# Patient Record
Sex: MALE | Race: BLACK OR AFRICAN AMERICAN | NOT HISPANIC OR LATINO | Employment: UNEMPLOYED | URBAN - METROPOLITAN AREA
[De-identification: names, ages, dates, MRNs, and addresses within clinical notes are randomized per-mention and may not be internally consistent; named-entity substitution may affect disease eponyms.]

---
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
## 2018-01-05 ENCOUNTER — COMMUNICATION - HEALTHEAST (OUTPATIENT)
Dept: FAMILY MEDICINE | Facility: CLINIC | Age: 18
End: 2018-01-05

## 2018-01-05 DIAGNOSIS — F06.30 MOOD DISORDER DUE TO A GENERAL MEDICAL CONDITION: ICD-10-CM

## 2018-01-28 ENCOUNTER — COMMUNICATION - HEALTHEAST (OUTPATIENT)
Dept: FAMILY MEDICINE | Facility: CLINIC | Age: 18
End: 2018-01-28

## 2018-01-28 DIAGNOSIS — F06.30 MOOD DISORDER DUE TO A GENERAL MEDICAL CONDITION: ICD-10-CM

## 2018-02-05 ENCOUNTER — COMMUNICATION - HEALTHEAST (OUTPATIENT)
Dept: FAMILY MEDICINE | Facility: CLINIC | Age: 18
End: 2018-02-05

## 2018-02-05 DIAGNOSIS — F06.30 MOOD DISORDER DUE TO A GENERAL MEDICAL CONDITION: ICD-10-CM

## 2018-02-14 ENCOUNTER — OFFICE VISIT - HEALTHEAST (OUTPATIENT)
Dept: FAMILY MEDICINE | Facility: CLINIC | Age: 18
End: 2018-02-14

## 2018-02-14 DIAGNOSIS — F06.30 MOOD DISORDER DUE TO A GENERAL MEDICAL CONDITION: ICD-10-CM

## 2018-02-14 DIAGNOSIS — F63.81 INTERMITTENT EXPLOSIVE DISORDER: ICD-10-CM

## 2018-02-14 DIAGNOSIS — E66.09 OBESITY DUE TO EXCESS CALORIES: ICD-10-CM

## 2018-02-14 ASSESSMENT — MIFFLIN-ST. JEOR: SCORE: 2353.44

## 2018-03-07 ENCOUNTER — COMMUNICATION - HEALTHEAST (OUTPATIENT)
Dept: FAMILY MEDICINE | Facility: CLINIC | Age: 18
End: 2018-03-07

## 2018-03-07 DIAGNOSIS — F06.30 MOOD DISORDER DUE TO A GENERAL MEDICAL CONDITION: ICD-10-CM

## 2018-04-07 ENCOUNTER — COMMUNICATION - HEALTHEAST (OUTPATIENT)
Dept: FAMILY MEDICINE | Facility: CLINIC | Age: 18
End: 2018-04-07

## 2018-04-07 DIAGNOSIS — F06.30 MOOD DISORDER DUE TO A GENERAL MEDICAL CONDITION: ICD-10-CM

## 2018-04-07 DIAGNOSIS — F63.81 INTERMITTENT EXPLOSIVE DISORDER: ICD-10-CM

## 2018-05-07 ENCOUNTER — COMMUNICATION - HEALTHEAST (OUTPATIENT)
Dept: FAMILY MEDICINE | Facility: CLINIC | Age: 18
End: 2018-05-07

## 2018-05-07 DIAGNOSIS — F06.30 MOOD DISORDER DUE TO A GENERAL MEDICAL CONDITION: ICD-10-CM

## 2018-05-07 DIAGNOSIS — F63.81 INTERMITTENT EXPLOSIVE DISORDER: ICD-10-CM

## 2018-05-16 ENCOUNTER — OFFICE VISIT - HEALTHEAST (OUTPATIENT)
Dept: BEHAVIORAL HEALTH | Facility: CLINIC | Age: 18
End: 2018-05-16

## 2018-05-16 DIAGNOSIS — F63.81 INTERMITTENT EXPLOSIVE DISORDER: ICD-10-CM

## 2018-05-16 DIAGNOSIS — F41.1 GENERALIZED ANXIETY DISORDER: ICD-10-CM

## 2018-06-02 ENCOUNTER — COMMUNICATION - HEALTHEAST (OUTPATIENT)
Dept: FAMILY MEDICINE | Facility: CLINIC | Age: 18
End: 2018-06-02

## 2018-06-02 DIAGNOSIS — F63.81 INTERMITTENT EXPLOSIVE DISORDER: ICD-10-CM

## 2018-06-02 DIAGNOSIS — F06.30 MOOD DISORDER DUE TO A GENERAL MEDICAL CONDITION: ICD-10-CM

## 2018-06-06 ENCOUNTER — COMMUNICATION - HEALTHEAST (OUTPATIENT)
Dept: BEHAVIORAL HEALTH | Facility: CLINIC | Age: 18
End: 2018-06-06

## 2018-06-06 ENCOUNTER — COMMUNICATION - HEALTHEAST (OUTPATIENT)
Dept: FAMILY MEDICINE | Facility: CLINIC | Age: 18
End: 2018-06-06

## 2018-06-06 DIAGNOSIS — D69.3 CHRONIC ITP (IDIOPATHIC THROMBOCYTOPENIA) (H): ICD-10-CM

## 2018-06-06 DIAGNOSIS — F06.30 MOOD DISORDER DUE TO A GENERAL MEDICAL CONDITION: ICD-10-CM

## 2018-06-06 DIAGNOSIS — F63.81 INTERMITTENT EXPLOSIVE DISORDER: ICD-10-CM

## 2018-06-07 ENCOUNTER — COMMUNICATION - HEALTHEAST (OUTPATIENT)
Dept: FAMILY MEDICINE | Facility: CLINIC | Age: 18
End: 2018-06-07

## 2018-06-07 DIAGNOSIS — F06.30 MOOD DISORDER DUE TO A GENERAL MEDICAL CONDITION: ICD-10-CM

## 2018-07-08 ENCOUNTER — COMMUNICATION - HEALTHEAST (OUTPATIENT)
Dept: FAMILY MEDICINE | Facility: CLINIC | Age: 18
End: 2018-07-08

## 2018-07-08 DIAGNOSIS — F63.81 INTERMITTENT EXPLOSIVE DISORDER: ICD-10-CM

## 2018-07-08 DIAGNOSIS — F06.30 MOOD DISORDER DUE TO A GENERAL MEDICAL CONDITION: ICD-10-CM

## 2018-07-09 ENCOUNTER — COMMUNICATION - HEALTHEAST (OUTPATIENT)
Dept: FAMILY MEDICINE | Facility: CLINIC | Age: 18
End: 2018-07-09

## 2018-07-09 DIAGNOSIS — F06.30 MOOD DISORDER DUE TO A GENERAL MEDICAL CONDITION: ICD-10-CM

## 2018-07-09 DIAGNOSIS — F63.81 INTERMITTENT EXPLOSIVE DISORDER: ICD-10-CM

## 2018-07-23 ENCOUNTER — OFFICE VISIT - HEALTHEAST (OUTPATIENT)
Dept: BEHAVIORAL HEALTH | Facility: CLINIC | Age: 18
End: 2018-07-23

## 2018-07-23 DIAGNOSIS — F06.30 MOOD DISORDER DUE TO A GENERAL MEDICAL CONDITION: ICD-10-CM

## 2018-07-30 ENCOUNTER — COMMUNICATION - HEALTHEAST (OUTPATIENT)
Dept: FAMILY MEDICINE | Facility: CLINIC | Age: 18
End: 2018-07-30

## 2018-07-30 DIAGNOSIS — F06.30 MOOD DISORDER DUE TO A GENERAL MEDICAL CONDITION: ICD-10-CM

## 2018-08-21 ENCOUNTER — COMMUNICATION - HEALTHEAST (OUTPATIENT)
Dept: FAMILY MEDICINE | Facility: CLINIC | Age: 18
End: 2018-08-21

## 2018-08-21 DIAGNOSIS — F63.81 INTERMITTENT EXPLOSIVE DISORDER: ICD-10-CM

## 2018-08-21 DIAGNOSIS — F06.30 MOOD DISORDER DUE TO A GENERAL MEDICAL CONDITION: ICD-10-CM

## 2018-09-14 ENCOUNTER — COMMUNICATION - HEALTHEAST (OUTPATIENT)
Dept: FAMILY MEDICINE | Facility: CLINIC | Age: 18
End: 2018-09-14

## 2018-09-14 DIAGNOSIS — F06.30 MOOD DISORDER DUE TO A GENERAL MEDICAL CONDITION: ICD-10-CM

## 2018-09-14 DIAGNOSIS — F63.81 INTERMITTENT EXPLOSIVE DISORDER: ICD-10-CM

## 2018-10-08 ENCOUNTER — COMMUNICATION - HEALTHEAST (OUTPATIENT)
Dept: FAMILY MEDICINE | Facility: CLINIC | Age: 18
End: 2018-10-08

## 2018-10-08 DIAGNOSIS — F63.81 INTERMITTENT EXPLOSIVE DISORDER: ICD-10-CM

## 2018-10-08 DIAGNOSIS — F06.30 MOOD DISORDER DUE TO A GENERAL MEDICAL CONDITION: ICD-10-CM

## 2018-11-04 ENCOUNTER — COMMUNICATION - HEALTHEAST (OUTPATIENT)
Dept: FAMILY MEDICINE | Facility: CLINIC | Age: 18
End: 2018-11-04

## 2018-11-04 DIAGNOSIS — F06.30 MOOD DISORDER DUE TO A GENERAL MEDICAL CONDITION: ICD-10-CM

## 2018-11-04 DIAGNOSIS — F63.81 INTERMITTENT EXPLOSIVE DISORDER: ICD-10-CM

## 2018-12-08 ENCOUNTER — COMMUNICATION - HEALTHEAST (OUTPATIENT)
Dept: FAMILY MEDICINE | Facility: CLINIC | Age: 18
End: 2018-12-08

## 2018-12-08 DIAGNOSIS — F06.30 MOOD DISORDER DUE TO A GENERAL MEDICAL CONDITION: ICD-10-CM

## 2018-12-08 DIAGNOSIS — F63.81 INTERMITTENT EXPLOSIVE DISORDER: ICD-10-CM

## 2019-01-31 ENCOUNTER — COMMUNICATION - HEALTHEAST (OUTPATIENT)
Dept: FAMILY MEDICINE | Facility: CLINIC | Age: 19
End: 2019-01-31

## 2019-01-31 DIAGNOSIS — F06.30 MOOD DISORDER DUE TO A GENERAL MEDICAL CONDITION: ICD-10-CM

## 2019-01-31 DIAGNOSIS — F63.81 INTERMITTENT EXPLOSIVE DISORDER: ICD-10-CM

## 2019-03-01 ENCOUNTER — COMMUNICATION - HEALTHEAST (OUTPATIENT)
Dept: FAMILY MEDICINE | Facility: CLINIC | Age: 19
End: 2019-03-01

## 2019-03-20 ENCOUNTER — OFFICE VISIT - HEALTHEAST (OUTPATIENT)
Dept: FAMILY MEDICINE | Facility: CLINIC | Age: 19
End: 2019-03-20

## 2019-03-20 DIAGNOSIS — E66.813 CLASS 3 SEVERE OBESITY DUE TO EXCESS CALORIES WITHOUT SERIOUS COMORBIDITY IN ADULT, UNSPECIFIED BMI (H): ICD-10-CM

## 2019-03-20 DIAGNOSIS — R46.89 AGGRESSIVE BEHAVIOR: ICD-10-CM

## 2019-03-20 DIAGNOSIS — F70 MENTAL RETARDATION, MILD (I.Q. 50-70): ICD-10-CM

## 2019-03-20 DIAGNOSIS — D69.3 CHRONIC ITP (IDIOPATHIC THROMBOCYTOPENIA) (H): ICD-10-CM

## 2019-03-20 DIAGNOSIS — Z79.899 ASSESSMENT OF EFFECTS OF PSYCHOTROPIC DRUG IN PATIENT AT RISK FOR METABOLIC SYNDROME: ICD-10-CM

## 2019-03-20 DIAGNOSIS — E66.01 CLASS 3 SEVERE OBESITY DUE TO EXCESS CALORIES WITHOUT SERIOUS COMORBIDITY IN ADULT, UNSPECIFIED BMI (H): ICD-10-CM

## 2019-03-20 DIAGNOSIS — F63.81 INTERMITTENT EXPLOSIVE DISORDER: ICD-10-CM

## 2019-03-20 DIAGNOSIS — Z01.89 ASSESSMENT OF EFFECTS OF PSYCHOTROPIC DRUG IN PATIENT AT RISK FOR METABOLIC SYNDROME: ICD-10-CM

## 2019-03-20 DIAGNOSIS — Z91.89 ASSESSMENT OF EFFECTS OF PSYCHOTROPIC DRUG IN PATIENT AT RISK FOR METABOLIC SYNDROME: ICD-10-CM

## 2019-03-20 DIAGNOSIS — Z90.81 POST-SPLENECTOMY: ICD-10-CM

## 2019-04-08 ENCOUNTER — COMMUNICATION - HEALTHEAST (OUTPATIENT)
Dept: FAMILY MEDICINE | Facility: CLINIC | Age: 19
End: 2019-04-08

## 2019-04-08 DIAGNOSIS — R73.03 PRE-DIABETES: ICD-10-CM

## 2019-04-08 DIAGNOSIS — D69.3 CHRONIC ITP (IDIOPATHIC THROMBOCYTOPENIA) (H): ICD-10-CM

## 2019-04-08 DIAGNOSIS — F06.30 MOOD DISORDER DUE TO A GENERAL MEDICAL CONDITION: ICD-10-CM

## 2019-04-08 DIAGNOSIS — F63.81 INTERMITTENT EXPLOSIVE DISORDER: ICD-10-CM

## 2019-04-12 ENCOUNTER — COMMUNICATION - HEALTHEAST (OUTPATIENT)
Dept: FAMILY MEDICINE | Facility: CLINIC | Age: 19
End: 2019-04-12

## 2019-04-12 DIAGNOSIS — E61.1 LOW IRON: ICD-10-CM

## 2019-04-15 ENCOUNTER — OFFICE VISIT - HEALTHEAST (OUTPATIENT)
Dept: BEHAVIORAL HEALTH | Facility: CLINIC | Age: 19
End: 2019-04-15

## 2019-04-15 ENCOUNTER — AMBULATORY - HEALTHEAST (OUTPATIENT)
Dept: FAMILY MEDICINE | Facility: CLINIC | Age: 19
End: 2019-04-15

## 2019-04-15 DIAGNOSIS — F63.81 INTERMITTENT EXPLOSIVE DISORDER: ICD-10-CM

## 2019-04-15 DIAGNOSIS — F40.01 PANIC DISORDER WITH AGORAPHOBIA: ICD-10-CM

## 2019-04-15 DIAGNOSIS — Z79.899 HIGH RISK MEDICATION USE: ICD-10-CM

## 2019-04-15 DIAGNOSIS — E61.1 IRON DEFICIENCY: ICD-10-CM

## 2019-04-15 DIAGNOSIS — F06.30 MOOD DISORDER DUE TO A GENERAL MEDICAL CONDITION: ICD-10-CM

## 2019-04-15 LAB
AMPHETAMINES UR QL SCN: ABNORMAL
BARBITURATES UR QL: ABNORMAL
BENZODIAZ UR QL: ABNORMAL
CANNABINOIDS UR QL SCN: ABNORMAL
COCAINE UR QL: ABNORMAL
CREAT UR-MCNC: 202.9 MG/DL
METHADONE UR QL SCN: ABNORMAL
OPIATES UR QL SCN: ABNORMAL
OXYCODONE UR QL: ABNORMAL
PCP UR QL SCN: ABNORMAL

## 2019-04-15 ASSESSMENT — MIFFLIN-ST. JEOR: SCORE: 2337.56

## 2019-04-16 ENCOUNTER — COMMUNICATION - HEALTHEAST (OUTPATIENT)
Dept: BEHAVIORAL HEALTH | Facility: CLINIC | Age: 19
End: 2019-04-16

## 2019-04-18 LAB — DRUGS UR SCN NOM: NORMAL

## 2019-04-19 ENCOUNTER — COMMUNICATION - HEALTHEAST (OUTPATIENT)
Dept: BEHAVIORAL HEALTH | Facility: CLINIC | Age: 19
End: 2019-04-19

## 2019-04-22 ENCOUNTER — OFFICE VISIT - HEALTHEAST (OUTPATIENT)
Dept: FAMILY MEDICINE | Facility: CLINIC | Age: 19
End: 2019-04-22

## 2019-04-22 DIAGNOSIS — F63.81 INTERMITTENT EXPLOSIVE DISORDER: ICD-10-CM

## 2019-04-22 DIAGNOSIS — D69.3 CHRONIC ITP (IDIOPATHIC THROMBOCYTOPENIA) (H): ICD-10-CM

## 2019-04-22 DIAGNOSIS — Z23 IMMUNIZATION DUE: ICD-10-CM

## 2019-04-22 DIAGNOSIS — Z90.81 POST-SPLENECTOMY: ICD-10-CM

## 2019-04-24 ENCOUNTER — COMMUNICATION - HEALTHEAST (OUTPATIENT)
Dept: BEHAVIORAL HEALTH | Facility: CLINIC | Age: 19
End: 2019-04-24

## 2019-04-25 ENCOUNTER — COMMUNICATION - HEALTHEAST (OUTPATIENT)
Dept: BEHAVIORAL HEALTH | Facility: CLINIC | Age: 19
End: 2019-04-25

## 2019-04-25 ENCOUNTER — OFFICE VISIT - HEALTHEAST (OUTPATIENT)
Dept: BEHAVIORAL HEALTH | Facility: CLINIC | Age: 19
End: 2019-04-25

## 2019-04-25 DIAGNOSIS — F63.81 INTERMITTENT EXPLOSIVE DISORDER: ICD-10-CM

## 2019-04-25 DIAGNOSIS — F40.01 PANIC DISORDER WITH AGORAPHOBIA: ICD-10-CM

## 2019-04-25 DIAGNOSIS — F06.30 MOOD DISORDER DUE TO A GENERAL MEDICAL CONDITION: ICD-10-CM

## 2019-04-25 ASSESSMENT — MIFFLIN-ST. JEOR: SCORE: 2292.21

## 2019-05-01 ENCOUNTER — OFFICE VISIT - HEALTHEAST (OUTPATIENT)
Dept: BEHAVIORAL HEALTH | Facility: CLINIC | Age: 19
End: 2019-05-01

## 2019-05-01 ENCOUNTER — AMBULATORY - HEALTHEAST (OUTPATIENT)
Dept: BEHAVIORAL HEALTH | Facility: CLINIC | Age: 19
End: 2019-05-01

## 2019-05-01 DIAGNOSIS — F63.81 INTERMITTENT EXPLOSIVE DISORDER: ICD-10-CM

## 2019-05-01 DIAGNOSIS — F06.30 MOOD DISORDER DUE TO A GENERAL MEDICAL CONDITION: ICD-10-CM

## 2019-05-01 ASSESSMENT — MIFFLIN-ST. JEOR: SCORE: 2296.74

## 2019-05-07 ENCOUNTER — AMBULATORY - HEALTHEAST (OUTPATIENT)
Dept: NURSING | Facility: CLINIC | Age: 19
End: 2019-05-07

## 2019-05-07 ENCOUNTER — COMMUNICATION - HEALTHEAST (OUTPATIENT)
Dept: FAMILY MEDICINE | Facility: CLINIC | Age: 19
End: 2019-05-07

## 2019-05-07 DIAGNOSIS — D69.3 CHRONIC ITP (IDIOPATHIC THROMBOCYTOPENIA) (H): ICD-10-CM

## 2019-05-07 DIAGNOSIS — Z23 IMMUNIZATION DUE: ICD-10-CM

## 2019-05-07 DIAGNOSIS — Z90.81 POST-SPLENECTOMY: ICD-10-CM

## 2019-05-08 ENCOUNTER — COMMUNICATION - HEALTHEAST (OUTPATIENT)
Dept: BEHAVIORAL HEALTH | Facility: CLINIC | Age: 19
End: 2019-05-08

## 2019-05-08 ENCOUNTER — AMBULATORY - HEALTHEAST (OUTPATIENT)
Dept: BEHAVIORAL HEALTH | Facility: CLINIC | Age: 19
End: 2019-05-08

## 2019-05-08 DIAGNOSIS — Z79.899 HIGH RISK MEDICATION USE: ICD-10-CM

## 2019-05-08 DIAGNOSIS — F63.81 INTERMITTENT EXPLOSIVE DISORDER: ICD-10-CM

## 2019-05-08 LAB
AMPHETAMINES UR QL SCN: NORMAL
BARBITURATES UR QL: NORMAL
BENZODIAZ UR QL: NORMAL
CANNABINOIDS UR QL SCN: NORMAL
COCAINE UR QL: NORMAL
CREAT UR-MCNC: 295.3 MG/DL
METHADONE UR QL SCN: NORMAL
OPIATES UR QL SCN: NORMAL
OXYCODONE UR QL: NORMAL
PCP UR QL SCN: NORMAL

## 2019-05-08 ASSESSMENT — MIFFLIN-ST. JEOR: SCORE: 2264.99

## 2019-05-09 ENCOUNTER — OFFICE VISIT - HEALTHEAST (OUTPATIENT)
Dept: BEHAVIORAL HEALTH | Facility: CLINIC | Age: 19
End: 2019-05-09

## 2019-05-09 ENCOUNTER — COMMUNICATION - HEALTHEAST (OUTPATIENT)
Dept: BEHAVIORAL HEALTH | Facility: CLINIC | Age: 19
End: 2019-05-09

## 2019-05-09 DIAGNOSIS — F63.81 INTERMITTENT EXPLOSIVE DISORDER: ICD-10-CM

## 2019-05-22 ENCOUNTER — RECORDS - HEALTHEAST (OUTPATIENT)
Dept: ADMINISTRATIVE | Facility: OTHER | Age: 19
End: 2019-05-22

## 2019-05-30 ENCOUNTER — COMMUNICATION - HEALTHEAST (OUTPATIENT)
Dept: BEHAVIORAL HEALTH | Facility: CLINIC | Age: 19
End: 2019-05-30

## 2019-05-30 DIAGNOSIS — F40.01 PANIC DISORDER WITH AGORAPHOBIA: ICD-10-CM

## 2019-06-03 ENCOUNTER — COMMUNICATION - HEALTHEAST (OUTPATIENT)
Dept: BEHAVIORAL HEALTH | Facility: CLINIC | Age: 19
End: 2019-06-03

## 2019-06-03 DIAGNOSIS — F40.01 PANIC DISORDER WITH AGORAPHOBIA: ICD-10-CM

## 2019-06-04 ENCOUNTER — COMMUNICATION - HEALTHEAST (OUTPATIENT)
Dept: FAMILY MEDICINE | Facility: CLINIC | Age: 19
End: 2019-06-04

## 2019-06-11 ENCOUNTER — COMMUNICATION - HEALTHEAST (OUTPATIENT)
Dept: BEHAVIORAL HEALTH | Facility: CLINIC | Age: 19
End: 2019-06-11

## 2019-06-17 ENCOUNTER — COMMUNICATION - HEALTHEAST (OUTPATIENT)
Dept: BEHAVIORAL HEALTH | Facility: CLINIC | Age: 19
End: 2019-06-17

## 2019-06-26 ENCOUNTER — COMMUNICATION - HEALTHEAST (OUTPATIENT)
Dept: BEHAVIORAL HEALTH | Facility: CLINIC | Age: 19
End: 2019-06-26

## 2019-06-26 DIAGNOSIS — F40.01 PANIC DISORDER WITH AGORAPHOBIA: ICD-10-CM

## 2019-06-26 DIAGNOSIS — F63.81 INTERMITTENT EXPLOSIVE DISORDER: ICD-10-CM

## 2019-06-26 DIAGNOSIS — F06.30 MOOD DISORDER DUE TO A GENERAL MEDICAL CONDITION: ICD-10-CM

## 2019-07-01 ENCOUNTER — COMMUNICATION - HEALTHEAST (OUTPATIENT)
Dept: BEHAVIORAL HEALTH | Facility: CLINIC | Age: 19
End: 2019-07-01

## 2019-07-01 DIAGNOSIS — F06.30 MOOD DISORDER DUE TO A GENERAL MEDICAL CONDITION: ICD-10-CM

## 2019-07-01 DIAGNOSIS — F63.81 INTERMITTENT EXPLOSIVE DISORDER: ICD-10-CM

## 2019-07-02 ENCOUNTER — OFFICE VISIT - HEALTHEAST (OUTPATIENT)
Dept: BEHAVIORAL HEALTH | Facility: CLINIC | Age: 19
End: 2019-07-02

## 2019-07-02 DIAGNOSIS — F63.81 INTERMITTENT EXPLOSIVE DISORDER: ICD-10-CM

## 2019-07-02 DIAGNOSIS — F06.30 MOOD DISORDER DUE TO A GENERAL MEDICAL CONDITION: ICD-10-CM

## 2019-07-02 ASSESSMENT — MIFFLIN-ST. JEOR: SCORE: 2246.85

## 2019-07-17 ENCOUNTER — OFFICE VISIT - HEALTHEAST (OUTPATIENT)
Dept: BEHAVIORAL HEALTH | Facility: CLINIC | Age: 19
End: 2019-07-17

## 2019-07-17 DIAGNOSIS — F63.81 INTERMITTENT EXPLOSIVE DISORDER: ICD-10-CM

## 2019-07-17 DIAGNOSIS — F06.30 MOOD DISORDER DUE TO A GENERAL MEDICAL CONDITION: ICD-10-CM

## 2019-07-17 DIAGNOSIS — F40.01 PANIC DISORDER WITH AGORAPHOBIA: ICD-10-CM

## 2019-07-17 ASSESSMENT — MIFFLIN-ST. JEOR: SCORE: 2260.45

## 2019-07-25 ENCOUNTER — COMMUNICATION - HEALTHEAST (OUTPATIENT)
Dept: BEHAVIORAL HEALTH | Facility: CLINIC | Age: 19
End: 2019-07-25

## 2019-07-25 DIAGNOSIS — F40.01 PANIC DISORDER WITH AGORAPHOBIA: ICD-10-CM

## 2019-07-26 ENCOUNTER — RECORDS - HEALTHEAST (OUTPATIENT)
Dept: ADMINISTRATIVE | Facility: OTHER | Age: 19
End: 2019-07-26

## 2019-08-01 ENCOUNTER — COMMUNICATION - HEALTHEAST (OUTPATIENT)
Dept: BEHAVIORAL HEALTH | Facility: CLINIC | Age: 19
End: 2019-08-01

## 2019-08-01 ENCOUNTER — RECORDS - HEALTHEAST (OUTPATIENT)
Dept: ADMINISTRATIVE | Facility: OTHER | Age: 19
End: 2019-08-01

## 2019-08-01 DIAGNOSIS — F40.01 PANIC DISORDER WITH AGORAPHOBIA: ICD-10-CM

## 2019-08-01 DIAGNOSIS — F63.81 INTERMITTENT EXPLOSIVE DISORDER: ICD-10-CM

## 2019-08-12 ENCOUNTER — COMMUNICATION - HEALTHEAST (OUTPATIENT)
Dept: BEHAVIORAL HEALTH | Facility: CLINIC | Age: 19
End: 2019-08-12

## 2019-08-12 DIAGNOSIS — F06.30 MOOD DISORDER DUE TO A GENERAL MEDICAL CONDITION: ICD-10-CM

## 2019-08-16 ENCOUNTER — OFFICE VISIT - HEALTHEAST (OUTPATIENT)
Dept: FAMILY MEDICINE | Facility: CLINIC | Age: 19
End: 2019-08-16

## 2019-08-16 DIAGNOSIS — E66.01 CLASS 3 SEVERE OBESITY DUE TO EXCESS CALORIES WITHOUT SERIOUS COMORBIDITY IN ADULT, UNSPECIFIED BMI (H): ICD-10-CM

## 2019-08-16 DIAGNOSIS — Z90.81 POST-SPLENECTOMY: ICD-10-CM

## 2019-08-16 DIAGNOSIS — E66.813 CLASS 3 SEVERE OBESITY DUE TO EXCESS CALORIES WITHOUT SERIOUS COMORBIDITY IN ADULT, UNSPECIFIED BMI (H): ICD-10-CM

## 2019-08-16 DIAGNOSIS — D69.3 CHRONIC ITP (IDIOPATHIC THROMBOCYTOPENIA) (H): ICD-10-CM

## 2019-08-16 DIAGNOSIS — F20.3 UNDIFFERENTIATED SCHIZOPHRENIA (H): ICD-10-CM

## 2019-08-16 ASSESSMENT — MIFFLIN-ST. JEOR: SCORE: 2264.99

## 2019-08-19 ENCOUNTER — COMMUNICATION - HEALTHEAST (OUTPATIENT)
Dept: BEHAVIORAL HEALTH | Facility: CLINIC | Age: 19
End: 2019-08-19

## 2019-08-20 ENCOUNTER — OFFICE VISIT - HEALTHEAST (OUTPATIENT)
Dept: BEHAVIORAL HEALTH | Facility: CLINIC | Age: 19
End: 2019-08-20

## 2019-08-20 DIAGNOSIS — F63.81 INTERMITTENT EXPLOSIVE DISORDER: ICD-10-CM

## 2019-08-20 DIAGNOSIS — F40.01 PANIC DISORDER WITH AGORAPHOBIA: ICD-10-CM

## 2019-08-20 DIAGNOSIS — F06.30 MOOD DISORDER DUE TO A GENERAL MEDICAL CONDITION: ICD-10-CM

## 2019-08-20 ASSESSMENT — MIFFLIN-ST. JEOR: SCORE: 2237.78

## 2019-08-30 ENCOUNTER — COMMUNICATION - HEALTHEAST (OUTPATIENT)
Dept: BEHAVIORAL HEALTH | Facility: CLINIC | Age: 19
End: 2019-08-30

## 2019-09-18 ENCOUNTER — OFFICE VISIT - HEALTHEAST (OUTPATIENT)
Dept: BEHAVIORAL HEALTH | Facility: CLINIC | Age: 19
End: 2019-09-18

## 2019-09-18 DIAGNOSIS — F63.81 INTERMITTENT EXPLOSIVE DISORDER: ICD-10-CM

## 2019-09-18 DIAGNOSIS — F06.30 MOOD DISORDER DUE TO A GENERAL MEDICAL CONDITION: ICD-10-CM

## 2019-09-18 ASSESSMENT — MIFFLIN-ST. JEOR: SCORE: 2287.67

## 2019-10-29 ENCOUNTER — OFFICE VISIT - HEALTHEAST (OUTPATIENT)
Dept: BEHAVIORAL HEALTH | Facility: CLINIC | Age: 19
End: 2019-10-29

## 2019-10-29 DIAGNOSIS — F63.81 INTERMITTENT EXPLOSIVE DISORDER: ICD-10-CM

## 2019-10-29 DIAGNOSIS — F40.01 PANIC DISORDER WITH AGORAPHOBIA: ICD-10-CM

## 2019-10-29 DIAGNOSIS — F06.30 MOOD DISORDER DUE TO A GENERAL MEDICAL CONDITION: ICD-10-CM

## 2019-10-29 ASSESSMENT — MIFFLIN-ST. JEOR: SCORE: 2274.06

## 2019-10-31 ENCOUNTER — OFFICE VISIT - HEALTHEAST (OUTPATIENT)
Dept: FAMILY MEDICINE | Facility: CLINIC | Age: 19
End: 2019-10-31

## 2019-10-31 DIAGNOSIS — R46.89 AGGRESSIVE BEHAVIOR: ICD-10-CM

## 2019-10-31 DIAGNOSIS — F79 INTELLECTUAL DISABILITY: ICD-10-CM

## 2019-10-31 DIAGNOSIS — D69.3 CHRONIC ITP (IDIOPATHIC THROMBOCYTOPENIA) (H): ICD-10-CM

## 2019-10-31 DIAGNOSIS — F20.3 UNDIFFERENTIATED SCHIZOPHRENIA (H): ICD-10-CM

## 2019-10-31 DIAGNOSIS — F63.81 INTERMITTENT EXPLOSIVE DISORDER: ICD-10-CM

## 2019-10-31 ASSESSMENT — MIFFLIN-ST. JEOR: SCORE: 2255.92

## 2019-12-17 ENCOUNTER — OFFICE VISIT - HEALTHEAST (OUTPATIENT)
Dept: BEHAVIORAL HEALTH | Facility: CLINIC | Age: 19
End: 2019-12-17

## 2019-12-17 DIAGNOSIS — F63.81 INTERMITTENT EXPLOSIVE DISORDER: ICD-10-CM

## 2019-12-17 DIAGNOSIS — F40.01 PANIC DISORDER WITH AGORAPHOBIA: ICD-10-CM

## 2019-12-17 DIAGNOSIS — F06.30 MOOD DISORDER DUE TO A GENERAL MEDICAL CONDITION: ICD-10-CM

## 2019-12-17 ASSESSMENT — MIFFLIN-ST. JEOR: SCORE: 2246.85

## 2020-01-15 ENCOUNTER — OFFICE VISIT - HEALTHEAST (OUTPATIENT)
Dept: BEHAVIORAL HEALTH | Facility: CLINIC | Age: 20
End: 2020-01-15

## 2020-01-15 DIAGNOSIS — F40.01 PANIC DISORDER WITH AGORAPHOBIA: ICD-10-CM

## 2020-01-15 ASSESSMENT — MIFFLIN-ST. JEOR: SCORE: 2201.49

## 2020-02-03 ENCOUNTER — COMMUNICATION - HEALTHEAST (OUTPATIENT)
Dept: BEHAVIORAL HEALTH | Facility: CLINIC | Age: 20
End: 2020-02-03

## 2020-02-03 DIAGNOSIS — F63.81 INTERMITTENT EXPLOSIVE DISORDER: ICD-10-CM

## 2020-02-03 DIAGNOSIS — F06.30 MOOD DISORDER DUE TO A GENERAL MEDICAL CONDITION: ICD-10-CM

## 2020-02-12 ENCOUNTER — OFFICE VISIT - HEALTHEAST (OUTPATIENT)
Dept: BEHAVIORAL HEALTH | Facility: CLINIC | Age: 20
End: 2020-02-12

## 2020-02-12 ENCOUNTER — COMMUNICATION - HEALTHEAST (OUTPATIENT)
Dept: BEHAVIORAL HEALTH | Facility: CLINIC | Age: 20
End: 2020-02-12

## 2020-02-12 DIAGNOSIS — F63.81 INTERMITTENT EXPLOSIVE DISORDER: ICD-10-CM

## 2020-02-12 DIAGNOSIS — F40.01 PANIC DISORDER WITH AGORAPHOBIA: ICD-10-CM

## 2020-02-12 DIAGNOSIS — F06.30 MOOD DISORDER DUE TO A GENERAL MEDICAL CONDITION: ICD-10-CM

## 2020-02-12 ASSESSMENT — MIFFLIN-ST. JEOR: SCORE: 2219.63

## 2020-03-03 ENCOUNTER — COMMUNICATION - HEALTHEAST (OUTPATIENT)
Dept: FAMILY MEDICINE | Facility: CLINIC | Age: 20
End: 2020-03-03

## 2020-03-24 ENCOUNTER — OFFICE VISIT - HEALTHEAST (OUTPATIENT)
Dept: BEHAVIORAL HEALTH | Facility: CLINIC | Age: 20
End: 2020-03-24

## 2020-03-24 DIAGNOSIS — F41.1 GENERALIZED ANXIETY DISORDER: ICD-10-CM

## 2020-03-24 DIAGNOSIS — F06.30 MOOD DISORDER DUE TO A GENERAL MEDICAL CONDITION: ICD-10-CM

## 2020-03-24 DIAGNOSIS — R46.89 AGGRESSIVE BEHAVIOR: ICD-10-CM

## 2020-03-24 DIAGNOSIS — F40.01 PANIC DISORDER WITH AGORAPHOBIA: ICD-10-CM

## 2020-03-24 DIAGNOSIS — F63.81 INTERMITTENT EXPLOSIVE DISORDER: ICD-10-CM

## 2020-04-22 ENCOUNTER — OFFICE VISIT - HEALTHEAST (OUTPATIENT)
Dept: BEHAVIORAL HEALTH | Facility: CLINIC | Age: 20
End: 2020-04-22

## 2020-04-22 DIAGNOSIS — F06.30 MOOD DISORDER DUE TO A GENERAL MEDICAL CONDITION: ICD-10-CM

## 2020-04-22 DIAGNOSIS — F63.81 INTERMITTENT EXPLOSIVE DISORDER: ICD-10-CM

## 2020-05-13 ENCOUNTER — COMMUNICATION - HEALTHEAST (OUTPATIENT)
Dept: BEHAVIORAL HEALTH | Facility: CLINIC | Age: 20
End: 2020-05-13

## 2020-05-13 DIAGNOSIS — F40.01 PANIC DISORDER WITH AGORAPHOBIA: ICD-10-CM

## 2020-05-27 ENCOUNTER — OFFICE VISIT - HEALTHEAST (OUTPATIENT)
Dept: BEHAVIORAL HEALTH | Facility: CLINIC | Age: 20
End: 2020-05-27

## 2020-05-27 DIAGNOSIS — F40.01 PANIC DISORDER WITH AGORAPHOBIA: ICD-10-CM

## 2020-05-27 DIAGNOSIS — F06.30 MOOD DISORDER DUE TO A GENERAL MEDICAL CONDITION: ICD-10-CM

## 2020-05-27 DIAGNOSIS — F63.81 INTERMITTENT EXPLOSIVE DISORDER: ICD-10-CM

## 2020-06-09 ENCOUNTER — OFFICE VISIT - HEALTHEAST (OUTPATIENT)
Dept: BEHAVIORAL HEALTH | Facility: CLINIC | Age: 20
End: 2020-06-09

## 2020-06-09 DIAGNOSIS — F63.81 INTERMITTENT EXPLOSIVE DISORDER: ICD-10-CM

## 2020-06-09 DIAGNOSIS — F06.30 MOOD DISORDER DUE TO A GENERAL MEDICAL CONDITION: ICD-10-CM

## 2020-06-09 DIAGNOSIS — F40.01 PANIC DISORDER WITH AGORAPHOBIA: ICD-10-CM

## 2020-06-09 ASSESSMENT — MIFFLIN-ST. JEOR: SCORE: 2242.31

## 2020-06-12 ENCOUNTER — COMMUNICATION - HEALTHEAST (OUTPATIENT)
Dept: FAMILY MEDICINE | Facility: CLINIC | Age: 20
End: 2020-06-12

## 2020-06-12 DIAGNOSIS — T78.40XS ALLERGIC STATE, SEQUELA: ICD-10-CM

## 2020-06-24 ENCOUNTER — COMMUNICATION - HEALTHEAST (OUTPATIENT)
Dept: BEHAVIORAL HEALTH | Facility: CLINIC | Age: 20
End: 2020-06-24

## 2020-07-15 ENCOUNTER — COMMUNICATION - HEALTHEAST (OUTPATIENT)
Dept: BEHAVIORAL HEALTH | Facility: CLINIC | Age: 20
End: 2020-07-15

## 2020-07-15 ENCOUNTER — OFFICE VISIT - HEALTHEAST (OUTPATIENT)
Dept: BEHAVIORAL HEALTH | Facility: CLINIC | Age: 20
End: 2020-07-15

## 2020-07-15 DIAGNOSIS — F06.30 MOOD DISORDER DUE TO A GENERAL MEDICAL CONDITION: ICD-10-CM

## 2020-07-15 DIAGNOSIS — F63.81 INTERMITTENT EXPLOSIVE DISORDER: ICD-10-CM

## 2020-07-31 ENCOUNTER — COMMUNICATION - HEALTHEAST (OUTPATIENT)
Dept: BEHAVIORAL HEALTH | Facility: CLINIC | Age: 20
End: 2020-07-31

## 2020-07-31 ENCOUNTER — AMBULATORY - HEALTHEAST (OUTPATIENT)
Dept: FAMILY MEDICINE | Facility: CLINIC | Age: 20
End: 2020-07-31

## 2020-07-31 ENCOUNTER — COMMUNICATION - HEALTHEAST (OUTPATIENT)
Dept: FAMILY MEDICINE | Facility: CLINIC | Age: 20
End: 2020-07-31

## 2020-07-31 DIAGNOSIS — R73.03 PRE-DIABETES: ICD-10-CM

## 2020-07-31 DIAGNOSIS — E61.1 LOW IRON: ICD-10-CM

## 2020-07-31 DIAGNOSIS — F06.30 MOOD DISORDER DUE TO A GENERAL MEDICAL CONDITION: ICD-10-CM

## 2020-08-04 ENCOUNTER — COMMUNICATION - HEALTHEAST (OUTPATIENT)
Dept: BEHAVIORAL HEALTH | Facility: CLINIC | Age: 20
End: 2020-08-04

## 2020-08-04 DIAGNOSIS — F40.01 PANIC DISORDER WITH AGORAPHOBIA: ICD-10-CM

## 2020-08-07 ENCOUNTER — COMMUNICATION - HEALTHEAST (OUTPATIENT)
Dept: BEHAVIORAL HEALTH | Facility: HOSPITAL | Age: 20
End: 2020-08-07

## 2020-08-13 ENCOUNTER — COMMUNICATION - HEALTHEAST (OUTPATIENT)
Dept: BEHAVIORAL HEALTH | Facility: HOSPITAL | Age: 20
End: 2020-08-13

## 2020-08-13 ENCOUNTER — AMBULATORY - HEALTHEAST (OUTPATIENT)
Dept: BEHAVIORAL HEALTH | Facility: CLINIC | Age: 20
End: 2020-08-13

## 2020-08-13 DIAGNOSIS — F06.30 MOOD DISORDER DUE TO A GENERAL MEDICAL CONDITION: ICD-10-CM

## 2020-08-13 DIAGNOSIS — F63.81 INTERMITTENT EXPLOSIVE DISORDER: ICD-10-CM

## 2020-08-13 DIAGNOSIS — F40.01 PANIC DISORDER WITH AGORAPHOBIA: ICD-10-CM

## 2020-08-14 ENCOUNTER — COMMUNICATION - HEALTHEAST (OUTPATIENT)
Dept: BEHAVIORAL HEALTH | Facility: CLINIC | Age: 20
End: 2020-08-14

## 2020-08-14 ENCOUNTER — AMBULATORY - HEALTHEAST (OUTPATIENT)
Dept: BEHAVIORAL HEALTH | Facility: CLINIC | Age: 20
End: 2020-08-14

## 2020-08-15 ENCOUNTER — COMMUNICATION - HEALTHEAST (OUTPATIENT)
Dept: SCHEDULING | Facility: CLINIC | Age: 20
End: 2020-08-15

## 2020-08-16 ENCOUNTER — COMMUNICATION - HEALTHEAST (OUTPATIENT)
Dept: BEHAVIORAL HEALTH | Facility: CLINIC | Age: 20
End: 2020-08-16

## 2020-08-16 DIAGNOSIS — F06.30 MOOD DISORDER DUE TO A GENERAL MEDICAL CONDITION: ICD-10-CM

## 2020-08-16 DIAGNOSIS — F63.81 INTERMITTENT EXPLOSIVE DISORDER: ICD-10-CM

## 2020-08-17 ENCOUNTER — COMMUNICATION - HEALTHEAST (OUTPATIENT)
Dept: BEHAVIORAL HEALTH | Facility: CLINIC | Age: 20
End: 2020-08-17

## 2020-08-24 ENCOUNTER — AMBULATORY - HEALTHEAST (OUTPATIENT)
Dept: BEHAVIORAL HEALTH | Facility: CLINIC | Age: 20
End: 2020-08-24

## 2020-08-29 ENCOUNTER — COMMUNICATION - HEALTHEAST (OUTPATIENT)
Dept: SCHEDULING | Facility: CLINIC | Age: 20
End: 2020-08-29

## 2020-09-03 ENCOUNTER — COMMUNICATION - HEALTHEAST (OUTPATIENT)
Dept: BEHAVIORAL HEALTH | Facility: CLINIC | Age: 20
End: 2020-09-03

## 2020-09-03 DIAGNOSIS — F40.01 PANIC DISORDER WITH AGORAPHOBIA: ICD-10-CM

## 2020-09-03 DIAGNOSIS — F63.81 INTERMITTENT EXPLOSIVE DISORDER: ICD-10-CM

## 2020-09-03 DIAGNOSIS — F06.30 MOOD DISORDER DUE TO A GENERAL MEDICAL CONDITION: ICD-10-CM

## 2020-09-04 ENCOUNTER — COMMUNICATION - HEALTHEAST (OUTPATIENT)
Dept: BEHAVIORAL HEALTH | Facility: CLINIC | Age: 20
End: 2020-09-04

## 2020-09-04 ENCOUNTER — COMMUNICATION - HEALTHEAST (OUTPATIENT)
Dept: NURSING | Facility: CLINIC | Age: 20
End: 2020-09-04

## 2020-09-04 DIAGNOSIS — F40.01 PANIC DISORDER WITH AGORAPHOBIA: ICD-10-CM

## 2020-09-04 DIAGNOSIS — F63.81 INTERMITTENT EXPLOSIVE DISORDER: ICD-10-CM

## 2020-09-04 DIAGNOSIS — F06.30 MOOD DISORDER DUE TO A GENERAL MEDICAL CONDITION: ICD-10-CM

## 2020-09-11 ENCOUNTER — COMMUNICATION - HEALTHEAST (OUTPATIENT)
Dept: BEHAVIORAL HEALTH | Facility: CLINIC | Age: 20
End: 2020-09-11

## 2020-09-11 DIAGNOSIS — F39 MOOD DISORDER (H): ICD-10-CM

## 2020-09-15 ENCOUNTER — OFFICE VISIT - HEALTHEAST (OUTPATIENT)
Dept: FAMILY MEDICINE | Facility: CLINIC | Age: 20
End: 2020-09-15

## 2020-09-15 DIAGNOSIS — F17.200 CURRENT SMOKER: ICD-10-CM

## 2020-09-15 DIAGNOSIS — Z09 HOSPITAL DISCHARGE FOLLOW-UP: ICD-10-CM

## 2020-09-15 DIAGNOSIS — D69.3 CHRONIC IDIOPATHIC THROMBOCYTOPENIA (H): ICD-10-CM

## 2020-09-15 LAB
ERYTHROCYTE [DISTWIDTH] IN BLOOD BY AUTOMATED COUNT: 12.7 % (ref 11–14.5)
HCT VFR BLD AUTO: 40 % (ref 40–54)
HGB BLD-MCNC: 13 G/DL (ref 14–18)
MCH RBC QN AUTO: 28.5 PG (ref 27–34)
MCHC RBC AUTO-ENTMCNC: 32.4 G/DL (ref 32–36)
MCV RBC AUTO: 88 FL (ref 80–100)
PLATELET # BLD AUTO: 118 THOU/UL (ref 140–440)
PMV BLD AUTO: 9.9 FL (ref 7–10)
RBC # BLD AUTO: 4.55 MILL/UL (ref 4.4–6.2)
WBC: 8.9 THOU/UL (ref 4–11)

## 2020-09-15 ASSESSMENT — PATIENT HEALTH QUESTIONNAIRE - PHQ9: SUM OF ALL RESPONSES TO PHQ QUESTIONS 1-9: 11

## 2020-09-15 ASSESSMENT — MIFFLIN-ST. JEOR: SCORE: 2169.73

## 2020-09-16 ENCOUNTER — AMBULATORY - HEALTHEAST (OUTPATIENT)
Dept: BEHAVIORAL HEALTH | Facility: CLINIC | Age: 20
End: 2020-09-16

## 2020-09-17 ENCOUNTER — AMBULATORY - HEALTHEAST (OUTPATIENT)
Dept: BEHAVIORAL HEALTH | Facility: CLINIC | Age: 20
End: 2020-09-17

## 2020-09-18 ENCOUNTER — COMMUNICATION - HEALTHEAST (OUTPATIENT)
Dept: SCHEDULING | Facility: CLINIC | Age: 20
End: 2020-09-18

## 2020-09-22 ENCOUNTER — AMBULATORY - HEALTHEAST (OUTPATIENT)
Dept: ONCOLOGY | Facility: HOSPITAL | Age: 20
End: 2020-09-22

## 2020-09-22 ENCOUNTER — AMBULATORY - HEALTHEAST (OUTPATIENT)
Dept: INFUSION THERAPY | Facility: HOSPITAL | Age: 20
End: 2020-09-22

## 2020-09-22 ENCOUNTER — OFFICE VISIT - HEALTHEAST (OUTPATIENT)
Dept: ONCOLOGY | Facility: HOSPITAL | Age: 20
End: 2020-09-22

## 2020-09-22 DIAGNOSIS — D69.3 CHRONIC ITP (IDIOPATHIC THROMBOCYTOPENIA) (H): ICD-10-CM

## 2020-09-22 LAB
BASOPHILS # BLD AUTO: 0.1 THOU/UL (ref 0–0.2)
BASOPHILS NFR BLD AUTO: 0 % (ref 0–2)
EOSINOPHIL # BLD AUTO: 0.3 THOU/UL (ref 0–0.4)
EOSINOPHIL NFR BLD AUTO: 2 % (ref 0–6)
ERYTHROCYTE [DISTWIDTH] IN BLOOD BY AUTOMATED COUNT: 15.7 % (ref 11–14.5)
HCT VFR BLD AUTO: 40.2 % (ref 40–54)
HGB BLD-MCNC: 13.2 G/DL (ref 14–18)
IMM GRANULOCYTES # BLD: 0 THOU/UL
IMM GRANULOCYTES NFR BLD: 0 %
LYMPHOCYTES # BLD AUTO: 2.7 THOU/UL (ref 0.8–4.4)
LYMPHOCYTES NFR BLD AUTO: 23 % (ref 20–40)
MCH RBC QN AUTO: 28.5 PG (ref 27–34)
MCHC RBC AUTO-ENTMCNC: 32.8 G/DL (ref 32–36)
MCV RBC AUTO: 87 FL (ref 80–100)
MONOCYTES # BLD AUTO: 0.8 THOU/UL (ref 0–0.9)
MONOCYTES NFR BLD AUTO: 7 % (ref 2–10)
NEUTROPHILS # BLD AUTO: 8.1 THOU/UL (ref 2–7.7)
NEUTROPHILS NFR BLD AUTO: 68 % (ref 50–70)
PLATELET # BLD AUTO: 133 THOU/UL (ref 140–440)
PMV BLD AUTO: 13.3 FL (ref 8.5–12.5)
RBC # BLD AUTO: 4.63 MILL/UL (ref 4.4–6.2)
WBC: 11.9 THOU/UL (ref 4–11)

## 2020-09-22 ASSESSMENT — MIFFLIN-ST. JEOR: SCORE: 2154.76

## 2020-09-25 ENCOUNTER — COMMUNICATION - HEALTHEAST (OUTPATIENT)
Dept: BEHAVIORAL HEALTH | Facility: CLINIC | Age: 20
End: 2020-09-25

## 2020-09-25 DIAGNOSIS — F40.01 PANIC DISORDER WITH AGORAPHOBIA: ICD-10-CM

## 2020-09-25 DIAGNOSIS — F06.30 MOOD DISORDER DUE TO A GENERAL MEDICAL CONDITION: ICD-10-CM

## 2020-09-25 DIAGNOSIS — F63.81 INTERMITTENT EXPLOSIVE DISORDER: ICD-10-CM

## 2020-09-25 DIAGNOSIS — F39 MOOD DISORDER (H): ICD-10-CM

## 2020-09-30 ENCOUNTER — COMMUNICATION - HEALTHEAST (OUTPATIENT)
Dept: BEHAVIORAL HEALTH | Facility: CLINIC | Age: 20
End: 2020-09-30

## 2020-10-13 ENCOUNTER — COMMUNICATION - HEALTHEAST (OUTPATIENT)
Dept: SCHEDULING | Facility: CLINIC | Age: 20
End: 2020-10-13

## 2020-10-19 ENCOUNTER — OFFICE VISIT - HEALTHEAST (OUTPATIENT)
Dept: BEHAVIORAL HEALTH | Facility: CLINIC | Age: 20
End: 2020-10-19

## 2020-10-19 DIAGNOSIS — G47.00 INSOMNIA, UNSPECIFIED TYPE: ICD-10-CM

## 2020-10-19 DIAGNOSIS — F41.1 GENERALIZED ANXIETY DISORDER: ICD-10-CM

## 2020-10-19 DIAGNOSIS — R46.89 AGGRESSIVE BEHAVIOR: ICD-10-CM

## 2020-10-19 DIAGNOSIS — F40.01 PANIC DISORDER WITH AGORAPHOBIA: ICD-10-CM

## 2020-10-19 DIAGNOSIS — F39 MOOD DISORDER (H): ICD-10-CM

## 2020-11-15 ENCOUNTER — COMMUNICATION - HEALTHEAST (OUTPATIENT)
Dept: BEHAVIORAL HEALTH | Facility: CLINIC | Age: 20
End: 2020-11-15

## 2020-11-15 DIAGNOSIS — F39 MOOD DISORDER (H): ICD-10-CM

## 2020-11-20 ENCOUNTER — COMMUNICATION - HEALTHEAST (OUTPATIENT)
Dept: FAMILY MEDICINE | Facility: CLINIC | Age: 20
End: 2020-11-20

## 2020-11-20 DIAGNOSIS — R73.03 PRE-DIABETES: ICD-10-CM

## 2020-11-22 ENCOUNTER — COMMUNICATION - HEALTHEAST (OUTPATIENT)
Dept: BEHAVIORAL HEALTH | Facility: CLINIC | Age: 20
End: 2020-11-22

## 2020-11-22 DIAGNOSIS — F06.30 MOOD DISORDER DUE TO A GENERAL MEDICAL CONDITION: ICD-10-CM

## 2020-11-22 DIAGNOSIS — F63.81 INTERMITTENT EXPLOSIVE DISORDER: ICD-10-CM

## 2020-11-23 ENCOUNTER — OFFICE VISIT - HEALTHEAST (OUTPATIENT)
Dept: BEHAVIORAL HEALTH | Facility: CLINIC | Age: 20
End: 2020-11-23

## 2020-11-23 DIAGNOSIS — F63.81 INTERMITTENT EXPLOSIVE DISORDER: ICD-10-CM

## 2020-11-23 DIAGNOSIS — F06.30 MOOD DISORDER DUE TO A GENERAL MEDICAL CONDITION: ICD-10-CM

## 2020-11-23 DIAGNOSIS — G47.00 INSOMNIA, UNSPECIFIED TYPE: ICD-10-CM

## 2020-11-23 DIAGNOSIS — F39 MOOD DISORDER (H): ICD-10-CM

## 2020-11-23 DIAGNOSIS — F41.1 GENERALIZED ANXIETY DISORDER: ICD-10-CM

## 2020-11-23 DIAGNOSIS — R46.89 AGGRESSIVE BEHAVIOR: ICD-10-CM

## 2020-11-23 DIAGNOSIS — F40.01 PANIC DISORDER WITH AGORAPHOBIA: ICD-10-CM

## 2020-11-27 ENCOUNTER — COMMUNICATION - HEALTHEAST (OUTPATIENT)
Dept: BEHAVIORAL HEALTH | Facility: CLINIC | Age: 20
End: 2020-11-27

## 2020-12-30 ENCOUNTER — COMMUNICATION - HEALTHEAST (OUTPATIENT)
Dept: BEHAVIORAL HEALTH | Facility: CLINIC | Age: 20
End: 2020-12-30

## 2020-12-30 DIAGNOSIS — R46.89 AGGRESSIVE BEHAVIOR: ICD-10-CM

## 2020-12-30 DIAGNOSIS — F39 MOOD DISORDER (H): ICD-10-CM

## 2021-01-19 ENCOUNTER — COMMUNICATION - HEALTHEAST (OUTPATIENT)
Dept: BEHAVIORAL HEALTH | Facility: CLINIC | Age: 21
End: 2021-01-19

## 2021-01-19 ENCOUNTER — COMMUNICATION - HEALTHEAST (OUTPATIENT)
Dept: FAMILY MEDICINE | Facility: CLINIC | Age: 21
End: 2021-01-19

## 2021-01-19 DIAGNOSIS — F39 MOOD DISORDER (H): ICD-10-CM

## 2021-01-19 DIAGNOSIS — G47.00 INSOMNIA, UNSPECIFIED TYPE: ICD-10-CM

## 2021-01-19 DIAGNOSIS — R73.03 PRE-DIABETES: ICD-10-CM

## 2021-01-19 DIAGNOSIS — R46.89 AGGRESSIVE BEHAVIOR: ICD-10-CM

## 2021-01-27 ENCOUNTER — OFFICE VISIT - HEALTHEAST (OUTPATIENT)
Dept: BEHAVIORAL HEALTH | Facility: CLINIC | Age: 21
End: 2021-01-27

## 2021-01-27 DIAGNOSIS — F40.01 PANIC DISORDER WITH AGORAPHOBIA: ICD-10-CM

## 2021-01-27 DIAGNOSIS — F63.81 INTERMITTENT EXPLOSIVE DISORDER: ICD-10-CM

## 2021-01-27 DIAGNOSIS — F41.1 GENERALIZED ANXIETY DISORDER: ICD-10-CM

## 2021-01-27 DIAGNOSIS — G47.00 INSOMNIA, UNSPECIFIED TYPE: ICD-10-CM

## 2021-01-27 DIAGNOSIS — F06.30 MOOD DISORDER DUE TO A GENERAL MEDICAL CONDITION: ICD-10-CM

## 2021-02-10 ENCOUNTER — COMMUNICATION - HEALTHEAST (OUTPATIENT)
Dept: BEHAVIORAL HEALTH | Facility: CLINIC | Age: 21
End: 2021-02-10

## 2021-02-10 DIAGNOSIS — F06.30 MOOD DISORDER DUE TO A GENERAL MEDICAL CONDITION: ICD-10-CM

## 2021-02-10 DIAGNOSIS — F63.81 INTERMITTENT EXPLOSIVE DISORDER: ICD-10-CM

## 2021-02-10 DIAGNOSIS — F40.01 PANIC DISORDER WITH AGORAPHOBIA: ICD-10-CM

## 2021-02-10 DIAGNOSIS — R46.89 AGGRESSIVE BEHAVIOR: ICD-10-CM

## 2021-02-10 DIAGNOSIS — F39 MOOD DISORDER (H): ICD-10-CM

## 2021-02-17 ENCOUNTER — RECORDS - HEALTHEAST (OUTPATIENT)
Dept: ADMINISTRATIVE | Facility: OTHER | Age: 21
End: 2021-02-17

## 2021-02-17 ENCOUNTER — OFFICE VISIT - HEALTHEAST (OUTPATIENT)
Dept: FAMILY MEDICINE | Facility: CLINIC | Age: 21
End: 2021-02-17

## 2021-02-17 DIAGNOSIS — D69.3 CHRONIC ITP (IDIOPATHIC THROMBOCYTOPENIA) (H): ICD-10-CM

## 2021-02-17 DIAGNOSIS — Z79.899 HIGH RISK MEDICATION USE: ICD-10-CM

## 2021-02-17 DIAGNOSIS — F79 INTELLECTUAL DISABILITY: ICD-10-CM

## 2021-02-17 DIAGNOSIS — F20.3 UNDIFFERENTIATED SCHIZOPHRENIA (H): ICD-10-CM

## 2021-02-17 DIAGNOSIS — Z90.81 POST-SPLENECTOMY: ICD-10-CM

## 2021-02-17 DIAGNOSIS — Z79.899 MEDICATION MANAGEMENT: ICD-10-CM

## 2021-02-17 DIAGNOSIS — D69.3 CHRONIC IDIOPATHIC THROMBOCYTOPENIA (H): ICD-10-CM

## 2021-02-17 DIAGNOSIS — R73.9 HYPERGLYCEMIA: ICD-10-CM

## 2021-02-17 DIAGNOSIS — E66.01 MORBID OBESITY (H): ICD-10-CM

## 2021-02-17 LAB
ALBUMIN SERPL-MCNC: 3.7 G/DL (ref 3.5–5)
ALP SERPL-CCNC: 83 U/L (ref 45–120)
ALT SERPL W P-5'-P-CCNC: 28 U/L (ref 0–45)
ANION GAP SERPL CALCULATED.3IONS-SCNC: 8 MMOL/L (ref 5–18)
AST SERPL W P-5'-P-CCNC: 22 U/L (ref 0–40)
BASOPHILS # BLD AUTO: 0 THOU/UL (ref 0–0.2)
BASOPHILS NFR BLD AUTO: 0 % (ref 0–2)
BILIRUB SERPL-MCNC: 0.2 MG/DL (ref 0–1)
BUN SERPL-MCNC: 13 MG/DL (ref 8–22)
CALCIUM SERPL-MCNC: 8.7 MG/DL (ref 8.5–10.5)
CHLORIDE BLD-SCNC: 110 MMOL/L (ref 98–107)
CO2 SERPL-SCNC: 22 MMOL/L (ref 22–31)
CREAT SERPL-MCNC: 0.83 MG/DL (ref 0.7–1.3)
EOSINOPHIL # BLD AUTO: 0.2 THOU/UL (ref 0–0.4)
EOSINOPHIL NFR BLD AUTO: 2 % (ref 0–6)
ERYTHROCYTE [DISTWIDTH] IN BLOOD BY AUTOMATED COUNT: 15.4 % (ref 11–14.5)
GFR SERPL CREATININE-BSD FRML MDRD: >60 ML/MIN/1.73M2
GLUCOSE BLD-MCNC: 74 MG/DL (ref 70–125)
HBA1C MFR BLD: 5.6 %
HCT VFR BLD AUTO: 40.3 % (ref 40–54)
HGB BLD-MCNC: 13.3 G/DL (ref 14–18)
IMM GRANULOCYTES # BLD: 0 THOU/UL
IMM GRANULOCYTES NFR BLD: 0 %
LYMPHOCYTES # BLD AUTO: 2.2 THOU/UL (ref 0.8–4.4)
LYMPHOCYTES NFR BLD AUTO: 23 % (ref 20–40)
MCH RBC QN AUTO: 28.1 PG (ref 27–34)
MCHC RBC AUTO-ENTMCNC: 33 G/DL (ref 32–36)
MCV RBC AUTO: 85 FL (ref 80–100)
MONOCYTES # BLD AUTO: 0.9 THOU/UL (ref 0–0.9)
MONOCYTES NFR BLD AUTO: 9 % (ref 2–10)
NEUTROPHILS # BLD AUTO: 6.5 THOU/UL (ref 2–7.7)
NEUTROPHILS NFR BLD AUTO: 66 % (ref 50–70)
PLATELET # BLD AUTO: 59 THOU/UL (ref 140–440)
PMV BLD AUTO: ABNORMAL FL
POTASSIUM BLD-SCNC: 3.9 MMOL/L (ref 3.5–5)
PROT SERPL-MCNC: 7 G/DL (ref 6–8)
RBC # BLD AUTO: 4.74 MILL/UL (ref 4.4–6.2)
SODIUM SERPL-SCNC: 140 MMOL/L (ref 136–145)
WBC: 9.8 THOU/UL (ref 4–11)

## 2021-02-17 ASSESSMENT — MIFFLIN-ST. JEOR: SCORE: 2073.34

## 2021-02-19 ENCOUNTER — COMMUNICATION - HEALTHEAST (OUTPATIENT)
Dept: FAMILY MEDICINE | Facility: CLINIC | Age: 21
End: 2021-02-19

## 2021-02-19 ENCOUNTER — AMBULATORY - HEALTHEAST (OUTPATIENT)
Dept: FAMILY MEDICINE | Facility: CLINIC | Age: 21
End: 2021-02-19

## 2021-02-19 DIAGNOSIS — F39 MOOD DISORDER (H): ICD-10-CM

## 2021-02-19 DIAGNOSIS — R46.89 AGGRESSIVE BEHAVIOR: ICD-10-CM

## 2021-02-22 ENCOUNTER — COMMUNICATION - HEALTHEAST (OUTPATIENT)
Dept: BEHAVIORAL HEALTH | Facility: CLINIC | Age: 21
End: 2021-02-22

## 2021-02-22 ENCOUNTER — COMMUNICATION - HEALTHEAST (OUTPATIENT)
Dept: BEHAVIORAL HEALTH | Facility: HOSPITAL | Age: 21
End: 2021-02-22

## 2021-02-22 ENCOUNTER — OFFICE VISIT - HEALTHEAST (OUTPATIENT)
Dept: BEHAVIORAL HEALTH | Facility: CLINIC | Age: 21
End: 2021-02-22

## 2021-02-22 DIAGNOSIS — F06.30 MOOD DISORDER DUE TO A GENERAL MEDICAL CONDITION: ICD-10-CM

## 2021-02-22 DIAGNOSIS — F40.01 PANIC DISORDER WITH AGORAPHOBIA: ICD-10-CM

## 2021-02-22 DIAGNOSIS — F63.81 INTERMITTENT EXPLOSIVE DISORDER: ICD-10-CM

## 2021-02-22 DIAGNOSIS — G47.00 INSOMNIA, UNSPECIFIED TYPE: ICD-10-CM

## 2021-02-22 DIAGNOSIS — F41.1 GENERALIZED ANXIETY DISORDER: ICD-10-CM

## 2021-02-23 ENCOUNTER — COMMUNICATION - HEALTHEAST (OUTPATIENT)
Dept: FAMILY MEDICINE | Facility: CLINIC | Age: 21
End: 2021-02-23

## 2021-02-23 DIAGNOSIS — R46.89 AGGRESSIVE BEHAVIOR: ICD-10-CM

## 2021-02-23 DIAGNOSIS — F39 MOOD DISORDER (H): ICD-10-CM

## 2021-02-24 ENCOUNTER — COMMUNICATION - HEALTHEAST (OUTPATIENT)
Dept: BEHAVIORAL HEALTH | Facility: CLINIC | Age: 21
End: 2021-02-24

## 2021-02-24 DIAGNOSIS — R46.89 AGGRESSIVE BEHAVIOR: ICD-10-CM

## 2021-02-24 DIAGNOSIS — F39 MOOD DISORDER (H): ICD-10-CM

## 2021-03-01 ENCOUNTER — COMMUNICATION - HEALTHEAST (OUTPATIENT)
Dept: BEHAVIORAL HEALTH | Facility: CLINIC | Age: 21
End: 2021-03-01

## 2021-03-01 DIAGNOSIS — F06.30 MOOD DISORDER DUE TO A GENERAL MEDICAL CONDITION: ICD-10-CM

## 2021-03-01 DIAGNOSIS — F63.81 INTERMITTENT EXPLOSIVE DISORDER: ICD-10-CM

## 2021-03-02 ENCOUNTER — COMMUNICATION - HEALTHEAST (OUTPATIENT)
Dept: BEHAVIORAL HEALTH | Facility: CLINIC | Age: 21
End: 2021-03-02

## 2021-03-11 ENCOUNTER — COMMUNICATION - HEALTHEAST (OUTPATIENT)
Dept: BEHAVIORAL HEALTH | Facility: CLINIC | Age: 21
End: 2021-03-11

## 2021-03-11 ENCOUNTER — COMMUNICATION - HEALTHEAST (OUTPATIENT)
Dept: FAMILY MEDICINE | Facility: CLINIC | Age: 21
End: 2021-03-11

## 2021-03-11 DIAGNOSIS — F63.81 INTERMITTENT EXPLOSIVE DISORDER: ICD-10-CM

## 2021-03-11 DIAGNOSIS — F06.30 MOOD DISORDER DUE TO A GENERAL MEDICAL CONDITION: ICD-10-CM

## 2021-03-11 DIAGNOSIS — R73.03 PRE-DIABETES: ICD-10-CM

## 2021-03-22 ENCOUNTER — OFFICE VISIT - HEALTHEAST (OUTPATIENT)
Dept: BEHAVIORAL HEALTH | Facility: CLINIC | Age: 21
End: 2021-03-22

## 2021-03-22 ENCOUNTER — COMMUNICATION - HEALTHEAST (OUTPATIENT)
Dept: FAMILY MEDICINE | Facility: CLINIC | Age: 21
End: 2021-03-22

## 2021-03-22 DIAGNOSIS — F41.1 GENERALIZED ANXIETY DISORDER: ICD-10-CM

## 2021-03-22 DIAGNOSIS — D69.3 CHRONIC ITP (IDIOPATHIC THROMBOCYTOPENIA) (H): ICD-10-CM

## 2021-03-22 DIAGNOSIS — G47.00 INSOMNIA, UNSPECIFIED TYPE: ICD-10-CM

## 2021-03-22 DIAGNOSIS — F06.30 MOOD DISORDER DUE TO A GENERAL MEDICAL CONDITION: ICD-10-CM

## 2021-03-22 DIAGNOSIS — F63.81 INTERMITTENT EXPLOSIVE DISORDER: ICD-10-CM

## 2021-03-24 ENCOUNTER — COMMUNICATION - HEALTHEAST (OUTPATIENT)
Dept: BEHAVIORAL HEALTH | Facility: CLINIC | Age: 21
End: 2021-03-24

## 2021-04-27 ENCOUNTER — OFFICE VISIT - HEALTHEAST (OUTPATIENT)
Dept: FAMILY MEDICINE | Facility: CLINIC | Age: 21
End: 2021-04-27

## 2021-04-27 DIAGNOSIS — F63.81 INTERMITTENT EXPLOSIVE DISORDER IN ADULT: ICD-10-CM

## 2021-04-27 DIAGNOSIS — D69.3 CHRONIC ITP (IDIOPATHIC THROMBOCYTOPENIA) (H): ICD-10-CM

## 2021-04-27 DIAGNOSIS — Z90.81 POST-SPLENECTOMY: ICD-10-CM

## 2021-04-28 ENCOUNTER — COMMUNICATION - HEALTHEAST (OUTPATIENT)
Dept: FAMILY MEDICINE | Facility: CLINIC | Age: 21
End: 2021-04-28

## 2021-04-28 DIAGNOSIS — Z00.00 HEALTH CARE MAINTENANCE: ICD-10-CM

## 2021-04-28 DIAGNOSIS — Z90.81 POST-SPLENECTOMY: ICD-10-CM

## 2021-04-29 ENCOUNTER — OFFICE VISIT - HEALTHEAST (OUTPATIENT)
Dept: BEHAVIORAL HEALTH | Facility: CLINIC | Age: 21
End: 2021-04-29

## 2021-04-29 DIAGNOSIS — F70 MILD INTELLECTUAL DISABILITY: ICD-10-CM

## 2021-04-29 DIAGNOSIS — Q86.0 FETAL ALCOHOL SYNDROME: ICD-10-CM

## 2021-04-29 DIAGNOSIS — F90.9 ATTENTION DEFICIT HYPERACTIVITY DISORDER (ADHD), UNSPECIFIED ADHD TYPE: ICD-10-CM

## 2021-04-29 DIAGNOSIS — F63.81 INTERMITTENT EXPLOSIVE DISORDER: ICD-10-CM

## 2021-05-19 ENCOUNTER — AMBULATORY - HEALTHEAST (OUTPATIENT)
Dept: FAMILY MEDICINE | Facility: CLINIC | Age: 21
End: 2021-05-19

## 2021-05-26 ASSESSMENT — PATIENT HEALTH QUESTIONNAIRE - PHQ9: SUM OF ALL RESPONSES TO PHQ QUESTIONS 1-9: 11

## 2021-05-27 NOTE — TELEPHONE ENCOUNTER
Who is calling:  nurse Victor Hugo  Reason for Call:  Caller stated the FE Rx is supposed to have gone to [facility] in [place]. Please have Laureano Rivera MD re-send the Rx to [facility] in [place].  Date of last appointment with primary care: 3/20/19  Okay to leave a detailed message: No  [phone]

## 2021-05-27 NOTE — TELEPHONE ENCOUNTER
Victor Hugo from [facility] calling stating that he was with patient at visit yesterday and the Adderall was discontinued because Strattera started.  He is asking that the order to discontinue Adderall be sent to pharmacy and faxed to  [phone].  The consult took place yesterday and there is not a TIGRE reflected in chart yet.

## 2021-05-27 NOTE — TELEPHONE ENCOUNTER
RN cannot approve Refill Request    RN can NOT refill this medication medication not on med list and overdue for labs. Last office visit: 3/20/2019 Laureano Rivera MD Last Physical: Visit date not found Last MTM visit: Visit date not found Last visit same specialty: 3/20/2019 Laureano Rivera MD.  Next visit within 3 mo: Visit date not found  Next physical within 3 mo: Visit date not found      Nakia Anand, [facility] Triage/Med Refill 4/11/2019    Requested Prescriptions   Pending Prescriptions Disp Refills     metFORMIN (GLUCOPHAGE) 500 MG tablet 60 tablet 0     Sig: Take 1 tablet (500 mg total) by mouth 2 (two) times a day with meals.       Metformin Refill Protocol Failed - 4/11/2019  1:41 PM        Failed - LFT or AST or ALT in last 12 months     Albumin   Date Value Ref Range Status   11/23/2016 3.3 (L) 3.5 - 5.0 g/dL Final     Bilirubin, Total   Date Value Ref Range Status   11/23/2016 0.5 0.0 - 1.0 mg/dL Final     Alkaline Phosphatase   Date Value Ref Range Status   11/23/2016 69 50 - 364 U/L Final     AST   Date Value Ref Range Status   11/23/2016 21 0 - 40 U/L Final     ALT   Date Value Ref Range Status   11/23/2016 41 0 - 45 U/L Final     Protein, Total   Date Value Ref Range Status   11/23/2016 8.0 6.0 - 8.0 g/dL Final                Failed - GFR or Serum Creatinine in last 6 months     GFR MDRD Non Af Amer   Date Value Ref Range Status   11/23/2016 >60 >60 mL/min/1.73m2 Final     GFR MDRD Af Amer   Date Value Ref Range Status   11/23/2016 >60 >60 mL/min/1.73m2 Final             Failed - A1C in last 6 months     No results found for: HGBA1C            Failed - Microalbumin in last year      No results found for: MICROALBUR               Passed - Blood pressure in last 12 months     BP Readings from Last 1 Encounters:   03/20/19 110/70             Passed - Visit with PCP or prescribing provider visit in last 6 months or next 3 months     Last office visit with prescriber/PCP: 3/20/2019 OR same dept:  3/20/2019 Laureano Rivera MD OR same specialty: 3/20/2019 Laureano Rivera MD Last physical: Visit date not found Last MTM visit: Visit date not found         Next appt within 3 mo: Visit date not found  Next physical within 3 mo: Visit date not found  Prescriber OR PCP: Laureano Rivera MD  Last diagnosis associated with med order: There are no diagnoses linked to this encounter.   If protocol passes may refill for 12 months if within 3 months of last provider visit (or a total of 15 months).         Signed Prescriptions Disp Refills    metFORMIN (GLUCOPHAGE) 500 MG tablet       Sig: Take 500 mg by mouth 2 (two) times a day with meals.       There is no refill protocol information for this order

## 2021-05-27 NOTE — TELEPHONE ENCOUNTER
I don't know for sure, but I tell pts to ask the pharmacist to send a request to the system (I don't know exactly how they do this), the RN refill process refills if criteria are met and forward to me if not.      When it comes to me the meds are identified correctly with the last sig and I deal with it.

## 2021-05-27 NOTE — TELEPHONE ENCOUNTER
Unable to pend or for ferrous sulfate 325. Called and cancel RX at [facility], Please reorder Medication if okay.

## 2021-05-27 NOTE — TELEPHONE ENCOUNTER
Medication Request  Medication name: ferrous sulfate  -iron   Pharmacy Name and Location: [facility]    Reason for request:  Pt needs refill of Iron pills   When did you use medication last?:  Yesterday-  He is out of med  Okay to leave a detailed message: yes

## 2021-05-27 NOTE — TELEPHONE ENCOUNTER
Who is calling:  Nurse from [facility]  Reason for Call:   Patient was only given a 30 day when released from hospital  Please expedite  Date of last appointment with primary care:  3/20/2019  Okay to leave a detailed message: Yes

## 2021-05-27 NOTE — PROGRESS NOTES
Correct pharmacy verified with patient and confirmed in snapshot? [] yes []no    Charge captured ? [x] yes  [] no    Medications Phoned  to Pharmacy [] yes [x]no  Name of Pharmacist:  List Medications, including dose, quantity and instructions      Medication Prescriptions given to patient   [] yes  [x] no   List the name of the drug the prescription was written for.       Medications ordered this visit were e-scribed.  Verified by order class [x] yes  [] no   Strattera, Prolixin, Neurontin, Inderal, Topamax, Trazodone, Risperidone 2 mg,   Medication changes or discontinuations were communicated to patient's pharmacy: [] yes  [x] no    UA collected [x] yes  [] no    [place] Prescription Monitoring Program Reviewed? [x] yes  [] no    Referrals were made to:  none  Future appointment was made: [x] yes  [] no    Dictation completed at time of chart check: [] yes  [x] no    I have checked the documentation for today s encounters and the above information has been reviewed and completed.

## 2021-05-27 NOTE — TELEPHONE ENCOUNTER
Writer called Sera STUART.  Informed Sera that We have no HX of Pt ever being prescribed  Metformin 500 mg 1 tab two times a day  Ferrous Sulfate 325 1 tab daily with breakfast   Adderall 5 mg 3 tablets two times a day    Sera states All the above medications have been taken care of except Metformin 500 mg 1 tab two times a day.    Sera states Pt was in [facility] in March and that is where he was first prescribed the medication.  Sera is asking Dr Rivera to refill Metformin as his PCP?    Writer explained will send her request to Dr Rivera.  Nakia Anand, RN, [facility] RN Triage/Med Refills

## 2021-05-27 NOTE — TELEPHONE ENCOUNTER
Per Dr. Rivera, This needs to come through [facility] med Refill pool to make sure it meets the criteria.

## 2021-05-27 NOTE — TELEPHONE ENCOUNTER
RN cannot approve Refill Request    RN can NOT refill this medication med is not covered by policy/route to provider.    Set up for 1 year- group home      Kamini Guzman, [facility] Triage/Med Refill 4/8/2019    Requested Prescriptions   Pending Prescriptions Disp Refills     gabapentin (NEURONTIN) 100 MG capsule 89 capsule 0       Gabapentin/Levetiracetam/Tiagabine Refill Protocol  Passed - 4/8/2019 12:56 PM        Passed - PCP or prescribing provider visit in past 12 months or next 3 months     Last office visit with prescriber/PCP: 3/20/2019 Laureano Rivera MD OR same dept: 3/20/2019 Laureano Rivera MD OR same specialty: 3/20/2019 Laureano Rivera MD  Last physical: Visit date not found Last MTM visit: Visit date not found   Next visit within 3 mo: Visit date not found  Next physical within 3 mo: Visit date not found  Prescriber OR PCP: Laureano Rivera MD  Last diagnosis associated with med order: 1. Mood disorder due to a general medical condition  - gabapentin (NEURONTIN) 100 MG capsule  Dispense: 89 capsule; Refill: 0  - hydrOXYzine pamoate (VISTARIL) 50 MG capsule; Take 1 capsule (50 mg total) by mouth at bedtime.  Dispense: 29 capsule; Refill: 0  - risperiDONE (RISPERDAL) 4 MG tablet; Take 1 tablet (4 mg total) by mouth at bedtime.  Dispense: 29 tablet; Refill: 0  - benztropine (COGENTIN) 1 MG tablet; Take 1 tablet (1 mg total) by mouth 2 (two) times a day as needed (stiffness).  Dispense: 30 tablet; Refill: 0  - multivitamin (DAILY-TUSHAR) per tablet; Take 1 tablet by mouth daily.  Dispense: 100 tablet; Refill: 0  - loratadine (CLARITIN) 10 mg tablet; Take 1 tablet (10 mg total) by mouth daily.  Dispense: 30 tablet; Refill: 0  - hydrOXYzine pamoate (VISTARIL) 50 MG capsule; Take 1 capsule (50 mg total) by mouth at bedtime.  Dispense: 30 capsule; Refill: 0  - traZODone (DESYREL) 50 MG tablet  Dispense: 30 tablet; Refill: 11    2. Intermittent explosive disorder  - gabapentin (NEURONTIN) 100 MG capsule  Dispense: 89  capsule; Refill: 0  - hydrOXYzine pamoate (VISTARIL) 50 MG capsule; Take 1 capsule (50 mg total) by mouth at bedtime.  Dispense: 29 capsule; Refill: 0  - risperiDONE (RISPERDAL) 4 MG tablet; Take 1 tablet (4 mg total) by mouth at bedtime.  Dispense: 29 tablet; Refill: 0    3. Chronic ITP (idiopathic thrombocytopenia) (H)  - sodium chloride (OCEAN) 0.65 % nasal spray; To both nostrils 4 times a day as needed  Dispense: 15 mL; Refill: 12    If protocol passes may refill for 12 months if within 3 months of last provider visit (or a total of 15 months).             hydrOXYzine pamoate (VISTARIL) 50 MG capsule 29 capsule 0     Sig: Take 1 capsule (50 mg total) by mouth at bedtime.       Antihistamine Refill Protocol Passed - 4/8/2019 12:56 PM        Passed - Patient has had office visit/physical in last year     Last office visit with prescriber/PCP: 3/20/2019 Laureano Rivera MD OR same dept: 3/20/2019 Laureano Rivera MD OR same specialty: 3/20/2019 Laureano Rivera MD  Last physical: Visit date not found Last MTM visit: Visit date not found   Next visit within 3 mo: Visit date not found  Next physical within 3 mo: Visit date not found  Prescriber OR PCP: Laureano Rivera MD  Last diagnosis associated with med order: 1. Mood disorder due to a general medical condition  - gabapentin (NEURONTIN) 100 MG capsule  Dispense: 89 capsule; Refill: 0  - hydrOXYzine pamoate (VISTARIL) 50 MG capsule; Take 1 capsule (50 mg total) by mouth at bedtime.  Dispense: 29 capsule; Refill: 0  - risperiDONE (RISPERDAL) 4 MG tablet; Take 1 tablet (4 mg total) by mouth at bedtime.  Dispense: 29 tablet; Refill: 0  - benztropine (COGENTIN) 1 MG tablet; Take 1 tablet (1 mg total) by mouth 2 (two) times a day as needed (stiffness).  Dispense: 30 tablet; Refill: 0  - multivitamin (DAILY-TUSHAR) per tablet; Take 1 tablet by mouth daily.  Dispense: 100 tablet; Refill: 0  - loratadine (CLARITIN) 10 mg tablet; Take 1 tablet (10 mg total) by mouth daily.  Dispense: 30  tablet; Refill: 0  - hydrOXYzine pamoate (VISTARIL) 50 MG capsule; Take 1 capsule (50 mg total) by mouth at bedtime.  Dispense: 30 capsule; Refill: 0  - traZODone (DESYREL) 50 MG tablet  Dispense: 30 tablet; Refill: 11    2. Intermittent explosive disorder  - gabapentin (NEURONTIN) 100 MG capsule  Dispense: 89 capsule; Refill: 0  - hydrOXYzine pamoate (VISTARIL) 50 MG capsule; Take 1 capsule (50 mg total) by mouth at bedtime.  Dispense: 29 capsule; Refill: 0  - risperiDONE (RISPERDAL) 4 MG tablet; Take 1 tablet (4 mg total) by mouth at bedtime.  Dispense: 29 tablet; Refill: 0    3. Chronic ITP (idiopathic thrombocytopenia) (H)  - sodium chloride (OCEAN) 0.65 % nasal spray; To both nostrils 4 times a day as needed  Dispense: 15 mL; Refill: 12    If protocol passes may refill for 12 months if within 3 months of last provider visit (or a total of 15 months).             risperiDONE (RISPERDAL) 4 MG tablet 29 tablet 0     Sig: Take 1 tablet (4 mg total) by mouth at bedtime.       There is no refill protocol information for this order        benztropine (COGENTIN) 1 MG tablet 30 tablet 0     Sig: Take 1 tablet (1 mg total) by mouth 2 (two) times a day as needed (stiffness).       Parkinson's Meds I Refill Protocol Passed - 4/8/2019 12:56 PM        Passed - PCP or prescribing provider visit in past 6 months      Last office visit with prescriber/PCP: 3/20/2019 OR same dept: 3/20/2019 Laureano Rivera MD OR same specialty: 3/20/2019 Laureano Rivera MD Last physical: Visit date not found Last MTM visit: Visit date not found     Next appt within 3 mo: Visit date not found  Next physical within 3 mo: Visit date not found  Prescriber OR PCP: Laureano Rivera MD  Last diagnosis associated with med order: 1. Mood disorder due to a general medical condition  - gabapentin (NEURONTIN) 100 MG capsule  Dispense: 89 capsule; Refill: 0  - hydrOXYzine pamoate (VISTARIL) 50 MG capsule; Take 1 capsule (50 mg total) by mouth at bedtime.  Dispense: 29  capsule; Refill: 0  - risperiDONE (RISPERDAL) 4 MG tablet; Take 1 tablet (4 mg total) by mouth at bedtime.  Dispense: 29 tablet; Refill: 0  - benztropine (COGENTIN) 1 MG tablet; Take 1 tablet (1 mg total) by mouth 2 (two) times a day as needed (stiffness).  Dispense: 30 tablet; Refill: 0  - multivitamin (DAILY-TUSHAR) per tablet; Take 1 tablet by mouth daily.  Dispense: 100 tablet; Refill: 0  - loratadine (CLARITIN) 10 mg tablet; Take 1 tablet (10 mg total) by mouth daily.  Dispense: 30 tablet; Refill: 0  - hydrOXYzine pamoate (VISTARIL) 50 MG capsule; Take 1 capsule (50 mg total) by mouth at bedtime.  Dispense: 30 capsule; Refill: 0  - traZODone (DESYREL) 50 MG tablet  Dispense: 30 tablet; Refill: 11    2. Intermittent explosive disorder  - gabapentin (NEURONTIN) 100 MG capsule  Dispense: 89 capsule; Refill: 0  - hydrOXYzine pamoate (VISTARIL) 50 MG capsule; Take 1 capsule (50 mg total) by mouth at bedtime.  Dispense: 29 capsule; Refill: 0  - risperiDONE (RISPERDAL) 4 MG tablet; Take 1 tablet (4 mg total) by mouth at bedtime.  Dispense: 29 tablet; Refill: 0    3. Chronic ITP (idiopathic thrombocytopenia) (H)  - sodium chloride (OCEAN) 0.65 % nasal spray; To both nostrils 4 times a day as needed  Dispense: 15 mL; Refill: 12    If protocol passes may refill for 6 months if within 3 months of last provider visit (or a total of 9 months).              multivitamin (DAILY-TUSHAR) per tablet 100 tablet 0     Sig: Take 1 tablet by mouth daily.       There is no refill protocol information for this order        loratadine (CLARITIN) 10 mg tablet 30 tablet 0     Sig: Take 1 tablet (10 mg total) by mouth daily.       Antihistamine Refill Protocol Passed - 4/8/2019 12:56 PM        Passed - Patient has had office visit/physical in last year     Last office visit with prescriber/PCP: 3/20/2019 Laureano Rivera MD OR same dept: 3/20/2019 Laureano Rivera MD OR same specialty: 3/20/2019 Laureano Rivera MD  Last physical: Visit date not found  Last [facility] visit: Visit date not found   Next visit within 3 mo: Visit date not found  Next physical within 3 mo: Visit date not found  Prescriber OR PCP: Laureano Rivera MD  Last diagnosis associated with med order: 1. Mood disorder due to a general medical condition  - gabapentin (NEURONTIN) 100 MG capsule  Dispense: 89 capsule; Refill: 0  - hydrOXYzine pamoate (VISTARIL) 50 MG capsule; Take 1 capsule (50 mg total) by mouth at bedtime.  Dispense: 29 capsule; Refill: 0  - risperiDONE (RISPERDAL) 4 MG tablet; Take 1 tablet (4 mg total) by mouth at bedtime.  Dispense: 29 tablet; Refill: 0  - benztropine (COGENTIN) 1 MG tablet; Take 1 tablet (1 mg total) by mouth 2 (two) times a day as needed (stiffness).  Dispense: 30 tablet; Refill: 0  - multivitamin (DAILY-TUSHAR) per tablet; Take 1 tablet by mouth daily.  Dispense: 100 tablet; Refill: 0  - loratadine (CLARITIN) 10 mg tablet; Take 1 tablet (10 mg total) by mouth daily.  Dispense: 30 tablet; Refill: 0  - hydrOXYzine pamoate (VISTARIL) 50 MG capsule; Take 1 capsule (50 mg total) by mouth at bedtime.  Dispense: 30 capsule; Refill: 0  - traZODone (DESYREL) 50 MG tablet  Dispense: 30 tablet; Refill: 11    2. Intermittent explosive disorder  - gabapentin (NEURONTIN) 100 MG capsule  Dispense: 89 capsule; Refill: 0  - hydrOXYzine pamoate (VISTARIL) 50 MG capsule; Take 1 capsule (50 mg total) by mouth at bedtime.  Dispense: 29 capsule; Refill: 0  - risperiDONE (RISPERDAL) 4 MG tablet; Take 1 tablet (4 mg total) by mouth at bedtime.  Dispense: 29 tablet; Refill: 0    3. Chronic ITP (idiopathic thrombocytopenia) (H)  - sodium chloride (OCEAN) 0.65 % nasal spray; To both nostrils 4 times a day as needed  Dispense: 15 mL; Refill: 12    If protocol passes may refill for 12 months if within 3 months of last provider visit (or a total of 15 months).             hydrOXYzine pamoate (VISTARIL) 50 MG capsule 30 capsule 0     Sig: Take 1 capsule (50 mg total) by mouth at bedtime.        Antihistamine Refill Protocol Passed - 4/8/2019 12:56 PM        Passed - Patient has had office visit/physical in last year     Last office visit with prescriber/PCP: 3/20/2019 Laureano Rivera MD OR same dept: 3/20/2019 Laureano Rivera MD OR same specialty: 3/20/2019 Laureano Rivera MD  Last physical: Visit date not found Last MTM visit: Visit date not found   Next visit within 3 mo: Visit date not found  Next physical within 3 mo: Visit date not found  Prescriber OR PCP: Laureano Rivera MD  Last diagnosis associated with med order: 1. Mood disorder due to a general medical condition  - gabapentin (NEURONTIN) 100 MG capsule  Dispense: 89 capsule; Refill: 0  - hydrOXYzine pamoate (VISTARIL) 50 MG capsule; Take 1 capsule (50 mg total) by mouth at bedtime.  Dispense: 29 capsule; Refill: 0  - risperiDONE (RISPERDAL) 4 MG tablet; Take 1 tablet (4 mg total) by mouth at bedtime.  Dispense: 29 tablet; Refill: 0  - benztropine (COGENTIN) 1 MG tablet; Take 1 tablet (1 mg total) by mouth 2 (two) times a day as needed (stiffness).  Dispense: 30 tablet; Refill: 0  - multivitamin (DAILY-TUSHAR) per tablet; Take 1 tablet by mouth daily.  Dispense: 100 tablet; Refill: 0  - loratadine (CLARITIN) 10 mg tablet; Take 1 tablet (10 mg total) by mouth daily.  Dispense: 30 tablet; Refill: 0  - hydrOXYzine pamoate (VISTARIL) 50 MG capsule; Take 1 capsule (50 mg total) by mouth at bedtime.  Dispense: 30 capsule; Refill: 0  - traZODone (DESYREL) 50 MG tablet  Dispense: 30 tablet; Refill: 11    2. Intermittent explosive disorder  - gabapentin (NEURONTIN) 100 MG capsule  Dispense: 89 capsule; Refill: 0  - hydrOXYzine pamoate (VISTARIL) 50 MG capsule; Take 1 capsule (50 mg total) by mouth at bedtime.  Dispense: 29 capsule; Refill: 0  - risperiDONE (RISPERDAL) 4 MG tablet; Take 1 tablet (4 mg total) by mouth at bedtime.  Dispense: 29 tablet; Refill: 0    3. Chronic ITP (idiopathic thrombocytopenia) (H)  - sodium chloride (OCEAN) 0.65 % nasal spray; To both  nostrils 4 times a day as needed  Dispense: 15 mL; Refill: 12    If protocol passes may refill for 12 months if within 3 months of last provider visit (or a total of 15 months).             traZODone (DESYREL) 50 MG tablet 30 tablet 11       Tricyclics/Misc Antidepressant/Antianxiety Meds Refill Protocol Failed - 4/8/2019 12:56 PM        Failed - Age 21 and younger route to prescribing provider     Last office visit with prescriber/PCP: 3/20/2019 Laureano Rivera MD OR same dept: 3/20/2019 Laureano Rivera MD OR same specialty: 3/20/2019 Laureano Rivera MD  Last physical: Visit date not found Last MTM visit: Visit date not found   Next visit within 3 mo: Visit date not found  Next physical within 3 mo: Visit date not found  Prescriber OR PCP: Laureano Rivera MD  Last diagnosis associated with med order: 1. Mood disorder due to a general medical condition  - gabapentin (NEURONTIN) 100 MG capsule  Dispense: 89 capsule; Refill: 0  - hydrOXYzine pamoate (VISTARIL) 50 MG capsule; Take 1 capsule (50 mg total) by mouth at bedtime.  Dispense: 29 capsule; Refill: 0  - risperiDONE (RISPERDAL) 4 MG tablet; Take 1 tablet (4 mg total) by mouth at bedtime.  Dispense: 29 tablet; Refill: 0  - benztropine (COGENTIN) 1 MG tablet; Take 1 tablet (1 mg total) by mouth 2 (two) times a day as needed (stiffness).  Dispense: 30 tablet; Refill: 0  - multivitamin (DAILY-TUSHAR) per tablet; Take 1 tablet by mouth daily.  Dispense: 100 tablet; Refill: 0  - loratadine (CLARITIN) 10 mg tablet; Take 1 tablet (10 mg total) by mouth daily.  Dispense: 30 tablet; Refill: 0  - hydrOXYzine pamoate (VISTARIL) 50 MG capsule; Take 1 capsule (50 mg total) by mouth at bedtime.  Dispense: 30 capsule; Refill: 0  - traZODone (DESYREL) 50 MG tablet  Dispense: 30 tablet; Refill: 11    2. Intermittent explosive disorder  - gabapentin (NEURONTIN) 100 MG capsule  Dispense: 89 capsule; Refill: 0  - hydrOXYzine pamoate (VISTARIL) 50 MG capsule; Take 1 capsule (50 mg total) by mouth at  bedtime.  Dispense: 29 capsule; Refill: 0  - risperiDONE (RISPERDAL) 4 MG tablet; Take 1 tablet (4 mg total) by mouth at bedtime.  Dispense: 29 tablet; Refill: 0    3. Chronic ITP (idiopathic thrombocytopenia) (H)  - sodium chloride (OCEAN) 0.65 % nasal spray; To both nostrils 4 times a day as needed  Dispense: 15 mL; Refill: 12    If protocol passes may refill for 12 months if within 3 months of last provider visit (or a total of 15 months).             sodium chloride (OCEAN) 0.65 % nasal spray 15 mL 12     Sig: To both nostrils 4 times a day as needed       There is no refill protocol information for this order

## 2021-05-27 NOTE — TELEPHONE ENCOUNTER
Medication Request  Medication name:   Mirilax  Adderall 5 MG   15 mg 3 times per day  Metformin 500 mg 2 times perday  Ferrous Sulfate 325 mg 1 time per day  Mepron 10 ML  1 time per day    (750/5L)  Benadryl 50 mg for sleep  Pharmacy Name and Location: [facility] in [place]  Reason for request:  Patient was released to group home with meds he is out now  When did you use medication last?:  today  Patient offered appointment:  Patient was seen on 3/20/219  Okay to leave a detailed message: yes

## 2021-05-27 NOTE — TELEPHONE ENCOUNTER
Medication Request  Medication name:     Metformin 500 mg 1 tablet two times a day  Ferrous sulfate 325 1 tablet daily with breakfast  Adderall 5 mg 3 tablets two times a day    Pharmacy Name and Location: [facility]  Reason for request: Per the request of patients group home. They were not able to provide dose and sig. I reached out to patients pharmacy and they were able to provide dose and sig.   When did you use medication last?:  Unknown  Okay to leave a detailed message: no

## 2021-05-27 NOTE — TELEPHONE ENCOUNTER
Who is calling:  Sera Heard RN  Reason for Call:Checking status on medications listed below. Please send NEW RX to [facility] ASAP, please  call Sera with any questions.  Date of last appointment with primary care:  3/20/19   Has the patient been recently seen:  Yes  Okay to leave a detailed message: Yes    Medication Request  Medication name:   Mirilax  Adderall 5 MG   15 mg 3 times per day  Metformin 500 mg 2 times perday  Ferrous Sulfate 325 mg 1 time per day  Mepron 10 ML  1 time per day    (750/5L)  Benadryl 50 mg for sleep  Pharmacy Name and Location: [facility] in [place]  Reason for request:  Patient was released to group home with meds he is out now  When did you use medication last?:  today  Patient offered appointment:  Patient was seen on 3/20/219

## 2021-05-28 NOTE — TELEPHONE ENCOUNTER
Spoke to Victor Hugo the nurse. Per Victor Hugo questioned if Dr. Rivera still wants the patient on Mepron and I informed Victor Hugo of Dr. Rivera's message below. Victor Hugo is informed and does not have any other questions.
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
## 2021-05-28 NOTE — PROGRESS NOTES
Is patient receiving Vivitrol? no  If yes, has patient reviewed Vivitrol questions and reminders? n/a    Here today for Invega Sustenna 234mg injection       Depression: same  Sleep: good  Anxiety:same  Paranoia/hallucinations/delusions:none  SI/HI: denies      Status of last injection site:n/a  Medication Given:Invega Sustenna 234mg    Route:IM    Site:left deltoid  Tolerated: well  Reaction:none    Next injection appt on: 5/8/19

## 2021-05-28 NOTE — TELEPHONE ENCOUNTER
Called and spoke to Victor Hugo and she will fax over discharge summary due to stating that Dr. Rivera has to refill medication due to being the PCP. Please look out for fax paper.

## 2021-05-28 NOTE — PROGRESS NOTES
Correct pharmacy verified with patient and confirmed in snapshot? [x] yes []no    Charge captured ? [x] yes  [] no    Medications Phoned  to Pharmacy [] yes [x]no  Name of Pharmacist:  List Medications, including dose, quantity and instructions      Medication Prescriptions given to patient   [] yes  [x] no   List the name of the drug the prescription was written for.       Medications ordered this visit were e-scribed.  Verified by order class [] yes  [x] no   order for Invega injection pended for provider  Medication changes or discontinuations were communicated to patient's pharmacy: [] yes  [x] no  Provider will contact [facility]  UA collected [x] yes  [] no    [place] Prescription Monitoring Program Reviewed? [x] yes  [] no    Referrals were made to:  no  Future appointment was made: [] yes  [x] no  Pending  Dictation completed at time of chart check: [] yes  [] no    I have checked the documentation for today s encounters and the above information has been reviewed and completed.

## 2021-05-28 NOTE — TELEPHONE ENCOUNTER
I don't know if he is taking this or not.    I don't think I prescribed it.   It is listed as a historical med.  Listed a couple months ago.

## 2021-05-28 NOTE — TELEPHONE ENCOUNTER
Message left this morning for   Attempting clarify the following information  -Record review shows that patient was found incompetent to stand trial for previous legal charges  -Patient is committed to [facility] hospital-recommendation would be to extend the commitment would like to discuss this situation  -Recently started Invega Sustenna injections today received his second loading dose  -Questions about guardianship and his current financial situation/unsure about who is his decision maker

## 2021-05-28 NOTE — TELEPHONE ENCOUNTER
Victor Hugo (Nurse from [facility]) brought him in to update shot. He stated that Pharmacist is asking why the patient is still taking the medication listed below.    MEPRON PEARL 10ML PO Daily w/ Breakfast    Dr. Rivera please advise.

## 2021-05-28 NOTE — TELEPHONE ENCOUNTER
Phone call from Jose at [facility].  On 4/15/19 an e-script for fluphenazine (Prolixin) was received:  Fluphenazine 2.5mg tablets (5mg total), take 2 tablets two times per day. Quantity of 60 with 2 refills.  Jose wants the quantity clarified. 60 tablets is 15 days worth of medication. Does Dorcas JULIO want only 15 days dispensed at a time or should the quantity be 120 for a one monthe supply?    Pt.'s next appointment is 4/25/19

## 2021-05-28 NOTE — TELEPHONE ENCOUNTER
Paperwork with correction to Trazodone being routine versus prn, faxed to group home, and then sent for scanning.

## 2021-05-28 NOTE — TELEPHONE ENCOUNTER
Message with recommendations to talk about extension of committment of revokation   results of UDS and not taking meds or following recommendations   possiblity of being vulnerable and needing guardian ASAP due to FAS       By Marimar Cruz, CNP

## 2021-05-28 NOTE — TELEPHONE ENCOUNTER
Looking into the outside records it looks like it was written for a month and no refills.  This was in march.    The literature does not list this drug to be used in post splenectomy patients.  Which is a possible reason to use an antibiotic, but the meds suggested are very different.    I cannot find a discharge summary from the hospitalization when his spleen was removed.    Bottom line, I cannot find any reason for him to take this.  Best to ask the people who originated the prescription.

## 2021-05-28 NOTE — PROGRESS NOTES
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       intermittent explosive disorder quiet on atypical    OBJECTIVE:   Vitals:    04/22/19 1132   BP: 100/78   Pulse: 74   Resp: 20      Eyes: non icteric, noninflamed  Lungs: no resp distress  Heart: regular  Ankles: no edema  Muscles: nontender  Mental status: euthymic  Neuro: nonfocal    Body mass index is 46.2 kg/m .     ASSESSMENT/PLAN:    Additional diagnoses and related orders:  1. Immunization due  Pneumococcal polysaccharide vaccine 23-valent greater than or equal to 3yo subcutaneous/IM   2. Post-splenectomy  Pneumococcal polysaccharide vaccine 23-valent greater than or equal to 3yo subcutaneous/IM   3. Chronic ITP (idiopathic thrombocytopenia) (H)  Pneumococcal polysaccharide vaccine 23-valent greater than or equal to 3yo subcutaneous/IM   4. Intermittent explosive disorder       Chronic issues stable/ same treatment.   pcv 23 ordered as nurse visit for after May 7.

## 2021-05-28 NOTE — PROGRESS NOTES
Last visit Keeley: 4/15/19  Have you seen your PCP: yes   What medical conditions do we need to know about: no  Are you seeing a therapist: Yes  Are you taking your medications: yes  Any concerns about your sleep: denies  Any concerns about your Appetite: denies  How is your Mood: good  Any Pain scale 0-10, ten being the worst: 0   Any Anxiety scale 0-5, five being the worst: denies   Any Depression scale 0-5, five being the worst: deneis  Any Suicidal or homicidal ideation: denies    Correct pharmacy verified with patient and confirmed in snapshot? [x] yes []no    Charge captured ? [x] yes  [] no    Medications Phoned  to Pharmacy [] yes [x]no  Name of Pharmacist:  List Medications, including dose, quantity and instructions      Medication Prescriptions given to patient   [] yes  [x] no   List the name of the drug the prescription was written for.       Medications ordered this visit were e-scribed.  Verified by order class [x] yes  [] no  Strattera 40 mg  Invega 6 mg  Gabapentin 100 mg    Medication changes or discontinuations were communicated to patient's pharmacy: [] yes  [x] no    UA collected [] yes  [x] no    [place] Prescription Monitoring Program Reviewed? [] yes  [x] no    Referrals were made to: none     Future appointment was made: [x] yes  [] no    Dictation completed at time of chart check: [] yes  [x] no    I have checked the documentation for today s encounters and the above information has been reviewed and completed.

## 2021-05-28 NOTE — TELEPHONE ENCOUNTER
Informed Victor Hugo of message below. He states pt was put on this medication from the hospitalist. Should he continue it or discontinue?? Please advise.

## 2021-05-29 NOTE — TELEPHONE ENCOUNTER
Dr. Daniels covering for Dr. Lloyd  Previously followed by JASMINA Cruz      Date of Last Office Visit: 5/8/19 JASMINA Cruz  Date of Next Office Visit: none  No shows since last visit: no  Cancellations since last visit: no  ED visits since last visit: no    Medication Prolixin 2.5 mg  date last ordered: 4/15/19  Qty: 15 day sypply  Refills: 2    Marimar Cruz, Marimar Moreno, CNP      Disp Refills Start End    fluPHENAZine (PROLIXIN) 2.5 MG tablet 60 tablet 2 4/15/2019     Sig - Route: Take 2 tablets (5 mg total) by mouth 2 (two) times a day. Morning and afternoon - Oral    Sent to pharmacy as: fluPHENAZine (PROLIXIN) 2.5 MG tablet          Lapse in therapy greater than 7 days: no  Medication refill request verified as identical to current order: yes except changed to 30 day supply and no RF  Result of Last DAM, VPA, Li+ Level, CBC, or Carbamazepine Level (at or since last visit): Negative DAM on 5/8/19        []Eligibility - not seen in last year    [x]Supervision - no future appointment    []Compliance     []Verification - order discrepancy    []Controlled Medication    []90 - day supply request    [x]Other LPN pending medications    Current Medication list:      acetaminophen (TYLENOL) 325 MG tablet Take 650 mg by mouth.   albuterol (PROVENTIL HFA) 90 mcg/actuation inhaler Inhale 2 puffs.   atomoxetine (STRATTERA) 40 MG capsule Take 1 capsule (40 mg total) by mouth daily.   atovaquone (MEPRON) 750 mg/5 mL suspension    doxylamine (UNISOM, DOXYLAMINE,) 25 mg tablet Take 2 tablets (50 mg total) by mouth at bedtime as needed for sleep.   ferrous sulfate 325 (65 FE) MG tablet Take 1 tablet (325 mg total) by mouth daily with breakfast.   fluPHENAZine (PROLIXIN) 2.5 MG tablet Take 2 tablets (5 mg total) by mouth 2 (two) times a day. Morning and afternoon   gabapentin (NEURONTIN) 100 MG capsule 2  CAPSULES BY MOUTH THREE Times a day as needed 20 minutes before stressful event   loratadine (CLARITIN) 10 mg  tablet Take 1 tablet (10 mg total) by mouth daily.   melatonin 3 mg Tab tablet Take 1 tablet (3 mg total) by mouth bedtime as needed.   metFORMIN (GLUCOPHAGE) 500 MG tablet Take 1 tablet (500 mg total) by mouth 2 (two) times a day with meals.   multivitamin (DAILY-TUSHAR) per tablet Take 1 tablet by mouth daily.   paliperidone (INVEGA) 6 MG 24 hr tablet Take 1 tablet (6 mg total) by mouth every morning.   paliperidone palmitate (INVEGA SUSTENNA) 234 mg/1.5 mL Syrg IM injection 234 mg every month IM   polyethylene glycol (GLYCOLAX) 17 gram/dose powder    propranolol (INDERAL) 10 MG tablet Take 1 tablet (10 mg total) by mouth 3 (three) times a day. With meals   risperiDONE (RISPERDAL) 2 MG tablet Take 1 tablet (2 mg total) by mouth 2 (two) times a day.   sodium chloride (OCEAN) 0.65 % nasal spray To both nostrils 4 times a day as needed   topiramate (TOPAMAX) 50 MG tablet Take 1 tablet (50 mg total) by mouth 2 (two) times a day.   traZODone (DESYREL) 50 MG tablet Take 1 tablet (50 mg total) by mouth at bedtime.       Medication Plan of Care at last office visit with MD/CNP:    PLAN:    1. Ongoing education given regarding diagnostic and treatment options with risks, benefits and alternatives and adequate verbalization of understanding.  2. Continue medication management given beneficial response.      3. Psychiatric Plan Of Care:        Medication changes:none  Group home will give his next injection of 234 mg IM on June 7  3 months were ordered he would then need to return to the clinic for a follow-up sooner as needed  Oral paliperidone was discontinued     4.  Substance Use Plan Of Care:       Continue to work for continued sobriety      5..  Medical Plan Of Care:         Continue with primary care provider      6.  Therapy as recommended           .  Legal Plan of Care:            Commitment : yes       ITP authorized meds: yes           no        : yes     8.  Risk Assessment         Risk Assessment:  Moderate        Safety Monitoring: group home       [place], [place], [place], and [place] :Reviewed and no worrisome pharmacy activity noted

## 2021-05-29 NOTE — TELEPHONE ENCOUNTER
Staff person, Jonna, at patient's residence called to inquire about follow-up appt for Kaden (former patient of Keeley Cruz NP). Reviewed disposition list, with directives to set up appt with Nkechi Monreal NP. Initial appt made for July 2, 2019 at 1330.    Staff person also reported that patient has been trying to stop using marijuana but came home from spending the weekend with his Dad and step-brother and said that he had used this past weekend.

## 2021-05-29 NOTE — TELEPHONE ENCOUNTER
Medication Request  Medication name:     atovaquone (MEPRON) 750 mg/5 mL suspension   3/12/2019     Class: Historical Med      Pharmacy Name and Location: [facility] [id number] -3  Reason for request: The patient was discharged from the hospital and would like to have the script filled if he should resume taking it.   When did you use medication last?:  Curtis  Patient offered appointment:  No  Okay to leave a detailed message: yes

## 2021-05-29 NOTE — TELEPHONE ENCOUNTER
Called Victor Hugo back and informed him of Dr. Rivera's message from encounter May 7. No further question needed.

## 2021-05-29 NOTE — TELEPHONE ENCOUNTER
Victor Hugo calling to state they have not been able to get refills of the Topiramate or the Propranolol.  He was told they were sent to [facility] in [place], but under Dr. Lloyd.  He will recall [facility] to determine if it is there but under Dr. Lloyd.

## 2021-05-30 VITALS — HEIGHT: 66 IN | WEIGHT: 275.75 LBS | BODY MASS INDEX: 44.32 KG/M2

## 2021-05-30 NOTE — PROGRESS NOTES
Date of Service:  2019    Name:  Kaden Easton Jr.  :  2000  MRN:  [id number]    HPI:   Kaden Easton Jr. is a 19 y.o. male with a long history of fetal alcohol spectrum disorder,  among others who presents to the clinic today to establish psychiatric care for medication management.  Patient is a transfer of care from seeing NP Kaia MONTANEZ who recently left [facility].    Past psychiatric medication  include :   adder al   Benadryl  Zyprexa -   Abilifangelica Jimenezentin       Current psychiatric t medication include Trazodone  50 mg at HS  Topiramate 50 two times a day    Fluphenazine 5 mg two times a day   Propanolol 10  three times a day   Invega Kaminski stena - 234 mg Q 4 weeks - administered at group home   Gabapentin 200 three times a day PRN - has actually been using it 3 times daily   Atomoxetine 40 mg daily   Risperdal 2 mg two times a day     Patient seen together with the group home staff and patient's .  Patient is a poor historian .  Group home staff answered most of my questions.  Patient did answer considerable good amount of questions but not all.  He presented with a flat affect,  Poor eye contact, head bowed for the most part with both hands on his head and complained of a headache.   Group home staff  reports that majority of the time he has been compliant with mediations but lately in th  week he has been noncompliant with medications Patient is on commitment with [facility].    Recently escalation in beh to include wall punching , verbally aggressive with housemates and staff, no physical aggression     Punched wall last night , threw a plate also last.  Woman destroys property creating dental and wall .  Staff reports that patient is more aggressive  after visit with dad. Visits dad a once week and lately has been spending overnights at dad's once weekly .  PRN medications have been helpful.  Thorough review of patient's medications done and are ongoing.  Currently patient is  on multiple neuroleptic medications and different anxiolytics.  I will need to collect more information before I can make medication adjustments as I would like to reduce the multiple neuroleptic medications and have patient on different anxiety medications in particular I would like to discontinue propranolol and utilize hydroxyzine more for anxiety management.  Will also consider other adjustments most likely after thorough review of past records to see what patient has tried and what has worked in the past and what has not worked in the past.  Staff member reports that currently there is no much of a safety concern at the group home has patient is able to eat de-escalate by going to his room and punching the walls.  I did recommend having patient punch pillows instead of a wall to avoid being hard patient is agreeable to trying this strategy.  Patient also mentioned trying to call his dad as a strategy to de-escalate when he is easily irritated.  For now patient will return to the clinic in 2 weeks for follow-up appointment and encouraged him to call in between visits any questions or concerns.       Psychiatric History:    Current psychiatrist:  Transfer for [facility]   Current psychotherapist:  Has therapist at [facility] - see therapist weekly    Current :MAYELA GONZALEZ worker   Hospitalizations: yes -  [facility] 09/09/16-11/10/16, [facility] 1/29/19  Suicide attempts:   SI : Hx SIB with head banging  Electroconvulsive therapy:  No   Judicial commitments:  Commitment  with [facility] committed 72/14/19-8/14/19 -  Committed to [facility]   Anxiety General   Social anxiety:   OCD:   Depression:   Leatha/hypomania:   PTSD:   Hallucinations :   Eating disorder:  ADHD:   Personality disorder including history of oppositional defiant disorder or conduct disorder in childhood:          Decision Making Capacity   Patient has the capacity to make independent decisions regarding medical and psychiatric care.    Chemical  "use History:              Hx of THC and alcohol use disorders  Smokes cigarettes  1/2 pack per day   Per group home staff :Uses alcohol and marijuana when he visit his dad   Alcohol : Last Use -    Marijuana Use - Uses frequently when he visit with family . Positive UA  For Marijuana - per group home staff                 Past Medical History:           Patient Active Problem List   Diagnosis     Fetal alcohol spectrum disorder     Intermittent explosive disorder     Aggressive behavior     Intellectual disability     Chronic ITP (idiopathic thrombocytopenia) (H)     Mood disorder due to a general medical condition     Mild intellectual disability     Encounters for administrative purpose     Epistaxis, recurrent     Psychosis (H)     Mild intellectual disability     Organic mood disorder     Obesity due to excess calories     Generalized anxiety disorder     Post-splenectomy     Past Medical History:   Diagnosis Date     ADD (attention deficit disorder)      ADHD (attention deficit hyperactivity disorder)      Anxiety      Fetal alcohol syndrome      History of transfusion      Obesity        No past surgical history on file.     Family Psychiatric History:          Mental illness: Unkn own   Addiction: Denies  Suicide: Denies      Social History:       Marital Status :  Single   Number of children:  Denies   Current living circumstances:[facility]      Obstetric History:  Not applicable-male patient     History:  Denied  service.    Access to weapons  Denies access to weapons.  Currently lives in a group home        Trauma & Abuse History:  Major accidents and injuries: Denies  Concussion or traumatic brain injury: Denies  Abuse: Patient did not answer question.  Patient has a history of frequent alcohol spectrum syndrome    Spiritual History:  Sources of hope, meaning, comfort, strength, peace and love:\" Playing games on my formal\"   Part of an organized Bahai: \" I do not know I don't " "know \"     Birth & Development History:  Highest education achieved:  Graduated placement    Unsure if he graduated from high school     Legal History:  .  Hx of longterm -  \" Breaking widows     [place] Prescription Monitoring Program:  No worrisome pharmacy activity.  Not indicated for this patient.    Medications:   These were reviewed.  None currently.    paliperidone palmitate  156 mg Intramuscular Once     paliperidone palmitate  234 mg Intramuscular Q30 Days     paliperidone palmitate  234 mg Intramuscular Once     paliperidone palmitate  234 mg Intramuscular Q30 Days     Current Outpatient Medications on File Prior to Visit   Medication Sig Dispense Refill     atomoxetine (STRATTERA) 40 MG capsule Take 1 capsule (40 mg total) by mouth daily. 30 capsule 3     ferrous sulfate 325 (65 FE) MG tablet Take 1 tablet (325 mg total) by mouth daily with breakfast. 90 tablet 3     fluPHENAZine (PROLIXIN) 2.5 MG tablet TAKE 2 TABLETS BY MOUTH TWICE A DAY IN THE MORNING AND AFTERNOON 120 tablet 1     gabapentin (NEURONTIN) 100 MG capsule 2  CAPSULES BY MOUTH THREE Times a day as needed 20 minutes before stressful event 180 capsule 6     loratadine (CLARITIN) 10 mg tablet Take 1 tablet (10 mg total) by mouth daily. 30 tablet 11     metFORMIN (GLUCOPHAGE) 500 MG tablet Take 1 tablet (500 mg total) by mouth 2 (two) times a day with meals. 180 tablet 1     multivitamin (DAILY-TUSHAR) per tablet Take 1 tablet by mouth daily. 30 tablet 11     paliperidone palmitate (INVEGA SUSTENNA) 234 mg/1.5 mL Syrg IM injection 234 mg every month  mL 3     polyethylene glycol (GLYCOLAX) 17 gram/dose powder        propranolol (INDERAL) 10 MG tablet TAKE 1 TABLET BY MOUTH THREE TIMES A DAY WITH MEALS 90 tablet 1     risperiDONE (RISPERDAL) 2 MG tablet Take 1 tablet (2 mg total) by mouth 2 (two) times a day. 60 tablet 2     sodium chloride (OCEAN) 0.65 % nasal spray To both nostrils 4 times a day as needed 15 mL 12     topiramate (TOPAMAX) " 50 MG tablet TAKE 1 TABLET BY MOUTH TWICE A DAY 60 tablet 1     traZODone (DESYREL) 50 MG tablet Take 1 tablet (50 mg total) by mouth at bedtime. 30 tablet 2     VITAMIN B-6 50 MG tablet Take 50 mg by mouth daily.  3     [DISCONTINUED] acetaminophen (TYLENOL) 325 MG tablet Take 650 mg by mouth.       [DISCONTINUED] albuterol (PROVENTIL HFA) 90 mcg/actuation inhaler Inhale 2 puffs.       [DISCONTINUED] atovaquone (MEPRON) 750 mg/5 mL suspension        [DISCONTINUED] doxylamine (UNISOM, DOXYLAMINE,) 25 mg tablet Take 2 tablets (50 mg total) by mouth at bedtime as needed for sleep. 30 tablet 0     [DISCONTINUED] melatonin 3 mg Tab tablet Take 1 tablet (3 mg total) by mouth bedtime as needed. 30 tablet 0     [DISCONTINUED] paliperidone (INVEGA) 6 MG 24 hr tablet Take 1 tablet (6 mg total) by mouth every morning. 10 tablet 0     Current Facility-Administered Medications on File Prior to Visit   Medication Dose Route Frequency Provider Last Rate Last Dose     paliperidone palmitate IM injection 156 mg (INVEGA SUSTENNA)  156 mg Intramuscular Once Marimar Cruz CNP         paliperidone palmitate IM injection 234 mg (INVEGA SUSTENNA)  234 mg Intramuscular Q30 Days Marimar Cruz CNP         paliperidone palmitate IM injection 234 mg (INVEGA SUSTENNA)  234 mg Intramuscular Once Marimar Cruz CNP         paliperidone palmitate IM injection 234 mg (INVEGA SUSTENNA)  234 mg Intramuscular Q30 Days Marimar Cruz, CNP               Lab Results:  Personally reviewed and discussed with the patient    Lab Results   Component Value Date    WBC 7.7 12/13/2016    HGB 13.3 (L) 12/13/2016    HCT 39.4 (L) 12/13/2016    PLT 80 (L) 12/13/2016    ALT 41 11/23/2016    AST 21 11/23/2016     11/23/2016    K 4.0 11/23/2016     11/23/2016    CREATININE 0.75 11/23/2016    BUN 12 11/23/2016    CO2 26 11/23/2016    INR 0.93 11/04/2016     No results found for: PHENYTOIN, PHENOBARB, VALPROATE, CBMZ      Vital  "signs:    Vitals:    07/02/19 1354   BP: 117/77   Patient Site: Left Arm   Patient Position: Sitting   Cuff Size: Adult Large   Pulse: 90   Weight: (!) 284 lb (128.8 kg)   Height: 5' 7\" (1.702 m)     Allergies:   Other environmental allergy        Associated Clinical Documents:       Notes reviewed in EPIC and [facility] including: medication reconciliation, progress notes, recent labs, PMH, and OSH records.    ROS:       10 point ROS was negative except for the items listed in HPI.  No Medication s/e's    Review of Systems - General ROS: negative for - chills, fatigue, night sweats, sleep disturbance or weight loss  Respiratory ROS: no cough, shortness of breath, or wheezing  negative for - cough or shortness of breath  Cardiovascular ROS: no chest pain or dyspnea on exertion  Gastrointestinal ROS: no abdominal pain, change in bowel habits, or black or bloody stools  Musculoskeletal ROS: negative  negative for - gait disturbance, joint pain, joint stiffness, muscle pain or muscular weakness  Neurological ROS: negative for - confusion, gait disturbance, headaches or seizures          MSE:      Alert & oriented x 3.   Appearance: Appears stated age, casually dressed.  Poor eye contact  Speech: Brief, latency of speech   Gait: Normal.  Musculoskeletal: Normal strength, no abnormal movements.  Mood/Affect: Flat  Thought Process: Paucity of thought  Thought Content: Denies suicide or homicide ideation.  Associations: Intact, no delusions.No loose associations   Perceptions: No hallucinations.  Memory: recent and remote memory -admitted to poor  Attention span and concentration: poor  Language: Intact.  Fund of Knowledge: Fair  Insight and Judgement: Poor    Clinical Outcome Measures:  1. PHQ-9: Total score : 13  2. MDQ: Total score : 3   3. JAZMINE-7: Total score : 11  Impression:      Hx Mild intellectual disability,   Hx mood disorder due to general medical condition   Hx intermittent explosive disorder  Hx Fetal Alcohol " spectrum     Plan:         Patient and I reviewed diagnosis and treatment plan.   Reviewed risks/benefits of medication with patient.  Ongoing education given regarding diagnostic and treatment options with adequate verbalization of understanding  Patient agrees with following recommendations:      1. Highly recommend psychotherapy: Currently attending therapy once weekly    2. RTC :2 weeks - will considered adjustment /medications .  Concerns of multiple neuroleptic medication   4. Continue current medications for now- Trazodone  50 mg at HS  Topiramate 50 two times a day    Fluphenazine (prolixin)5 mg two times a day   Propanolol 10  three times a day   Invega Kaminski stena - 234 mg Q 4 weeks - administered at group home   Gabapentin 200 three times a day PRN - has actually been using it 3 times daily   Atomoxetine 40 mg daily   Risperdal 2 mg two times a day .       Total Time:      60 Minutes spent on this visit with >50% time spent on  discussing and educating patient about diagnosis, treatment options, risks, benefits ,side effects of medications and instructions for follow up.  Time also spent on reviewing  Past  EHR from the providers  For better transition of care    This dictation was completed with speech recognition software and there may be unintended word substitutions.

## 2021-05-30 NOTE — PROGRESS NOTES
Correct pharmacy verified with patient and confirmed in snapshot? [x] yes []no    Charge captured ? [x] yes  [] no    Medications Phoned  to Pharmacy [] yes [x]no  Name of Pharmacist:  List Medications, including dose, quantity and instructions      Medication Prescriptions given to patient   [] yes  [x] no   List the name of the drug the prescription was written for.       Medications ordered this visit were e-scribed.  Verified by order class [x] yes  [] no   Risperdal 2 mg; Trazodone 50 mg   Medication changes or discontinuations were communicated to patient's pharmacy: [] yes  [x] no    UA collected [] yes  [x] no    [place] Prescription Monitoring Program Reviewed? [x] yes  [] no    Referrals were made to: None     Future appointment was made: [x] yes  [] no  7/17/2019  Dictation completed at time of chart check: [] yes  [x] no    I have checked the documentation for today s encounters and the above information has been reviewed and completed.

## 2021-05-30 NOTE — TELEPHONE ENCOUNTER
Date of Last Office Visit: 05/08/2019 Marimar Cruz CNP  Date of Next Office Visit: 07/02/2019 Nkechi Monreal CNP  No shows since last visit: none  Cancellations since last visit: none  ED visits since last visit:  none  Medication Fluphenazine 2.5 mg date last ordered: 06/03/2019  Qty: 120  Refills: 0  Lapse in therapy greater than 7 days: no  Medication refill request verified as identical to current order: yes  Result of Last DAM, VPA, Li+ Level, CBC, or Carbamazepine Level (at or since last visit): Other, on 05/08/2019 positive for THC and Amphetamines     [] Medication refilled per [facility] M-1.   [x] Medication unable to be refilled by RN due to criteria not met as indicated below:     []Eligibility - not seen in last year    []Supervision - no future appointment    []Compliance     []Verification - order discrepancy    []Controlled Medication    []Medication not included in RN Protocol    []90 - day supply request    [x]Other     Current Medication list:    Kaden Easton Jr.    (MRN [id number])   Your Current Medications Are     acetaminophen (TYLENOL) 325 MG tablet Take 650 mg by mouth.   albuterol (PROVENTIL HFA) 90 mcg/actuation inhaler Inhale 2 puffs.   atomoxetine (STRATTERA) 40 MG capsule Take 1 capsule (40 mg total) by mouth daily.   atovaquone (MEPRON) 750 mg/5 mL suspension    doxylamine (UNISOM, DOXYLAMINE,) 25 mg tablet Take 2 tablets (50 mg total) by mouth at bedtime as needed for sleep.   ferrous sulfate 325 (65 FE) MG tablet Take 1 tablet (325 mg total) by mouth daily with breakfast.   fluPHENAZine (PROLIXIN) 2.5 MG tablet TAKE 2 TABLETS BY MOUTH TWICE A DAY IN THE MORNING AND AFTERNOON   gabapentin (NEURONTIN) 100 MG capsule 2  CAPSULES BY MOUTH THREE Times a day as needed 20 minutes before stressful event   loratadine (CLARITIN) 10 mg tablet Take 1 tablet (10 mg total) by mouth daily.   melatonin 3 mg Tab tablet Take 1 tablet (3 mg total) by mouth bedtime as needed.   metFORMIN (GLUCOPHAGE)  500 MG tablet Take 1 tablet (500 mg total) by mouth 2 (two) times a day with meals.   multivitamin (DAILY-TUSHAR) per tablet Take 1 tablet by mouth daily.   paliperidone (INVEGA) 6 MG 24 hr tablet Take 1 tablet (6 mg total) by mouth every morning.   paliperidone palmitate (INVEGA SUSTENNA) 234 mg/1.5 mL Syrg IM injection 234 mg every month IM   polyethylene glycol (GLYCOLAX) 17 gram/dose powder    propranolol (INDERAL) 10 MG tablet TAKE 1 TABLET BY MOUTH THREE TIMES A DAY WITH MEALS   risperiDONE (RISPERDAL) 2 MG tablet Take 1 tablet (2 mg total) by mouth 2 (two) times a day.   sodium chloride (OCEAN) 0.65 % nasal spray To both nostrils 4 times a day as needed   topiramate (TOPAMAX) 50 MG tablet TAKE 1 TABLET BY MOUTH TWICE A DAY   traZODone (DESYREL) 50 MG tablet Take 1 tablet (50 mg total) by mouth at bedtime.       Medication Plan of Care at last office visit with MD/CNP:       PLAN   1. Ongoing education given regarding diagnostic and treatment options with risks, benefits and alternatives and adequate verbalization of understanding.  2. Continue medication management given beneficial response.      3. Psychiatric Plan Of Care:        Medication changes:none  Group home will give his next injection of 234 mg IM on June 7  3 months were ordered he would then need to return to the clinic for a follow-up sooner as needed  Oral paliperidone was discontinued     4.  Substance Use Plan Of Care:       Continue to work for continued sobriety      5..  Medical Plan Of Care:         Continue with primary care provider      6.  Therapy as recommended           .  Legal Plan of Care:            Commitment : yes       ITP authorized meds: yes           no        : yes     8.  Risk Assessment        Risk Assessment:  Moderate        Safety Monitoring: [facility]

## 2021-05-30 NOTE — PATIENT INSTRUCTIONS - HE
Continue medications as follows   1.Topiramate 50 two times a day    2.Fluphenazine (prolixin)5 mg two times a day   3.Atomoxetine   40 mg daily   4.Invega Kaminski stena - 234 mg Q 4 weeks - administered at group home   5.Continue Risperdal 2 mg two times a day   6.Add Risperdal  1 mg  two times a day  as needed - agitation  7.Increase to Trazodone  100 mg  mg at HS  8.Decrease Gabapentin to 100 mg  three times a day as needed- weaning off    9.Wean off propanol as follow - in 8-10 days - to avoid withdrawal syndrome  Take 10 mg two times a day for 3 days   Then 10 mg  two times a day for 2 days   Then 10 mg Daily for 2 days   Then 10 mg every other day for 2 days then stop     Have your pharmacy contact us for a refill if you are running low on medications (We may ask you to come into clinic to get a refill from the nurse  No Alcohol or drug use  No driving if sedated  Call the clinic with any questions or concerns   Reach out for help if you feel like hurting yourself or others ([facility] [phone]: [address] [facility], [address] or [facility] Suicide Hotline [phone] , call 911 or go to nearest Emergency room    Follow up as directed, for your appointments, per your After Visit Summary Form.

## 2021-05-30 NOTE — TELEPHONE ENCOUNTER
Date of Last Office Visit: 7/17/17  Date of Next Office Visit: 7/31/19  No shows since last visit: no  Cancellations since last visit: no  ED visits since last visit:   7/18/19-7/19/19 -  ED Suicidal Ideation    Medication Topamax 50 mg date last ordered: 6/5/19  Qty: 60  Refills: 1    topiramate (TOPAMAX) 50 MG tablet 60 tablet 1 6/5/2019  No   Sig: TAKE 1 TABLET BY MOUTH TWICE A DAY   Sent to pharmacy as: topiramate (TOPAMAX) 50 MG tablet       Lapse in therapy greater than 7 days: no  Medication refill request verified as identical to current order: yes  Result of Last DAM, VPA, Li+ Level, CBC, or Carbamazepine Level (at or since last visit): Negative DAM on 7/18/19        []Eligibility - not seen in last year    []Supervision - no future appointment    []Compliance     []Verification - order discrepancy    []Controlled Medication    []90 - day supply request    [x]Other:  Requested too early, should have enough meds to bridge him until next appt   LPN pending medications    Current Medication list:       atomoxetine (STRATTERA) 40 MG capsule Take 1 capsule (40 mg total) by mouth daily.   ferrous sulfate 325 (65 FE) MG tablet Take 1 tablet (325 mg total) by mouth daily with breakfast.   fluPHENAZine (PROLIXIN) 2.5 MG tablet TAKE 2 TABLETS BY MOUTH TWICE A DAY IN THE MORNING AND AFTERNOON   gabapentin (NEURONTIN) 100 MG capsule Take 100 mg by mouth 3 (three) times a day as needed (20 minutes before stressful events).   loratadine (CLARITIN) 10 mg tablet Take 1 tablet (10 mg total) by mouth daily.   metFORMIN (GLUCOPHAGE) 500 MG tablet Take 1 tablet (500 mg total) by mouth 2 (two) times a day with meals.   multivitamin (DAILY-TUSHAR) per tablet Take 1 tablet by mouth daily.   paliperidone palmitate (INVEGA SUSTENNA) 234 mg/1.5 mL Syrg IM injection 234 mg every month IM   polyethylene glycol (GLYCOLAX) 17 gram/dose powder Take 17 g by mouth daily as needed (constipation).        risperiDONE (RISPERDAL) 1 MG tablet  Take 1 tablet (1 mg total) by mouth 2 (two) times a day as needed (agitation).   risperiDONE (RISPERDAL) 2 MG tablet Take 1 tablet (2 mg total) by mouth 2 (two) times a day.   sodium chloride (OCEAN) 0.65 % nasal spray To both nostrils 4 times a day as needed   topiramate (TOPAMAX) 50 MG tablet TAKE 1 TABLET BY MOUTH TWICE A DAY   traZODone (DESYREL) 100 MG tablet Take 1 tablet (100 mg total) by mouth at bedtime         Medication Plan of Care at last office visit with MD/CNP:    PLAN:    Plan   1.Continue current medications for now-  Topiramate 50 two times a day    Fluphenazine (prolixin)5 mg two times a day    Atomoxetine   40 mg daily   Invega Kaminski stena - 234 mg Q 4 weeks - administered at group home   Continue Risperdal 2 mg two times a day   Add Risperdal  1 mg  two times a day  as needed - agitation  Increase to Trazodone  100 mg  mg at HS  Decrease Gabapentin to 100 mg  three times a day as needed- weaning off  Start  Weaning off  Propanolol 5  three times a day -  Wean off propanol as follow - in 10 days - to avoid withdrawal syndrome  Take 10 mg two times a day for 3 days   Then 10 mg  two times a day for 2 days   Then 10 mg Daily for 2 days   Then 10 mg every other day for 2 days then sto     2. RTC: 1-2 weeks, call in between visit with lanny questions or concerns     [place], [place], [place], and [place] : N/A

## 2021-05-30 NOTE — PROGRESS NOTES
.  Psychiatric  Progress Note  Date of visit:7/17/2019           Discussion of Care and Treatment Recommendations:   This is a 19 y.o. male with male with a long history of fetal alcohol spectrum disorder,  among others who presents to the clinic today.     Past psychiatric medication  include :   adder al   Benadryl  Zyprexa -   Abilify   Cogentin     Pt is on commitment  with Novoa committed 72/14/19-8/14/19 -  Committed to [facility]       Last visit 7/2/19.  Recommendation at last visit .  . Highly recommend psychotherapy: Currently attending therapy once weekly    2. RTC :2 weeks - will considered adjustment /medications .  Concerns of multiple neuroleptic medication   4. Continue current medications for now- Trazodone  50 mg at HS  Topiramate 50 two times a day    Fluphenazine (prolixin)5 mg two times a day   Propanolol 10  three times a day   Invega Kaminski stena - 234 mg Q 4 weeks - administered at group home   Gabapentin 200 three times a day PRN - has actually been using it 3 times daily   Atomoxetine 40 mg daily   Risperdal 2 mg two times a day .        Patient and I reviewed diagnosis and treatment plan and patient agrees with following recommendations:  Ongoing education given regarding diagnostic and treatment options with adequate verbalization of understanding.  Plan   1.Continue current medications for now-  Topiramate 50 two times a day    Fluphenazine (prolixin)5 mg two times a day    Atomoxetine   40 mg daily   Invega Kaminski stena - 234 mg Q 4 weeks - administered at group home   Continue Risperdal 2 mg two times a day   Add Risperdal  1 mg  two times a day  as needed - agitation  Increase to Trazodone  100 mg  mg at HS  Decrease Gabapentin to 100 mg  three times a day as needed- weaning off  Start  Weaning off  Propanolol 5  three times a day -  Wean off propanol as follow - in 10 days - to avoid withdrawal syndrome  Take 10 mg two times a day for 3 days   Then 10 mg  two times a day for 2 days  "  Then 10 mg Daily for 2 days   Then 10 mg every other day for 2 days then sto    2. 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   Hx intermittent explosive disorder  Hx Fetal Alcohol spectrum     Patient Active Problem List   Diagnosis     Fetal alcohol spectrum disorder     Intermittent explosive disorder     Aggressive behavior     Intellectual disability     Chronic ITP (idiopathic thrombocytopenia) (H)     Mood disorder due to a general medical condition     Mild intellectual disability     Encounters for administrative purpose     Epistaxis, recurrent     Psychosis (H)     Mild intellectual disability     Organic mood disorder     Obesity due to excess calories     Generalized anxiety disorder     Post-splenectomy             Chief Complaint / Subjective:    Chief complaint: \" I do not know how I feel\"     History of Present Illness:   Recent ED encounters:  Yesterday 7/16/19 : [facility] - wanderd from group home at 1 am and was taken to the ED by a good Sikh     Patient is a poor historian and is a diffuse questions but most of the information provided by group home staff  Patient seen  in the clinic together with the RN, PCA and 1 of the staff from his group home.  RN at the group home reports that patient has increased wandering behaviors.  He wanders out of the group home and will return later.  However yesterday at 1 AM in the morning he wanted away and had to be taken to the ER by a good Sikh.  He was assessed at the ER and discharged back to the group home after which he became agitated at the group home and was sent again to the ED for suicidal ideations with no plan or intent to act.  At the ED he was evaluated and deemed to be stable and safe to return back to the group home.  Per RN statement-agitation has continued which include punching walls, " for possible aggression, impulsivity, explosive anger outbursts.  RN confirms that patient has been taking his current medications    RN reports that behavior escalations are more prominent after patient's visits with his dad.  It has been noted in the past that patient smokes marijuana when he goes to visit his dad.  Group home staff felt done a UA  in the past which came back positive for marijuana after his visits with dad.  RN at the group home is worried that there may not be able to continue providing safe care for patient and other patients as long as patient's behaviors continue to escalate.  I did recommend that they share this information with case management again if they feel they cannot safely take care of patients that they should refer him to a higher level of care.  Patient is currently on multiple neuroleptic medications and Topamax-prescribed by previous psychiatric provider for Laessig mood disorder and on gabapentin and propranolol I presumed to prescribe off label for anxiety.  Patient is t also is a chain smoker.  RN reports that most of patient's medications were started in April by previous psychiatric provider .  Another staff agree that current medications have not been been effective in managing patient's symptoms.  They feel that patient did better when he was on Zyprexa.  They also feel that gabapentin as needed does not help in managing his anxiety patient feels the same and also want to discontinue the medications.  Staff also feel that propranolol has not helped in managing patient's anxiety.  Patient is requesting for Zyprexa today  This is my second visit with this patient who has a complex history of fecal alcohol syndrome amongst other diagnoses.  My goal today is to address the continued agitation and start weaning him off some of his medications as they are not providing efficacy for him.  Last Invega Sustenna injection was on 07/08/2019  Today I have made some medication  changes as aforementioned above with the hope of continued medications titration until we get the desired efficacy.  We may need to wean patient off some of the neuroleptic medications and start new ones but the goal will be to keep patients on not more than 2 neuroleptic medications.  I will have patient return in a week for follow-up appointment and encouraged him to call in between visits any questions or concerns      Mental Status Examination:   General: Adequate hygiene, cooperative  Speech: Normal in rate and tone  Language: Intact  Thought process: Coherent  Thought content:                    Auditory hallucinations-Denies                     Visual Hallucinations - Denies                         Delusions Absent                           Loose Associations:  No                          Suicidal thoughts: Denies                          Homicidal thoughts:Denies                        Affect: Neutral  Mood: Neutral   Intellectual functioning: Within normal limits  Memory: Within normal limits  Fund of knowledge: Average  Attention and concentration: Within normal limits  Gait: Steady  Psychomotor activity: Calm, no agitation  Muscles: No atrophy, no abnormal movements  InSight and judgment: Fair      Drug/treatment history and current pattern of use:     Hx of THC and alcohol use disorders  Smokes cigarettes  1/2 pack per day   Per group home staff :Uses alcohol and marijuana when he visit his dad   Marijuana Use - Uses frequently when he visit with family . Positive UA  For Marijuana - per group home staff        Medication changes: See Above   Medication adherence: compliant  Medication side effects: absent  Information about medications: Side effects, benefits and alternative treatments discussed and patient agrees .    Psychotherapy: Supportive therapy day-to-day living    Education: Diet, exercise, abstinence from drugs and alcohol, patient will not drive if sedated and medications or  under influence  of any substance    Lab Results:  Personally reviewed and discussed with the patient    Lab Results   Component Value Date    WBC 7.7 12/13/2016    HGB 13.3 (L) 12/13/2016    HCT 39.4 (L) 12/13/2016    PLT 80 (L) 12/13/2016    ALT 41 11/23/2016    AST 21 11/23/2016     11/23/2016    K 4.0 11/23/2016     11/23/2016    CREATININE 0.75 11/23/2016    BUN 12 11/23/2016    CO2 26 11/23/2016    INR 0.93 11/04/2016       Vital signs:  There were no vitals taken for this visit.  .vital  Allergies: Other environmental allergy         Medications:         paliperidone palmitate  156 mg Intramuscular Once     paliperidone palmitate  234 mg Intramuscular Q30 Days     paliperidone palmitate  234 mg Intramuscular Once     paliperidone palmitate  234 mg Intramuscular Q30 Days              Review of Systems:      ROS:       10 point ROS was negative except for the items listed in HPI.    Review of Systems - General ROS: negative for - chills, fatigue, night sweats, sleep disturbance or weight loss    Respiratory ROS: no cough, shortness of breath, or wheezing  negative for - cough or shortness of breath  Cardiovascular ROS: no chest pain or dyspnea on exertion  Gastrointestinal ROS: no abdominal pain, change in bowel habits, or black or bloody stools  Musculoskeletal ROS: negative  negative for - gait disturbance, joint pain, joint stiffness, muscle pain or muscular weakness  Neurological ROS: negative for - confusion, gait disturbance, headaches or seizures    Coordination of Care:   More than 25 minutes spent on this visit  with more than 50% of time spent on coordination of care including: Educating patient about diagnosis, prognosis, side effects and benefits of medications, diet, exercise.  Time also spent providing supportive therapy regarding above issues.    This note was created using a dictation system. All typing errors or contextual distortion is unintentional and software inherent.

## 2021-05-30 NOTE — TELEPHONE ENCOUNTER
Refill requests received for Risperidone 2 mg and Trazodone 50 mg.  In talking to [facility], they stated that was sent in error, as they still have a refill left on each, so those refills were refused.

## 2021-05-30 NOTE — PATIENT INSTRUCTIONS - HE
Continue medications as prescribed  Have your pharmacy contact us for a refill if you are running low on medications (We may ask you to come into clinic to get a refill from the nurse  No Alcohol or drug use  No driving if sedated  Call the clinic with any questions or concerns   Reach out for help if you feel like hurting yourself or others ([facility] [phone]: [address] [facility], [address] or [facility] Suicide Hotline [phone] , call 911 or go to nearest Emergency room    Follow up as directed, for your appointments, per your After Visit Summary Form.

## 2021-05-31 NOTE — PROGRESS NOTES
Psychiatric  Progress Note  Date of visit:2019           Discussion of Care and Treatment Recommendations:   This is a 19 y.o. male with a long history of fetal alcohol spectrum disorder, intermittent explosive disorder among others who presents to the clinic today.        Past psychiatric medication  include :   adder al   Benadryl  Zyprexa -   Abilify   Cogentin      Pt is on commitment  with Novoa committed -19 -  Committed to [facility] hospital Commitment now .  Will tania  to conform on obdulia if committed     Last visit  19 Recommendation at last visit   1.Continue current medications for now-  Topiramate 50 two times a day    Fluphenazine (prolixin)5 mg two times a day    Atomoxetine   40 mg daily   Invega Kaminski stena - 234 mg Q 4 weeks - administered at group home   Continue Risperdal 2 mg two times a day   Add Risperdal  1 mg  two times a day  as needed - agitation  Increase to Trazodone  100 mg  mg at HS  Decrease Gabapentin to 100 mg  three times a day as needed- weaning off  Start  Weaning off  Propanolol 5  three times a day -  Wean off propanol as follow - in 10 days - to avoid withdrawal syndrome  Take 10 mg two times a day for 3 days   Then 10 mg  two times a day for 2 days   Then 10 mg Daily for 2 days   Then 10 mg every other day for 2 days then stop   2. RTC: 1-2 weeks, call in between visit with lanny questions or concerns       Patient and I reviewed diagnosis and treatment plan and patient agrees with following recommendations:  Ongoing education given regarding diagnostic and treatment options with adequate verbalization of understanding.  Plan   1- Continue current med's Risperdal 1 mg two times a day PRN   -Topamax 50 mg two times a day   -Invega 234 mg Q 4 weeks - last give 8/10/19 - at the group home   -Fluphenazine (prolixin)5 mg two times a day    Atomoxetine   40 mg daily  -Trazodone  100 mg  mg at HS  2. RTC : 4 weeks, call in between visits with any   "questions or concerns            Diagnoses:     Hx Mild intellectual disability,   Hx mood disorder due to general medical condition   Hx intermittent explosive disorder  Hx Fetal Alcohol spectrum     Patient Active Problem List   Diagnosis     Fetal alcohol spectrum disorder     Intermittent explosive disorder     Aggressive behavior     Intellectual disability     Chronic ITP (idiopathic thrombocytopenia) (H)     Mood disorder due to a general medical condition     Mild intellectual disability     Encounters for administrative purpose     Epistaxis, recurrent     Psychosis (H)     Mild intellectual disability     Organic mood disorder     Obesity due to excess calories     Generalized anxiety disorder     Post-splenectomy             Chief Complaint / Subjective:    Chief complaint:\"  I am doing good     History of Present Illness:   Patient seen together with group home staff.  Since our last visit  - pt hospitalized at [facility]   7/18/19-7/26/19   Med's  After hospitalization at discharge   - Risperdal 1 mg two times a day PRN   -Topamax 50 mg two times a day   -Invega 234 mg Q 4 weeks - last give 8/10/19 - at the group home   -Fluphenazine (prolixin)5 mg two times a day    Atomoxetine   40 mg daily  -Trazodone  100 mg  mg at HS    Tel encounter update 8/19/19 \" Escobar just called to let Nkechi know that he isn't able to make it in with patient tomorrow but as of August 1 2019 he is in a new [employer] foster care and has had no bad behavior . No fires, no head banging, no bad boundaries, no bed wetting. He does have access to tobacco\"       Per patient statement today : Reports compliance with medications denies side effects.  Reports feeling better about himself.  He says his mood is stabilized and he is  liking the new group home.  He says his mind is more settled and he is sleeping  better.  Today he appeared with a brighter affect, he was cooperative, answers all questions had good eye contact and was " coherent.  Staff member present for the visit reported that patient has been stable with no behaviors.   He has not smoked marijuana since his last hospitalization.  He continues to smoke cigarettes and  he is not willing to give up the cigarettes at this time.   This is consistent with the same report given yesterday they his group home staff manager.  Patient was able to wean off gabapentin and propranolol without any issues.  For now we will continue his current medications and have him return to the clinic in 4 weeks for follow-up appointment and encouraged him to call in between visits any questions or concerns    Mental Status Examination:   General: poor  Hygiene(body odor), appropriately dressed in clean clothes  cooperative  Speech: Normal in rate and tone  Language: Intact  Thought process: Coherent  Thought content:                           Auditory hallucinations-Denies                          Visual Hallucinations - Denies                         Delusions Absent                           Loose Associations:  No                          Suicidal thoughts: Denies                          Homicidal thoughts:Denies                        Affect: Neutral  Mood: Neutral   Intellectual functioning: Within normal limits  Memory: Within normal limits  Fund of knowledge: Average  Attention and concentration: Within normal limits  Gait: Steady  Psychomotor activity: Calm, no agitation  Muscles: No atrophy, no abnormal movements  InSight and judgment: Fair      Drug/treatment history and current pattern of use:   Denies     Medication changes: See Above   Medication adherence: compliant  Medication side effects: absent  Information about medications: Side effects, benefits and alternative treatments discussed and patient agrees .    Psychotherapy: Supportive therapy day-to-day living    Education: Diet, exercise, abstinence from drugs and alcohol, patient will not drive if sedated and medications or  under  "influence of any substance    Lab Results: Personally reviewed and discussed with the patient    Lab Results   Component Value Date    WBC 7.7 12/13/2016    HGB 13.3 (L) 12/13/2016    HCT 39.4 (L) 12/13/2016    PLT 80 (L) 12/13/2016    ALT 41 11/23/2016    AST 21 11/23/2016     11/23/2016    K 4.0 11/23/2016     11/23/2016    CREATININE 0.75 11/23/2016    BUN 12 11/23/2016    CO2 26 11/23/2016    INR 0.93 11/04/2016       Vital signs:    Vitals:    08/20/19 1304   BP: 111/79   Patient Site: Left Arm   Patient Position: Sitting   Cuff Size: Adult Large   Pulse: 97   Weight: (!) 289 lb (131.1 kg)   Height: 5' 5\" (1.651 m)     Allergies: Other environmental allergy         Medications:     Current Outpatient Medications on File Prior to Visit   Medication Sig Dispense Refill     atomoxetine (STRATTERA) 40 MG capsule Take 1 capsule (40 mg total) by mouth daily. 30 capsule 3     ferrous sulfate 325 (65 FE) MG tablet Take 1 tablet (325 mg total) by mouth daily with breakfast. 90 tablet 3     fluPHENAZine (PROLIXIN) 2.5 MG tablet TAKE 2 TABLETS BY MOUTH TWICE A DAY IN THE MORNING AND AFTERNOON 120 tablet 1     metFORMIN (GLUCOPHAGE) 500 MG tablet Take 1 tablet (500 mg total) by mouth 2 (two) times a day with meals. 180 tablet 1     multivitamin (DAILY-TUSHAR) per tablet Take 1 tablet by mouth daily. 30 tablet 11     paliperidone palmitate (INVEGA SUSTENNA) 234 mg/1.5 mL Syrg IM injection 234 mg every month  mL 3     sodium chloride (OCEAN) 0.65 % nasal spray To both nostrils 4 times a day as needed 15 mL 12     [DISCONTINUED] risperiDONE (RISPERDAL) 1 MG tablet Take 1 tablet (1 mg total) by mouth 2 (two) times a day as needed (agitation). 60 tablet 1     [DISCONTINUED] topiramate (TOPAMAX) 50 MG tablet TAKE 50MG (1 TABLET) BY MOUTH 2 TIMES A DAY 60 tablet 0     [DISCONTINUED] traZODone (DESYREL) 100 MG tablet Take 1 tablet (100 mg total) by mouth at bedtime. 30 tablet 1     polyethylene glycol (GLYCOLAX) 17 " gram/dose powder Take 17 g by mouth daily as needed (constipation).              [DISCONTINUED] gabapentin (NEURONTIN) 100 MG capsule Take 100 mg by mouth 3 (three) times a day as needed (20 minutes before stressful events).       [DISCONTINUED] loratadine (CLARITIN) 10 mg tablet Take 1 tablet (10 mg total) by mouth daily. 30 tablet 11     [DISCONTINUED] risperiDONE (RISPERDAL) 2 MG tablet Take 1 tablet (2 mg total) by mouth 2 (two) times a day. 60 tablet 1     Current Facility-Administered Medications on File Prior to Visit   Medication Dose Route Frequency Provider Last Rate Last Dose     paliperidone palmitate IM injection 156 mg (INVEGA SUSTENNA)  156 mg Intramuscular Once Nancy, Marimar D, CNP         paliperidone palmitate IM injection 234 mg (INVEGA SUSTENNA)  234 mg Intramuscular Q30 Days Nancy, Marimar D, CNP         paliperidone palmitate IM injection 234 mg (INVEGA SUSTENNA)  234 mg Intramuscular Once Nancy, Marimar D, CNP         paliperidone palmitate IM injection 234 mg (INVEGA SUSTENNA)  234 mg Intramuscular Q30 Days Nancy, Marimar D, CNP               paliperidone palmitate  156 mg Intramuscular Once     paliperidone palmitate  234 mg Intramuscular Q30 Days     paliperidone palmitate  234 mg Intramuscular Once     paliperidone palmitate  234 mg Intramuscular Q30 Days              Review of Systems:      ROS:       10 point ROS was negative except for the items listed in HPI.    Review of Systems - General ROS: negative for - chills, fatigue, night sweats, sleep disturbance or weight loss    Respiratory ROS: no cough, shortness of breath, or wheezing  negative for - cough or shortness of breath  Cardiovascular ROS: no chest pain or dyspnea on exertion  Gastrointestinal ROS: no abdominal pain, change in bowel habits, or black or bloody stools  Musculoskeletal ROS: negative  negative for - gait disturbance, joint pain, joint stiffness, muscle pain or muscular weakness  Neurological ROS: negative  for - confusion, gait disturbance, headaches or seizures    Coordination of Care:   More than 25 minutes spent on this visit  with more than 50% of time spent on coordination of care including: Educating patient about diagnosis, prognosis, side effects and benefits of medications, diet, exercise.  Time also spent providing supportive therapy regarding above issues.    This note was created using a dictation system. All typing errors or contextual distortion is unintentional and software inherent.

## 2021-05-31 NOTE — TELEPHONE ENCOUNTER
Please, deny  Risperidone 2 mg got transferred from [place] yesterday.     Date of Last Office Visit: 7/17/19  Date of Next Office Visit: 8/20/19  No shows since last visit: no  Cancellations since last visit: 7/31/19 - hospitalized @   ED visits since last visit:   Hospitalized @  7/26/19  Pt got transferred to [facility] as of yesterday    [facility]    Medication Risperidone 2 mg date last ordered: 7/17/19  Qty: 60 Refills: 1    risperiDONE (RISPERDAL) 2 MG tablet 60 tablet 1 7/17/2019  --   Sig - Route: Take 1 tablet (2 mg total) by mouth 2 (two) times a day. - Oral     Requested too early, called [facility], prescription for Risperidone 2 mg transferred out at 3 pm yesterday.    [facility]ed [facility], they did get the Rx for Risperidone but do need a RF for topomax

## 2021-05-31 NOTE — TELEPHONE ENCOUNTER
returning Marie with Nkechi Burnette's call per Escobar , patient is no longer on commitment effective 8.14.19 and is his own guardian currently. [phone] is Escobar's number if you need to call him back.

## 2021-05-31 NOTE — PROGRESS NOTES
Correct pharmacy verified with patient and confirmed in snapshot? [x] yes []no    Charge captured ? [x] yes  [] no    Medications Phoned  to Pharmacy [] yes [x]no  Name of Pharmacist:  List Medications, including dose, quantity and instructions      Medication Prescriptions given to patient   [] yes  [x] no   List the name of the drug the prescription was written for.       Medications ordered this visit were e-scribed.  Verified by order class [x] yes  [] no   Risperdal 1 mg; Topamax 50 mg; trazodone 100 mg   Medication changes or discontinuations were communicated to patient's pharmacy: [] yes  [x] no    UA collected [] yes  [x] no    [place] Prescription Monitoring Program Reviewed? [x] yes  [] no    Referrals were made to:  None    Future appointment was made: [x] yes  [] no  9/18/2019   Dictation completed at time of chart check: [x] yes  [] no    I have checked the documentation for today s encounters and the above information has been reviewed and completed.

## 2021-05-31 NOTE — TELEPHONE ENCOUNTER
Escobar just called to let Nkechi know that he isnt able to make it in with patient tomorrow but as of August 1 2019 he is in a new [employer] foster care and has had no bad behavior . No fires, no head banging, no bad boundaries, no bed wetting. He does have access to tobacco.

## 2021-05-31 NOTE — PATIENT INSTRUCTIONS - HE
Continue medications as prescribed  Have your pharmacy contact us for a refill if you are running low on medications (We may ask you to come into clinic to get a refill from the nurse  No Alcohol or drug use  No driving if sedated  Call the clinic with any questions or concerns   Reach out for help if you feel like hurting yourself or others ([facility] [phone]: [address] [facility], [address] or [facility] Suicide Hotline [phone] , call 911 or go to nearest Emergency room    Follow up as directed, for your appointments, per your After Visit Summary Form.

## 2021-05-31 NOTE — TELEPHONE ENCOUNTER
According to  staff pt got discharged to the new facility as of today.    Phone call made to the [facility]  @ [phone] and confirmed that pt got admitted to the  facility a couple hours ago, only has 2 day supply of medications and needs RF for everything. During hospitalization they made some med adjustment and he is no longer on Vistaril or Cogentin.   Confirmed Topamax dose: 50 mg two times a day.   Advised the staff to contact his previous treatment facility and have his new pharmacy to sent us RF request if needed.     Pharmacy info updated in [facility]  See discharge plan below:    Current Discharge Medication List     CONTINUE these medications which have CHANGED   Details   paliperidone palmitate ER (INVEGA SUSTENNA) 234 MG/1.5ML injection Inject 1.5 mL intramuscularly every 4 weeks.     risperiDONE (RISPERDAL) 1 MG tablet Take 1 Tablet by mouth two times daily as needed for Other (agitation).       CONTINUE these medications which have NOT CHANGED   Details   atomoxetine (STRATTERA) 40 MG capsule Take 40 mg by mouth daily.     ferrous sulfate 325 (65 Fe) MG tablet Take 325 mg by mouth daily with breakfast.     fluPHENAZine (PROLIXIN) 2.5 MG tablet Take 5 mg by mouth two times a day.     metFORMIN (GLUCOPHAGE) 500 MG tablet Take 500 mg by mouth two times a day with meals.     multivitamin (THERAGRAN) tablet Take 1 Tablet by mouth daily.     polyethylene glycol (MIRALAX) packet Take 17 g by mouth daily as needed for Constipation.     saline (SODIUM CHLORIDE) 0.65 % nasal solution Place 1 Spray into both nostrils 4 times daily as needed for Congestion.     topiramate (TOPAMAX) 50 MG tablet Take 50 mg by mouth two times a day.     traZODone (DESYREL) 100 MG tablet Take 100 mg by mouth daily at bedtime.       STOP taking these medications     gabapentin (NEURONTIN) 100 MG capsule Comments:   Reason for Stopping:     loratadine (CLARITIN) 10 MG tablet Comments:   Reason for Stopping:     propranolol  (INDERAL) 10 MG tablet Comments:   Reason for Stopping:

## 2021-05-31 NOTE — PROGRESS NOTES
Atomoxetine 40  325 /d  Fluphenazine 2.5 two times a day  Met 500 two times a day  pallperidone  Palmitate er 234mg/1.5 ml    Every 4 wks  topiramate 50 two times a day   traz 100/hs   Prn peg  Risperidone 1 two times a day prn   Nasal saline prn    Psychiatrist prescribing    Pre Diabetes denies polyuria.   Hx march this year dx pre diabetes mellitus and started on metformin.   Though sugars were good, pt left on metformin    Psychosis controlled meds above    Obesity denies polyuria    Post splenectomy for chronic ITP  Was on steroids causing pre diabetes mellitus above     Post splenectomy no fevers  OBJECTIVE:   Vitals:    08/16/19 1339   BP: 114/72   Pulse: 98      Eyes: non icteric, noninflamed  Lungs: no resp distress  Heart: regular  Ankles: no edema  Muscles: nontender  Mental status: euthymic  Neuro: nonfocal    Body mass index is 49.09 kg/m .   Acanthosis nigricans    ASSESSMENT/PLAN:    1. Chronic ITP (idiopathic thrombocytopenia) (H)     2. Undifferentiated schizophrenia (H)     3. Post-splenectomy     4. Class 3 severe obesity due to excess calories without serious comorbidity in adult, unspecified BMI (H)       Life style modification   Educated on metabolic syndrome risk  Chronic issues stable/ same treatment.  Refuses labs    Atomoxetine 40  325 /d  Fluphenazine 2.5 two times a day  Met 500 two times a day  pallperidone  Palmitate er 234mg/1.5 ml    Every 4 wks  topiramate 50 two times a day   traz 100/hs   Prn peg  Risperidone 1 two times a day prn   Nasal saline prn

## 2021-06-01 VITALS — BODY MASS INDEX: 49.98 KG/M2 | HEIGHT: 66 IN | WEIGHT: 311 LBS

## 2021-06-01 NOTE — PROGRESS NOTES
Correct pharmacy verified with patient and confirmed in snapshot? [x] yes []no    Charge captured ? [x] yes  [] no    Medications Phoned  to Pharmacy [] yes [x]no  Name of Pharmacist:  List Medications, including dose, quantity and instructions      Medication Prescriptions given to patient   [] yes  [x] no   List the name of the drug the prescription was written for.       Medications ordered this visit were e-scribed.  Verified by order class [x] yes  [] no   Risperdal 1 mg   Medication changes or discontinuations were communicated to patient's pharmacy: [] yes  [x] no    UA collected [] yes  [x] no    [place] Prescription Monitoring Program Reviewed? [x] yes  [] no    Referrals were made to:  None    Future appointment was made: [x] yes  [] no  10/16/2019  Dictation completed at time of chart check: [x] yes  [] no    I have checked the documentation for today s encounters and the above information has been reviewed and completed.

## 2021-06-01 NOTE — PATIENT INSTRUCTIONS - HE
Continue medications as prescribed  Have your pharmacy contact us for a refill if you are running low on medications (We may ask you to come into clinic to get a refill from the nurse  No Alcohol or drug use  No driving if sedated  Call the clinic with any questions or concerns   Reach out for help if you feel like hurting yourself or others ([facility] [phone]: [address] [facility], [address] or [facility] Suicide Hotline [phone] , call 911 or go to nearest Emergency room    Follow up as directed, for your appointments, per your After Visit Summary Form.

## 2021-06-01 NOTE — PROGRESS NOTES
Psychiatric  Progress Note  Date of visit:9/18/2019         Discussion of Care and Treatment Recommendations:   This is a 19 y.o. male with a long history of fetal alcohol spectrum disorder, intermittent explosive disorder among others who presents to the clinic today.         Past psychiatric medication  include :   adder jorge alberto Foleyadryl  Zyprexa -   Abilify   Cogentin          Last visit  08/20/19  Recommendation at last visit .  1- Continue current med's Risperdal 1 mg two times a day PRN   -Topamax 50 mg two times a day   -Invega 234 mg Q 4 weeks - last give 8/10/19 - at the group home   -Fluphenazine (prolixin)5 mg two times a day    Atomoxetine   40 mg daily  -Trazodone  100 mg  at HS  2. RTC : 4 weeks, call in between visits with any  questions or concerns     Patient and I reviewed diagnosis and treatment plan and patient agrees with following recommendations:  Ongoing education given regarding diagnostic and treatment options with adequate verbalization of understanding.  Plan   1- Continue current med's Risperdal 1 mg two times a day PRN   -Topamax 50 mg two times a day   -Invega 234 mg Q 4 weeks - last give  9/2/19- at the group home   -Fluphenazine (prolixin)5 mg two times a day    Atomoxetine   40 mg daily  -Trazodone  100 mg  at HS  2. RTC : 4 weeks, call in between visits with any  questions or concerns          Diagnoses:     Hx Mild intellectual disability,   Hx mood disorder due to general medical condition   Hx intermittent explosive disorder  Hx Fetal Alcohol spectrum     Patient Active Problem List   Diagnosis     Fetal alcohol spectrum disorder     Intermittent explosive disorder     Aggressive behavior     Intellectual disability     Chronic ITP (idiopathic thrombocytopenia) (H)     Mood disorder due to a general medical condition     Mild intellectual disability     Encounters for administrative purpose     Epistaxis, recurrent     Psychosis (H)     Mild intellectual disability     Organic mood  "disorder     Obesity due to excess calories     Generalized anxiety disorder     Post-splenectomy             Chief Complaint / Subjective:    Chief complaint: \" I am doing well\"     History of Present Illness:   Patient seen at the clinic together with the group home staff member per patient's preference.  Per patient statement-he is doing well.  Reports he has maintained his calm and denies all psychiatric symptoms.  He reports compliance with current medications and denies side effects.  He denies use of mood altering substances including alcohol and marijuana.  However he continues to smoke cigarettes.  Reports staff reports that patient does continue to do well at this current group home and denies any behavior issues.  Patient would like to continue the same medications he will call in between visits with any questions or concerns.    Mental Status Examination:   General: poor hygiene , cooperative  Speech: Normal in rate and tone  Language: Intact  Thought process: Coherent  Thought content:                           Auditory hallucinations-Denies                          Visual Hallucinations - Denies                         Delusions Absent                           Loose Associations:  No                          Suicidal thoughts: Denies                          Homicidal thoughts:Denies                        Affect: Neutral  Mood: Neutral   Intellectual functioning: Within normal limits  Memory: Within normal limits  Fund of knowledge: Average  Attention and concentration: Within normal limits  Gait: Steady  Psychomotor activity: Calm, no agitation  Muscles: No atrophy, no abnormal movements  InSight and judgment: Fair      Drug/treatment history and current pattern of use:   Denies any  current use of any mood altering substances including marijuana and alcohol    Medication changes: See Above   Medication adherence: compliant  Medication side effects: absent  Information about medications: Side " "effects, benefits and alternative treatments discussed and patient agrees .    Psychotherapy: Supportive therapy day-to-day living    Education: Diet, exercise, abstinence from drugs and alcohol, patient will not drive if sedated and medications or  under influence of any substance    Lab Results:   Personally reviewed and discussed with the patient    Lab Results   Component Value Date    WBC 7.7 12/13/2016    HGB 13.3 (L) 12/13/2016    HCT 39.4 (L) 12/13/2016    PLT 80 (L) 12/13/2016    ALT 41 11/23/2016    AST 21 11/23/2016     11/23/2016    K 4.0 11/23/2016     11/23/2016    CREATININE 0.75 11/23/2016    BUN 12 11/23/2016    CO2 26 11/23/2016    INR 0.93 11/04/2016       Vital signs:  Vitals:    09/18/19 1034   BP: (!) 140/95   Patient Site: Right Arm   Patient Position: Sitting   Cuff Size: Adult Large   Pulse: (!) 101   Weight: (!) 300 lb (136.1 kg)   Height: 5' 5\" (1.651 m)     Vitals:    09/18/19 1034   BP: (!) 140/95   Patient Site: Right Arm   Patient Position: Sitting   Cuff Size: Adult Large   Pulse: (!) 101   Weight: (!) 300 lb (136.1 kg)   Height: 5' 5\" (1.651 m)     Allergies: Other environmental allergy         Medications:         paliperidone palmitate  156 mg Intramuscular Once     paliperidone palmitate  234 mg Intramuscular Once              Review of Systems:      ROS:       10 point ROS was negative except for the items listed in HPI.    Review of Systems - General ROS: negative for - chills, fatigue, night sweats, sleep disturbance or weight loss    Respiratory ROS: no cough, shortness of breath, or wheezing  negative for - cough or shortness of breath  Cardiovascular ROS: no chest pain or dyspnea on exertion  Gastrointestinal ROS: no abdominal pain, change in bowel habits, or black or bloody stools  Musculoskeletal ROS: negative  negative for - gait disturbance, joint pain, joint stiffness, muscle pain or muscular weakness  Neurological ROS: negative for - confusion, gait " disturbance, headaches or seizures    Coordination of Care:   More than 25 minutes spent on this visit  with more than 50% of time spent on coordination of care including: Educating patient about diagnosis, prognosis, side effects and benefits of medications, diet, exercise.  Time also spent providing supportive therapy regarding above issues.    This note was created using a dictation system. All typing errors or contextual distortion is unintentional and software inherent.

## 2021-06-02 ENCOUNTER — TRANSFERRED RECORDS (OUTPATIENT)
Dept: HEALTH INFORMATION MANAGEMENT | Facility: CLINIC | Age: 21
End: 2021-06-02

## 2021-06-02 VITALS — WEIGHT: 309 LBS | BODY MASS INDEX: 48.4 KG/M2

## 2021-06-02 VITALS — HEIGHT: 67 IN | WEIGHT: 304 LBS | BODY MASS INDEX: 47.71 KG/M2

## 2021-06-02 NOTE — PROGRESS NOTES
Psychiatric  Progress Note  Date of visit:10/29/2019         Discussion of Care and Treatment Recommendations:   This is a 19 y.o. male with  long history of fetal alcohol spectrum disorder, intermittent explosive disorder among others who presents to the clinic today.         Past psychiatric medication  include :   adder al   Benadryl  Zyprexa -   Abilify   Cogentin     Pt is on commitment  with Novoa committed -19 -  Committed to [facility] hospital Commitment now .  Will tania  to conform on obdulia if committed     Last visit  Recommendation at last visit .  1- Continue current med's Risperdal 1 mg two times a day PRN   -Topamax 50 mg two times a day   -Invega 234 mg Q 4 weeks - last give  19- at the group home   -Fluphenazine (prolixin)5 mg two times a day    Atomoxetine   40 mg daily  -Trazodone  100 mg  at HS  2. RTC : 4 weeks, call in between visits with any  questions or concerns   Patient and I reviewed diagnosis and treatment plan and patient agrees with following recommendations:  Ongoing education given regarding diagnostic and treatment options with adequate verbalization of understanding.  Plan   1- Continue current med's Risperdal 1 mg two times a day PRN   -Topamax 50 mg two times a day   -Invega 234 mg Q 4 weeks - last give  19- at the group home   -Fluphenazine (prolixin)5 mg two times a day    Atomoxetine   40 mg daily  -Trazodone  100 mg  at HS  2. RTC : 4 weeks, call in between visits with any  questions or concerns          Diagnoses:        Hx Mild intellectual disability,   Hx mood disorder due to general medical condition   Hx intermittent explosive disorder  Hx Fetal Alcohol spectrum     Patient Active Problem List   Diagnosis     Fetal alcohol spectrum disorder     Intermittent explosive disorder     Aggressive behavior     Intellectual disability     Chronic ITP (idiopathic thrombocytopenia) (H)     Mood disorder due to a general medical condition      "Mild intellectual disability     Encounters for administrative purpose     Epistaxis, recurrent     Psychosis (H)     Mild intellectual disability     Organic mood disorder     Obesity due to excess calories     Generalized anxiety disorder     Post-splenectomy             Chief Complaint / Subjective:    Chief complaint: \" I am ok \"     History of Present Illness:  Pr patient's statement : Patient presented to the clinic today accompanied but his  and his staff from the [facility].  He reports he is doing well.  He is compliant with current medications denies side effects.  [facility] [place] staff were concerned that patient may be using marijuana supplied by other [facility] fears or when he goes to  visit his dad.  They are still investigating the source of the marijuana .  Patient however denies that any of his peers or himself using.  [facility] [place] staff members are happy to report no behaviors.  Only issue concern is patient has been reluctant to take good care of his hygiene and today he was disheveled and with poor body odor .  Spent a lot of time in counseling and encouraging patient to take good care of his hygiene.  Also to spend more time on hematuria human interaction and less time on the screen.  Patient is agreeable to this.  Currently denies all psychiatric symptoms.  States current medications are working well.  Group home staff agreeable to this.  We will therefore continue the same medications and have patient return in 8 weeks for follow-up appointment and call in between visits any questions or concerns.  Mental Status Examination: Baseline Mild intellectual disability,Fetal Alcohol spectrum     General: Disheveled look poor hygiene  hygiene, cooperative  Speech: Normal in rate and tone  Language: Intact  Thought process: Coherent  Thought content:                           Auditory hallucinations-Denies                          Visual Hallucinations - Denies                         Delusions " "Absent                           Loose Associations:  No                          Suicidal thoughts: Denies                          Homicidal thoughts:Denies                        Affect: Neutral  Mood: Neutral   Intellectual functioning: Within normal limits  Memory: Within normal limits  Fund of knowledge: Average  Attention and concentration: Within normal limits  Gait: Steady  Psychomotor activity: Calm, no agitation  Muscles: No atrophy, no abnormal movements  InSight and judgment: Fair      Drug/treatment history and current pattern of use:   Denies any  current use of any mood altering substances including marijuana and alcohol    Medication changes: See Above   Medication adherence: compliant  Medication side effects: absent  Information about medications: Side effects, benefits and alternative treatments discussed and patient agrees .    Psychotherapy: Supportive therapy day-to-day living    Education: Diet, exercise, abstinence from drugs and alcohol, patient will not drive if sedated and medications or  under influence of any substance    Lab Results:   Personally reviewed and discussed with the patient    Lab Results   Component Value Date    WBC 7.7 12/13/2016    HGB 13.3 (L) 12/13/2016    HCT 39.4 (L) 12/13/2016    PLT 80 (L) 12/13/2016    ALT 41 11/23/2016    AST 21 11/23/2016     11/23/2016    K 4.0 11/23/2016     11/23/2016    CREATININE 0.75 11/23/2016    BUN 12 11/23/2016    CO2 26 11/23/2016    INR 0.93 11/04/2016       Vital signs:    Vitals:    10/29/19 1114   BP: 133/87   Patient Site: Left Arm   Patient Position: Sitting   Cuff Size: Adult Large   Pulse: 96   Weight: (!) 297 lb (134.7 kg)   Height: 5' 5\" (1.651 m)     Allergies: Other environmental allergy         Medications:         paliperidone palmitate  156 mg Intramuscular Once     paliperidone palmitate  234 mg Intramuscular Once              Review of Systems:      ROS:       10 point ROS was negative except for the " items listed in HPI.    Review of Systems - General ROS: negative for - chills, fatigue, night sweats, sleep disturbance or weight loss    Respiratory ROS: no cough, shortness of breath, or wheezing  negative for - cough or shortness of breath  Cardiovascular ROS: no chest pain or dyspnea on exertion  Gastrointestinal ROS: no abdominal pain, change in bowel habits, or black or bloody stools  Musculoskeletal ROS: negative  negative for - gait disturbance, joint pain, joint stiffness, muscle pain or muscular weakness  Neurological ROS: negative for - confusion, gait disturbance, headaches or seizures    Coordination of Care:   More than 25 minutes spent on this visit  with more than 50% of time spent on coordination of care including: Educating patient about diagnosis, prognosis, side effects and benefits of medications, diet, exercise.  Time also spent providing supportive therapy regarding above issues.    This note was created using a dictation system. All typing errors or contextual distortion is unintentional and software inherent.

## 2021-06-02 NOTE — PROGRESS NOTES
Correct pharmacy verified with patient and confirmed in snapshot? [x] yes []no    Charge captured ? [x] yes  [] no    Medications Phoned  to Pharmacy [] yes [x]no  Name of Pharmacist:  List Medications, including dose, quantity and instructions      Medication Prescriptions given to patient   [] yes  [x] no   List the name of the drug the prescription was written for.       Medications ordered this visit were e-scribed.  Verified by order class [x] yes  [] no  Invega Sustenna 234 mg; Risperdal 1 mg; Topamax 50 mg; trazodone 100 mg; Strattera 40 mg   Medication changes or discontinuations were communicated to patient's pharmacy: [] yes  [x] no    UA collected [] yes  [x] no    [place] Prescription Monitoring Program Reviewed? [x] yes  [] no    Referrals were made to:  None    Future appointment was made: [x] yes  [] no  12/17/2019  Dictation completed at time of chart check: [x] yes  [] no    I have checked the documentation for today s encounters and the above information has been reviewed and completed.

## 2021-06-02 NOTE — PATIENT INSTRUCTIONS - HE
Continue medications as prescribed  Have your pharmacy contact us for a refill if you are running low on medications (We may ask you to come into clinic to get a refill from the nurse  No Alcohol or drug use  No driving if sedated  Call the clinic with any questions or concerns   Reach out for help if you feel like hurting yourself or others ([facility] [phone]: [address] [facility], [address] or [facility] Suicide Hotline [phone] , call 911 or go to nearest Emergency room    Follow up as directed, for your appointments, per your After Visit Summary Form.

## 2021-06-02 NOTE — PROGRESS NOTES
Last week spitting and emesis.  Pt states fine  Denies emesis or pain or stool issues    Pre Diabetes denies polyuria.  Chronic atypical at risk metabolic syndrome  Obesity denies polyuria    Nicotine dependence.  Denies hemoptysis.     hx itp now post splenectomy   Was on oral steroids and high sugars placed on metformin.   Now metformin continued due to obesity and risk    OBJECTIVE:   Vitals:    10/31/19 1409   BP: 110/76   Pulse: 94   Resp: 16   Temp: 98.2  F (36.8  C)      Wt is noted.  No diaphoresis  Eyes: nl eom, anicteric   External ears, nose: nl    Neck: nl nodes, supple, thyroid normal   Lungs: clear to ausc   Heart: regular rhythm  Abd: soft nontender     No cva (renal) tenderness  Neuro: no weakness  Skin no rash  Joints: uninflamed   No ketotic breath odor noted  Mental: euthymic  Ext: nontender calves   Gait: normal    Body mass index is 48.76 kg/m .     ASSESSMENT/PLAN:    1. Fetal alcohol spectrum disorder     2. Intermittent explosive disorder     3. Aggressive behavior     4. Intellectual disability     5. Chronic ITP (idiopathic thrombocytopenia) (H)     6. Undifferentiated schizophrenia (H)       Chronic issues stable/ same treatment.    Recent nonspecific self resolving sx.   follow up if resumes    Declines flu shot

## 2021-06-03 VITALS — HEIGHT: 67 IN | WEIGHT: 294 LBS | BODY MASS INDEX: 46.15 KG/M2

## 2021-06-03 VITALS — BODY MASS INDEX: 46.3 KG/M2 | WEIGHT: 295 LBS | HEIGHT: 67 IN

## 2021-06-03 VITALS
SYSTOLIC BLOOD PRESSURE: 133 MMHG | BODY MASS INDEX: 49.48 KG/M2 | DIASTOLIC BLOOD PRESSURE: 87 MMHG | HEART RATE: 96 BPM | WEIGHT: 297 LBS | HEIGHT: 65 IN

## 2021-06-03 VITALS
HEIGHT: 65 IN | HEART RATE: 101 BPM | WEIGHT: 300 LBS | BODY MASS INDEX: 49.98 KG/M2 | DIASTOLIC BLOOD PRESSURE: 95 MMHG | SYSTOLIC BLOOD PRESSURE: 140 MMHG

## 2021-06-03 VITALS — HEIGHT: 65 IN | WEIGHT: 295 LBS | BODY MASS INDEX: 49.15 KG/M2

## 2021-06-03 VITALS
BODY MASS INDEX: 48.82 KG/M2 | DIASTOLIC BLOOD PRESSURE: 76 MMHG | WEIGHT: 293 LBS | HEIGHT: 65 IN | SYSTOLIC BLOOD PRESSURE: 110 MMHG | TEMPERATURE: 98.2 F | RESPIRATION RATE: 16 BRPM | HEART RATE: 94 BPM

## 2021-06-03 VITALS — WEIGHT: 287 LBS | HEIGHT: 67 IN | BODY MASS INDEX: 45.04 KG/M2

## 2021-06-03 VITALS — BODY MASS INDEX: 48.15 KG/M2 | HEIGHT: 65 IN | WEIGHT: 289 LBS

## 2021-06-03 VITALS — WEIGHT: 284 LBS | HEIGHT: 67 IN | BODY MASS INDEX: 44.57 KG/M2

## 2021-06-03 VITALS — HEIGHT: 67 IN | BODY MASS INDEX: 45.2 KG/M2 | WEIGHT: 288 LBS

## 2021-06-03 VITALS — BODY MASS INDEX: 46.2 KG/M2 | WEIGHT: 295 LBS

## 2021-06-04 VITALS
HEIGHT: 65 IN | SYSTOLIC BLOOD PRESSURE: 126 MMHG | BODY MASS INDEX: 48.48 KG/M2 | WEIGHT: 291 LBS | HEART RATE: 111 BPM | DIASTOLIC BLOOD PRESSURE: 87 MMHG

## 2021-06-04 VITALS
HEART RATE: 94 BPM | BODY MASS INDEX: 46.82 KG/M2 | HEIGHT: 65 IN | WEIGHT: 281 LBS | DIASTOLIC BLOOD PRESSURE: 58 MMHG | SYSTOLIC BLOOD PRESSURE: 121 MMHG

## 2021-06-04 VITALS — BODY MASS INDEX: 48.32 KG/M2 | WEIGHT: 290 LBS | HEIGHT: 65 IN

## 2021-06-04 VITALS — BODY MASS INDEX: 47.43 KG/M2 | HEIGHT: 65 IN

## 2021-06-04 VITALS
WEIGHT: 285 LBS | SYSTOLIC BLOOD PRESSURE: 131 MMHG | HEART RATE: 98 BPM | HEIGHT: 65 IN | DIASTOLIC BLOOD PRESSURE: 78 MMHG | BODY MASS INDEX: 47.48 KG/M2

## 2021-06-04 NOTE — PROGRESS NOTES
Correct pharmacy verified with patient and confirmed in snapshot? [x] yes []no    Charge captured ? [x] yes  [] no    Medications Phoned  to Pharmacy [] yes [x]no  Name of Pharmacist:  List Medications, including dose, quantity and instructions      Medication Prescriptions given to patient   [] yes  [x] no   List the name of the drug the prescription was written for.       Medications ordered this visit were e-scribed.  Verified by order class [x] yes  [] no  Invega Sustenna 234 mg IM; Risperdal 1 mg; Topamax 50 mg; trazodone 100 mg  Medication changes or discontinuations were communicated to patient's pharmacy: [] yes  [x] no    UA collected [] yes  [x] no    [place] Pres[place] Monitoring Program Reviewed? [x] yes  [] no    Referrals were made to: None     Future appointment was made: [x] yes  [] no  1/15/2020  Dictation completed at time of chart check: [x] yes  [] no    I have checked the documentation for today s encounters and the above information has been reviewed and completed.

## 2021-06-04 NOTE — PATIENT INSTRUCTIONS - HE
Continue medications as prescribed  Have your pharmacy contact us for a refill if you are running low on medications (We may ask you to come into clinic to get a refill from the nurse  No Alcohol or drug use  No driving if sedated  Call the clinic with any questions or concerns   Reach out for help if you feel like hurting yourself or others ([facility] [phone]: [address] [facility], [address] or [facility] Suicide Hotline [phone] , call 911 or go to nearest Emergency room    Follow up as directed, for your appointments, per your After Visit Summary Form.
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
## 2021-06-05 ENCOUNTER — RECORDS - HEALTHEAST (OUTPATIENT)
Dept: FAMILY MEDICINE | Facility: CLINIC | Age: 21
End: 2021-06-05

## 2021-06-05 VITALS
TEMPERATURE: 98.3 F | DIASTOLIC BLOOD PRESSURE: 79 MMHG | BODY MASS INDEX: 42.49 KG/M2 | OXYGEN SATURATION: 100 % | SYSTOLIC BLOOD PRESSURE: 106 MMHG | HEART RATE: 110 BPM | WEIGHT: 263.25 LBS

## 2021-06-05 VITALS
SYSTOLIC BLOOD PRESSURE: 104 MMHG | BODY MASS INDEX: 40.06 KG/M2 | DIASTOLIC BLOOD PRESSURE: 69 MMHG | HEIGHT: 66 IN | HEART RATE: 93 BPM | WEIGHT: 249.25 LBS

## 2021-06-05 VITALS
RESPIRATION RATE: 18 BRPM | HEIGHT: 67 IN | WEIGHT: 267 LBS | HEART RATE: 88 BPM | SYSTOLIC BLOOD PRESSURE: 107 MMHG | TEMPERATURE: 98.9 F | DIASTOLIC BLOOD PRESSURE: 74 MMHG | BODY MASS INDEX: 41.91 KG/M2

## 2021-06-05 VITALS
DIASTOLIC BLOOD PRESSURE: 73 MMHG | WEIGHT: 267.2 LBS | HEART RATE: 84 BPM | SYSTOLIC BLOOD PRESSURE: 140 MMHG | HEIGHT: 66 IN | TEMPERATURE: 98.6 F | OXYGEN SATURATION: 98 % | BODY MASS INDEX: 42.94 KG/M2

## 2021-06-05 NOTE — TELEPHONE ENCOUNTER
Date of Last Office Visit: 01/15/2020  Date of Next Office Visit: 02/12/2020  No shows since last visit: 0  Cancellations since last visit: 1 Pt Initiated 1/21/2020  ED visits since last visit: 1 1/15/2020 [facility]  Medication paliperidone palmitate 234 mg IM injection date last ordered: 12/17/2019  Qty: 1  Refills: 2  Lapse in therapy greater than 7 days: Yes, R/s follow up after ED  Medication refill request verified as identical to current order: Yes  Result of Last DAM, VPA, Li+ Level, CBC, or Carbamazepine Level (at or since last visit): NA     [] Medication refilled per AC M-1.   [] Medication unable to be refilled by RN due to criteria not met as indicated below:     []Eligibility - not seen in last year    []Supervision - no future appointment    []Compliance     []Verification - order discrepancy    []Controlled Medication    []Medication not included in RN Protocol    []90 - day supply request    [x]Other CMA pending medications   Current Medication list:    atomoxetine (STRATTERA) 40 MG capsule Take 1 capsule (40 mg total) by mouth daily.   ferrous sulfate 325 (65 FE) MG tablet Take 1 tablet (325 mg total) by mouth daily with breakfast.   fluPHENAZine (PROLIXIN) 2.5 MG tablet TAKE 2 TABLETS BY MOUTH TWICE A DAY IN THE MORNING AND AFTERNOON   metFORMIN (GLUCOPHAGE) 500 MG tablet Take 1 tablet (500 mg total) by mouth 2 (two) times a day with meals.   multivitamin (DAILY-TUSHAR) per tablet Take 1 tablet by mouth daily.   paliperidone palmitate (INVEGA SUSTENNA) 234 mg/1.5 mL Syrg IM injection Inject 1.5 mL (234 mg total) into the shoulder, thigh, or buttocks every 28 days.   polyethylene glycol (GLYCOLAX) 17 gram/dose powder Take 17 g by mouth daily as needed (constipation).        risperiDONE (RISPERDAL) 1 MG tablet Take 1 tablet (1 mg total) by mouth 2 (two) times a day as needed (agitation).   sodium chloride (OCEAN) 0.65 % nasal spray To both nostrils 4 times a day as needed   topiramate (TOPAMAX) 50  MG tablet TAKE 50MG (1 TABLET) BY MOUTH 2 TIMES A DAY   traZODone (DESYREL) 100 MG tablet Take 1 tablet (100 mg total) by mouth at bedtime.       Medication Plan of Care at last office visit with MD/CNP:    Last visit  12/17/2019  Recommendation at last visit .  1- Continue current med's Risperdal 1 mg two times a day PRN   -Topamax 50 mg two times a day   -Invega 234 mg Q 4 weeks - last give  9/2/19- at the group home   -Fluphenazine (prolixin)5 mg two times a day    Atomoxetine   40 mg daily  -Trazodone  100 mg  at HS  2. RTC : 4 weeks, call in between visits with any  questions or concerns  Patient and I reviewed diagnosis and treatment plan and patient agrees with following recommendations:  Ongoing education given regarding diagnostic and treatment options with adequate verbalization of understanding.  Plan   Send to ED for further evaluation - Recommend - admit to inpatient for medication evaluation and psychiatric  stabilization   Patient placed on a transport hold and escorted to the ED by 2 security staff and clinic CMA staff

## 2021-06-05 NOTE — PATIENT INSTRUCTIONS - HE
Continue medications as prescribed  Have your pharmacy contact us for a refill if you are running low on medications (We may ask you to come into clinic to get a refill from the nurse  No Alcohol or drug use  No driving if sedated  Call the clinic with any questions or concerns   Reach out for help if you feel like hurting yourself or others ([facility] [phone]: [address] [facility], [address] or [facility] Suicide Hotline [phone] , call 911 or go to nearest Emergency room    Follow up as directed, for your appointments, per your After Visit Summary Form.
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
## 2021-06-06 NOTE — PROGRESS NOTES
Psychiatric  Progress Note  Date of visit:         Discussion of Care and Treatment Recommendations:   This is a 19 y.o. male with with  a long history of fetal alcohol spectrum disorder, intermittent explosive disorder among others who presents to the clinic today.      Past psychiatric medication  include :   adder jorge alberto Hanl  Zyprexa -   Abilify   Cogentin       Last visit 01/15/19 .  Recommendation at last visit .  Send to ED for further evaluation - Recommend - admit to inpatient for medication evaluation and psychiatric  stabilization   Patient placed on a transport hold and escorted to the ED by 2 security staff and clinic CMA staff  Patient and I reviewed diagnosis and treatment plan and patient agrees with following recommendations:  Ongoing education given regarding diagnostic and treatment options with adequate verbalization of understanding.  Plan   1- Continue current med's Risperdal 1 mg two times a day PRN   -Topamax 50 mg two times a day   -Invega 234 mg Q 4 weeks - last give  9/2/19- at the group home   -Fluphenazine (prolixin)5 mg two times a day    Atomoxetine   40 mg daily  -Trazodone  100 mg  at HS  2. 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   Hx Intermittent Explosive Disorder  Hx Fetal Alcohol Spectrum     Patient Active Problem List   Diagnosis     Fetal alcohol spectrum disorder     Intermittent explosive disorder     Aggressive behavior     Intellectual disability     Chronic ITP (idiopathic thrombocytopenia) (H)     Mood disorder due to a general medical condition     Mild intellectual disability     Encounters for administrative purpose     Epistaxis, recurrent     Psychosis (H)     Mild intellectual disability     Organic mood disorder     Obesity due to excess calories     Generalized anxiety disorder     Post-splenectomy             Chief  "Complaint / Subjective:    Chief complaint: \" I a doing wel; \"     History of Present Illness:   Patient seen today he was also accompanied with group home staff.  Group home staff also provided some collateral information.  During our last visit patient was not doing well and was sent to the ER for crisis evaluation.  While there at the ER he did better and was discharged within 24 hours.  Group home staff reported after his discharge he had another aggressive episode at the group home and had to be transferred within the same company but to a different house where they have older peers.  Staff report that he has done so much better as far as behaviors are concerned over there.  No behavior reports no aggressiveness.  He has been compliant with his current medications.  He denies side effects.  Family has visited him at the group home and there has been concerns of family bringing in alcohol and drugs.  A bottle of alcohol was found in patient's position after family members left but patient adamantly denies that he did partake of alcohol.  Today patient's hygiene is good and his clothes are clean.  He is answering all my questions he is very pleasant he smiles to his demeanor is good.  I did applaud him for the behavior change and for the hard work he is putting to maintain his mood.  Group home staff report that this may be a better environment for him as they have seen a big change in his behavior.  Patient has been utilizing his current scheduled medications and PRN Risperdal as needed and this has been helpful.  Both patient and group home staff are happy with current medications.  We will continue patient on the same medications and have him return in a month for follow-up appointment.  Patient/group home staff encouraged to call in between visits with any questions or concerns.    Mental Status Examination:   General: Adequate hygiene, cooperative  Speech: Normal in rate and tone  Language: Intact  Thought " "process: Coherent  Thought content:                           Auditory hallucinations-Denies                          Visual Hallucinations - Denies                         Delusions Absent                           Loose Associations:  No                          Suicidal thoughts: Denies                          Homicidal thoughts:Denies                        Affect: Neutral  Mood: Neutral   Intellectual functioning: Within normal limits  Memory: Within normal limits  Fund of knowledge: Average  Attention and concentration: Within normal limits  Gait: Steady  Psychomotor activity: Calm, no agitation  Muscles: No atrophy, no abnormal movements  InSight and judgment: Fair      Drug/treatment history and current pattern of use:   History of marijuana use    Medication changes: See Above   Medication adherence: compliant  Medication side effects: absent  Information about medications: Side effects, benefits and alternative treatments discussed and patient agrees .    Psychotherapy: Supportive therapy day-to-day living    Education: Diet, exercise, abstinence from drugs and alcohol, patient will not drive if sedated and medications or  under influence of any substance    Lab Results:   Personally reviewed and discussed with the patient    Lab Results   Component Value Date    WBC 7.7 12/13/2016    HGB 13.3 (L) 12/13/2016    HCT 39.4 (L) 12/13/2016    PLT 80 (L) 12/13/2016    ALT 41 11/23/2016    AST 21 11/23/2016     11/23/2016    K 4.0 11/23/2016     11/23/2016    CREATININE 0.75 11/23/2016    BUN 12 11/23/2016    CO2 26 11/23/2016    INR 0.93 11/04/2016       Vital signs:  Vitals:    02/12/20 1333   BP: 131/78   Patient Site: Right Arm   Patient Position: Sitting   Cuff Size: Adult Large   Pulse: 98   Weight: (!) 285 lb (129.3 kg)   Height: 5' 5\" (1.651 m)     Allergies: Other environmental allergy         Medications:     Current Outpatient Medications on File Prior to Visit   Medication Sig Dispense " Refill     atomoxetine (STRATTERA) 40 MG capsule Take 1 capsule (40 mg total) by mouth daily. 30 capsule 3     ferrous sulfate 325 (65 FE) MG tablet Take 1 tablet (325 mg total) by mouth daily with breakfast. 90 tablet 3     fluPHENAZine (PROLIXIN) 2.5 MG tablet TAKE 2 TABLETS BY MOUTH TWICE A DAY IN THE MORNING AND AFTERNOON (Patient taking differently: Take 2.5 mg by mouth 2 (two) times a day. Morning and afternoon (1400)) 120 tablet 1     fluPHENAZine (PROLIXIN) 2.5 MG tablet Take 1 tablet (2.5 mg total) by mouth 2 (two) times a day for 7 days. 14 tablet 0     INVEGA SUSTENNA 234 mg/1.5 mL Syrg IM injection INJECT 1.5ML INTRAMUSCULARLY EVERY 4 WEEKS. 1.5 mL 0     metFORMIN (GLUCOPHAGE) 500 MG tablet Take 1 tablet (500 mg total) by mouth 2 (two) times a day with meals. 180 tablet 1     multivitamin (DAILY-TUSHAR) per tablet Take 1 tablet by mouth daily. 30 tablet 11     polyethylene glycol (GLYCOLAX) 17 gram/dose powder Take 17 g by mouth daily as needed (constipation).              risperiDONE (RISPERDAL) 1 MG tablet Take 1 tablet (1 mg total) by mouth 2 (two) times a day as needed (agitation). 60 tablet 2     sodium chloride (OCEAN) 0.65 % nasal spray To both nostrils 4 times a day as needed 15 mL 12     topiramate (TOPAMAX) 50 MG tablet TAKE 50MG (1 TABLET) BY MOUTH 2 TIMES A DAY 60 tablet 2     traZODone (DESYREL) 100 MG tablet Take 1 tablet (100 mg total) by mouth at bedtime. 30 tablet 2     Current Facility-Administered Medications on File Prior to Visit   Medication Dose Route Frequency Provider Last Rate Last Dose     paliperidone palmitate IM injection 234 mg (INVEGA SUSTENNA)  234 mg Intramuscular Once Marimar Cruz CNP               paliperidone palmitate  234 mg Intramuscular Once              Review of Systems:      ROS:       10 point ROS was negative except for the items listed in HPI.    Review of Systems - General ROS: negative for - chills, fatigue, night sweats, sleep disturbance or weight  loss    Respiratory ROS: no cough, shortness of breath, or wheezing  negative for - cough or shortness of breath  Cardiovascular ROS: no chest pain or dyspnea on exertion  Gastrointestinal ROS: no abdominal pain, change in bowel habits, or black or bloody stools  Musculoskeletal ROS: negative  negative for - gait disturbance, joint pain, joint stiffness, muscle pain or muscular weakness  Neurological ROS: negative for - confusion, gait disturbance, headaches or seizures    Coordination of Care:   More than 25 minutes spent on this visit  with more than 50% of time spent on coordination of care including: Educating patient about diagnosis, prognosis, side effects and benefits of medications, diet, exercise.  Time also spent providing supportive therapy regarding above issues.    This note was created using a dictation system. All typing errors or contextual distortion is unintentional and software inherent.

## 2021-06-06 NOTE — PROGRESS NOTES
Correct pharmacy verified with patient and confirmed in snapshot? [x] yes []no    Charge captured ? [x] yes  [] no    Medications Phoned  to Pharmacy [] yes [x]no  Name of Pharmacist:  List Medications, including dose, quantity and instructions      Medication Prescriptions given to patient   [] yes  [x] no   List the name of the drug the prescription was written for.       Medications ordered this visit were e-scribed.  Verified by order class [x] yes  [] no   Strattera 40 mg; Invega Sustenna 234 mg IM  Medication changes or discontinuations were communicated to patient's pharmacy: [] yes  [x] no    UA collected [] yes  [x] no    [place] Prescription Monitoring Program Reviewed? [x] yes  [] no    Referrals were made to: None     Future appointment was made: [x] yes  [] no  3/24/2020  Dictation completed at time of chart check: [x] yes  [] no    I have checked the documentation for today s encounters and the above information has been reviewed and completed.

## 2021-06-06 NOTE — TELEPHONE ENCOUNTER
loratadine (CLARITIN) 10 mg tablet [[id number]]     Electronically signed by: Laureano Rivera MD on 04/08/19 1352 Status: Discontinued   Ordering user: Laureano Rivera MD 04/08/19 1352 Authorized by: Laureano Rivera MD   Frequency: DAILY 04/08/19 - 08/20/19  Indications of use: allergic rhinitis   Released by: Laureano Rivera MD 04/08/19 1352 Discontinued by: Marie Wheeler CMA 08/20/19 1302 [Discontinued by another clinician]

## 2021-06-06 NOTE — PATIENT INSTRUCTIONS - HE
Continue medications as prescribed  Have your pharmacy contact us for a refill if you are running low on medications (We may ask you to come into clinic to get a refill from the nurse  No Alcohol or drug use  No driving if sedated  Call the clinic with any questions or concerns   Reach out for help if you feel like hurting yourself or others ([facility] [phone]: [address] [facility], [address] or [facility] Suicide Hotline [phone] , call 911 or go to nearest Emergency room    Follow up as directed, for your appointments, per your After Visit Summary Form.

## 2021-06-07 NOTE — PROGRESS NOTES
This video/telephone visit will be conducted via a call between you and your physician/provider. We have found that certain health care needs can be provided without the need for an in-person physical exam. This service lets us provide the care you need with a video /telephone conversation. If a prescription is necessary we can send it directly to your pharmacy. If lab work is needed we can place an order for that and you can then stop by our lab to have the test done at a later time.    Just as we bill insurance for in-person visits, we also bill insurance for video/telephone visits. If you have questions about your insurance coverage, we recommend that you speak with your insurance company.    Patient has given verbal consent for video/Telephone visit? Yes   Patient would like the video visit invitation sent by: Text to cell phone: [phone]  Marie Liam [facility]     Patient verified allergies, medications and pharmacy via phone.  Patient states he  is ready for visit.    During intake Pt asked writer about getting Zyprexa stated he would have to talk to provider about this medication.  Mail AVS please.    Per Staff: last Invega Sustenna 234 mg IM was given 4/14/2020.     [place] :  8/23/2019 Gabapentin 100 mg Qty: 180 Provider: Juli Cruz CNP

## 2021-06-07 NOTE — PROGRESS NOTES
Medications Phoned  to Pharmacy [] yes [x]no  Name of Pharmacist:  List Medications, including dose, quantity and instructions    Medications ordered this visit were e-scribed.  Verified by order class [x] yes  [] no  Risperdal 1 mg; topamax 50 mg; trazodone 100 mg  Medication changes or discontinuations were communicated to patient's pharmacy: [] yes  [x] no    Future appointment was made: [] yes  [x] No  Group Home staff will call back she stated. Per Plan return in 4 weeks  Dictation completed at time of chart check: [x] yes  [] no    I have checked the documentation for today s encounters and the above information has been reviewed and completed.

## 2021-06-07 NOTE — PATIENT INSTRUCTIONS - HE
Continue medications as prescribed  Have your pharmacy contact us for a refill if you are running low on medications (We may ask you to come into clinic to get a refill from the nurse  No Alcohol or drug use  No driving if sedated  Call the clinic with any questions or concerns   Reach out for help if you feel like hurting yourself or others ([facility] [phone]: [address] [facility], [address] or [facility] Suicide Hotline [phone] , call 911 or go to nearest Emergency room    Follow up as directed, for your appointments, per your After Visit Summary Form.

## 2021-06-07 NOTE — PROGRESS NOTES
"Kaden Easton Jr. is a 19 y.o. male who is being evaluated via a billable telephone visit.      The patient has been notified of following:     \"This telephone visit will be conducted via a call between you and your physician/provider. We have found that certain health care needs can be provided without the need for a physical exam.  This service lets us provide the care you need with a short phone conversation.  If a prescription is necessary we can send it directly to your pharmacy.  If lab work is needed we can place an order for that and you can then stop by our lab to have the test done at a later time.    If during the course of the call the physician/provider feels a telephone visit is not appropriate, you will not be charged for this service.\"     Kaden Easton Jr. complains of    Chief Complaint   Patient presents with     Follow-up     Medication Management       I have reviewed and updated the patient's Past Medical History, Social History, Family History and Medication List.    Additional provider notes:       Psychiatric  Progress Note  Date of visit:3/24/2020         Discussion of Care and Treatment Recommendations:   This is a 19 y.o. male with with  a long history of fetal alcohol spectrum disorder, intermittent explosive disorder among others who presents to the clinic today.      Past psychiatric medication  include :   adder al   Benadryl  Zyprexa -   Abilify   Cogentin       Last visit  2/12/20 Recommendation at last visit   1- Continue current med's Risperdal 1 mg two times a day PRN   -Topamax 50 mg two times a day   -Invega 234 mg Q 4 weeks - last give  9/2/19- at the group home   -Fluphenazine (prolixin)5 mg two times a day    Atomoxetine   40 mg daily  -Trazodone  100 mg  at HS  2. RTC : 4 weeks, call in between visits with any  questions or concerns  Patient and I reviewed diagnosis and treatment plan and patient agrees with following recommendations:  Ongoing education given " "regarding diagnostic and treatment options with adequate verbalization of understanding.  Plan   1- Start Taking  Risperdal as follows Risperdal  1 mg two  times a day scheduled   Continue other  current med's as follows   -Topamax 50 mg two times a day   -Invega 234 mg Q 4 weeks - last give  9/2/19- at the group home   -Fluphenazine (prolixin)5 mg two times a day    Atomoxetine   40 mg daily  -Trazodone  100 mg  at HS  2. 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   Hx Intermittent Explosive Disorder  Hx Fetal Alcohol Spectrum     Patient Active Problem List   Diagnosis     Fetal alcohol spectrum disorder     Intermittent explosive disorder     Aggressive behavior     Intellectual disability     Chronic ITP (idiopathic thrombocytopenia) (H)     Mood disorder due to a general medical condition     Mild intellectual disability     Encounters for administrative purpose     Epistaxis, recurrent     Psychosis (H)     Mild intellectual disability     Organic mood disorder     Obesity due to excess calories     Generalized anxiety disorder     Post-splenectomy             Chief Complaint / Subjective:    Chief complaint: \" I am doing ok \"     History of Present Illness:   I spoke to both patient and caregiver at his group home facility.  Per patient statement : Patient reports his been doing well.  Reports compliance with current medication denies side effects.  Patient reports that he has been doing well staying healthy playing video games as he has nothing much to do and exercising a bit.  However group home staff report that patient has had some physical aggression and sexual inappropriate behaviors with staff.  Redirection has been challenging.  In February we change his Risperdal from 1 mg twice daily to 1 mg twice daily as needed.  Given his current behavior I will go ahead and " schedule his Risperdal to 1 mg twice daily so he can be premedicated to see if his behaviors will improve.  We also spent time with patient in counseling and discussing behavior modification strategies.  Was disagreeing with staff but he has been inappropriate which is baseline with him when we are having clinic visits.  I did recommend the staff call by end of the week if behaviors do not improve or become worse and if they feel that patient is becoming increasingly aggressive and a danger to self or others they should call 911.  Patient and staff endorsed understanding.        Mental Status Examination:   General: HEIDI - Telephone Visit   Speech: Normal in rate and tone  Language: Intact  Thought process: Coherent  Thought content:                           Auditory hallucinations-Denies                          Visual Hallucinations - Denies                         Delusions Absent                           Loose Associations:  No                          Suicidal thoughts: Denies                          Homicidal thoughts:Denies                        Affect: Neutral  Mood: Neutral   Intellectual functioning: Within normal limits  Memory: Within normal limits  Fund of knowledge: Average  Attention and concentration: Within normal limits  Gait: Steady  Psychomotor activity: HEIDI - Telephone Visit   Muscles: HEIDI - Telephone Visit   InSight and judgment: Fair      Drug/treatment history and current pattern of use:   History of marijuana use    Medication changes: See Above   Medication adherence: compliant  Medication side effects: absent  Information about medications: Side effects, benefits and alternative treatments discussed and patient agrees .    Psychotherapy: Supportive therapy day-to-day living    Education: Diet, exercise, abstinence from drugs and alcohol, patient will not drive if sedated and medications or  under influence of any substance    Lab Results:   Personally reviewed and discussed with the  patient    Lab Results   Component Value Date    WBC 7.7 12/13/2016    HGB 13.3 (L) 12/13/2016    HCT 39.4 (L) 12/13/2016    PLT 80 (L) 12/13/2016    ALT 41 11/23/2016    AST 21 11/23/2016     11/23/2016    K 4.0 11/23/2016     11/23/2016    CREATININE 0.75 11/23/2016    BUN 12 11/23/2016    CO2 26 11/23/2016    INR 0.93 11/04/2016       Vital signs:  There were no vitals taken for this visit.  HEIDI - Telephone Visit   Allergies: Other environmental allergy         Medications:         paliperidone palmitate  234 mg Intramuscular Once     Current Outpatient Medications on File Prior to Visit   Medication Sig Dispense Refill     atomoxetine (STRATTERA) 40 MG capsule Take 1 capsule (40 mg total) by mouth daily. 30 capsule 3     ferrous sulfate 325 (65 FE) MG tablet Take 1 tablet (325 mg total) by mouth daily with breakfast. 90 tablet 3     fluPHENAZine (PROLIXIN) 2.5 MG tablet TAKE 2 TABLETS BY MOUTH TWICE A DAY IN THE MORNING AND AFTERNOON (Patient taking differently: Take 2.5 mg by mouth 2 (two) times a day. Morning and afternoon (1400)) 120 tablet 1     fluPHENAZine (PROLIXIN) 2.5 MG tablet Take 1 tablet (2.5 mg total) by mouth 2 (two) times a day for 7 days. 14 tablet 0     metFORMIN (GLUCOPHAGE) 500 MG tablet Take 1 tablet (500 mg total) by mouth 2 (two) times a day with meals. 180 tablet 1     multivitamin (DAILY-TUSHAR) per tablet Take 1 tablet by mouth daily. 30 tablet 11     paliperidone palmitate (INVEGA SUSTENNA) 234 mg/1.5 mL Syrg IM injection INJECT 1.5ML INTRAMUSCULARLY EVERY 4 WEEKS. 1.5 mL 2     polyethylene glycol (GLYCOLAX) 17 gram/dose powder Take 17 g by mouth daily as needed (constipation).              risperiDONE (RISPERDAL) 1 MG tablet Take 1 tablet (1 mg total) by mouth 2 (two) times a day as needed (agitation). 60 tablet 2     sodium chloride (OCEAN) 0.65 % nasal spray To both nostrils 4 times a day as needed 15 mL 12     topiramate (TOPAMAX) 50 MG tablet TAKE 50MG (1 TABLET) BY  MOUTH 2 TIMES A DAY 60 tablet 2     traZODone (DESYREL) 100 MG tablet Take 1 tablet (100 mg total) by mouth at bedtime. 30 tablet 2     Current Facility-Administered Medications on File Prior to Visit   Medication Dose Route Frequency Provider Last Rate Last Dose     paliperidone palmitate IM injection 234 mg (INVEGA SUSTENNA)  234 mg Intramuscular Once Marimar Cruz, CNP                 This note was created using a dictation system. All typing errors or contextual distortion is unintentional and software inherent.      Phone call duration: 20 minutes  Time : 1480-7173    Nkechi Monreal NP

## 2021-06-07 NOTE — PROGRESS NOTES
________________________________________  Medications Phoned  to Pharmacy [] yes [x]no  Name of Pharmacist:  List Medications, including dose, quantity and instructions    Medications ordered this visit were e-scribed.  Verified by order class [x] yes  [] no   InVega Sustenna 234 mg IM  Medication changes or discontinuations were communicated to patient's pharmacy: [] yes  [x] no    Dictation completed at time of chart check: [x] yes  [] no    I have checked the documentation for today s encounters and the above information has been reviewed and completed.

## 2021-06-07 NOTE — PROGRESS NOTES
"Kaden Easton Jr. is a 19 y.o. male who is being evaluated via a billable telephone visit.      The patient has been notified of following:     \"This telephone visit will be conducted via a call between you and your physician/provider. We have found that certain health care needs can be provided without the need for a physical exam.  This service lets us provide the care you need with a short phone conversation.  If a prescription is necessary we can send it directly to your pharmacy.  If lab work is needed we can place an order for that and you can then stop by our lab to have the test done at a later time.    Telephone visits are billed at different rates depending on your insurance coverage. During this emergency period, for some insurers they may be billed the same as an in-person visit.  Please reach out to your insurance provider with any questions.    If during the course of the call the physician/provider feels a telephone visit is not appropriate, you will not be charged for this service.\"    Patient has given verbal consent to a Telephone visit? Yes    Patient would like to receive their AVS by AVS Preference: Mail a copy.          Psychiatric  Progress Note  Date of visit:4/22/2020         Discussion of Care and Treatment Recommendations:   This is a 19 y.o. 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     Past psychiatric medication  include :   adder al   Benadryl  Zyprexa -   Abilify   Cogentin         Last visit  3/24/2020 Recommendation at last visit   1- Start Taking  Risperdal as follows Risperdal  1 mg two  times a day scheduled   Continue other  current med's as follows   -Topamax 50 mg two times a day   -Invega 234 mg Q 4 weeks - last give  9/2/19- at the group home   -Fluphenazine (prolixin)5 mg two times a day    Atomoxetine   40 " mg daily  -Trazodone  100 mg  at HS  2. RTC : 4 weeks, call in between visits with any  questions or concerns    Patient and I reviewed diagnosis and treatment plan and patient agrees with following recommendations:  Ongoing education given regarding diagnostic and treatment options with adequate verbalization of understanding.  Plan   1- Continue  Risperdal  1 mg two  times a day scheduled   Continue other  current med's as follows   -Topamax 50 mg two times a day   -Invega 234 mg Q 4 weeks - last give  9/2/19- at the group home   -Fluphenazine (prolixin)5 mg two times a day    Atomoxetine   40 mg daily  -Trazodone  100 mg  at HS  2. RTC : 4 weeks, call in between visits with any  questions or concerns              DIagnoses:     Hx Mild Intellectual Disability,   Hx Mood Disorder Due to General Medical Condition   Hx Intermittent Explosive Disorder  Hx Fetal Alcohol Spectrum     Patient Active Problem List   Diagnosis     Fetal alcohol spectrum disorder     Intermittent explosive disorder     Aggressive behavior     Intellectual disability     Chronic ITP (idiopathic thrombocytopenia) (H)     Mood disorder due to a general medical condition     Mild intellectual disability     Encounters for administrative purpose     Epistaxis, recurrent     Psychosis (H)     Mild intellectual disability     Organic mood disorder     Obesity due to excess calories     Generalized anxiety disorder     Post-splenectomy             Chief Complaint / Subjective:    Chief complaint: I am ok'    History of Present Illness:  Per patient's statement : Patient reports compliance with current medications and denies side effects.  Our conversation was interrupted on importance with rapid phone for patient and we had to redirect the patient to resume sobriety.  I did speak to the the group home staff member and requested patient be taken to private area today and for future appointments and have a dedicated staff member to be present for the  appointment for at least 30 minutes to allow for meaningful meaningfully appointment that he needs HIPAA requirements.  I did let staff member know that if this is not a possibility the need to let us know.  Staff member stated that this is possible.  I had received a message from member last week that patient was not taking his medications.  Contrary to that message the staff member I spoke to today with the patient reported that he has been working with the patient for the past 2 weeks and patient has consistently taken his medications.  During our conversation patient sometimes raised his voice which is baseline for him.  The staff member reported that he is able to de-escalate patients without much effort.  Patient today is denying all psychiatric issues and offering no new concerns.  Staff member feel that patient is at baseline as long as he takes his medications.  They are not looking for medication changes.  I did call patient's  - [facility] [phone]  and left a voicemail for him to call our offic so he can help us facilitate a successful  future appointment with patient.  For now patient will continue the same medications call in between visits any questions or concerns.    Mental Status Examination:   General: Alert and oriented x 3   Speech: Loud  Language: Intact  Thought process: Coherent  Thought content:                           Auditory hallucinations-Denies                          Visual Hallucinations - Denies                         Delusions Absent                           Loose Associations:  No                          Suicidal thoughts: Denies                          Homicidal thoughts:Denies                        Affect/Mood: Easily irritable  Intellectual functioning: Baseline impaired due to history of fetal alcohol syndrome and history of intellectual disability  Memory: Impaired-history of fetal alcohol syndrome, history of mild intellectual disability  Fund of knowledge:  Impaired due to history of withdrawal alcohol syndrome, history of mild intellectual disability  Attention and concentration: Easily distracted  Gait: Unable to assess telephone visit  Psychomotor activity: Unable to assess telephone visit  Muscles: Unable to assess telephone visit  InSight and judgment: Impaired-history of fetal alcohol syndrome, history of mild intellectual disability      Drug/treatment history and current pattern of use:   History of marijuana use    Medication changes: See Above   Medication adherence: compliant  Medication side effects: absent  Information about medications: Side effects, benefits and alternative treatments discussed and patient agrees .    Psychotherapy: Supportive therapy day-to-day living    Education: Diet, exercise, abstinence from drugs and alcohol, patient will not drive if sedated and medications or  under influence of any substance    Lab Results:   Personally reviewed and discussed with the patient    Lab Results   Component Value Date    WBC 7.7 12/13/2016    HGB 13.3 (L) 12/13/2016    HCT 39.4 (L) 12/13/2016    PLT 80 (L) 12/13/2016    ALT 41 11/23/2016    AST 21 11/23/2016     11/23/2016    K 4.0 11/23/2016     11/23/2016    CREATININE 0.75 11/23/2016    BUN 12 11/23/2016    CO2 26 11/23/2016    INR 0.93 11/04/2016       Vital signs:  There were no vitals taken for this visit.  Unable to assess telephone visit  Allergies: Other environmental allergy         Medications:         paliperidone palmitate  234 mg Intramuscular Once       Current Outpatient Medications on File Prior to Visit   Medication Sig Dispense Refill     atomoxetine (STRATTERA) 40 MG capsule Take 1 capsule (40 mg total) by mouth daily. 30 capsule 3     ferrous sulfate 325 (65 FE) MG tablet Take 1 tablet (325 mg total) by mouth daily with breakfast. 90 tablet 3     fluPHENAZine (PROLIXIN) 2.5 MG tablet TAKE 2 TABLETS BY MOUTH TWICE A DAY IN THE MORNING AND AFTERNOON (Patient taking  differently: Take 2.5 mg by mouth 2 (two) times a day. Morning and afternoon (1400)) 120 tablet 1     fluPHENAZine (PROLIXIN) 2.5 MG tablet Take 1 tablet (2.5 mg total) by mouth 2 (two) times a day for 7 days. 14 tablet 0     metFORMIN (GLUCOPHAGE) 500 MG tablet Take 1 tablet (500 mg total) by mouth 2 (two) times a day with meals. 180 tablet 1     multivitamin (DAILY-TUSHAR) per tablet Take 1 tablet by mouth daily. 30 tablet 11     paliperidone palmitate (INVEGA SUSTENNA) 234 mg/1.5 mL Syrg IM injection INJECT 1.5ML INTRAMUSCULARLY EVERY 4 WEEKS. 1.5 mL 2     polyethylene glycol (GLYCOLAX) 17 gram/dose powder Take 17 g by mouth daily as needed (constipation).              risperiDONE (RISPERDAL) 1 MG tablet Take 1 tablet (1 mg total) by mouth 2 (two) times a day. 60 tablet 2     sodium chloride (OCEAN) 0.65 % nasal spray To both nostrils 4 times a day as needed 15 mL 12     topiramate (TOPAMAX) 50 MG tablet TAKE 50MG (1 TABLET) BY MOUTH 2 TIMES A DAY 60 tablet 2     traZODone (DESYREL) 100 MG tablet Take 1 tablet (100 mg total) by mouth at bedtime. 30 tablet 2     Current Facility-Administered Medications on File Prior to Visit   Medication Dose Route Frequency Provider Last Rate Last Dose     paliperidone palmitate IM injection 234 mg (INVEGA SUSTENNA)  234 mg Intramuscular Once Marimar Cruz, CNP             Coordination of Care:   More than 25 minutes spent on this visit  with more than 50% of time spent on coordination of care including: Educating patient about diagnosis, prognosis, side effects and benefits of medications, diet, exercise.  Time also spent providing supportive therapy regarding above issues.    This note was created using a dictation system. All typing errors or contextual distortion is unintentional and software inherent.    Phone call duration: 25  minutes    Nkechi Monreal NP

## 2021-06-08 NOTE — PROGRESS NOTES
This video/telephone visit will be conducted via a call between you and your physician/provider. We have found that certain health care needs can be provided without the need for an in-person physical exam. This service lets us provide the care you need with a video /telephone conversation. If a prescription is necessary we can send it directly to your pharmacy. If lab work is needed we can place an order for that and you can then stop by our lab to have the test done at a later time.    Just as we bill insurance for in-person visits, we also bill insurance for video/telephone visits. If you have questions about your insurance coverage, we recommend that you speak with your insurance company.    Patient has given verbal consent for video/Telephone visit? Yes   Patient would like telephone visit please call: [phone]  Marie NAQVI CMA  AVS: Fax to [facility]: [phone] fax number   Per Staff:   Pt is now residing in an new group home within [facility]. Move was due to his behaviors with staff and other residents.   Per Pt request he wants to restart PRN Zyprexa again.   Last Invega injection: 5/11/2020 per staff.  Patient verified allergies, medications and pharmacy via phone.  Patient states he is ready for visit.    [place] :  8/23/2019 Gabapentin 100 mg Qty: 180 Provider: Juli Cruz NP

## 2021-06-08 NOTE — TELEPHONE ENCOUNTER
Date of Last Office Visit: 4/22/20  Date of Next Office Visit: 5/27/20  No shows since last visit: none  Cancellations since last visit: none  ED visits since last visit:  none  Medication atomoxetine (STRATTERA) 40 MG capsule  date last ordered: 2/12/20  Qty: 30  Refills: 3  Lapse in therapy greater than 7 days: no  Medication refill request verified as identical to current order: yes  Result of Last DAM, VPA, Li+ Level, CBC, or Carbamazepine Level (at or since last visit): N/A     [] Medication refilled per [facility] M-1.   [x] Medication unable to be refilled by RN due to criteria not met as indicated below:     []Eligibility - not seen in last year    []Supervision - no future appointment    []Compliance     []Verification - order discrepancy    []Controlled Medication    []Medication not included in RN Protocol    []90 - day supply request    [x]Other - Patient requesting refill early   Current Medication list:    atomoxetine (STRATTERA) 40 MG capsule  Take 1 capsule (40 mg total) by mouth daily.    ferrous sulfate 325 (65 FE) MG tablet  Take 1 tablet (325 mg total) by mouth daily with breakfast.    fluPHENAZine (PROLIXIN) 2.5 MG tablet  TAKE 2 TABLETS BY MOUTH TWICE A DAY IN THE MORNING AND AFTERNOON    fluPHENAZine (PROLIXIN) 2.5 MG tablet  Take 1 tablet (2.5 mg total) by mouth 2 (two) times a day for 7 days.    metFORMIN (GLUCOPHAGE) 500 MG tablet  Take 1 tablet (500 mg total) by mouth 2 (two) times a day with meals.    multivitamin (DAILY-TUSHAR) per tablet  Take 1 tablet by mouth daily.    paliperidone palmitate (INVEGA SUSTENNA) 234 mg/1.5 mL Syrg IM injection  INJECT 1.5ML INTRAMUSCULARLY EVERY 4 WEEKS.    polyethylene glycol (GLYCOLAX) 17 gram/dose powder  Take 17 g by mouth daily as needed (constipation).        risperiDONE (RISPERDAL) 1 MG tablet  Take 1 tablet (1 mg total) by mouth 2 (two) times a day.    sodium chloride (OCEAN) 0.65 % nasal spray  To both nostrils 4 times a day as needed    topiramate  (TOPAMAX) 50 MG tablet  TAKE 50MG (1 TABLET) BY MOUTH 2 TIMES A DAY    traZODone (DESYREL) 100 MG tablet  Take 1 tablet (100 mg total) by mouth at bedtime.        Medication Plan of Care at last office visit with MD/CNP:    Plan   1- Continue  Risperdal  1 mg two  times a day scheduled   Continue other  current med's as follows   -Topamax 50 mg two times a day   -Invega 234 mg Q 4 weeks - last give  9/2/19- at the group home   -Fluphenazine (prolixin)5 mg two times a day    Atomoxetine   40 mg daily  -Trazodone  100 mg  at HS  2. RTC : 4 weeks, call in between visits with any  questions or concerns

## 2021-06-08 NOTE — PROGRESS NOTES
Obesity denies polyuria  Admits no exercise and still pop bid    Explosive disorder.  Lost contact with therapists.  Today here with father who says pt does not answer phone  Ran out of med.  Risperdal.  Does not know who is writing rx.    Low platelets.  No bleed.    Psychosis on risperdal.  No issues  ROS: as noted above    OBJECTIVE:   Vitals:    02/13/17 1539   BP: 104/68   Pulse: 88   Resp: 20   Temp: 99.1  F (37.3  C)      Head: atraumatic   Eyes: nl eom, anicteric   Ears: nl external ears   Neck: nl nodes, supple   Lungs: clear to ausc   Heart: regular rhythm  Back: no tenderness  Abd: soft nontender   Joints: uninflamed   Ext: nontender calves   Mental: euthymic  Neuro: no weakness  Gait: normal  Wt up 25 pounds  ASSESSMENT/PLAN:      Additional diagnoses and related orders:  1. Intermittent explosive disorder  Ambulatory referral to Psychology   2. Fetal alcohol spectrum disorder  Ambulatory referral to Psychology   3. Aggressive behavior  Ambulatory referral to Psychology   4. Intellectual disability  Ambulatory referral to Psychology   5. Chronic ITP (idiopathic thrombocytopenia)     6. Obesity due to excess calories           Will continue on meds  Will help re establish contact with therapists  If no prescriber found will maintain until one is  Discussed Life style modification for obesity.   No pop.  Dad will go to gym to encourage exercise  Discussed metabolic syndrome.  follow up 1-2 months  Thrombocytopenia followed by hematology.

## 2021-06-08 NOTE — PROGRESS NOTES
"Kaden Easton Jr. is a 19 y.o. male who is being evaluated via a billable telephone visit.      The patient has been notified of following:     \"This telephone visit will be conducted via a call between you and your physician/provider. We have found that certain health care needs can be provided without the need for a physical exam.  This service lets us provide the care you need with a short phone conversation.  If a prescription is necessary we can send it directly to your pharmacy.  If lab work is needed we can place an order for that and you can then stop by our lab to have the test done at a later time.    Telephone visits are billed at different rates depending on your insurance coverage. During this emergency period, for some insurers they may be billed the same as an in-person visit.  Please reach out to your insurance provider with any questions.    If during the course of the call the physician/provider feels a telephone visit is not appropriate, you will not be charged for this service.\"    Patient has given verbal consent to a Telephone visit? Yes      Patient would like to receive their AVS by AVS Preference: Mail a copy.    Psychiatric  Progress Note  Date of visit:6/9/2020         Discussion of Care and Treatment Recommendations:   This is a 19 y.o. 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      Past psychiatric medication  include :   adder al   Benadryl  Zyprexa -   Abilify   Cogentin       Last visit 04/22/2020.  Recommendation at last visit .  1- Continue  Risperdal  1 mg two  times a day scheduled   Continue other  current med's as follows   -Topamax 50 mg two times a day   -Invega 234 mg Q 4 weeks - last give  9/2/19- at the group home   -Fluphenazine (prolixin)5 mg two times a day    Atomoxetine   40 mg daily  -Trazodone  100 mg  at " "HS  2. RTC : 4 weeks, call in between visits with any  questions or concerns        Patient and I reviewed diagnosis and treatment plan and patient agrees with following recommendations:  Ongoing education given regarding diagnostic and treatment options with adequate verbalization of understanding.  Plan     1- Continue  Risperdal  1 mg two  times a day scheduled   Continue other  current med's as follows   -Topamax 50 mg two times a day   -Invega 234 mg Q 4 weeks - last give  9/2/19- at the group home   -Fluphenazine (prolixin)5 mg two times a day    Atomoxetine   40 mg daily  -Trazodone  100 mg  at HS  -Start Risperdal 0/5 mg two times a day PRN - aggressive beh   2. RTC : 2 weeks, call in between visits with any  questions or concerns             DIagnoses:   Hx Mild Intellectual Disability,   Hx Mood Disorder Due to General Medical Condition   Hx Intermittent Explosive Disorder  Hx Fetal Alcohol Spectrum          Patient Active Problem List   Diagnosis     Fetal alcohol spectrum disorder     Intermittent explosive disorder     Aggressive behavior     Intellectual disability     Chronic ITP (idiopathic thrombocytopenia) (H)     Mood disorder due to a general medical condition     Mild intellectual disability     Encounters for administrative purpose     Epistaxis, recurrent     Psychosis (H)     Mild intellectual disability     Organic mood disorder     Obesity due to excess calories     Generalized anxiety disorder     Post-splenectomy             Chief Complaint / Subjective:    Chief complaint: \" I want something to help me relax\"    History of Present Illness:   I spoke with both patient and group home staff today.  Per patient's statement : Patient has been moved to a different house where he was noted getting along with a roommate in the previous house that would do the same at home company.  He has been in his house for a week and so far things have been going well.  Staff member reported that patient " gets into did not humans and becomes verbally aggressive with other residents and therefore they have moved him twice now PCA.  Staff was requesting for PRN medication.  Patient is currently on multiple neuroleptic medication therefore I was hesitant to introduce another neuroleptic medication.  However we added 0.5 mg of Risperdal twice daily as needed in addition to the scheduled Risperdal.  Staff report that patient has been compliant with her medications.  We also discussed various behavior modification strategies including applying at the group home to help decrease triggers for mood swings and irritability and aggression.  They will try these for almost 2 weeks and will have the follow-up appointment to review patient's progress.  Mental Status Examination:   General: Alert and oriented x 3   Speech: Normal in rate and tone  Language: Intact  Thought process: Coherent  Thought content:                           Auditory hallucinations-Denies                          Visual Hallucinations - Denies                         Delusions Absent                           Loose Associations:  No                          Suicidal thoughts: Denies                          Homicidal thoughts:Denies                        Affect: Neutral  Mood: Neutral   Intellectual functioning: Within normal limits  Memory: Within normal limits  Fund of knowledge: Average  Attention and concentration: Within normal limits  Gait: Unable to assess telephone visit  Psychomotor activity: Unable to assess telephone visit  Muscles: Unable to assess telephone visit  InSight and judgment: Fair      Drug/treatment history and current pattern of use:   History of marijuana use    Medication changes: See Above   Medication adherence: compliant  Medication side effects: absent  Information about medications: Side effects, benefits and alternative treatments discussed and patient agrees .    Psychotherapy: Supportive therapy day-to-day  living    Education: Diet, exercise, abstinence from drugs and alcohol, patient will not drive if sedated and medications or  under influence of any substance    Lab Results:   Personally reviewed and discussed with the patient    Lab Results   Component Value Date    WBC 7.7 12/13/2016    HGB 13.3 (L) 12/13/2016    HCT 39.4 (L) 12/13/2016    PLT 80 (L) 12/13/2016    ALT 41 11/23/2016    AST 21 11/23/2016     11/23/2016    K 4.0 11/23/2016     11/23/2016    CREATININE 0.75 11/23/2016    BUN 12 11/23/2016    CO2 26 11/23/2016    INR 0.93 11/04/2016       Vital signs:  There were no vitals taken for this visit.  Unable to assess telephone visit  Allergies: Other environmental allergy         Medications:     Current Outpatient Medications on File Prior to Visit   Medication Sig Dispense Refill     atomoxetine (STRATTERA) 40 MG capsule TAKE 40MG (1 CAPSULE) BY MOUTH EVERY DAY.. 30 capsule 11     ferrous sulfate 325 (65 FE) MG tablet Take 1 tablet (325 mg total) by mouth daily with breakfast. 90 tablet 3     fluPHENAZine (PROLIXIN) 2.5 MG tablet TAKE 2 TABLETS BY MOUTH TWICE A DAY IN THE MORNING AND AFTERNOON (Patient taking differently: Take 2.5 mg by mouth 2 (two) times a day. Morning and afternoon (1400)) 120 tablet 1     fluPHENAZine (PROLIXIN) 2.5 MG tablet Take 1 tablet (2.5 mg total) by mouth 2 (two) times a day for 7 days. 14 tablet 0     metFORMIN (GLUCOPHAGE) 500 MG tablet Take 1 tablet (500 mg total) by mouth 2 (two) times a day with meals. 180 tablet 1     multivitamin (DAILY-TUSHAR) per tablet Take 1 tablet by mouth daily. 30 tablet 11     paliperidone palmitate (INVEGA SUSTENNA) 234 mg/1.5 mL Syrg IM injection INJECT 1.5ML INTRAMUSCULARLY EVERY 4 WEEKS. 1.5 mL 2     polyethylene glycol (GLYCOLAX) 17 gram/dose powder Take 17 g by mouth daily as needed (constipation).              risperiDONE (RISPERDAL) 1 MG tablet Take 1 tablet (1 mg total) by mouth 2 (two) times a day. 60 tablet 2     sodium  chloride (OCEAN) 0.65 % nasal spray To both nostrils 4 times a day as needed 15 mL 12     topiramate (TOPAMAX) 50 MG tablet TAKE 50MG (1 TABLET) BY MOUTH 2 TIMES A DAY 60 tablet 2     traZODone (DESYREL) 100 MG tablet Take 1 tablet (100 mg total) by mouth at bedtime. 30 tablet 2     Current Facility-Administered Medications on File Prior to Visit   Medication Dose Route Frequency Provider Last Rate Last Dose     paliperidone palmitate IM injection 234 mg (INVEGA SUSTENNA)  234 mg Intramuscular Once Marimar Cruz, PAYTON             paliperidone palmitate  234 mg Intramuscular Once           Coordination of Care:   More than 25 minutes spent on this visit  with more than 50% of time spent on coordination of care including: Educating patient about diagnosis, prognosis, side effects and benefits of medications, diet, exercise.  Time also spent providing supportive therapy regarding above issues.    This note was created using a dictation system. All typing errors or contextual distortion is unintentional and software inherent.  Phone call duration: 30 minutes    Nkechi Monreal NP

## 2021-06-08 NOTE — PATIENT INSTRUCTIONS - HE
Continue medications as prescribed  Have your pharmacy contact us for a refill if you are running low on medications (We may ask you to come into clinic to get a refill from the nurse  No Alcohol or drug use  No driving if sedated  Call the clinic with any questions or concerns   Reach out for help if you feel like hurting yourself or others ([facility] [phone]: [address] [facility] [place], [address] or [facility] Suicide Hotline [phone] , call 911 or go to nearest Emergency room    Follow up as directed, for your appointments, per your After Visit Summary Form.

## 2021-06-08 NOTE — PROGRESS NOTES
________________________________________  Medications Phoned  to Pharmacy [] yes [x]no  Name of Pharmacist:  List Medications, including dose, quantity and instructions    Medications ordered this visit were e-scribed.  Verified by order class [x] yes  [] no  Ivega Sustenna 234 mg IM; Risperdal 0.5 mg & 1 mg; Topamax 50 mg; trazodone 100 mg   Medication changes or discontinuations were communicated to patient's pharmacy: [] yes  [x] no    Dictation completed at time of chart check: [x] yes  [] no    I have checked the documentation for today s encounters and the above information has been reviewed and completed.

## 2021-06-09 NOTE — TELEPHONE ENCOUNTER
Date of Last Office Visit: 6/9/2020  Date of Next Office Visit: today  No shows since last visit: 1   Cancellations since last visit:none   ED visits since last visit:  none    Medication risperidone 0.5 mg  date last ordered: 6/9/2020  Qty: 60  Refills: 0    Lapse in therapy greater than 7 days: yes  Medication refill request verified as identical to current order: yes  Result of Last DAM, VPA, Li+ Level, CBC, or Carbamazepine Level (at or since last visit): n/a     [] Medication refilled per [facility].   [x] Medication unable to be refilled by RN due to criteria not met as indicated below:     []Eligibility - not seen in last year    []Supervision - no future appointment    []Compliance     []Verification - order discrepancy    []Controlled Medication    [x]Medication not included in RN Protocol    []90 - day supply request    []Other   Current Medication list: Kaden Easton Jr.    (MRN [id number])   Your Current Medications Are     ALLERGY RELIEF, LORATADINE, 10 mg tablet  TAKE 10MG (1 TABLET) BY MOUTH EVERY DAY    atomoxetine (STRATTERA) 40 MG capsule  TAKE 40MG (1 CAPSULE) BY MOUTH EVERY DAY..    ferrous sulfate 325 (65 FE) MG tablet  Take 1 tablet (325 mg total) by mouth daily with breakfast.    fluPHENAZine (PROLIXIN) 2.5 MG tablet  TAKE 2 TABLETS BY MOUTH TWICE A DAY IN THE MORNING AND AFTERNOON    fluPHENAZine (PROLIXIN) 2.5 MG tablet  Take 1 tablet (2.5 mg total) by mouth 2 (two) times a day for 7 days.    metFORMIN (GLUCOPHAGE) 500 MG tablet  Take 1 tablet (500 mg total) by mouth 2 (two) times a day with meals.    multivitamin (DAILY-TUSHAR) per tablet  Take 1 tablet by mouth daily.    paliperidone palmitate (INVEGA SUSTENNA) 234 mg/1.5 mL Syrg IM injection  INJECT 1.5ML INTRAMUSCULARLY EVERY 4 WEEKS.    polyethylene glycol (GLYCOLAX) 17 gram/dose powder  Take 17 g by mouth daily as needed (constipation).        risperiDONE (RISPERDAL) 0.5 MG tablet  Take 1 tablet (0.5 mg total) by mouth 2 (two) times a  day as needed.    risperiDONE (RISPERDAL) 1 MG tablet  Take 1 tablet (1 mg total) by mouth 2 (two) times a day.    sodium chloride (OCEAN) 0.65 % nasal spray  To both nostrils 4 times a day as needed    topiramate (TOPAMAX) 50 MG tablet  TAKE 50MG (1 TABLET) BY MOUTH 2 TIMES A DAY    traZODone (DESYREL) 100 MG tablet         Medication Plan of Care at last office visit with MD/CNP: Plan      1- Continue  Risperdal  1 mg two  times a day scheduled   Continue other  current med's as follows   -Topamax 50 mg two times a day   -Invega 234 mg Q 4 weeks - last give  9/2/19- at the group home   -Fluphenazine (prolixin)5 mg two times a day    Atomoxetine   40 mg daily  -Trazodone  100 mg  at HS  -Start Risperdal 0/5 mg two times a day PRN - aggressive beh   2. RTC : 2 weeks, call in between visits with any  questions or concerns

## 2021-06-09 NOTE — PROGRESS NOTES
________________________________________  Medications Phoned  to Pharmacy [] yes [x]no  Name of Pharmacist:  List Medications, including dose, quantity and instructions    Medications ordered this visit were e-scribed.  Verified by order class [x] yes  [] no  PRN Risperidone  Medication changes or discontinuations were communicated to patient's pharmacy: [] yes  [x] no    Dictation completed at time of chart check: [x] yes  [] no    I have checked the documentation for today s encounters and the above information has been reviewed and completed.

## 2021-06-09 NOTE — PROGRESS NOTES
"Kaden Easton Jr. is a 20 y.o. male who is being evaluated via a billable telephone visit.      The patient has been notified of following:     \"This telephone visit will be conducted via a call between you and your physician/provider. We have found that certain health care needs can be provided without the need for a physical exam.  This service lets us provide the care you need with a short phone conversation.  If a prescription is necessary we can send it directly to your pharmacy.  If lab work is needed we can place an order for that and you can then stop by our lab to have the test done at a later time.    Telephone visits are billed at different rates depending on your insurance coverage. During this emergency period, for some insurers they may be billed the same as an in-person visit.  Please reach out to your insurance provider with any questions.    If during the course of the call the physician/provider feels a telephone visit is not appropriate, you will not be charged for this service.\"    Patient has given verbal consent to a Telephone visit? Yes    Patient would like to receive their AVS by AVS Preference: Mail a copy.    Psychiatric  Out patient Follow Up Progress Note  Date of visit:7/15/2020         Discussion of Care and Treatment Recommendations:   This is a 20 y.o. 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      Past psychiatric medication  include :   nhung Milesadryl  Zyprexa -   Abilify   Cogentin       Last visit 06/09/2020  Recommendation at last visit   1- Continue  Risperdal  1 mg two  times a day scheduled   Continue other  current med's as follows   -Topamax 50 mg two times a day   -Invega 234 mg Q 4 weeks - last give  9/2/19- at the group home   -Fluphenazine (prolixin)5 mg two times a day    Atomoxetine   40 mg " "daily  -Trazodone  100 mg  at HS  -Start Risperdal 0/5 mg two times a day PRN - aggressive beh   2. RTC : 2 weeks, call in between visits with any  questions or concerns     Patient and I reviewed diagnosis and treatment plan and patient agrees with following recommendations:  Ongoing education given regarding diagnostic and treatment options with adequate verbalization of understanding.  Plan   1- Continue  Risperdal  1 mg two  times a day scheduled   Continue other  current med's as follows   -Topamax 50 mg two times a day   -Invega 234 mg Q 4 weeks - last give  7/7/2020- at the group home   -Fluphenazine (prolixin)5 mg two times a day    Atomoxetine   40 mg daily  -Trazodone  100 mg  at HS  -Start Risperdal 0.5  mg two times a day PRN - aggressive beh   2. RTC : 2 weeks, call in between visits with any  questions or concerns            DIagnoses:     Hx Mild Intellectual Disability,   Hx Mood Disorder Due to General Medical Condition   Hx Intermittent Explosive Disorder  Hx Fetal Alcohol Spectrum     Patient Active Problem List   Diagnosis     Fetal alcohol spectrum disorder     Intermittent explosive disorder     Aggressive behavior     Intellectual disability     Chronic ITP (idiopathic thrombocytopenia) (H)     Mood disorder due to a general medical condition     Mild intellectual disability     Encounters for administrative purpose     Epistaxis, recurrent     Psychosis (H)     Mild intellectual disability     Organic mood disorder     Obesity due to excess calories     Generalized anxiety disorder     Post-splenectomy             Chief Complaint / Subjective:    Chief complaint:\" I have not seen mu dad for a  While \"     History of Present Illness:   I spoke with patient and staff member.  Per their  statement -patient has been compliant with current medications and denies side effects.  He has been utilizing his PRN twice daily as prescribed.  He recently has been agitated as he has not been able to speak " with his father for a long time.  His father has not been answering his phone and this has been frustrating for patient . When  he  Is frustrated he  tends to exhibit behavior tendencies with staff and other peers.  Patient is allowed to have 1 guest who visits with him outside the home while they are wearing facemask and maintaining physical  distance.  This seems to help him a bit.  Patient is on multiple neuroleptics.  Still agitated.  Today we discussed behavior modification strategies including decreasing stimulation and creating a calm environment for patient.  We have discussed diet modification avoiding sugary drinks and carbohydrates and focusing more on making healthy choices including fruits and vegetables and foods low in carbohydrates and sugar.  In addition we discussed other therapeutic activities to keep patient's engaged and occupied .  We discussed with staff that they should try this strategies for the next 2 weeks in addition to using current medications then if this fails, they can reach out to the clinic and we can  consider cautiously adjusting medication as patient is already taking multiple neuroleptic  medications.  Patient also endorsed understanding and is agreeable to this plan.  Patient will continue with the same medications for now and return to the clinic in 2 weeks.  I did advise staff that should patient become unsafe or a danger to himself or others then they should take him to the ER.    MSE; Baseline Intellectual disability   General: Alert and oriented x 3   Speech: Normal in rate and tone  Language: Intact  Thought process: Coherent  Thought content:                           Auditory hallucinations-Denies                          Visual Hallucinations - Denies                         Delusions Absent                           Loose Associations:  No                          Suicidal thoughts: Denies                          Homicidal thoughts:Denies                         Mood: Irritable  Intellectual functioning: Fair for baseline- Hx intellectual disability  Memory: Fair  Fund of knowledge: Fair for baseline   Attention and concentration: Within normal limits  Gait: Unable to assess telephone visit  Psychomotor activity: Unable to assess telephone visit  Muscles: Unable to assess telephone visit  InSight and judgment: poor      Drug/treatment history and current pattern of use:   Denies any current or recent use of all controlled substances    Medication changes: See Above   Medication adherence: compliant  Medication side effects: absent  Information about medications: Side effects, benefits and alternative treatments discussed and patient agrees .    Psychotherapy: Supportive therapy day-to-day living    Education: Diet, exercise, abstinence from drugs and alcohol, patient will not drive if sedated and medications or  under influence of any substance    Lab Results:   Personally reviewed and discussed with the patient    Lab Results   Component Value Date    WBC 7.7 12/13/2016    HGB 13.3 (L) 12/13/2016    HCT 39.4 (L) 12/13/2016    PLT 80 (L) 12/13/2016    ALT 41 11/23/2016    AST 21 11/23/2016     11/23/2016    K 4.0 11/23/2016     11/23/2016    CREATININE 0.75 11/23/2016    BUN 12 11/23/2016    CO2 26 11/23/2016    INR 0.93 11/04/2016       Vital signs:  There were no vitals taken for this visit.  Unable to assess telephone visit  Allergies: Other environmental allergy           Review of Systems:      ROS:       10 point ROS was negative except for the items listed in HPI- Subjective data only              Medications:         paliperidone palmitate  234 mg Intramuscular Once       Current Outpatient Medications on File Prior to Visit   Medication Sig Dispense Refill     ALLERGY RELIEF, LORATADINE, 10 mg tablet TAKE 10MG (1 TABLET) BY MOUTH EVERY DAY 30 tablet 11     atomoxetine (STRATTERA) 40 MG capsule TAKE 40MG (1 CAPSULE) BY MOUTH EVERY DAY.. 30 capsule 11      ferrous sulfate 325 (65 FE) MG tablet Take 1 tablet (325 mg total) by mouth daily with breakfast. 90 tablet 3     fluPHENAZine (PROLIXIN) 2.5 MG tablet TAKE 2 TABLETS BY MOUTH TWICE A DAY IN THE MORNING AND AFTERNOON (Patient taking differently: Take 2.5 mg by mouth 2 (two) times a day. Morning and afternoon (1400)) 120 tablet 1     metFORMIN (GLUCOPHAGE) 500 MG tablet Take 1 tablet (500 mg total) by mouth 2 (two) times a day with meals. 180 tablet 1     multivitamin (DAILY-TUSHAR) per tablet Take 1 tablet by mouth daily. 30 tablet 11     paliperidone palmitate (INVEGA SUSTENNA) 234 mg/1.5 mL Syrg IM injection INJECT 1.5ML INTRAMUSCULARLY EVERY 4 WEEKS. 1.5 mL 2     polyethylene glycol (GLYCOLAX) 17 gram/dose powder Take 17 g by mouth daily as needed (constipation).              risperiDONE (RISPERDAL) 1 MG tablet Take 1 tablet (1 mg total) by mouth 2 (two) times a day. 60 tablet 2     sodium chloride (OCEAN) 0.65 % nasal spray To both nostrils 4 times a day as needed 15 mL 12     topiramate (TOPAMAX) 50 MG tablet TAKE 50MG (1 TABLET) BY MOUTH 2 TIMES A DAY 60 tablet 2     traZODone (DESYREL) 100 MG tablet Take 1 tablet (100 mg total) by mouth at bedtime. 30 tablet 2     [DISCONTINUED] risperiDONE (RISPERDAL) 0.5 MG tablet Take 1 tablet (0.5 mg total) by mouth 2 (two) times a day as needed. 60 tablet 0     fluPHENAZine (PROLIXIN) 2.5 MG tablet Take 1 tablet (2.5 mg total) by mouth 2 (two) times a day for 7 days. 14 tablet 0     Current Facility-Administered Medications on File Prior to Visit   Medication Dose Route Frequency Provider Last Rate Last Dose     paliperidone palmitate IM injection 234 mg (INVEGA SUSTENNA)  234 mg Intramuscular Once Marimar Cruz, PAYTON             Coordination of Care:   More than 25 minutes spent on this visit  with more than 50% of time spent on coordination of care including: Educating patient about diagnosis, prognosis, side effects and benefits of medications, diet, exercise.   Time also spent providing supportive therapy regarding above issues.    This note was created using a dictation system. All typing errors or contextual distortion is unintentional and software inherent.    Phone call duration: 30 minutes    Nkechi Monreal NP

## 2021-06-09 NOTE — PATIENT INSTRUCTIONS - HE
Continue medications as prescribed  Have your pharmacy contact us for a refill if you are running low on medications (We may ask you to come into clinic to get a refill from the nurse  No Alcohol or drug use  No driving if sedated  Call the clinic with any questions or concerns   Reach out for help if you feel like hurting yourself or others ([facility] [phone]: [address] [facility], [address] or [facility] Suicide Hotline [phone] , call 911 or go to nearest Emergency room    Follow up as directed, for your appointments, per your After Visit Summary Form.

## 2021-06-09 NOTE — TELEPHONE ENCOUNTER
Unable to reach patient for phone appointment today.  A message was left for the patient to call back to reschedule.

## 2021-06-10 NOTE — TELEPHONE ENCOUNTER
Date of Last Office Visit: 7-15-20  Date of Next Office Visit: 8-14-20  No shows since last visit: today  Cancellations since last visit: 0  ED visits since last visit:  0  Medication prolixin  date last ordered: 1-16-20  Qty: 14  Refills: 0   ( Dr. Perkins )  Lapse in therapy greater than 7 days: yes  Medication refill request verified as identical to current order: yes  Result of Last DAM, VPA, Li+ Level, CBC, or Carbamazepine Level (at or since last visit): N/A     [] Medication refilled per [facility] M-1.   [x] Medication unable to be refilled by RN due to criteria not met as indicated below:     []Eligibility - not seen in last year    []Supervision - no future appointment    [x]Compliance     []Verification - order discrepancy    []Controlled Medication    []Medication not included in RN Protocol    []90 - day supply request    []Other   Current Medication list:  Your Current Medications Are     ALLERGY RELIEF, LORATADINE, 10 mg tablet  TAKE 10MG (1 TABLET) BY MOUTH EVERY DAY    atomoxetine (STRATTERA) 40 MG capsule  TAKE 40MG (1 CAPSULE) BY MOUTH EVERY DAY..    ferrous sulfate 325 (65 FE) MG tablet  TAKE 325MG (1 TABLET) BY MOUTH EVERY DAY WITH BREAKFAST    fluPHENAZine (PROLIXIN) 2.5 MG tablet  TAKE 2 TABLETS BY MOUTH TWICE A DAY IN THE MORNING AND AFTERNOON    fluPHENAZine (PROLIXIN) 2.5 MG tablet  Take 1 tablet (2.5 mg total) by mouth 2 (two) times a day for 7 days.    metFORMIN (GLUCOPHAGE) 500 MG tablet  TAKE 500MG (1 TABLET) BY MOUTH 2 TIMES A DAY WITH MEALS.    paliperidone palmitate (INVEGA SUSTENNA) 234 mg/1.5 mL Syrg IM injection  INJECT 1.5ML INTRAMUSCULARLY EVERY 4 WEEKS.    polyethylene glycol (GLYCOLAX) 17 gram/dose powder  Take 17 g by mouth daily as needed (constipation).        risperiDONE (RISPERDAL) 0.5 MG tablet  TAKE 0.5MG (1 TABLET) BY MOUTH 2 TIMES A DAY AS NEEDED    risperiDONE (RISPERDAL) 0.5 MG tablet  Take 1 tablet (0.5 mg total) by mouth 2 (two) times a day as needed.    risperiDONE  (RISPERDAL) 1 MG tablet  Take 1 tablet (1 mg total) by mouth 2 (two) times a day.    sodium chloride (OCEAN) 0.65 % nasal spray  To both nostrils 4 times a day as needed    TAB-A-TUSHAR per tablet  TAKE 1 TABLET BY MOUTH EVERY DAY.    topiramate (TOPAMAX) 50 MG tablet  TAKE 50MG (1 TABLET) BY MOUTH 2 TIMES A DAY    traZODone (DESYREL) 100 MG tablet  Take 1 tablet (100 mg total) by mouth at bedtime.    Allergies     Other Environmental Allergy        Medication Plan of Care at last office visit with MD/CNP:  Plan   1- Continue  Risperdal  1 mg two  times a day scheduled   Continue other  current med's as follows   -Topamax 50 mg two times a day   -Invega 234 mg Q 4 weeks - last give  7/7/2020- at the group home   -Fluphenazine (prolixin)5 mg two times a day    Atomoxetine   40 mg daily  -Trazodone  100 mg  at HS  -Start Risperdal 0.5  mg two times a day PRN - aggressive beh   2. RTC : 2 weeks, call in between visits with any  questions or concerns
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
## 2021-06-10 NOTE — TELEPHONE ENCOUNTER
Received call from [facility] regarding the denial of fluphenazine 2.5mg.  Pt is at a home care facility where meds are set-up for him.  Rx was signed last year and has now  and can't be refilled without a new rx.  Please contact [phone] ext [phone] for [place] or new rx can be faxed to [phone].

## 2021-06-10 NOTE — TELEPHONE ENCOUNTER
Patient has a visit with SUMANTH Monreal tomorrow and they can discuss this at that time.  Refills will be addressed at the visit.

## 2021-06-10 NOTE — TELEPHONE ENCOUNTER
Group home called again regarding medication as patient has cancelled appointment for today.    Next appointment 8/14/20.    Wondering the correct dose of Fluphenazine?    Please call Khalida- group home staff to advise:  [phone]    [facility] [place] - [address] [place] [address] [phone] (Phone)  [phone] (Fax)         THANKS!
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
## 2021-06-11 NOTE — PROGRESS NOTES
9/4/2020  TCM DISCHARGE FOLLOW UP CALL  Hospital discharge follow up call to pt,   Spoke to staff at group home.stated patient is not home. Still in the hospital  Left message with staff CHW will call back next week.    Patient has INF appt 9-15-20    CHW will outreach: 9-8-20
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
## 2021-06-11 NOTE — PROGRESS NOTES
"9/8/2020    TCM DISCHARGE FOLLOW UP CALL      \"Hi, my name is  Aun, ELMO, and I am calling from [facility].  I am calling to follow up and see how things are going for you after your recent hospitalization.      Tell me how you are doing now that you are home?\"   \" am doing good.\"    Discharge Instructions     Do you understand your discharge instructions?  Pt. Response: Yes      \"Has an appointment with your primary care provider been scheduled?\" No  \"If yes, when is that appointment?  yes  If no, date of appointment scheduled   I can assist you to schedule that now.   CC refer to AVS to schedule when instructed.     CHW informed patient that he has an INF appt on 9-15-20 at 8:40am with Dr Lund at [facility] .  Spoke to staff at [facility] and informed of INF appt on 9-15-20 at 8:40am at [facility]. appt is face to face visit.      Medications    \"Did you have any medication changes?   \"  I don't know about about my medications. You can talk to the nurse or staff a the [facility] home.\"  Patient handed phone to talk to staff at [facility].  Spoke to staff and stated that the nurse is not here.  Staff provided name and contact of group home and nurse contact information.  [facility] is [facility] [phone] or [phone]  Nurse: [phone]  [place] (spelling?)   Do you have any concerns with obtaining the medications or questions about your medications that you would like a RN to review with you?    group home nurse will call or discuss with PCP on 9-15-20  **If yes, escalate to CC RN responsible for patient's clinic or [facility] RN  Send an inbasket Epic message if RN is unavailable after call.     \"When you see the provider, I would recommend that you bring your medications with you.\"    Call Summary    \"What questions or concerns do you have about your recent visit and your follow-up care?\"             Can be addressed if scheduled with RN or SW either Care Coordination or if " "declining to triage for further questions.       \"If you have questions or things don't continue to improve, we encourage you contact us through the main clinic number [phone] or [phone]).  Even if the clinic is not open, triage nurses are available 24/7 to help you.      I am with Clinic Care Coordination, and we are a team of nurses, social workers, community health workers, and financial resource workers. We work together with your primary doctor.   We are here to see if we can help you with a variety of things - from resources in the community such as food assistance, transportation, help in the home, equipment needs, assistance with medications, coordination of your appointments, help with any medical questions, and things like this.      We would have a nurse or  speak with you and together come up with goals to help you to improve your health and wellness and do the best to help you stay out of the hospital.      Patient declined CCC because he has support from staff at  the group home          "

## 2021-06-11 NOTE — TELEPHONE ENCOUNTER
Date of Last Office Visit: 7/15/20  Date of Next Office Visit: 9/30/20  No shows since last visit: 8/5/20  Cancellations since last visit: 8/14/20 (Hospitalized)  ED visits since last visit:  8/13/20, 8/24/20, 9/3/20, 9/16/20     Medication palperidone palmitate 234 mg/1.5 mL injection date last ordered: 9/2/20  Qty: 1 (Ordered by Loretta)  Refills: 0    Medication risperidone 1 mg date last ordered: 6/9/20  Qty: 60  Refills: 2    Medication topiramate 50 mg date last ordered: 6/9/20  Qty: 60  Refills: 2    Lapse in therapy greater than 7 days: Yes  Medication refill request verified as identical to current order: Yes  Result of Last DAM, VPA, Li+ Level, CBC, or Carbamazepine Level (at or since last visit): See labs.     [] Medication refilled per [facility] M-1.   [x] Medication unable to be refilled by RN due to criteria not met as indicated below:     []Eligibility - not seen in last year    []Supervision - no future appointment    []Compliance     []Verification - order discrepancy    []Controlled Medication    []Medication not included in RN Protocol    []90 - day supply request    [x]Other - Hospitalizations     Current Medication list:  Kaden Easton Jr.    (MRN [id number])   Your Current Medications Are     ALLERGY RELIEF, LORATADINE, 10 mg tablet  TAKE 10MG (1 TABLET) BY MOUTH EVERY DAY    atomoxetine (STRATTERA) 40 MG capsule  TAKE 40MG (1 CAPSULE) BY MOUTH EVERY DAY..    ferrous sulfate 325 (65 FE) MG tablet  TAKE 325MG (1 TABLET) BY MOUTH EVERY DAY WITH BREAKFAST    fluPHENAZine (PROLIXIN) 2.5 MG tablet  Take 1 tablet (2.5 mg total) by mouth 2 (two) times a day.    hydrOXYzine pamoate (VISTARIL) 50 MG capsule  Take 1 capsule (50 mg total) by mouth 3 (three) times a day as needed for anxiety (Agitation).    melatonin 3 mg Tab tablet  Take 1 tablet (3 mg total) by mouth at bedtime.    metFORMIN (GLUCOPHAGE) 500 MG tablet  TAKE 500MG (1 TABLET) BY MOUTH 2 TIMES A DAY WITH MEALS.    nicotine (NICODERM CQ) 21 mg/24  hr  Place 1 patch on the skin daily.    nicotine polacrilex (NICORETTE) 4 MG gum  Apply 1 each (4 mg total) to the mouth or throat every hour as needed for smoking cessation.    paliperidone palmitate (INVEGA SUSTENNA) 234 mg/1.5 mL Syrg IM injection  Starting on 9/29/2020. Inject 1.5 mL (234 mg total) into the shoulder, thigh, or buttocks every 28 days.    polyethylene glycol (GLYCOLAX) 17 gram/dose powder  Take 17 g by mouth daily as needed (constipation).        risperiDONE (RISPERDAL) 0.5 MG tablet  Take 0.5 mg by mouth 2 (two) times a day as needed (aggressive behavior).    risperiDONE (RISPERDAL) 1 MG tablet  Take 1 tablet (1 mg total) by mouth 2 (two) times a day.    sodium chloride (OCEAN) 0.65 % nasal spray  To both nostrils 4 times a day as needed    TAB-A-TUSHAR per tablet  TAKE 1 TABLET BY MOUTH EVERY DAY.    topiramate (TOPAMAX) 50 MG tablet  TAKE 50MG (1 TABLET) BY MOUTH 2 TIMES A DAY    traZODone (DESYREL) 150 MG tablet  Take 1 tablet (150 mg total) by mouth at bedtime.    traZODone (DESYREL) 50 MG tablet  Take 1 tablet (50 mg total) by mouth at bedtime as needed for sleep.       Medication Plan of Care at last office visit with MD/CNP:  Plan   1- Continue  Risperdal  1 mg two  times a day scheduled   Continue other  current med's as follows   -Topamax 50 mg two times a day   -Invega 234 mg Q 4 weeks - last give  7/7/2020- at the group home   -Fluphenazine (prolixin)5 mg two times a day    Atomoxetine   40 mg daily  -Trazodone  100 mg  at HS  -Start Risperdal 0.5  mg two times a day PRN - aggressive beh   2. RTC : 2 weeks, call in between visits with any  questions or concerns

## 2021-06-11 NOTE — CONSULTS
[facility] Hematology and Oncology Consult Note    Patient: Kaden Easton Jr.  MRN: [id number]  Date of Service: 09/22/2020        Reason for Visit    I was consulted by Dr. Rivera regarding Low platelets    Assessment/Plan    Chronic Refractory ITP, in remission  S/p Splenectomy 2019  Fetal alcohol syndrome    CBC today shows platelet count of 133.  Drop to 41 in hospital due to acute illness, medications or bleeding from trauma.    No intervention required.    Recommend check platelet count q3m and f/u in 1 year.    Call for any new bruising/bleeding.    PLAN:    As above      ECOG Performance   ECOG Performance Status: 0  Distress Assessment  Distress Assessment Score: No distress        Problem List    1. Chronic ITP (idiopathic thrombocytopenia) (H)  HM1(CBC and Differential)           CC: Laureano Rivera MD (Inactive)      ______________________________________________________________________________      Staging History    Cancer Staging  No matching staging information was found for the patient.    History    Mr. Kaden Easton Jr. is a 20 year old referred for history of ITP.  Last hematology note from 2015 is reviewed.    Assessment:  Kaden Easton is a 15 year old male with a history of obesity, developmental delay, aggressive behavior, thrombocytopenia and epistaxis admitted to [facility] approximately 3 weeks ago for treatment of chronic ITP. His platelet count has decreased again from the time of his discharge from the hospital to last week and again at previous check on 8/20. However, his platelets have increased today to 85,000 compared to 33,000 at last check. Also, he has not had recurrence of his symptoms of bleeding since his last clinic visit. He does not require retreatment of his ITP today. But he should have regular follow up by hematology/oncology. Given that he is moving to [place], [place] next week, we will contact pediatric hematology/oncology at [facility] in [place] to provide  information in case he requires further treatment which is unable to be followed at the residential facility where he is moving to.    - Repeat CBC in clinic today as noted above - improved to 85,000 compared to previous 33,000 on 8/20.  - Plan for provider to provider contact communication for pediatric hematology/oncology at [facility]  - Discussed Alanas medical history and needs with the medical staff at his new residential facility in [place] previously.    Patient was seen and discussed with Dr. Angela Dill MD - Attending Pediatric Hematologist/Oncologist    Laparascopic splenectomy in 2/2019.    Denies any bleeding now.  Was in hospital 3 weeks ago for emotional issues and suffered trauma and bleeding and platelet count went down to 41k.      Past History  Past Medical History:   Diagnosis Date     ADD (attention deficit disorder)      ADHD (attention deficit hyperactivity disorder)      Anxiety      Current smoker      Fetal alcohol syndrome      Forehead abrasion      History of transfusion      Obesity      Self-injurious behavior 8/17/2020     History reviewed. No pertinent surgical history.  Family History   Problem Relation Age of Onset     No Medical Problems Mother      No Medical Problems Father      No Medical Problems Sister      No Medical Problems Brother      No Medical Problems Maternal Grandmother      No Medical Problems Maternal Grandfather      No Medical Problems Paternal Grandmother      No Medical Problems Paternal Grandfather      No Medical Problems Other      Social History     Socioeconomic History     Marital status: Single     Spouse name: None     Number of children: None     Years of education: None     Highest education level: None   Occupational History     None   Social Needs     Financial resource strain: None     Food insecurity     Worry: None     Inability: None     Transportation needs     Medical: None     Non-medical: None   Tobacco Use      "Smoking status: Current Every Day Smoker     Packs/day: 1.00     Smokeless tobacco: Never Used     Tobacco comment: smokes daily about a pack    Substance and Sexual Activity     Alcohol use: Not Currently     Alcohol/week: 2.0 standard drinks     Types: 2 Shots of liquor per week     Comment: Birthday/Beer per staff     Drug use: Not Currently     Types: Marijuana     Comment: staff says yes     Sexual activity: Yes     Partners: Female   Lifestyle     Physical activity     Days per week: 0 days     Minutes per session: 0 min     Stress: To some extent   Relationships     Social connections     Talks on phone: Patient refused     Gets together: Patient refused     Attends [employer] service: Patient refused     Active member of club or organization: Patient refused     Attends meetings of clubs or organizations: Patient refused     Relationship status: Patient refused     Intimate partner violence     Fear of current or ex partner: Patient refused     Emotionally abused: Patient refused     Physically abused: Patient refused     Forced sexual activity: Patient refused   Other Topics Concern     None   Social History Narrative     None     Allergies    Allergies   Allergen Reactions     Depakote [Divalproex]      Acute on Chronic Thrombocytopenia.     Other Environmental Allergy Other (See Comments)     Mild reaction-- runny nose, itchy/watery eyes, etc.        Review of Systems    General     ENT     Respiratory     Cardiovascular     Endocrine     Gastrointestinal     Musculoskeletal     Neurological     Psychological/Emotional     Hematological/Lymphatic     Dermatological     Genitourinary/Reproductive     Reproductive (Females only)     Pain  Currently in Pain: No/denies    Physical Exam    Recent Vitals 9/22/2020   Height 5' 6\"   Weight 267 lbs 3 oz   BSA (m2) 2.38 m2   /73   Pulse 84   Temp 98.6   Temp src 1   SpO2 98   Some recent data might be hidden       GENERAL: Alert and oriented to time place and " person. Seated comfortably. In no distress.    HEAD: Atraumatic and normocephalic.    EYES: UMM, EOMI.  No pallor.  No icterus.    Oral cavity: no mucosal lesion or tonsillar enlargement.    NECK: supple. JVP normal.  No thyroid enlargement.    LYMPH NODES: No palpable, cervical, axillary or inguinal lymphadenopathy.    CHEST: clear to auscultation bilaterally.  Resonant to percussion throughout bilaterally.  Symmetrical breath movements bilaterally.    CVS: S1 and S2 are heard. Regular rate and rhythm.  No murmur or gallop or rub heard.  No peripheral edema.    ABDOMEN: Soft. Not tender. Not distended.  No palpable hepatomegaly or splenomegaly.  No other mass palpable.  Bowel sounds heard.    EXTREMITIES: Warm.    SKIN: no rash, or bruising or purpura.  Has a full head of hair.      Lab Results    Recent Results (from the past 168 hour(s))   Drug Abuse 1+, Urine   Result Value Ref Range    Amphetamines Screen Negative Screen Negative    Benzodiazepines Screen Negative Screen Negative    Opiates Screen Negative Screen Negative    Phencyclidine Screen Negative Screen Negative    THC Screen Negative Screen Negative    Barbiturates Screen Negative Screen Negative    Cocaine Metabolite Screen Negative Screen Negative    Methadone Screen Negative Screen Negative    Oxycodone Screen Negative Screen Negative    Creatinine, Urine 95.3 mg/dL   COVID-19 Virus PCR MRF    Specimen: Nasopharyngeal Swab; Respiratory   Result Value Ref Range    COVID-19 VIRUS SPECIMEN SOURCE Nasopharyngeal     2019-nCOV       Test received-See reflex to IDDL test SARS CoV2 (COVID-19) Virus RT-PCR   SARS-CoV-2 (COVID-19) RT-PCR-IDDL    Specimen: Nasopharyngeal Swab; Respiratory   Result Value Ref Range    SARS-CoV-2 Virus Specimen Source Nasopharyngeal     SARS-CoV-2 PCR Result NEGATIVE     SARS-COV-2 PCR COMMENT       Testing was performed using the [employer] COVID-19 Direct Assay on the [place]   POCT Glucose    Specimen: Capillary; Blood    Result Value Ref Range    Glucose 86 70 - 139 mg/dL   HM1 (CBC with Diff)   Result Value Ref Range    WBC 11.9 (H) 4.0 - 11.0 thou/uL    RBC 4.63 4.40 - 6.20 mill/uL    Hemoglobin 13.2 (L) 14.0 - 18.0 g/dL    Hematocrit 40.2 40.0 - 54.0 %    MCV 87 80 - 100 fL    MCH 28.5 27.0 - 34.0 pg    MCHC 32.8 32.0 - 36.0 g/dL    RDW 15.7 (H) 11.0 - 14.5 %    Platelets 133 (L) 140 - 440 thou/uL    MPV 13.3 (H) 8.5 - 12.5 fL    Neutrophils % 68 50 - 70 %    Lymphocytes % 23 20 - 40 %    Monocytes % 7 2 - 10 %    Eosinophils % 2 0 - 6 %    Basophils % 0 0 - 2 %    Immature Granulocyte % 0 <=0 %    Neutrophils Absolute 8.1 (H) 2.0 - 7.7 thou/uL    Lymphocytes Absolute 2.7 0.8 - 4.4 thou/uL    Monocytes Absolute 0.8 0.0 - 0.9 thou/uL    Eosinophils Absolute 0.3 0.0 - 0.4 thou/uL    Basophils Absolute 0.1 0.0 - 0.2 thou/uL    Immature Granulocyte Absolute 0.0 <=0.0 thou/uL       Imaging Results    Ct Head Without Contrast    Result Date: 9/2/2020  EXAM: CT HEAD WO CONTRAST LOCATION: [facility] DATE/TIME: 9/2/2020 4:57 PM INDICATION: Patient hit his head against the wall repeatedly.  Opened up a small wound on his forehead. COMPARISON: 08/29/2020 TECHNIQUE: Routine without IV contrast. Multiplanar reformats. Dose reduction techniques were used. FINDINGS: INTRACRANIAL CONTENTS: No intracranial hemorrhage, extraaxial collection, or mass effect.  No CT evidence of acute infarct. Normal parenchymal attenuation. Normal ventricles and sulci. VISUALIZED ORBITS/SINUSES/MASTOIDS: No intraorbital abnormality. No paranasal sinus mucosal disease. No middle ear or mastoid effusion. BONES/SOFT TISSUES: New midline frontal scalp contusion. No fracture.     1.  New midline frontal scalp contusion. No acute intracranial hemorrhage or fracture.    Ct Head Without Contrast    Result Date: 8/29/2020  EXAM: CT HEAD WO CONTRAST, CT CERVICAL SPINE WO CONTRAST LOCATION: [facility] DATE/TIME: 8/29/2020 3:26 PM INDICATION: Head  trauma, minor, normal mental status (Age 19-64y) self injury, pt hitting back of head on wall repeatedly, small forehead hematoma from previous injury COMPARISON: Head CT 08/27/2020, cervical spine CT 08/17/2020 TECHNIQUE: HEAD CT: Without IV contrast. Multiplanar reformats. Dose reduction techniques were used. CERVICAL SPINE CT: Routine without IV contrast. Multiplanar reformats. Dose reduction techniques were used. FINDINGS: HEAD CT: INTRACRANIAL CONTENTS: No intracranial hemorrhage, extraaxial collection, or mass effect.  No CT evidence of acute infarct. Normal parenchymal attenuation. Normal ventricles and sulci. VISUALIZED ORBITS/SINUSES/MASTOIDS: No intraorbital abnormality. No paranasal sinus mucosal disease. No middle ear or mastoid effusion. BONES/SOFT TISSUES: No acute abnormality. CERVICAL SPINE CT: Moderate motion artifact present. VERTEBRA: Normal sagittal alignment. Craniocervical junction is within normal limits. Small somewhat dysplastic appearing odontoid again noted. The anterior and posterior arches of C1 are not fused developmentally. Small well-corticated osseous defect medial to the left anterior arch of C1. Vertebral body heights are maintained. No prevertebral soft tissue edema. No fractures. CANAL/FORAMINA: No canal or neural foraminal stenosis. PARASPINAL: No extraspinal abnormality. Visualized lung fields are clear.     HEAD CT: 1.  No intracranial hemorrhage, mass lesions, hydrocephalus or CT evidence for an acute infarct. CERVICAL SPINE CT: 1.  No CT evidence for acute fracture or post traumatic subluxation.    Ct Head Without Contrast    Result Date: 8/27/2020  EXAM: CT HEAD WO CONTRAST LOCATION: [facility] DATE/TIME: 8/27/2020 9:05 PM INDICATION: hit back of head on wall COMPARISON: Head CT 08/17/2020 TECHNIQUE: Routine without IV contrast. Multiplanar reformats. Dose reduction techniques were used. FINDINGS: INTRACRANIAL CONTENTS: Small right parietal occipital scalp soft  tissue swelling as well as swelling over the anterior frontal scalp. No intracranial hemorrhage, extraaxial collection, or mass effect.  No CT evidence of acute infarct. Normal parenchymal attenuation. Normal ventricles and sulci. VISUALIZED ORBITS/SINUSES/MASTOIDS: No intraorbital abnormality. No paranasal sinus mucosal disease. No middle ear or mastoid effusion. BONES/SOFT TISSUES: No depressed skull fractures.     1.  Small right parietal occipital scalp soft tissue swelling as well as swelling over the anterior frontal scalp. No underlying skull fractures. 2.  No intracranial hemorrhage, mass lesions, hydrocephalus or CT evidence for an acute infarct.    Ct Cervical Spine Without Contrast    Result Date: 8/29/2020  EXAM: CT HEAD WO CONTRAST, CT CERVICAL SPINE WO CONTRAST LOCATION: [facility] DATE/TIME: 8/29/2020 3:26 PM INDICATION: Head trauma, minor, normal mental status (Age 19-64y) self injury, pt hitting back of head on wall repeatedly, small forehead hematoma from previous injury COMPARISON: Head CT 08/27/2020, cervical spine CT 08/17/2020 TECHNIQUE: HEAD CT: Without IV contrast. Multiplanar reformats. Dose reduction techniques were used. CERVICAL SPINE CT: Routine without IV contrast. Multiplanar reformats. Dose reduction techniques were used. FINDINGS: HEAD CT: INTRACRANIAL CONTENTS: No intracranial hemorrhage, extraaxial collection, or mass effect.  No CT evidence of acute infarct. Normal parenchymal attenuation. Normal ventricles and sulci. VISUALIZED ORBITS/SINUSES/MASTOIDS: No intraorbital abnormality. No paranasal sinus mucosal disease. No middle ear or mastoid effusion. BONES/SOFT TISSUES: No acute abnormality. CERVICAL SPINE CT: Moderate motion artifact present. VERTEBRA: Normal sagittal alignment. Craniocervical junction is within normal limits. Small somewhat dysplastic appearing odontoid again noted. The anterior and posterior arches of C1 are not fused developmentally. Small  well-corticated osseous defect medial to the left anterior arch of C1. Vertebral body heights are maintained. No prevertebral soft tissue edema. No fractures. CANAL/FORAMINA: No canal or neural foraminal stenosis. PARASPINAL: No extraspinal abnormality. Visualized lung fields are clear.     HEAD CT: 1.  No intracranial hemorrhage, mass lesions, hydrocephalus or CT evidence for an acute infarct. CERVICAL SPINE CT: 1.  No CT evidence for acute fracture or post traumatic subluxation.        Signed by: Krystyna Ponce MD

## 2021-06-11 NOTE — PROGRESS NOTES
Assessment/ Plan  1. Hospital discharge follow-up  See description below, reviewed discharge summaries    2. Chronic idiopathic thrombocytopenia (H)  Results for orders placed or performed in visit on 09/15/20   HM2(CBC w/o Differential)   Result Value Ref Range    WBC 8.9 4.0 - 11.0 thou/uL    RBC 4.55 4.40 - 6.20 mill/uL    Hemoglobin 13.0 (L) 14.0 - 18.0 g/dL    Hematocrit 40.0 40.0 - 54.0 %    MCV 88 80 - 100 fL    MCH 28.5 27.0 - 34.0 pg    MCHC 32.4 32.0 - 36.0 g/dL    RDW 12.7 11.0 - 14.5 %    Platelets 118 (L) 140 - 440 thou/uL    MPV 9.9 7.0 - 10.0 fL   Platelets better today.  Await follow-up with hematology.  - HM2(CBC w/o Differential)    3. Current smoker  Smoking cessation  Greater than 5 minutes spent discussing smoking cessation today.  Patient is in the precontemplative- regarding quitting.  Motivational interview technique used to explore patients desire to quit.  Discussed benefit of quitting, risk of ongoing smoking and monetary cost.    In the end, decided on the following intervention  Will set quit date in next 2 weeks?  No        Body mass index is 41.82 kg/m .    Subjective  CC:  Chief Complaint   Patient presents with     Hospital Visit Follow Up     HPI:  Patient is here for follow-up from hospitalization, primarily on psychiatry, [facility] 8/25 through 9/4/2020.  Initial problem was mood disorder, anger issues, acting out.  Patient began angry during the hospitalization and banged his head against the wall, causing significant hematoma and small laceration on forehead.  Noted to have low platelets, has a history of low platelets.  Seen by hematology.  They recommended weekly follow-up to ensure that they do not get critically low and follow-up with them.  Appointment is already made for 9/22 according to group home attendant.  Patient was taken off of Depakote due to low platelets.  Apparently this is helpful with behavior so group home attendant is a little disappointed.   Patient did have a CT of his brain following the contusion, no intracranial abnormality noted.  All medications have been reconciled.    Discussed smoking with patient, currently pre-contemplative.    No other concerns.  Agreeable, after some conversation, with flu shot, HPV shot and tetanus booster which are all due.  Patient Active Problem List   Diagnosis     Fetal alcohol spectrum disorder     Intermittent explosive disorder     Aggressive behavior     Intellectual disability     Chronic ITP (idiopathic thrombocytopenia) (H)     Mood disorder due to a general medical condition     Mild intellectual disability     Encounters for administrative purpose     Epistaxis, recurrent     Psychosis (H)     Mild intellectual disability     Organic mood disorder     Obesity due to excess calories     Generalized anxiety disorder     Post-splenectomy     Psychosis, unspecified psychosis type (H)     Agitation     Intermittent explosive disorder in adult     Fetal alcohol syndrome     Cannabis abuse     Self-injurious behavior     Outbursts of explosive behavior     Forehead abrasion     Current smoker     Mood disorder (H)     Chronic idiopathic thrombocytopenia (H)     Current medications reviewed as follows:  Current Outpatient Medications on File Prior to Visit   Medication Sig     ALLERGY RELIEF, LORATADINE, 10 mg tablet TAKE 10MG (1 TABLET) BY MOUTH EVERY DAY     atomoxetine (STRATTERA) 40 MG capsule TAKE 40MG (1 CAPSULE) BY MOUTH EVERY DAY..     ferrous sulfate 325 (65 FE) MG tablet TAKE 325MG (1 TABLET) BY MOUTH EVERY DAY WITH BREAKFAST     fluPHENAZine (PROLIXIN) 2.5 MG tablet Take 1 tablet (2.5 mg total) by mouth 2 (two) times a day.     hydrOXYzine pamoate (VISTARIL) 50 MG capsule Take 1 capsule (50 mg total) by mouth 3 (three) times a day as needed for anxiety (Agitation).     melatonin 3 mg Tab tablet Take 1 tablet (3 mg total) by mouth at bedtime.     metFORMIN (GLUCOPHAGE) 500 MG tablet TAKE 500MG (1 TABLET) BY  MOUTH 2 TIMES A DAY WITH MEALS.     nicotine (NICODERM CQ) 21 mg/24 hr Place 1 patch on the skin daily.     nicotine polacrilex (NICORETTE) 4 MG gum Apply 1 each (4 mg total) to the mouth or throat every hour as needed for smoking cessation.     [START ON 9/29/2020] paliperidone palmitate (INVEGA SUSTENNA) 234 mg/1.5 mL Syrg IM injection Inject 1.5 mL (234 mg total) into the shoulder, thigh, or buttocks every 28 days.     polyethylene glycol (GLYCOLAX) 17 gram/dose powder Take 17 g by mouth daily as needed (constipation).            risperiDONE (RISPERDAL) 0.5 MG tablet Take 0.5 mg by mouth 2 (two) times a day as needed (aggressive behavior).     risperiDONE (RISPERDAL) 1 MG tablet Take 1 tablet (1 mg total) by mouth 2 (two) times a day.     sodium chloride (OCEAN) 0.65 % nasal spray To both nostrils 4 times a day as needed     TAB-A-TUSHAR per tablet TAKE 1 TABLET BY MOUTH EVERY DAY.     topiramate (TOPAMAX) 50 MG tablet TAKE 50MG (1 TABLET) BY MOUTH 2 TIMES A DAY     traZODone (DESYREL) 150 MG tablet Take 1 tablet (150 mg total) by mouth at bedtime.     traZODone (DESYREL) 50 MG tablet Take 1 tablet (50 mg total) by mouth at bedtime as needed for sleep.     No current facility-administered medications on file prior to visit.      Social History     Tobacco Use   Smoking Status Current Every Day Smoker     Packs/day: 1.00   Smokeless Tobacco Never Used   Tobacco Comment    smokes daily about a pack      Social History     Social History Narrative     Not on file     Patient Care Team:  Laureano Rivera MD (Inactive) as PCP - General (Family Medicine)  Escobar Winslow,  as   Riki Claros  MAYELA Ca   Laureano Rivera MD (Inactive) as Assigned PCP  [facility]  [facility]  Full 10 system review including constitutional, respiratory, cardiac, gi, urinary, rheumatologic, neurologic, reproductive, dermatologic psychiatric is  performed   "and is otherwise negative         Objective  Physical Exam  Vitals:    09/15/20 0850   BP: 107/74   Patient Site: Left Arm   Patient Position: Sitting   Cuff Size: Adult Large   Pulse: 88   Resp: 18   Temp: 98.9  F (37.2  C)   TempSrc: Oral   Weight: (!) 267 lb (121.1 kg)   Height: 5' 7\" (1.702 m)     Gen- alert, oriented/ appropriately responsive  Contusion on scalp appears to be healing.  .   Chest- Normal inspiration and expiration.    Clear to ascultation.    No chest wall deformity or scar.  CV- Heart regular rate and rhythm  normal tones, no murmurs   No gallops or rubs.  Ext- appear well perfused, no edema  Skin- warm and dry,   no visualized rash    Diagnostics  Results for orders placed or performed in visit on 09/15/20   HM2(CBC w/o Differential)   Result Value Ref Range    WBC 8.9 4.0 - 11.0 thou/uL    RBC 4.55 4.40 - 6.20 mill/uL    Hemoglobin 13.0 (L) 14.0 - 18.0 g/dL    Hematocrit 40.0 40.0 - 54.0 %    MCV 88 80 - 100 fL    MCH 28.5 27.0 - 34.0 pg    MCHC 32.4 32.0 - 36.0 g/dL    RDW 12.7 11.0 - 14.5 %    Platelets 118 (L) 140 - 440 thou/uL    MPV 9.9 7.0 - 10.0 fL         Please note: Voice recognition software was used in this dictation.  It may therefore contain typographical errors.    "

## 2021-06-11 NOTE — TELEPHONE ENCOUNTER
Dialed  to get him check in for appt with CIER[facility] Monreal but was told that pt moved out to another facility and they do not have his new contact number. Dialed his father - phone restricted or unavailable.  Aunt - unable to receive calls    Left  for his CM asking to contact the clinic to set up an appt with CIER[facility] Monreal.

## 2021-06-11 NOTE — PROGRESS NOTES
R:  10:50 AM  Spoke with Marisol Martinez, crisis  working with patient.  Per Marisol, patient can be taken off of the adult work list as she is working on placement vs. Possible legal action from [employer] on 9/16/20.  If anything changes, Intake will be notified.

## 2021-06-12 NOTE — PATIENT INSTRUCTIONS - HE
Continue medications as prescribed  Have your pharmacy contact us for a refill if you are running low on medications (We may ask you to come into clinic to get a refill from the nurse  No Alcohol or drug use  No driving if sedated  Call the clinic with any questions or concerns   Reach out for help if you feel like hurting yourself or others ([facility] [phone]: [address] [facility], [address] or [facility] Suicide Hotline [phone] , call 911 or go to nearest Emergency room    Follow up as directed, for your appointments, per your After Visit Summary Form.

## 2021-06-12 NOTE — PROGRESS NOTES
This video/telephone visit will be conducted via a call between you and your physician/provider. We have found that certain health care needs can be provided without the need for an in-person physical exam. This service lets us provide the care you need with a video /telephone conversation. If a prescription is necessary we can send it directly to your pharmacy. If lab work is needed we can place an order for that and you can then stop by our lab to have the test done at a later time.    Just as we bill insurance for in-person visits, we also bill insurance for video/telephone visits. If you have questions about your insurance coverage, we recommend that you speak with your insurance company.    Patient has given verbal consent for video/Telephone visit? yes  Patient would like the video visit invitation sent by: Text to cell phone: NA or Send to email: NA  Patient would like telephone visit, please call: [phone]  TERRY/TEJ : Garry RAE LPN    Staff verified allergies, medications and pharmacy via phone and states that pt is ready for visit.    :  Nothing to report    ________________________________________  Medications Phoned  to Pharmacy [] yes [x]no  Name of Pharmacist:  List Medications, including dose, quantity and instructions    Medications ordered this visit were e-scribed.  Verified by order class [x] yes  [] no  Prolixin, Vistaril, Melatonin, Invega IM, Risperidone, Topamax, Trazodone 150 mg and 50 mg  Medication changes or discontinuations were communicated to patient's pharmacy: [] yes  [x] no    Dictation completed at time of chart check: [x] yes  [] no    I have checked the documentation for today s encounters and the above information has been reviewed and completed.

## 2021-06-13 NOTE — TELEPHONE ENCOUNTER
Reason for call:  Other Patient called regarding (reason for call): appointment  Additional comments: Received call from group home RN, Jeet Walls and pt. Trying to clarify any changes from appt on 11/23. Would like a call back or AVS to be faxed. Pt is unsure of what was decided during appt.    Phone number to reach patient:  [phone]      Best Time:  Anytime    Can we leave a detailed message on this number? yes

## 2021-06-13 NOTE — PATIENT INSTRUCTIONS - HE
Continue medications as prescribed  Have your pharmacy contact us for a refill if you are running low on medications (We may ask you to come into clinic to get a refill from the nurse  No Alcohol or drug use  No driving if sedated  Call the clinic with any questions or concerns   Reach out for help if you feel like hurting yourself or others ([facility] [phone]: [address], [address] or [facility] Suicide Hotline [phone] , call 911 or go to nearest Emergency room    Follow up as directed, for your appointments, per your After Visit Summary Form.

## 2021-06-13 NOTE — TELEPHONE ENCOUNTER
Date of Last Office Visit: 10/19/20  Date of Next Office Visit: 11/23/20  No shows since last visit: none  Cancellations since last visit: none  ED visits since last visit:  none  Medication paliperidone palmitate inj. 234mg date last ordered: 10/19/20  Qty: 1.5ml  Refills: 0  Lapse in therapy greater than 7 days: no  Medication refill request verified as identical to current order: yes  Result of Last DAM, VPA, Li+ Level, CBC, or Carbamazepine Level (at or since last visit): N/A     [] Medication refilled per [facility] M-1.   [x] Medication unable to be refilled by RN due to criteria not met as indicated below:     []Eligibility - not seen in last year    []Supervision - no future appointment    []Compliance     []Verification - order discrepancy    []Controlled Medication    [x]Medication not included in RN Protocol    []90 - day supply request    []Other   Current Medication list:    ALLERGY RELIEF, LORATADINE, 10 mg tablet TAKE 10MG (1 TABLET) BY MOUTH EVERY DAY   atomoxetine (STRATTERA) 40 MG capsule TAKE 40MG (1 CAPSULE) BY MOUTH EVERY DAY..   ferrous sulfate 325 (65 FE) MG tablet TAKE 325MG (1 TABLET) BY MOUTH EVERY DAY WITH BREAKFAST   fluPHENAZine (PROLIXIN) 2.5 MG tablet Take 1 tablet (2.5 mg total) by mouth 2 (two) times a day.   hydrOXYzine pamoate (VISTARIL) 50 MG capsule Take 1 capsule (50 mg total) by mouth 3 (three) times a day as needed for anxiety (Agitation).   melatonin 3 mg Tab tablet Take 1 tablet (3 mg total) by mouth at bedtime.   metFORMIN (GLUCOPHAGE) 500 MG tablet TAKE 500MG (1 TABLET) BY MOUTH 2 TIMES A DAY WITH MEALS.   nicotine (NICODERM CQ) 21 mg/24 hr Place 1 patch on the skin daily.   nicotine polacrilex (NICORETTE) 4 MG gum Apply 1 each (4 mg total) to the mouth or throat every hour as needed for smoking cessation.   paliperidone palmitate (INVEGA SUSTENNA) 234 mg/1.5 mL Syrg IM injection INJECT 1.5ML INTRAMUSCULARLY EVERY 4 WEEKS.   polyethylene glycol (GLYCOLAX) 17 gram/dose powder  Take 17 g by mouth daily as needed (constipation).        risperiDONE (RISPERDAL) 0.5 MG tablet Take 1 tablet (0.5 mg total) by mouth 2 (two) times a day as needed (aggressive behavior).   risperiDONE (RISPERDAL) 1 MG tablet TAKE 1MG (1 TABLET) BY MOUTH 2 TIMES A DAY.   sodium chloride (OCEAN) 0.65 % nasal spray To both nostrils 4 times a day as needed   TAB-A-TUSHAR per tablet TAKE 1 TABLET BY MOUTH EVERY DAY.   topiramate (TOPAMAX) 50 MG tablet Take 1 tablet (50 mg total) by mouth 2 (two) times a day.   traZODone (DESYREL) 150 MG tablet Take 1 tablet (150 mg total) by mouth at bedtime.   traZODone (DESYREL) 50 MG tablet Take 1 tablet (50 mg total) by mouth at bedtime as needed for sleep.       Medication Plan of Care at last office visit with MD/CNP:    Plan   1. Continue   - Risperdal  1 mg two  times a day scheduled   -Topamax 50 mg two times a day   -Invega 234 mg Q 4 weeks - last give  7/7/2020- at the group home   -Fluphenazine (prolixin)2.5  mg two times a day    -Atomoxetine   40 mg daily  -Trazodone  100 mg  at HS  -Risperdal 0.5  mg two times a day PRN - aggressive beh   2. RTC : 4 weeks, call in between visits with any  questions or concerns

## 2021-06-13 NOTE — PROGRESS NOTES
"Kaden Easton Jr. is a 20 y.o. male who is being evaluated via a billable telephone visit.      The patient has been notified of following:     \"This telephone visit will be conducted via a call between you and your physician/provider. We have found that certain health care needs can be provided without the need for a physical exam.  This service lets us provide the care you need with a short phone conversation.  If a prescription is necessary we can send it directly to your pharmacy.  If lab work is needed we can place an order for that and you can then stop by our lab to have the test done at a later time.    Telephone visits are billed at different rates depending on your insurance coverage. During this emergency period, for some insurers they may be billed the same as an in-person visit.  Please reach out to your insurance provider with any questions.    If during the course of the call the physician/provider feels a telephone visit is not appropriate, you will not be charged for this service.\"    Patient has given verbal consent to a Telephone visit? Yes    What phone number would you like to be contacted at? **  Patient would like to receive their AVS by AVS Preference: Mail a copy.    Psychiatric  Out patient Follow Up Progress Note  Date of visit:11/23/2020         Discussion of Care and Treatment Recommendations:   This is a 20 y.o. 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      Past psychiatric medication  include :   adder jorge alberto Floeyadryl  Zyprexa -   Abilify   Cogentin       Last visit  10/19/2020 Recommendation at last visit .  1. Continue   - Risperdal  1 mg two  times a day scheduled   -Topamax 50 mg two times a day   -Invega 234 mg Q 4 weeks - last give  7/7/2020- at the group home   -Fluphenazine (prolixin)2.5  mg two times a " "day    -Atomoxetine   40 mg daily  -Trazodone  100 mg  at HS  -Risperdal 0.5  mg two times a day PRN - aggressive beh   2. RTC : 4 weeks, call in between visits with any  questions or concerns     Patient and I reviewed diagnosis and treatment plan and patient agrees with following recommendations:  Ongoing education given regarding diagnostic and treatment options with adequate verbalization of understanding.  Plan   1. Continue   - Risperdal  1 mg two  times a day scheduled   -Topamax 50 mg two times a day   -Invega 234 mg Q 4 weeks - last give  7/7/2020- at the group home   -Fluphenazine (prolixin)2.5  mg two times a day    -Atomoxetine   40 mg daily  -Trazodone  100 mg  at HS  -Risperdal 0.5  mg two times a day PRN - aggressive beh   2. RTC : 4 weeks, call in between visits with any  questions or concerns            DIagnoses:     Hx Mild Intellectual Disability,   Hx Mood Disorder Due to General Medical Condition   Hx Intermittent Explosive Disorder  Hx Fetal Alcohol Spectrum        Patient Active Problem List   Diagnosis     Fetal alcohol spectrum disorder     Intermittent explosive disorder     Aggressive behavior     Intellectual disability     Chronic ITP (idiopathic thrombocytopenia) (H)     Mood disorder due to a general medical condition     Mild intellectual disability     Encounters for administrative purpose     Epistaxis, recurrent     Psychosis (H)     Mild intellectual disability     Organic mood disorder     Obesity due to excess calories     Generalized anxiety disorder     Post-splenectomy     Psychosis, unspecified psychosis type (H)     Agitation     Intermittent explosive disorder in adult     Fetal alcohol syndrome     Cannabis abuse     Self-injurious behavior     Outbursts of explosive behavior     Forehead abrasion     Current smoker     Mood disorder (H)     Chronic idiopathic thrombocytopenia (H)             Chief Complaint / Subjective:    Chief complaint: \" I am doing well\" "     History of Present Illness:   Per patient's statement : Patient reports compliance with current medications and denies side effects.  Today he is denying all psychiatric issues offers no new concerns.  He continues to smoke at least 10 cigarettes daily.  He does have smoking cessation nicotine gum and patches but does not use the gum frequently as he does not like its taste.  Not willing to give up smoking at this time though.  He reports that he has been getting along with all his housemates at his group home.  Today he was with his  and not a staff member from the group home so I did not get feedback from staff however the worker stated that there has been no complaints recently.  He is hoping to go to his aunt's  house for Thanksgiving.  Would like to continue the same medications.  Mental Status Examination:   General: Alert and oriented x 3   Speech: Normal in rate and tone  Language: Intact  Thought process: Coherent  Thought content:                           Auditory hallucinations-Denies                          Visual Hallucinations - Denies                         Delusions Absent                           Loose Associations:  No                          Suicidal thoughts: Denies                          Homicidal thoughts:Denies                        Affect: Neutral  Mood: Neutral   Intellectual functioning: Within normal limits  Memory: Within normal limits  Fund of knowledge: Average  Attention and concentration: Within normal limits  Gait: Unable to assess telephone visit  Psychomotor activity: Unable to assess telephone visit  Muscles: Unable to assess telephone visit  InSight and judgment: Fair      Drug/treatment history and current pattern of use:   History of marijuana use  Smokes 10 cigarettes daily      Medication changes: See Above   Medication adherence: compliant  Medication side effects: absent  Information about medications: Side effects, benefits and alternative treatments  discussed and patient agrees .    Psychotherapy: Supportive therapy day-to-day living    Education: Diet, exercise, abstinence from drugs and alcohol, patient will not drive if sedated and medications or  under influence of any substance    Lab Results:   Personally reviewed and discussed with the patient    Lab Results   Component Value Date    WBC 11.9 (H) 09/22/2020    HGB 13.2 (L) 09/22/2020    HCT 40.2 09/22/2020     (L) 09/22/2020    ALT 26 09/02/2020    AST 19 09/02/2020     09/02/2020    K 4.2 09/02/2020     09/02/2020    CREATININE 0.75 09/02/2020    BUN 9 09/02/2020    CO2 18 (L) 09/02/2020    TSH 2.19 08/26/2020    INR 0.93 11/04/2016    HGBA1C 5.5 08/16/2020       Vital signs:  There were no vitals taken for this visit.  Unable to assess telephone visit  Allergies: Depakote [divalproex] and Other environmental allergy           Review of Systems:      ROS:  Subjective data only - Tele-Health  Visit   10 point ROS was negative except for the items listed in HPI-              Medications:         Current Outpatient Medications on File Prior to Visit   Medication Sig Dispense Refill     ALLERGY RELIEF, LORATADINE, 10 mg tablet TAKE 10MG (1 TABLET) BY MOUTH EVERY DAY 30 tablet 11     atomoxetine (STRATTERA) 40 MG capsule TAKE 40MG (1 CAPSULE) BY MOUTH EVERY DAY.. 30 capsule 11     ferrous sulfate 325 (65 FE) MG tablet TAKE 325MG (1 TABLET) BY MOUTH EVERY DAY WITH BREAKFAST 30 tablet 11     fluPHENAZine (PROLIXIN) 2.5 MG tablet Take 1 tablet (2.5 mg total) by mouth 2 (two) times a day. 60 tablet 0     hydrOXYzine pamoate (VISTARIL) 50 MG capsule Take 1 capsule (50 mg total) by mouth 3 (three) times a day as needed for anxiety (Agitation). 60 capsule 0     melatonin 3 mg Tab tablet Take 1 tablet (3 mg total) by mouth at bedtime. 30 tablet 0     metFORMIN (GLUCOPHAGE) 500 MG tablet TAKE 500MG (1 TABLET) BY MOUTH 2 TIMES A DAY WITH MEALS 56 tablet 0     nicotine (NICODERM CQ) 21 mg/24 hr Place  1 patch on the skin daily. 30 patch 0     nicotine polacrilex (NICORETTE) 4 MG gum Apply 1 each (4 mg total) to the mouth or throat every hour as needed for smoking cessation. 380 each 0     paliperidone palmitate (INVEGA SUSTENNA) 234 mg/1.5 mL Syrg IM injection INJECT 1.5ML INTRAMUSCULARLY EVERY 4 WEEKS. 1.5 mL 0     polyethylene glycol (GLYCOLAX) 17 gram/dose powder Take 17 g by mouth daily as needed (constipation).              risperiDONE (RISPERDAL) 0.5 MG tablet Take 1 tablet (0.5 mg total) by mouth 2 (two) times a day as needed (aggressive behavior). 60 tablet 0     risperiDONE (RISPERDAL) 1 MG tablet TAKE 1MG (1 TABLET) BY MOUTH 2 TIMES A DAY. 60 tablet 0     sodium chloride (OCEAN) 0.65 % nasal spray To both nostrils 4 times a day as needed 15 mL 12     TAB-A-TUSHAR per tablet TAKE 1 TABLET BY MOUTH EVERY DAY. 30 tablet 11     topiramate (TOPAMAX) 50 MG tablet Take 1 tablet (50 mg total) by mouth 2 (two) times a day. 60 tablet 0     traZODone (DESYREL) 150 MG tablet Take 1 tablet (150 mg total) by mouth at bedtime. 30 tablet 0     traZODone (DESYREL) 50 MG tablet Take 1 tablet (50 mg total) by mouth at bedtime as needed for sleep. 30 tablet 0     No current facility-administered medications on file prior to visit.            Coordination of Care:   More than 25 minutes spent on this visit  with more than 50% of time spent on coordination of care including: Educating patient about diagnosis, prognosis, side effects and benefits of medications, diet, exercise.  Time also spent providing supportive therapy regarding above issues.    This note was created using a dictation system. All typing errors or contextual distortion is unintentional and software inherent.  Phone call duration: 25  minutes    Nkechi Monreal NP

## 2021-06-13 NOTE — PROGRESS NOTES
________________________________________  Medications Phoned  to Pharmacy [] yes [x]no  Name of Pharmacist:  List Medications, including dose, quantity and instructions    Medications ordered this visit were e-scribed.  Verified by order class [x] yes  [] no  Prolixin 2.5 mg  Hydroxyzine 50 mg  Invega Inj 234mg/1.5mL  Risperidone 0.5 mg  Risperidone 1 mg  Topamax 50 mg  Trazodone 50 mg  Trazodone 150 mg    Medication changes or discontinuations were communicated to patient's pharmacy: [] yes  [x] no    Dictation completed at time of chart check: [x] yes  [] no    I have checked the documentation for today s encounters and the above information has been reviewed and completed.

## 2021-06-13 NOTE — PROGRESS NOTES
This video/telephone visit will be conducted via a call between you and your physician/provider. We have found that certain health care needs can be provided without the need for an in-person physical exam. This service lets us provide the care you need with a video /telephone conversation. If a prescription is necessary we can send it directly to your pharmacy. If lab work is needed we can place an order for that and you can then stop by our lab to have the test done at a later time.    Just as we bill insurance for in-person visits, we also bill insurance for video/telephone visits. If you have questions about your insurance coverage, we recommend that you speak with your insurance company.    Patient has given verbal consent for video/Telephone visit? Yes  Patient would like telephone visit, please call:  [phone]  TERRY/TEJ FREEDMAN CMA    Patient verified allergies, medications and pharmacy via phone.  Patient states he is ready for visit.    No [place]  available for review

## 2021-06-14 NOTE — PROGRESS NOTES
________________________________________  Medications Phoned  to Pharmacy [] yes [x]no  Name of Pharmacist:  List Medications, including dose, quantity and instructions    Medications ordered this visit were e-scribed.  Verified by order class [x] yes  [] no  Vistaril, Risperidone, Topamax, and Desyrel  Medication changes or discontinuations were communicated to patient's pharmacy: [] yes  [x] no    Dictation completed at time of chart check: [x] yes  [] no    I have checked the documentation for today s encounters and the above information has been reviewed and completed.

## 2021-06-14 NOTE — PROGRESS NOTES
"Kaden Easton Jr. is a 20 y.o. male who is being evaluated via a billable telephone visit.      The patient has been notified of following:     \"This telephone visit will be conducted via a call between you and your physician/provider. We have found that certain health care needs can be provided without the need for a physical exam.  This service lets us provide the care you need with a short phone conversation.  If a prescription is necessary we can send it directly to your pharmacy.  If lab work is needed we can place an order for that and you can then stop by our lab to have the test done at a later time.    Telephone visits are billed at different rates depending on your insurance coverage. During this emergency period, for some insurers they may be billed the same as an in-person visit.  Please reach out to your insurance provider with any questions.    If during the course of the call the physician/provider feels a telephone visit is not appropriate, you will not be charged for this service.\"    Patient has given verbal consent to a Telephone visit? Yes        Patient would like to receive their AVS by AVS Preference: Mail a copy.    Psychiatric  Out patient Follow Up Progress Note  Date of visit:         Discussion of Care and Treatment Recommendations:   This is a 20 y.o. 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      Past psychiatric medication  include :   nhung Milesadryl  Zyprexa -   Abilify   Cogentin       Last visit 11/23/2020 Recommendation at last visit   1. Continue   - Risperdal  1 mg two  times a day scheduled   -Topamax 50 mg two times a day   -Invega 234 mg Q 4 weeks - last give  7/7/2020- at the group home   -Fluphenazine (prolixin)2.5  mg two times a day    -Atomoxetine   40 mg daily  -Trazodone  100 mg  at   -Risperdal " "0.5  mg two times a day PRN - aggressive beh   2. RTC : 4 weeks, call in between visits with any  questions or concerns     Patient and I reviewed diagnosis and treatment plan and patient agrees with following recommendations:  Ongoing education given regarding diagnostic and treatment options with adequate verbalization of understanding.  Plan   1. Continue   - Risperdal  1 mg two  times a day scheduled   -Topamax 50 mg two times a day   -Invega 234 mg Q 4 weeks - last give  7/7/2020- at the group home   -Fluphenazine (prolixin)2.5  mg two times a day    -Atomoxetine   40 mg daily  -Trazodone  100 mg  at HS  -Risperdal 0.5  mg two times a day PRN - aggressive beh   2. RTC : 4 weeks, call in between visits with any  questions or concerns            DIagnoses:     Hx Mild Intellectual Disability,   Hx Mood Disorder Due to General Medical Condition   Hx Intermittent Explosive Disorder  Hx Fetal Alcohol Spectrum     Patient Active Problem List   Diagnosis     Fetal alcohol spectrum disorder     Intermittent explosive disorder     Aggressive behavior     Intellectual disability     Chronic ITP (idiopathic thrombocytopenia) (H)     Mood disorder due to a general medical condition     Mild intellectual disability     Encounters for administrative purpose     Epistaxis, recurrent     Psychosis (H)     Mild intellectual disability     Organic mood disorder     Obesity due to excess calories     Generalized anxiety disorder     Post-splenectomy     Psychosis, unspecified psychosis type (H)     Agitation     Intermittent explosive disorder in adult     Fetal alcohol syndrome     Cannabis abuse     Self-injurious behavior     Outbursts of explosive behavior     Forehead abrasion     Current smoker     Mood disorder (H)     Chronic idiopathic thrombocytopenia (H)             Chief Complaint / Subjective:    Chief complaint: \" I am doing good\"     History of Present Illness:   Per patient's statement : He reports compliance with " "current medications he denies side effects.  He feels that his anxiety and depression are currently well managed.  Today he is denying any psychosis denies suicidal homicidal ideations.  Reports he has been sleeping well at night.  Denies any racing thoughts.  Speaking with him today there was some improvement in the way he responded to his questions he sounded more, more willing to present at the moment and there was increased willingness to participate in his visit today compared to other days where and mostly speaking to the staff.  I also did speak to a staff member who reports that patient is currently doing well with no behavior issues and sleeping well at night.  He also confirmed that he has been taking his current medications and have not noted any effect.  Overall feels well.  Patient is doing better therefore we will continue with the same plan.  I will have patient return to the clinic in approximately a month for follow-up appointment and call in between visits with any questions or  Mental Status Examination: Baseline Hx Mild Intellectual Disability  Appearance: unable to assess  Orientation: Patient alert and oriented to person, place, time, and situation  Reliability:  Patient appears to be an adequate historian.    Behavior: unable to assess  Speech: Speech is spontaneous and coherent, with a normal rate, rhythm and tone.    Language:There are no difficulties with expressive or receptive language as observed throughout the interview.    Mood: Described as \"I am doing good\" \".    Affect: unable to assess  Judgement: Able to make basic decision regarding safety per his baseline Hx Mild Intellectual Disability  Insight: Fair -Baseline Hx Mild Intellectual Disability  Gait and station: unable to assess  Thought process: Logical   Hallucinations : No evidence of any hallucination  Thought content: No evidence of delusions or paranoia.   Suicidal /Homical Ideations:  No thoughts of self harm or suicide. " No thoughts of harming others.  Associations: Connected  Fund of knowledge: Fair- Baseline Hx Mild Intellectual Disability  Attention / Concentration: Able to remain focused during the interview with minimal distractibility or need for redirection.  Short Term Memory: Grossly intact as evidence by client recalling themes and ideas discussed.  Long Term Memory: Intact  Motor Status: unable to assess      Drug/treatment history and current pattern of use:   History of marijuana use  Smokes 10 cigarettes daily- not interested in smoking cessation at this time but willing to discuss this during our next visit.    Medication changes: See Above   Medication adherence: compliant  Medication side effects: absent  Information about medications: Side effects, benefits and alternative treatments discussed and patient agrees .    Psychotherapy: Supportive therapy day-to-day living    Education: Diet, exercise, abstinence from drugs and alcohol, patient will not drive if sedated and medications or  under influence of any substance    Lab Results:   Personally reviewed and discussed with the patient    Lab Results   Component Value Date    WBC 11.9 (H) 09/22/2020    HGB 13.2 (L) 09/22/2020    HCT 40.2 09/22/2020     (L) 09/22/2020    ALT 26 09/02/2020    AST 19 09/02/2020     09/02/2020    K 4.2 09/02/2020     09/02/2020    CREATININE 0.75 09/02/2020    BUN 9 09/02/2020    CO2 18 (L) 09/02/2020    TSH 2.19 08/26/2020    INR 0.93 11/04/2016    HGBA1C 5.5 08/16/2020       Vital signs:  There were no vitals taken for this visit.  Unable to assess telephone visit  Allergies: Depakote [divalproex] and Other environmental allergy           Review of Systems:      ROS:  Subjective data only - Tele-Health  Visit   10 point ROS was negative except for the items listed in HPI-              Medications:     Current Outpatient Medications on File Prior to Visit   Medication Sig Dispense Refill     ALLERGY RELIEF,  LORATADINE, 10 mg tablet TAKE 10MG (1 TABLET) BY MOUTH EVERY DAY 30 tablet 11     atomoxetine (STRATTERA) 40 MG capsule TAKE 40MG (1 CAPSULE) BY MOUTH EVERY DAY.. 30 capsule 11     ferrous sulfate 325 (65 FE) MG tablet TAKE 325MG (1 TABLET) BY MOUTH EVERY DAY WITH BREAKFAST 30 tablet 11     fluPHENAZine (PROLIXIN) 2.5 MG tablet TAKE 2.5MG (1 TABLET) BY MOUTH 2 TIMES A DAY 30 tablet 0     hydrOXYzine pamoate (VISTARIL) 50 MG capsule Take 1 capsule (50 mg total) by mouth 3 (three) times a day as needed for anxiety (Agitation). 60 capsule 0     INVEGA SUSTENNA 234 mg/1.5 mL Syrg IM injection INJECT 1.5ML INTRAMUSCULARLY EVERY 4 WEEKS. 1.5 mL 11     melatonin 3 mg Tab tablet Take 1 tablet (3 mg total) by mouth at bedtime. 30 tablet 0     metFORMIN (GLUCOPHAGE) 500 MG tablet TAKE 500MG (1 TABLET) BY MOUTH 2 TIMES A DAY WITH MEALS 56 tablet 0     nicotine (NICODERM CQ) 21 mg/24 hr Place 1 patch on the skin daily. 30 patch 0     nicotine polacrilex (NICORETTE) 4 MG gum Apply 1 each (4 mg total) to the mouth or throat every hour as needed for smoking cessation. 380 each 0     paliperidone palmitate (INVEGA SUSTENNA) 234 mg/1.5 mL Syrg IM injection INJECT 1.5ML INTRAMUSCULARLY EVERY 4 WEEKS. 1.5 mL 0     polyethylene glycol (GLYCOLAX) 17 gram/dose powder Take 17 g by mouth daily as needed (constipation).              risperiDONE (RISPERDAL) 0.5 MG tablet TAKE 0.5MG (1 TABLET) BY MOUTH 2 TIMES A DAY AS NEEDED FOR AGGRESIVE BEHAVIOR 60 tablet 0     risperiDONE (RISPERDAL) 1 MG tablet TAKE 1MG (1 TABLET) BY MOUTH 2 TIMES A DAY. 60 tablet 0     sodium chloride (OCEAN) 0.65 % nasal spray To both nostrils 4 times a day as needed 15 mL 12     TAB-A-TUSHAR per tablet TAKE 1 TABLET BY MOUTH EVERY DAY. 30 tablet 11     topiramate (TOPAMAX) 50 MG tablet Take 1 tablet (50 mg total) by mouth 2 (two) times a day. 60 tablet 0     traZODone (DESYREL) 150 MG tablet TAKE 150MG (1 TABLET) BY MOUTH AT BEDTIME 30 tablet 0     traZODone (DESYREL)  50 MG tablet Take 1 tablet (50 mg total) by mouth at bedtime as needed for sleep. 30 tablet 0     No current facility-administered medications on file prior to visit.                Coordination of Care:   More than 30 minutes spent on this visit  with more than 50% of time spent on coordination of care including: Educating patient about diagnosis, prognosis, side effects and benefits of medications, diet, exercise.  Time also spent providing supportive therapy regarding above issues.    This note was created using a dictation system. All typing errors or contextual distortion is unintentional and software inherent.      Nkechi Monreal, NP

## 2021-06-14 NOTE — TELEPHONE ENCOUNTER
Date of Last Office Visit: 11/23/2020  Date of Next Office Visit: none (scheduling, please contact patient and schedule follow up appointment with provider)  No shows since last visit: 1-1/6/2021  Cancellations since last visit: 2-provider initiated  ED visits since last visit:  none    Medication Fluphenazine 2.5 mg tablet date last ordered: 11/23/2020  Qty: 60  Refills: 0     Medication Trazodone 150 mg tablet date last ordered: 11/23/2020  Qty: 30  Refills: 0    Lapse in therapy greater than 7 days: yes  Medication refill request verified as identical to current order: yes  Result of Last DAM, VPA, Li+ Level, CBC, or Carbamazepine Level (at or since last visit): N/A     [] Medication refilled per [facility].   [x] Medication unable to be refilled by RN due to criteria not met as indicated below:     []Eligibility - not seen in last year    [x]Supervision - no future appointment    [x]Compliance     []Verification - order discrepancy    []Controlled Medication    []Medication not included in RN Protocol    []90 - day supply request    []Other   Current Medication list:  Kaden Easton Jr.   (MRN [id number])  Your Current Medications Are    ALLERGY RELIEF, LORATADINE, 10 mg tablet TAKE 10MG (1 TABLET) BY MOUTH EVERY DAY   atomoxetine (STRATTERA) 40 MG capsule TAKE 40MG (1 CAPSULE) BY MOUTH EVERY DAY..   ferrous sulfate 325 (65 FE) MG tablet TAKE 325MG (1 TABLET) BY MOUTH EVERY DAY WITH BREAKFAST   fluPHENAZine (PROLIXIN) 2.5 MG tablet Take 1 tablet (2.5 mg total) by mouth 2 (two) times a day.   hydrOXYzine pamoate (VISTARIL) 50 MG capsule Take 1 capsule (50 mg total) by mouth 3 (three) times a day as needed for anxiety (Agitation).   INVEGA SUSTENNA 234 mg/1.5 mL Syrg IM injection INJECT 1.5ML INTRAMUSCULARLY EVERY 4 WEEKS.   melatonin 3 mg Tab tablet Take 1 tablet (3 mg total) by mouth at bedtime.   metFORMIN (GLUCOPHAGE) 500 MG tablet TAKE 500MG (1 TABLET) BY MOUTH 2 TIMES A DAY WITH MEALS   nicotine (NICODERM CQ)  21 mg/24 hr Place 1 patch on the skin daily.   nicotine polacrilex (NICORETTE) 4 MG gum Apply 1 each (4 mg total) to the mouth or throat every hour as needed for smoking cessation.   paliperidone palmitate (INVEGA SUSTENNA) 234 mg/1.5 mL Syrg IM injection INJECT 1.5ML INTRAMUSCULARLY EVERY 4 WEEKS.   polyethylene glycol (GLYCOLAX) 17 gram/dose powder Take 17 g by mouth daily as needed (constipation).        risperiDONE (RISPERDAL) 0.5 MG tablet TAKE 0.5MG (1 TABLET) BY MOUTH 2 TIMES A DAY AS NEEDED FOR AGGRESIVE BEHAVIOR   risperiDONE (RISPERDAL) 1 MG tablet TAKE 1MG (1 TABLET) BY MOUTH 2 TIMES A DAY.   sodium chloride (OCEAN) 0.65 % nasal spray To both nostrils 4 times a day as needed   TAB-A-TUSHAR per tablet TAKE 1 TABLET BY MOUTH EVERY DAY.   topiramate (TOPAMAX) 50 MG tablet Take 1 tablet (50 mg total) by mouth 2 (two) times a day.   traZODone (DESYREL) 150 MG tablet Take 1 tablet (150 mg total) by mouth at bedtime.   traZODone (DESYREL) 50 MG tablet Take 1 tablet (50 mg total) by mouth at bedtime as needed for sleep.       Medication Plan of Care at last office visit with MD/CNP:  Plan   1. Continue   - Risperdal  1 mg two  times a day scheduled   -Topamax 50 mg two times a day   -Invega 234 mg Q 4 weeks - last give  7/7/2020- at the group home   -Fluphenazine (prolixin)2.5  mg two times a day    -Atomoxetine   40 mg daily  -Trazodone  100 mg  at HS  -Risperdal 0.5  mg two times a day PRN - aggressive beh   2. RTC : 4 weeks, call in between visits with any  questions or concerns

## 2021-06-14 NOTE — PATIENT INSTRUCTIONS - HE
Continue medications as prescribed  Have your pharmacy contact us for a refill if you are running low on medications (We may ask you to come into clinic to get a refill from the nurse  No Alcohol or drug use  No driving if sedated  Call the clinic with any questions or concerns   Reach out for help if you feel like hurting yourself or others ([facility] [phone]: [address] [facility] [address], [address] or [facility] Suicide Hotline [phone] , call 911 or go to nearest Emergency room    Follow up as directed, for your appointments, per your After Visit Summary Form.

## 2021-06-14 NOTE — TELEPHONE ENCOUNTER
Call placed to patient's group to inquire about what doses of Risperidone patient is receiving. Spoke with staff person Betty, She reports that patient gets the Risperdal 1 mg, scheduled two times a day and he gets 0.5 mg prn up to two times a day as needed. She said patient usually will be asking for prn.  Call placed to pharmacy, spoke with fuentes OCONNOR, she said Risperdal 1mg was last filled 11/22/20 and have orders on profile, and Risperdal 0.5 mg was filled 11/24/20 and they need order to refill.    Reminded  staff that patient has appt on 1/6/2021 with Nkechi Monreal NP at 0900, and will be checked in at 0845.

## 2021-06-14 NOTE — TELEPHONE ENCOUNTER
Unclear what dose patient is asking. Will ask support pool to call and clarify if patient taking 1mg two times a day documented in plan OR 0.5mg two times a day in order requested before refilling.

## 2021-06-14 NOTE — TELEPHONE ENCOUNTER
Patient has appointment with Nkechi on 1/6/2020  Last appointment was 11/23/2020  2 cancellations by provider 12/21 and 23/2020.    Risperidone 1 mg last ordered on 11/23/2020, for 60 tablets and no refills.  Plan   1. Continue   - Risperdal  1 mg two  times a day scheduled   -Topamax 50 mg two times a day   -Invega 234 mg Q 4 weeks - last give  7/7/2020- at the group home   -Fluphenazine (prolixin)2.5  mg two times a day    -Atomoxetine   40 mg daily  -Trazodone  100 mg  at HS  -Risperdal 0.5  mg two times a day PRN - aggressive beh   2. RTC : 4 weeks, call in between visits with any  questions or concerns

## 2021-06-14 NOTE — PROGRESS NOTES
This video/telephone visit will be conducted via a call between you and your physician/provider. We have found that certain health care needs can be provided without the need for an in-person physical exam. This service lets us provide the care you need with a video /telephone conversation. If a prescription is necessary we can send it directly to your pharmacy. If lab work is needed we can place an order for that and you can then stop by our lab to have the test done at a later time.    Just as we bill insurance for in-person visits, we also bill insurance for video/telephone visits. If you have questions about your insurance coverage, we recommend that you speak with your insurance company.    Patient has given verbal consent for video/Telephone visit? Yes  Patient would like telephone visit, please call:  [phone]   TERRY/TEJ FREEDMAN CMA    Patient verified allergies, medications and pharmacy via phone.  Patient states he is ready for visit.    No [place]  available for review

## 2021-06-15 NOTE — PROGRESS NOTES
"Kaden Easton Jr. is a 20 y.o. male who is being evaluated via a billable telephone visit.      The patient has been notified of following:     \"This telephone visit will be conducted via a call between you and your physician/provider. We have found that certain health care needs can be provided without the need for a physical exam.  This service lets us provide the care you need with a short phone conversation.  If a prescription is necessary we can send it directly to your pharmacy.  If lab work is needed we can place an order for that and you can then stop by our lab to have the test done at a later time.    Telephone visits are billed at different rates depending on your insurance coverage. During this emergency period, for some insurers they may be billed the same as an in-person visit.  Please reach out to your insurance provider with any questions.    If during the course of the call the physician/provider feels a telephone visit is not appropriate, you will not be charged for this service.\"    Patient has given verbal consent to a Telephone visit? Yes        Patient would like to receive their AVS by AVS Preference: Mail a copy.    Psychiatric  Out patient Follow Up Progress Note  Date of visit:2/22/2021         Discussion of Care and Treatment Recommendations:   This is a 20 y.o. 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      Past psychiatric medication  include :   nhung Milesadryl  Zyprexa -   Abilify   Cogentin       Last visit  01/27/2020 Recommendation at last visit .  1. Continue   - Risperdal  1 mg two  times a day scheduled   -Topamax 50 mg two times a day   -Invega 234 mg Q 4 weeks - last give  7/7/2020- at the group home   -Fluphenazine (prolixin)2.5  mg two times a day    -Atomoxetine   40 mg daily  -Trazodone  100 mg  at " "[facility]  -Risperdal 0.5  mg two times a day PRN - aggressive beh   2. RTC : 4 weeks, call in between visits with any  questions or concerns  Patient and I reviewed diagnosis and treatment plan and patient agrees with following recommendations:  Ongoing education given regarding diagnostic and treatment options with adequate verbalization of understanding.  Plan   1. Continue   - Risperdal  1 mg two  times a day scheduled   -Topamax 50 mg two times a day   -Invega 234 mg Q 4 weeks - last give  7/7/2020- at the group home   -Fluphenazine (prolixin)2.5  mg two times a day    -Atomoxetine   40 mg daily  -Trazodone  150 mg  at HS  Trazodone 50 mg bedtime PRN  -Risperdal 0.5  mg two times a day PRN - aggressive beh   2. 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   Hx Intermittent Explosive Disorder  Hx Fetal Alcohol Spectrum     Patient Active Problem List   Diagnosis     Fetal alcohol spectrum disorder     Aggressive behavior     Chronic ITP (idiopathic thrombocytopenia) (H)     Mood disorder due to a general medical condition     Encounters for administrative purpose     Epistaxis, recurrent     Psychosis (H)     Mild intellectual disability     Organic mood disorder     Generalized anxiety disorder     Post-splenectomy     Intermittent explosive disorder in adult     Cannabis abuse     Self-injurious behavior     Current smoker     Morbid obesity (H)             Chief Complaint / Subjective:    Chief complaint: \" I am ok \"     History of Present Illness:   Per patient's statement : Patient is reporting compliance with current medications and denies side effects.  He is also with historian therefore I did also speak to his  who reported that patient had 2 incidents of agitation last Friday ago seems to be isolated.  He he reports overall patient has been stable on current " "medications and they have used as needed medications to help him when he is anxious or agitated.  And not looking for medication changes today.  He states that patient has been getting along with fellow housemates.         Mental Status Examination: Baseline Hx Mild Intellectual Disability,Fetal Alcohol Spectrum   Orientation: Patient alert and oriented to person, place, time, and situation  Reliability:  Patient appears to be a poor  historian.    Behavior: unable to assess  Speech: Speech is spontaneous and coherent, with a normal rate, rhythm and tone.    Language:There are no difficulties with expressive or receptive language as observed throughout the interview.    Mood: Described as \"I am doing good\" \".    Affect: unable to assess  Judgement: Fair s baseline Hx Mild Intellectual Disability  Insight: Fair -Baseline Hx Mild Intellectual Disability  Gait and station: unable to assess  Thought process: Logical   Hallucinations : No evidence of any hallucination  Thought content: No evidence of delusions or paranoia.   Suicidal /Homical Ideations:  No thoughts of self harm or suicide. No thoughts of harming others.  Associations: Connected  Fund of knowledge: Fair- Baseline Hx Mild Intellectual Disability  Attention / Concentration:Needed redirection and reminders to stay focused   Short Term Memory: Grossly intact as evidence by client recalling themes and ideas discussed.  Long Term Memory: Fair  Motor Status: unable to assess     Drug/treatment history and current pattern of use:   History of marijuana use  Smokes 10 cigarettes daily. Also vapes - not interested in smoking cessation at this time   Medication changes: See Above   Medication adherence: compliant  Medication side effects: absent  Information about medications: Side effects, benefits and alternative treatments discussed and patient agrees .    Psychotherapy: Supportive therapy day-to-day living    Education: Diet, exercise, abstinence from drugs and " alcohol, patient will not drive if sedated and medications or  under influence of any substance    Lab Results:   Personally reviewed and discussed with the patient    Lab Results   Component Value Date    WBC 9.8 02/17/2021    HGB 13.3 (L) 02/17/2021    HCT 40.3 02/17/2021    PLT 59 (L) 02/17/2021    ALT 28 02/17/2021    AST 22 02/17/2021     02/17/2021    K 3.9 02/17/2021     (H) 02/17/2021    CREATININE 0.83 02/17/2021    BUN 13 02/17/2021    CO2 22 02/17/2021    TSH 2.19 08/26/2020    INR 0.93 11/04/2016    HGBA1C 5.6 02/17/2021       Vital signs:  There were no vitals taken for this visit.  Unable to assess telephone visit  Allergies: Depakote [divalproex] and Other environmental allergy           Review of Systems:      ROS:  Subjective data only - Tele-Health  Visit   10 point ROS was negative except for the items listed in HPI-              Medications:       Current Outpatient Medications on File Prior to Visit   Medication Sig Dispense Refill     ALLERGY RELIEF, LORATADINE, 10 mg tablet TAKE 10MG (1 TABLET) BY MOUTH EVERY DAY 30 tablet 11     atomoxetine (STRATTERA) 40 MG capsule TAKE 40MG (1 CAPSULE) BY MOUTH EVERY DAY.. 30 capsule 11     ferrous sulfate 325 (65 FE) MG tablet TAKE 325MG (1 TABLET) BY MOUTH EVERY DAY WITH BREAKFAST 30 tablet 11     fluPHENAZine (PROLIXIN) 2.5 MG tablet TAKE 2.5MG (1 TABLET) BY MOUTH 2 TIMES A DAY 56 tablet 11     gabapentin (NEURONTIN) 100 MG capsule 100 mg. TAKE 2 CAP (200MG) BY MOUTH 3 TIMES A DAY PRN 20 MINS BEFORE STRESSFUL EVENT       hydrOXYzine pamoate (VISTARIL) 50 MG capsule Take 1 capsule (50 mg total) by mouth 3 (three) times a day as needed for anxiety (Agitation). 60 capsule 0     INVEGA SUSTENNA 234 mg/1.5 mL Syrg IM injection INJECT 1.5ML INTRAMUSCULARLY EVERY 4 WEEKS. 1.5 mL 11     metFORMIN (GLUCOPHAGE) 500 MG tablet TAKE 500MG (1 TABLET) BY MOUTH 2 TIMES A DAY WITH MEALS 56 tablet 0     paliperidone palmitate (INVEGA SUSTENNA) 234 mg/1.5 mL  Syrg IM injection INJECT 1.5ML INTRAMUSCULARLY EVERY 4 WEEKS. 1.5 mL 0     risperiDONE (RISPERDAL) 0.5 MG tablet TAKE 0.5MG (1 TABLET) BY MOUTH 2 TIMES A DAY AS NEEDED FOR AGGRESIVE BEHAVIOR 60 tablet 0     risperiDONE (RISPERDAL) 1 MG tablet TAKE 1MG (1 TABLET) BY MOUTH 2 TIMES A DAY 56 tablet 0     sodium chloride (OCEAN) 0.65 % nasal spray To both nostrils 4 times a day as needed 15 mL 12     TAB-A-TUSHAR per tablet TAKE 1 TABLET BY MOUTH EVERY DAY. 30 tablet 11     topiramate (TOPAMAX) 50 MG tablet TAKE 50MG (1 TABLET) BY MOUTH 2 TIMES A DAY 56 tablet 11     traZODone (DESYREL) 150 MG tablet TAKE 150MG (1 TABLET) BY MOUTH AT BEDTIME 30 tablet 0     traZODone (DESYREL) 50 MG tablet Take 1 tablet (50 mg total) by mouth at bedtime as needed for sleep. 30 tablet 0     melatonin 3 mg Tab tablet Take 1 tablet (3 mg total) by mouth at bedtime. 30 tablet 0     nicotine (NICODERM CQ) 21 mg/24 hr Place 1 patch on the skin daily. 30 patch 0     nicotine polacrilex (NICORETTE) 4 MG gum Apply 1 each (4 mg total) to the mouth or throat every hour as needed for smoking cessation. 380 each 0     polyethylene glycol (GLYCOLAX) 17 gram/dose powder Take 17 g by mouth daily as needed (constipation).              No current facility-administered medications on file prior to visit.              Coordination of Care:   More than 30 minutes spent on this visit  with more than 50% of time spent on coordination of care including: Educating patient about diagnosis, prognosis, side effects and benefits of medications, diet, exercise.  Time also spent providing supportive therapy regarding above issues  This note was created using a dictation system. All typing errors or contextual distortion is unintentional and software inherent.  Phone call duration: 25 minutes    Nkechi Monreal NP

## 2021-06-15 NOTE — PROGRESS NOTES
2/17/21:   Lab Results   Component Value Date    PLT 59 (L) 02/17/2021       ----- Message -----  From: Krystyna Ponce MD  Sent: 2/18/2021   3:49 PM CST  To: Sonia Yan RN    No increased risk for bleeding.  No intervention needed.  Primary should check cbc q3m and also if new bleeding or bruising is noted.

## 2021-06-15 NOTE — PROGRESS NOTES
This video/telephone visit will be conducted via a call between you and your physician/provider. We have found that certain health care needs can be provided without the need for an in-person physical exam. This service lets us provide the care you need with a video /telephone conversation. If a prescription is necessary we can send it directly to your pharmacy. If lab work is needed we can place an order for that and you can then stop by our lab to have the test done at a later time.    Just as we bill insurance for in-person visits, we also bill insurance for video/telephone visits. If you have questions about your insurance coverage, we recommend that you speak with your insurance company.    Patient has given verbal consent for video/Telephone visit? yes  Patient would like telephone visit, please call: [phone]-   TERRY/TEJ : Garry RAE LPN   reports more agitation since Friday.  Patient verified allergies, medications and pharmacy via phone.  Patient states he is ready for visit.    : provider to review    ________________________________________  Medications Phoned  to Pharmacy [] yes [x]no  Name of Pharmacist:  List Medications, including dose, quantity and instructions    Medications ordered this visit were e-scribed.  Verified by order class [x] yes  [] no  Vistaril, Risperdal, and Trazodone 150 and 50 mg.   Medication changes or discontinuations were communicated to patient's pharmacy: [] yes  [x] no    Dictation completed at time of chart check: [x] yes  [] no    I have checked the documentation for today s encounters and the above information has been reviewed and completed.

## 2021-06-15 NOTE — TELEPHONE ENCOUNTER
Date of Last Office Visit: 1/27/21  Date of Next Office Visit: None - schedulers will attempt to contact  No shows since last visit: none  Cancellations since last visit: none    Medication requested: fluphenazine 2.5mg Date last ordered: 1/21/21Qty: 30 Refills: 0  Medication requested: risperidone 1mg Date last ordered: 1/27/21 Qty: 60 Refills: 0  Medication requested: topiramate 50mg Date last ordered: 1/27/21 Qty: 60 Refills: 0         Lapse in medication adherence greater than 5 days?: no  If yes, call patient and gather details: N/A  Medication refill request verified as identical to current order?:yes  Result of Last DAM, VPA, Li+ Level, CBC, or Carbamazepine Level (at or since last visit): N/A    []Medication refilled per  Medication Refill in Ambulatory Care  policy.  [x]Medication unable to be refilled by RN due to criteria not met as indicated below:    []Eligibility - not seen in the last year   [x]Supervision - no future appointment   []Compliance - no shows, cancellations or lapse in therapy   []Verification - order discrepancy   []Controlled medication   []Medication not included in policy   []90-day supply request   []Other

## 2021-06-15 NOTE — PROGRESS NOTES
Subjective:    Kaden Easton Jr. is a 20 y.o. male who presents with chief complaint of med management.  He has multiple chronic medical problems including chronic ITP, fetal alcohol syndrome, schizophrenia, history of splenectomy.  He is on Metformin and has diabetes written in his list of diagnoses from his med group home, but we do not have that official diagnosis in his chart.  It is possible him he could be just taking this preventatively, due to his significant risk factors and high risk medication.  Later in his chart he does have a diagnosis of prediabetes.  Last visit at our clinic was 9/20/2020 for hospital discharge follow-up after his chronic ITP.  Last visit with Dr. Rivera, his previous PCP, was in 2019.    He is here with his group home aide.  Patient has no concerns.  Aide has no concerns.  He apparently moved to a new group home within the past few weeks and needs a physical-type exam.  Essential medicines reconciled.    Patient Active Problem List   Diagnosis     Fetal alcohol spectrum disorder     Intermittent explosive disorder     Aggressive behavior     Intellectual disability     Chronic ITP (idiopathic thrombocytopenia) (H)     Mood disorder due to a general medical condition     Encounters for administrative purpose     Epistaxis, recurrent     Psychosis (H)     Mild intellectual disability     Organic mood disorder     Generalized anxiety disorder     Post-splenectomy     Agitation     Intermittent explosive disorder in adult     Fetal alcohol syndrome     Cannabis abuse     Self-injurious behavior     Outbursts of explosive behavior     Forehead abrasion     Current smoker     Chronic idiopathic thrombocytopenia (H)     Morbid obesity (H)       Current Outpatient Medications:      ALLERGY RELIEF, LORATADINE, 10 mg tablet, TAKE 10MG (1 TABLET) BY MOUTH EVERY DAY, Disp: 30 tablet, Rfl: 11     atomoxetine (STRATTERA) 40 MG capsule, TAKE 40MG (1 CAPSULE) BY MOUTH EVERY DAY.., Disp: 30  capsule, Rfl: 11     ferrous sulfate 325 (65 FE) MG tablet, TAKE 325MG (1 TABLET) BY MOUTH EVERY DAY WITH BREAKFAST, Disp: 30 tablet, Rfl: 11     fluPHENAZine (PROLIXIN) 2.5 MG tablet, TAKE 2.5MG (1 TABLET) BY MOUTH 2 TIMES A DAY, Disp: 56 tablet, Rfl: 11     gabapentin (NEURONTIN) 100 MG capsule, 100 mg. TAKE 2 CAP (200MG) BY MOUTH 3 TIMES A DAY PRN 20 MINS BEFORE STRESSFUL EVENT, Disp: , Rfl:      hydrOXYzine pamoate (VISTARIL) 50 MG capsule, Take 1 capsule (50 mg total) by mouth 3 (three) times a day as needed for anxiety (Agitation)., Disp: 60 capsule, Rfl: 0     metFORMIN (GLUCOPHAGE) 500 MG tablet, TAKE 500MG (1 TABLET) BY MOUTH 2 TIMES A DAY WITH MEALS, Disp: 56 tablet, Rfl: 0     nicotine polacrilex (NICORETTE) 4 MG gum, Apply 1 each (4 mg total) to the mouth or throat every hour as needed for smoking cessation., Disp: 380 each, Rfl: 0     paliperidone palmitate (INVEGA SUSTENNA) 234 mg/1.5 mL Syrg IM injection, INJECT 1.5ML INTRAMUSCULARLY EVERY 4 WEEKS., Disp: 1.5 mL, Rfl: 0     polyethylene glycol (GLYCOLAX) 17 gram/dose powder, Take 17 g by mouth daily as needed (constipation).    , Disp: , Rfl:      risperiDONE (RISPERDAL) 0.5 MG tablet, TAKE 0.5MG (1 TABLET) BY MOUTH 2 TIMES A DAY AS NEEDED FOR AGGRESIVE BEHAVIOR, Disp: 60 tablet, Rfl: 0     risperiDONE (RISPERDAL) 1 MG tablet, TAKE 1MG (1 TABLET) BY MOUTH 2 TIMES A DAY, Disp: 56 tablet, Rfl: 0     TAB-A-TUSHAR per tablet, TAKE 1 TABLET BY MOUTH EVERY DAY., Disp: 30 tablet, Rfl: 11     topiramate (TOPAMAX) 50 MG tablet, TAKE 50MG (1 TABLET) BY MOUTH 2 TIMES A DAY, Disp: 56 tablet, Rfl: 11     traZODone (DESYREL) 150 MG tablet, TAKE 150MG (1 TABLET) BY MOUTH AT BEDTIME, Disp: 30 tablet, Rfl: 0     traZODone (DESYREL) 50 MG tablet, Take 1 tablet (50 mg total) by mouth at bedtime as needed for sleep., Disp: 30 tablet, Rfl: 0     INVEGA SUSTENNA 234 mg/1.5 mL Syrg IM injection, INJECT 1.5ML INTRAMUSCULARLY EVERY 4 WEEKS., Disp: 1.5 mL, Rfl: 11     melatonin  "3 mg Tab tablet, Take 1 tablet (3 mg total) by mouth at bedtime., Disp: 30 tablet, Rfl: 0     nicotine (NICODERM CQ) 21 mg/24 hr, Place 1 patch on the skin daily., Disp: 30 patch, Rfl: 0     sodium chloride (OCEAN) 0.65 % nasal spray, To both nostrils 4 times a day as needed, Disp: 15 mL, Rfl: 12     Objective:   Allergies:  Depakote [divalproex] and Other environmental allergy    Vitals:  Vitals:    02/17/21 1023   BP: 104/69   Patient Site: Right Arm   Patient Position: Sitting   Cuff Size: Adult Large   Pulse: 93   Weight: (!) 249 lb 4 oz (113.1 kg)   Height: 5' 6\" (1.676 m)     Body mass index is 40.23 kg/m .    Vital signs reviewed.  General: Patient is alert and oriented x 3, in no apparent distress, appropriately groomed, minimal eye contact, does not speak for most of the visit, is able to follow directions easily  Eyes: Sclera clear bilaterally, PERRLA bilaterally  Ears: TMs are non-erythematous with good light reflex bilaterally  Lymphatic: no anterior cervical lymph node enlargement  Cardiac: regular rate and rhythm, no murmurs  Pulmonary: lungs clear to auscultation bilaterally, no crackles, rales, rhonchi, or wheezing noted  Abdomen: Non tender to palpation, no hepatosplenomegaly, negative Garcia's sign, no pain over McBurney's point, positive bowel sounds, no masses palpable  Musculoskeletal: Normal 4/5 strength in major muscle groups in upper and lower extremities bilaterally  Neuro: Patient walks a normal tandem gait  : patient reports no concerns    Results for orders placed or performed in visit on 02/17/21   Glycosylated Hemoglobin A1c   Result Value Ref Range    Hemoglobin A1c 5.6 <=5.6 %   Other labs pending.    Assessment and Plan:     1. Medication management  Essentially needed a physical type form filled out for his new group home.  Forms completed.  No concerns.  No med changes.  Screening labs ordered today.    2. Hyperglycemia  He has a diagnosis of diabetes in his med list from his " group home, but we do not have anything here in his chart.  Mention made in previous note of Pre-Diabetes.  Continue metformin.  - Comprehensive Metabolic Panel  - Glycosylated Hemoglobin A1c    3. Morbid obesity (H)  I will follow-up with screening labs when available.  - Comprehensive Metabolic Panel  - Glycosylated Hemoglobin A1c    4. High risk medication use  Due to atypical antipsychotics patient is taking, screening labs ordered today.  Should be done approximately once a year.  - Comprehensive Metabolic Panel  - Glycosylated Hemoglobin A1c    5. Chronic idiopathic thrombocytopenia (H)  6. Chronic ITP (idiopathic thrombocytopenia) (H)  I reviewed last hematology note from 9/22/2020.  He is recommended to have platelet count check every 3 months, and then follow-up in 1 year, approximately 9/2021.  No new concerns today.  - HM1(CBC and Differential)    7. Fetal alcohol spectrum disorder  8. Intellectual disability  9. Undifferentiated schizophrenia (H)  Living in a group home.  Group home aide feels like patient's current medications are working well.  Patient is minimally communicative today.  Continue present meds and follow-up as needed.    10. Post-splenectomy  No acute concerns.  According to Up-to-date recommendations, not recommended for daily antibiotic prophylaxis.  In reviewing recommended vaccinations for post splenectomy:  -PCV13: completed 2019  -PPSV23: 2019, booster every 5-7 years  -HIB: completed as child  -Menactra: ?MPSV4 - equivalent? 2010, due for booster, then booster in 1 year, then booster every 2-3 years  -MenB: due for 2-3 doses (depending on what type of vaccination we have)  When we contact him with lab results, will let him know he can come in for nurse-only for Menactra and Men B vaccines.    This dictation uses voice recognition software, which may contain typographical errors.

## 2021-06-15 NOTE — TELEPHONE ENCOUNTER
RN cannot approve Refill Request    RN can NOT refill this medication med is not covered by policy/route to provider. Last office visit: 2/17/2021 Silvia Quan PA-C Last Physical: Visit date not found Last MTM visit: Visit date not found Last visit same specialty: 2/17/2021 Silvia Quan PA-C.  Next visit within 3 mo: Visit date not found  Next physical within 3 mo: Visit date not found      Julieta Nash, [facility] Triage/Med Refill 2/19/2021    Requested Prescriptions   Pending Prescriptions Disp Refills     risperiDONE (RISPERDAL) 0.5 MG tablet [Pharmacy Med Name: RISPERIDONE 0.5MG] 60 tablet 0     Sig: TAKE 0.5MG (1 TABLET) BY MOUTH 2 TIMES A DAY AS NEEDED FOR AGGRESIVE BEHAVIOR.       There is no refill protocol information for this order

## 2021-06-15 NOTE — TELEPHONE ENCOUNTER
RN cannot approve Refill Request    RN can NOT refill this medication PCP messaged that patient is overdue for Labs. Last office visit: 9/15/2020 Jasper Lund MD Last Physical: Visit date not found Last MTM visit: Visit date not found Last visit same specialty: 2/17/2021 Silvia Quan PA-C.  Next visit within 3 mo: Visit date not found  Next physical within 3 mo: Visit date not found      Julieta Nash, [facility] Triage/Med Refill 3/11/2021    Requested Prescriptions   Pending Prescriptions Disp Refills     metFORMIN (GLUCOPHAGE) 500 MG tablet [Pharmacy Med Name: METFORMIN 500MG] 56 tablet 11     Sig: TAKE 500MG (1 TABLET) BY MOUTH 2 TIMES A DAY WITH MEALS.       Metformin Refill Protocol Failed - 3/11/2021  7:13 PM        Failed - Microalbumin in last year      No results found for: MICROALBUR               Passed - Blood pressure in last 12 months     BP Readings from Last 1 Encounters:   02/17/21 104/69             Passed - LFT or AST or ALT in last 12 months     Albumin   Date Value Ref Range Status   02/17/2021 3.7 3.5 - 5.0 g/dL Final     Bilirubin, Total   Date Value Ref Range Status   02/17/2021 0.2 0.0 - 1.0 mg/dL Final     Alkaline Phosphatase   Date Value Ref Range Status   02/17/2021 83 45 - 120 U/L Final     AST   Date Value Ref Range Status   02/17/2021 22 0 - 40 U/L Final     ALT   Date Value Ref Range Status   02/17/2021 28 0 - 45 U/L Final     Protein, Total   Date Value Ref Range Status   02/17/2021 7.0 6.0 - 8.0 g/dL Final                Passed - GFR or Serum Creatinine in last 6 months     GFR MDRD Non Af Amer   Date Value Ref Range Status   02/17/2021 >60 >60 mL/min/1.73m2 Final     GFR MDRD Af Amer   Date Value Ref Range Status   02/17/2021 >60 >60 mL/min/1.73m2 Final             Passed - Visit with PCP or prescribing provider visit in last 6 months or next 3 months     Last office visit with prescriber/PCP: 9/15/2020 OR same dept: 2/17/2021 Silvia Quan  JACE CANALES OR same specialty: 2/17/2021 Silvia Quan PA-C Last physical: Visit date not found Last MTM visit: Visit date not found         Next appt within 3 mo: Visit date not found  Next physical within 3 mo: Visit date not found  Prescriber OR PCP: Jasper Lund MD  Last diagnosis associated with med order: 1. Pre-diabetes  - metFORMIN (GLUCOPHAGE) 500 MG tablet [Pharmacy Med Name: METFORMIN 500MG]; TAKE 500MG (1 TABLET) BY MOUTH 2 TIMES A DAY WITH MEALS  Dispense: 56 tablet; Refill: 11     If protocol passes may refill for 12 months if within 3 months of last provider visit (or a total of 15 months).           Passed - A1C in last 6 months     Hemoglobin A1c   Date Value Ref Range Status   02/17/2021 5.6 <=5.6 % Final

## 2021-06-15 NOTE — PATIENT INSTRUCTIONS - HE
Continue medications as prescribed  Have your pharmacy contact us for a refill if you are running low on medications (We may ask you to come into clinic to get a refill from the nurse  No Alcohol or drug use  No driving if sedated  Call the clinic with any questions or concerns   Reach out for help if you feel like hurting yourself or others ([facility] [phone]: [address] [facility], [address] or [facility] Suicide Hotline [phone] , call 911 or go to nearest Emergency room    Follow up as directed, for your appointments, per your After Visit Summary Form.

## 2021-06-15 NOTE — TELEPHONE ENCOUNTER
Date of Last Office Visit: 2/22/2021  Date of Next Office Visit: 3/22/2021  No shows since last visit: none  Cancellations since last visit: none    Medication requested: Risperidone 0.5 mg tablet Date last ordered: 12/31/2021 Qty: 60 Refills: 0     Review of MN ?: no    Lapse in medication adherence greater than 5 days?: no  If yes, call patient and gather details: no  Medication refill request verified as identical to current order?: yes  Result of Last DAM, VPA, Li+ Level, CBC, or Carbamazepine Level (at or since last visit): N/A    []Medication refilled per  Medication Refill in Ambulatory Care  policy.  [x]Medication unable to be refilled by RN due to criteria not met as indicated below:    []Eligibility - not seen in the last year   []Supervision - no future appointment   []Compliance - no shows, cancellations or lapse in therapy   []Verification - order discrepancy   [x]Controlled medication   []Medication not included in policy   []90-day supply request   []Other

## 2021-06-15 NOTE — TELEPHONE ENCOUNTER
RN cannot approve Refill Request    RN can NOT refill this medication med is not covered by policy/route to provider. Last office visit: 2/17/2021 Silvia Quan PA-C Last Physical: Visit date not found Last MTM visit: Visit date not found Last visit same specialty: 2/17/2021 Silvia Quan PA-C.  Next visit within 3 mo: Visit date not found  Next physical within 3 mo: Visit date not found      Julieta Nash, [facility] Triage/Med Refill 2/23/2021    Requested Prescriptions   Pending Prescriptions Disp Refills     risperiDONE (RISPERDAL) 0.5 MG tablet [Pharmacy Med Name: RISPERIDONE 0.5MG] 60 tablet 1     Sig: TAKE 0.5MG (1 TABLET) BY MOUTH 2 TIMES A DAY AS NEEDED FOR AGGRESIVE BEHAVIOR.       There is no refill protocol information for this order

## 2021-06-16 NOTE — PATIENT INSTRUCTIONS - HE
Continue medications as prescribed  Have your pharmacy contact us for a refill if you are running low on medications (We may ask you to come into clinic to get a refill from the nurse  No Alcohol or drug use  No driving if sedated  Call the clinic with any questions or concerns   Reach out for help if you feel like hurting yourself or others ([facility] [phone]: [address] [facility], [address] or [facility] Suicide Hotline [phone] , call 911 or go to nearest Emergency room    Follow up as directed, for your appointments, per your After Visit Summary Form.

## 2021-06-16 NOTE — PROGRESS NOTES
Once to the mental health.  No follow up visits.    Numerous psychiatric diagnoses.    Fetal alcohol  Explosive disorder    Behavioral issues have not been an issue.    Stays home.  Intellectual issues preclude other activities    risperdal 2mg am and 4mg hs  Hydroxyzine hs  Maris 100 tid for mood    Is not on the abilify    No issues with explosive disorder    Low platelets no bleeding.  Not seeing hematology    Obesity denies polyuria  Wt up 36 pounds.  No nocturia  Watches tv and eats tid .   Chips.  No longer pop    ROS: as noted above    OBJECTIVE:   Vitals:    02/14/18 1123   BP: 112/80   Pulse: 88   Resp: 20   Temp: 97.3  F (36.3  C)      Wt is noted.  No diaphoresis  Eyes: nl eom, anicteric   External ears, nose: nl    Neck: nl nodes, supple, thyroid normal   Lungs: clear to ausc   Heart: regular rhythm  Abd: soft nontender   No cva (renal) tenderness  Neuro: no weakness  Skin no rash  Joints: uninflamed   No ketotic breath odor noted  Mental: euthymic  Ext: nontender calves   Gait: normal    Bmi:50    36 pound increase    ASSESSMENT/PLAN:    1. Intermittent explosive disorder  Ambulatory referral to Psychiatry    risperiDONE (RISPERDAL) 4 MG tablet    risperiDONE (RISPERDAL) 2 MG tablet    hydrOXYzine (VISTARIL) 50 MG capsule    gabapentin (NEURONTIN) 100 MG capsule    CANCELED: Ambulatory referral to Psychiatry   2. Obesity due to excess calories  Ambulatory referral to Psychiatry    CANCELED: Ambulatory referral to Psychiatry   3. Fetal alcohol spectrum disorder  Ambulatory referral to Psychiatry    CANCELED: Ambulatory referral to Psychiatry   4. Mood disorder due to a general medical condition  risperiDONE (RISPERDAL) 4 MG tablet    risperiDONE (RISPERDAL) 2 MG tablet    hydrOXYzine (VISTARIL) 50 MG capsule    gabapentin (NEURONTIN) 100 MG capsule     Problematic polydiagnosis and meds linked to metabolic syndrome.  Need better diagnosis and ? Adjust off atypicals.  Will try to find psychiatric eval and  hope for med change.  Pt seen with father who is thin, and understands need for wt control for coronary artery disease risk management

## 2021-06-16 NOTE — TELEPHONE ENCOUNTER
Telephone Encounter by Geeta Estrada RN at 5/30/2019  1:32 PM     Author: Geeta Estrada RN Service: Behavioral Author Type: Registered Nurse    Filed: 5/30/2019  1:51 PM Encounter Date: 5/30/2019 Status: Signed    : Geeta Estrada RN (Registered Nurse)       Date of Last Office Visit: 5/1/19  Date of Next Office Visit: Transition plan not yet determined  No shows since last visit: 0  Cancellations since last visit: 0  ED visits since last visit:  0    Medication topiramate 50 mg date last ordered: 4/15/19  Qty: 60  Refills: 1  Medication propranolol 10 mg date last ordered: 4/15/19  Qty: 90  Refills: 1    Lapse in therapy greater than 7 days: No, early fill due to packing purposes for group home.   Medication refill request verified as identical to current order: Yes  Result of Last DAM, VPA, Li+ Level, CBC, or Carbamazepine Level (at or since last visit): Positive for amphetamines on 5/8/19, positive for THC on 5/8/19     [] Medication refilled per [facility] M-1.   [x] Medication unable to be refilled by RN due to criteria not met as indicated below:     []Eligibility - not seen in last year    [x]Supervision - no future appointment    []Compliance     []Verification - order discrepancy    []Controlled Medication    []Medication not included in RN Protocol    []90 - day supply request    []Other     Current Medication list:    Medication Plan of Care at last office visit with MD/CNP:

## 2021-06-16 NOTE — TELEPHONE ENCOUNTER
RN cannot approve Refill Request    RN can NOT refill this medication No established PCP. Last office visit: 10/31/2019 Laureano Rivera MD Last Physical: Visit date not found Last MTM visit: Visit date not found Last visit same specialty: 2/17/2021 Silvia Quan PA-C.  Next visit within 3 mo: Visit date not found  Next physical within 3 mo: Visit date not found      Yanira Easton, [facility] Triage/Med Refill 3/22/2021    Requested Prescriptions   Pending Prescriptions Disp Refills     sodium chloride (DEEP SEA NASAL) 0.65 % nasal spray [Pharmacy Med Name: DEEP SEA NOSE SPRY 0.65%(44ML)] 44 mL 0     Sig: INHALE 1 SPRAY IN EACH NOSTRIL FOUR TIMES A DAY AS NEEDED       There is no refill protocol information for this order

## 2021-06-16 NOTE — TELEPHONE ENCOUNTER
Telephone Encounter by Yolie Baker RN at 4/25/2019  2:37 PM     Author: Yolie Baker RN Service: Psychiatry Author Type: Registered Nurse    Filed: 4/25/2019  2:44 PM Encounter Date: 4/19/2019 Status: Signed    : Yolie Baker RN (Registered Nurse)       Per Marimar Cruz CNP, call placed     Marimar Cruz CNP Stuart, Shannon N, RN Yesterday (11:17 AM)     I don't do prn medication   Had addressed his behaviors and my plan at last session   They may return to followup or go to APS at [facility] [place] if having outbursts   Or the ER    Routing comment        Staff person said she was aware of all of this, as the staff that brought him to appointment today explained all of this to her.  Her one question is that the Trazodone is listed on his medication list as a routine medication, but the paperwork that Marimar filled out today said prn.  They would prefer it be routine, but if that is the case she needs to change it on the paperwork.  They will fax over the paperwork that Marimar filled out today.

## 2021-06-16 NOTE — PROGRESS NOTES
________________________________________  Medications Phoned  to Pharmacy [] yes [x]no  Name of Pharmacist:  List Medications, including dose, quantity and instructions    Medications ordered this visit were e-scribed.  Verified by order class [x] yes  [] no   hydroxyzine pamoate 50 mg; melatonin 3 mg; Risperdal 1 mg; trazodone 50 mg & 150 mg   Medication changes or discontinuations were communicated to patient's pharmacy: [] yes  [x] no    Dictation completed at time of chart check: [x] yes  [] no    I have checked the documentation for today s encounters and the above information has been reviewed and completed.

## 2021-06-16 NOTE — PROGRESS NOTES
This video/telephone visit will be conducted via a call between you and your physician/provider. We have found that certain health care needs can be provided without the need for an in-person physical exam. This service lets us provide the care you need with a video /telephone conversation. If a prescription is necessary we can send it directly to your pharmacy. If lab work is needed we can place an order for that and you can then stop by our lab to have the test done at a later time.    Just as we bill insurance for in-person visits, we also bill insurance for video/telephone visits. If you have questions about your insurance coverage, we recommend that you speak with your insurance company.    Patient has given verbal consent for video/Telephone visit? Yes  Patient would like telephone visit please call: [phone]    TERRY/TEJ NAQVI CMA   AVS- Mail   Per Staff up and down in behaviors. Seen and heard delusional thinking? Not sure if polaying with staff or real event. Per Staff.     Patient verified allergies, medications and pharmacy via phone. Patient's staff states he is ready for visit.

## 2021-06-17 NOTE — PROGRESS NOTES
Kaden Easton Jr. is a 20 y.o. male who is being evaluated via a billable video visit.      How would you like to obtain your AVS? Mail a copy.  If dropped from the video visit, the video invitation should be resent by: Send to e-mail at: No e-mail address on record  Will anyone else be joining your video visit?  Staff from group home       Video Start Time: 11:26 AM    Psychiatric  Out- Patient  Follow Up Progress Note  Date of visit:4/29/2021         Discussion of Care and Treatment Recommendations:   This is a 20 y.o. male with explosive disorder, mild Intellectual disability, history of fetal alcohol spectrum and  mood Disorder, due to general medical condition.Pt resides in a group home.      Past psychiatric medication  include :   adder al   Benadryl  Zyprexa -   Abilify   Cogentin           Last visit  03/22/2021  Recommendation at last visit .  1. Continue   - Risperdal  1 mg two  times a day scheduled   -Topamax 50 mg two times a day   -Invega 234 mg Q 4 weeks - last give  7/7/2020- at the group home   -Fluphenazine (prolixin)2.5  mg two times a day    -Atomoxetine   40 mg daily  -Trazodone  150 mg  at HS  Trazodone 50 mg bedtime PRN  -Risperdal 0.5  mg two times a day PRN - aggressive beh   2. RTC : 4 weeks, call in between visits with any  questions or concerns  and I reviewed diagnosis and treatment plan and patient agrees with following recommendations:  Ongoing education given regarding diagnostic and treatment options with adequate verbalization of understanding.  Plan   1: Continue current medications olanzapine 10 mg daily-psychoses  Olanzapine 5 mg 3 times daily as needed-anxiety psychoses  Hydroxyzine pamoate 50 mg every 4 hours as needed for anxiety  Medications continued include  Strattera 18 mg daily-ADHD  Trnvega Sustenna 234 /1.5 ml IM every 4 weeks-psychosis  Trazodone 150 mg at bedtimeazodone 50 mg 3 times daily as needed-behavior disorder ??  2.  Abstain from all mood altering  substances.  Do not make mood altering substances medications.    3.Do not mix medication with modifying substances diet-  4. Return to the clinic in 4 weeks for management of medications or concerns patient          DIagnoses:     Hx Mild Intellectual Disability,   Hx Mood Disorder Due to General Medical Condition   Hx Intermittent Explosive Disorder  Hx Fetal Alcohol Spectrum     Patient Active Problem List   Diagnosis     Fetal alcohol spectrum disorder     Aggressive behavior     Chronic ITP (idiopathic thrombocytopenia) (H)     Mood disorder due to a general medical condition     Encounters for administrative purpose     Epistaxis, recurrent     Psychosis (H)     Mild intellectual disability     Organic mood disorder     Generalized anxiety disorder     Post-splenectomy     Intermittent explosive disorder in adult     Cannabis abuse     Self-injurious behavior     Current smoker     Morbid obesity (H)             Chief Complaint / Subjective:    Chief complaint: Behavioral disturbances including agitation    History of Present Illness:  Patient is a poor historian.  I spoke to both patient and patient's nurse at the group home.  Per patient statement he feels that he is doing well and has been compliant with current medications.  He did not speak with me much he did not speak to his nurse.  Group home nurse reports that patient continues to display behaviors issues including agitation and verbal aggression.  He has been all the changes that were made during his recent hospitalizations.  However they have not been any notable improvement in mood.  Will bring patient current medications.  We also discussed behavior modification strategies at the group home including decreasing stimulation and creating a routine for patient and educating staff on the routine and behavior modification plans to provide consistency and decrease in the lab since 2 weeks trigger patient's agitation.  Lab work on records of the recent  hospitalization reviewed.  Patient did see his primary care provider for who presents hospitalization and is following closely on recent lab work.  Not from The group home to monitor neuroleptic medication side effects.    Despite being on different neuroleptic medication and other antidepressant patient continues to display behavioral disturbances including agitation.  I am concerned currentf polypharmacy but given his current level behavior disturbances and agitation as reported by nursing staff we will continue on current medication and nonpharmacological behavior modification strategy before any consideration to adjust medication.    Collateral information from patient's admission at [facility]-patient was admitted for behavioral changes including agitation.  See note 4/4/2021..  Patient also had several ED visit after his hospitalization and no medication changes were made.  The epic encounters.    He did have some medication adjustments during this hospital visit:   Risperdal, gabapentin, and fluphenazine were discontinued  Medication initiated/added during  Recent hospitalization:   Olanzapine 10 mg daily-psychoses  Olanzapine 5 mg 3 times daily as needed-anxiety psychoses  Hydroxyzine pamoate 50 mg every 4 hours as needed for anxiety  Medications continued include  Strattera 18 mg daily-ADHD  Trazodone 50 mg 3 times daily as needed-behavior disorder ??  The following medications were continued without any changes  Invega Sustenna 234 /1.5 ml IM every 4 weeks-psychosis  Trazodone 150 mg at bedtime      Mental Status Examination: Baseline Hx Mild Intellectual Disability,Fetal Alcohol Spectrum   Orientation: Patient alert and oriented to person, place, time, and situation  Reliability:  Patient appears to be a poor  historian.    Behavior: unable to assess  Speech: Speech is spontaneous and coherent, with a normal rate, rhythm and tone.    Language:There are no difficulties with expressive or  "receptive language as observed throughout the interview.    Mood: Described as \"I am doing good\" \".    Affect: unable to assess  Judgement: Fair s baseline Hx Mild Intellectual Disability  Insight: Fair -Baseline Hx Mild Intellectual Disability  Gait and station: unable to assess  Thought process: Logical   Hallucinations : No evidence of any hallucination  Thought content: No evidence of delusions or paranoia.   Suicidal /Homical Ideations:  No thoughts of self harm or suicide. No thoughts of harming others.  Associations: Connected  Fund of knowledge: Fair- Baseline Hx Mild Intellectual Disability  Attention / Concentration:Needed redirection and reminders to stay focused   Short Term Memory: Grossly intact as evidence by client recalling themes and ideas discussed.  Long Term Memory: Fair  Motor Status: unable to assess     Drug/treatment history and current pattern of use:   History of marijuana use  Smokes 10 cigarettes daily. Also vapes - not interested in smoking cessation at this time        Medication changes: See Above   Medication adherence: compliant  Medication side effects: absent  Information about medications: Side effects, benefits and alternative treatments discussed and patient agrees .    Psychotherapy: Supportive therapy day-to-day living    Education: Diet, exercise, abstinence from drugs and alcohol, patient will not drive if sedated and medications or  under influence of any substance    Lab Results:   Personally reviewed and discussed with the patient    Lab Results   Component Value Date    WBC 9.8 02/17/2021    HGB 13.3 (L) 02/17/2021    HCT 40.3 02/17/2021    PLT 59 (L) 02/17/2021    ALT 28 02/17/2021    AST 22 02/17/2021     02/17/2021    K 3.9 02/17/2021     (H) 02/17/2021    CREATININE 0.83 02/17/2021    BUN 13 02/17/2021    CO2 22 02/17/2021    TSH 2.19 08/26/2020    INR 0.93 11/04/2016    HGBA1C 5.6 02/17/2021       Vital signs:  There were no vitals taken for this " visit.  Telemedicine visit-no vital signs completed  Allergies: Depakote [divalproex] and Other environmental allergy         Medications:     Current Outpatient Medications on File Prior to Visit   Medication Sig Dispense Refill     ALLERGY RELIEF, LORATADINE, 10 mg tablet TAKE 10MG (1 TABLET) BY MOUTH EVERY DAY 30 tablet 11     atomoxetine (STRATTERA) 40 MG capsule TAKE 40MG (1 CAPSULE) BY MOUTH EVERY DAY.. 30 capsule 11     ferrous sulfate 325 (65 FE) MG tablet TAKE 325MG (1 TABLET) BY MOUTH EVERY DAY WITH BREAKFAST 30 tablet 11     hydrOXYzine pamoate (VISTARIL) 50 MG capsule Take 1 capsule (50 mg total) by mouth 3 (three) times a day as needed for anxiety (Agitation). 90 capsule 2     INVEGA SUSTENNA 234 mg/1.5 mL Syrg IM injection INJECT 1.5ML INTRAMUSCULARLY EVERY 4 WEEKS. 1.5 mL 11     melatonin 3 mg Tab tablet Take 1 tablet (3 mg total) by mouth at bedtime. 30 tablet 3     metFORMIN (GLUCOPHAGE) 500 MG tablet TAKE 500MG (1 TABLET) BY MOUTH 2 TIMES A DAY WITH MEALS. 180 tablet 3     nicotine polacrilex (NICORETTE) 4 MG gum Apply 1 each (4 mg total) to the mouth or throat every hour as needed for smoking cessation. 380 each 0     OLANZapine (ZYPREXA) 10 MG tablet Take 10 mg by mouth 2 (two) times a day.       OLANZapine (ZYPREXA) 5 MG tablet Take 5 mg by mouth 3 (three) times a day. Take 1 tab by mouth three times a day as needed for aggression       polyethylene glycol (GLYCOLAX) 17 gram/dose powder Take 17 g by mouth daily as needed (constipation).              sodium chloride (DEEP SEA NASAL) 0.65 % nasal spray INHALE 1 SPRAY IN EACH NOSTRIL FOUR TIMES A DAY AS NEEDED 44 mL 0     TAB-A-TUSHAR per tablet TAKE 1 TABLET BY MOUTH EVERY DAY. 30 tablet 11     topiramate (TOPAMAX) 50 MG tablet TAKE 50MG (1 TABLET) BY MOUTH 2 TIMES A DAY 56 tablet 11     traZODone (DESYREL) 150 MG tablet TAKE 150MG (1 TABLET) BY MOUTH AT BEDTIME 30 tablet 2     traZODone (DESYREL) 50 MG tablet Take 1 tablet (50 mg total) by mouth at  bedtime as needed for sleep. 30 tablet 2     No current facility-administered medications on file prior to visit.                   Review of Systems:      ROS:    Subjective Data Only- Tele-Health Visit    10 point ROS was negative except for the items listed in HPI.      Coordination of Care:   More than 40 minutes spent on this visit  with more than 50% of time spent on coordination of care including: Educating patient about diagnosis, prognosis, side effects and benefits of medications, diet, exercise.  Time also spent providing supportive therapy regarding above issues.    This note was created using a dictation system. All typing errors or contextual distortion is unintentional and software inherent.            Video-Visit Details    Type of service:  Video Visit    Video End Time (time video stopped): 12:07 PM  Originating Location (pt. Location): Home    Distant Location (provider location):  [facility]     Platform used for Video Visit: [employer]

## 2021-06-17 NOTE — PROGRESS NOTES
Subjective:    Kaden Easton Jr. is a 20 y.o. male who presents with chief complaint of ER follow-up.  He was seen at [facility] ER yesterday for behavior problem.  I reviewed ER report today.  He had 2 previous ER visits in the past week for similar behavior concerns, on 4/22/2021 and 4/23/2021.  I viewed these notes today.  He was also hospitalized from 4/4/2021 to 4/19/2021 for behavior concerns and ongoing mental health issues.  I reviewed that discharge summary today.  He did have some medication adjustments after his hospital visit.  The 3 ER visits in the past week have been because he has had agitated behavior and called 911 from his group home.  Most recent ER visit yesterday appeared to be when patient tried to eat another resident's meal.  When the other resident and the staff told him to stop, he got agitated and then called 911.  He had stated that he wanted a new group home.  ER evaluation was generally reassuring and he was discharged to group home.  He is here today with group home health care worker.  There is no current plan to get him into a new group home.  Care worker states that they are actually getting more residents into the group home, which he thinks will help, because it will not be just patient and one other person.    Otherwise health care aide feels like patient is doing well.  Taking all medications as directed.  No problems with constipation.  He does have a psychiatry appointment coming up soon.  I have verified all medications today from his med list.    He did get both Covid shots.    He got Moderna, second shot was about 1 month ago.  Approximate dates were entered into the chart.  Due to his asplenia, he should also get meningitis and meningitis B vaccines, along with HPV #3.  See previous notes for this.    Patient Active Problem List   Diagnosis     Fetal alcohol spectrum disorder     Aggressive behavior     Chronic ITP (idiopathic thrombocytopenia) (H)     Mood disorder due  to a general medical condition     Encounters for administrative purpose     Epistaxis, recurrent     Psychosis (H)     Mild intellectual disability     Organic mood disorder     Generalized anxiety disorder     Post-splenectomy     Intermittent explosive disorder in adult     Cannabis abuse     Self-injurious behavior     Current smoker     Morbid obesity (H)       Current Outpatient Medications:      ALLERGY RELIEF, LORATADINE, 10 mg tablet, TAKE 10MG (1 TABLET) BY MOUTH EVERY DAY, Disp: 30 tablet, Rfl: 11     atomoxetine (STRATTERA) 40 MG capsule, TAKE 40MG (1 CAPSULE) BY MOUTH EVERY DAY.., Disp: 30 capsule, Rfl: 11     ferrous sulfate 325 (65 FE) MG tablet, TAKE 325MG (1 TABLET) BY MOUTH EVERY DAY WITH BREAKFAST, Disp: 30 tablet, Rfl: 11     hydrOXYzine pamoate (VISTARIL) 50 MG capsule, Take 1 capsule (50 mg total) by mouth 3 (three) times a day as needed for anxiety (Agitation)., Disp: 90 capsule, Rfl: 2     INVEGA SUSTENNA 234 mg/1.5 mL Syrg IM injection, INJECT 1.5ML INTRAMUSCULARLY EVERY 4 WEEKS., Disp: 1.5 mL, Rfl: 11     melatonin 3 mg Tab tablet, Take 1 tablet (3 mg total) by mouth at bedtime., Disp: 30 tablet, Rfl: 3     metFORMIN (GLUCOPHAGE) 500 MG tablet, TAKE 500MG (1 TABLET) BY MOUTH 2 TIMES A DAY WITH MEALS., Disp: 180 tablet, Rfl: 3     nicotine polacrilex (NICORETTE) 4 MG gum, Apply 1 each (4 mg total) to the mouth or throat every hour as needed for smoking cessation., Disp: 380 each, Rfl: 0     OLANZapine (ZYPREXA) 10 MG tablet, Take 10 mg by mouth 2 (two) times a day., Disp: , Rfl:      OLANZapine (ZYPREXA) 5 MG tablet, Take 5 mg by mouth 3 (three) times a day. Take 1 tab by mouth three times a day as needed for aggression, Disp: , Rfl:      polyethylene glycol (GLYCOLAX) 17 gram/dose powder, Take 17 g by mouth daily as needed (constipation).    , Disp: , Rfl:      sodium chloride (DEEP SEA NASAL) 0.65 % nasal spray, INHALE 1 SPRAY IN EACH NOSTRIL FOUR TIMES A DAY AS NEEDED, Disp: 44 mL, Rfl:  0     TAB-A-TUSHAR per tablet, TAKE 1 TABLET BY MOUTH EVERY DAY., Disp: 30 tablet, Rfl: 11     topiramate (TOPAMAX) 50 MG tablet, TAKE 50MG (1 TABLET) BY MOUTH 2 TIMES A DAY, Disp: 56 tablet, Rfl: 11     traZODone (DESYREL) 150 MG tablet, TAKE 150MG (1 TABLET) BY MOUTH AT BEDTIME, Disp: 30 tablet, Rfl: 2     traZODone (DESYREL) 50 MG tablet, Take 1 tablet (50 mg total) by mouth at bedtime as needed for sleep., Disp: 30 tablet, Rfl: 2     Objective:   Allergies:  Depakote [divalproex] and Other environmental allergy    Vitals:  Vitals:    04/27/21 1109 04/27/21 1155   BP: (!) 88/62 106/79   Patient Site: Left Arm    Patient Position: Sitting    Cuff Size: Adult Large    Pulse: 98 (!) 110   Temp: 98.3  F (36.8  C)    TempSrc: Other    SpO2: 100%    Weight: (!) 263 lb 4 oz (119.4 kg)        Body mass index is 42.49 kg/m .    Vital signs reviewed.  General: Patient is alert and oriented x 3, in no apparent distress, appears mildly agitated, follows directions  Cardiac: regular rate and rhythm, no murmurs  Pulmonary: lungs clear to auscultation bilaterally, no crackles, rales, rhonchi, or wheezing noted    Lab Results   Component Value Date    WBC 9.8 02/17/2021    HGB 13.3 (L) 02/17/2021    HCT 40.3 02/17/2021    MCV 85 02/17/2021    PLT 59 (L) 02/17/2021         Assessment and Plan:   1. ER discharge follow-up  2. Intermittent explosive disorder in adult  Continue same medications.  Based on med list today it looks like Risperdal, gabapentin, and fluphenazine were discontinued.  Current med list in EHR reflects current med list at his group home.  We will also scan in med list into his chart.  No acute concerns today.  He has upcoming appointment with psychiatry.  No plans on changing group home at this time.  However staff will be monitoring him, and they are planning to get more residents, so it is not just patient and one other resident.    Follow-up as needed.    3. Post-splenectomy  He needs to get Menactra and  meningitis B vaccines.  (Also HPV #3)  Last Covid vaccine was over 1 month ago, so he should be fine to get any vaccines today.  However patient was very agitated and aggressive.  He did not want any shots today.  I did not feel comfortable having my medical assistant give him shots today when he was in this state.  Can address at future visit, or possibly give these at group home.    4. Chronic ITP (idiopathic thrombocytopenia) (H)  Stable.  Evaluated by hematology in February 2021.  They recommended checking a CBC every 3 months, or more frequently if new bleeding or bruising.  No need for treatment or follow-up unless worsening or other concerns.  Most recent platelet check on 4/16/2021 = 313, normal.    This dictation uses voice recognition software, which may contain typographical errors.

## 2021-06-17 NOTE — TELEPHONE ENCOUNTER
Called the group home the pt is at, [facility] P: [phone] and spoke to Khalida. I relayed Silvia's message to her. Per Khalida will check with the  to see if he or she can come with the pt to his next appt for next Monday with Silvia as it might help with giving the pt vaccines.

## 2021-06-17 NOTE — TELEPHONE ENCOUNTER
SAVI for his group home:    He is due for Menactra and Men B vaccines.  We tried to give them to him when he was here yesterday, but he was too agitated.    I ordered the shots.  He could come in any time for nurse visit to get them, but he has to be willing to get the shots and not fight our CMA.  We could also try to get these done at his next office visit as well.    Also, if staff at group home are able to give vaccines, they could try to give the shots there.    Let me know if questions.

## 2021-06-17 NOTE — PROGRESS NOTES
This video/telephone visit will be conducted via a call between you and your physician/provider. We have found that certain health care needs can be provided without the need for an in-person physical exam. This service lets us provide the care you need with a video /telephone conversation. If a prescription is necessary we can send it directly to your pharmacy. If lab work is needed we can place an order for that and you can then stop by a lab to have the test done at a later time.    Just as we bill insurance for in-person visits, we also bill insurance for video/telephone visits. If you have questions about your insurance coverage, we recommend that you speak with your insurance company.    Patient has given verbal consent for video/Telephone visit? yes  Patient would like the video visit invitation sent by: Send to email: [email or web address]  [email or web address] PORTILLO CMA    Patient verified allergies, medications and pharmacy via phone.  Patient states he  is ready for visit.    ________________________________________  Medications Phoned  to Pharmacy [] yes []no  Name of Pharmacist:  List Medications, including dose, quantity and instructions    Medications ordered this visit were e-scribed.  Verified by order class [] yes  [x] no    Medication changes or discontinuations were communicated to patient's pharmacy: [] yes  [x] no    Dictation completed at time of chart check: [] yes  [x] no    I have checked the documentation for today s encounters and the above information has been reviewed and completed.

## 2021-06-17 NOTE — PATIENT INSTRUCTIONS - HE
Continue medications as prescribed  Have your pharmacy contact us for a refill if you are running low on medications (We may ask you to come into clinic to get a refill from the nurse  No Alcohol or drug use  No driving if sedated  Call the clinic with any questions or concerns   Reach out for help if you feel like hurting yourself or others ([facility] [phone]: [address], [id number] or [facility] Suicide Hotline [phone] , call 911 or go to nearest Emergency room    Follow up as directed, for your appointments, per your After Visit Summary Form.
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
## 2021-06-19 NOTE — LETTER
Letter by Marimar Cruz CNP at      Author: Marimar Cruz CNP Service: -- Author Type: --    Filed:  Encounter Date: 4/25/2019 Status: (Other)         Kaden Easton .  [address]  [facility] [address]                 April 25, 2019       Dear Mr. Easton,    We regret to inform you that Keeley Cruz CNP has resigned from her position and as of Friday, May 31 will no longer be providing care in the Outpatient Mental Health & Addiction Care Clinic at [facility].      In the event of a medical or mental health emergency, please call 911 or go to the nearest Emergency Department.  While any emergency department can assist you, [facility] emergency departments are located at [facility] in [place], [facility] in [facility] and [facility] in [place].     We want to assist you in transitioning your care to another provider.      For psychiatric medications, Dr. Aleksandra Lloyd, Senior Medical Director for [facility] Mental Health and Addiction Care, based on clinical review may be able to renew your prescription for an equivalent up to an additional month of medications while you are in the process of connecting with another provider.  Before this option, please contact the [facility] Mental Health and Addiction Clinic at [phone].      For issues limited to primary care, your primary care provider will need to manage those medications.  If you do not currently have a primary care provider, please contact [facility] at [phone] and they will assist you in scheduling with a primary care provider at one of our fourteen medical clinics in the [facility] area.      [facility] and [facility] work in partnership with [facility] ([facility]), a large network of community mental health services and providers. In order to provide you with support in locating psychiatry services, [facility] can be asked to contact you to help  locate and schedule an appointment with a community provider.        In the event that you would prefer to connect with a community psychiatry provider on your own we have provided a list of community clinics below.  We have confirmed that all of these clinics are taking new patients.  Please find the attached Release of Information (TIGRE) form.  To have your records transferred to another care provider, please complete the form and send directly to the following address:    Central Release of Information Department  [address]  [place] [address]    Fax - [phone]        Email - [email or web address]    With any questions regarding the transfer of your records, please contact Release of Information directly at [phone].    Sincerely,       [facility]   Outpatient Mental Health & Addiction Care Clinic  [phone]    PAYTON Cardenas MD  Clinic Manager  Medical Director  [phone]       [facility].  [address]  [place], [place] [id number] [phone]    [facility]  [facility] Locations  [phone]    [facility]  [facility] Locations  [phone]    [facility] [address]  [address]  [place] [address] [phone]

## 2021-06-23 NOTE — TELEPHONE ENCOUNTER
RN cannot approve Refill Request: Risperidone    RN can NOT refill this medication med is not covered by policy/route to provider. Last office visit: 2/14/2018 Laureano Rivera MD Last Physical: Visit date not found Last MTM visit: Visit date not found Last visit same specialty: 2/14/2018 Laureano Rivera MD.  Next visit within 3 mo: Visit date not found  Next physical within 3 mo: Visit date not found      Kendra Helm, [facility] Triage/Med Refill 2/3/2019    Requested Prescriptions   Pending Prescriptions Disp Refills     risperiDONE (RISPERDAL) 4 MG tablet [Pharmacy Med Name: RISPERIDONE 4MG TABLETS] 26 tablet 0     Sig: TAKE 1 TABLET BY MOUTH AT BEDTIME    There is no refill protocol information for this order        gabapentin (NEURONTIN) 100 MG capsule [Pharmacy Med Name: GABAPENTIN 100MG CAPSULES] 89 capsule 0     Sig: TAKE 1 TO 3 CAPSULES BY MOUTH THREE TIMES DAILY AS NEEDED FOR ANXIETY    Gabapentin/Levetiracetam/Tiagabine Refill Protocol  Passed - 1/31/2019  6:10 PM       Passed - PCP or prescribing provider visit in past 12 months or next 3 months    Last office visit with prescriber/PCP: 2/14/2018 Laureano Rivera MD OR same dept: 2/14/2018 Laureano Rivera MD OR same specialty: 2/14/2018 Laureano Rivera MD  Last physical: Visit date not found Last MTM visit: Visit date not found   Next visit within 3 mo: Visit date not found  Next physical within 3 mo: Visit date not found  Prescriber OR PCP: Laureano Rivera MD  Last diagnosis associated with med order: 1. Mood disorder due to a general medical condition  - risperiDONE (RISPERDAL) 4 MG tablet [Pharmacy Med Name: RISPERIDONE 4MG TABLETS]; TAKE 1 TABLET BY MOUTH AT BEDTIME  Dispense: 26 tablet; Refill: 0  - gabapentin (NEURONTIN) 100 MG capsule [Pharmacy Med Name: GABAPENTIN 100MG CAPSULES]; TAKE 1 TO 3 CAPSULES BY MOUTH THREE TIMES DAILY AS NEEDED FOR ANXIETY  Dispense: 89 capsule; Refill: 0  - risperiDONE (RISPERDAL) 2 MG tablet [Pharmacy Med Name: RISPERIDONE 2MG  TABLETS]; TAKE 1 TABLET(2 MG) BY MOUTH EVERY MORNING  Dispense: 30 tablet; Refill: 0  - hydrOXYzine pamoate (VISTARIL) 50 MG capsule [Pharmacy Med Name: HYDROXYZINE PAMOATE 50MG CAPSULES]; TAKE ONE CAPSULE BY MOUTH AT BEDTIME  Dispense: 30 capsule; Refill: 0    2. Intermittent explosive disorder  - risperiDONE (RISPERDAL) 4 MG tablet [Pharmacy Med Name: RISPERIDONE 4MG TABLETS]; TAKE 1 TABLET BY MOUTH AT BEDTIME  Dispense: 26 tablet; Refill: 0  - gabapentin (NEURONTIN) 100 MG capsule [Pharmacy Med Name: GABAPENTIN 100MG CAPSULES]; TAKE 1 TO 3 CAPSULES BY MOUTH THREE TIMES DAILY AS NEEDED FOR ANXIETY  Dispense: 89 capsule; Refill: 0  - risperiDONE (RISPERDAL) 2 MG tablet [Pharmacy Med Name: RISPERIDONE 2MG TABLETS]; TAKE 1 TABLET(2 MG) BY MOUTH EVERY MORNING  Dispense: 30 tablet; Refill: 0    If protocol passes may refill for 12 months if within 3 months of last provider visit (or a total of 15 months).             risperiDONE (RISPERDAL) 2 MG tablet [Pharmacy Med Name: RISPERIDONE 2MG TABLETS] 30 tablet 0     Sig: TAKE 1 TABLET(2 MG) BY MOUTH EVERY MORNING    There is no refill protocol information for this order        hydrOXYzine pamoate (VISTARIL) 50 MG capsule [Pharmacy Med Name: HYDROXYZINE PAMOATE 50MG CAPSULES] 30 capsule 0     Sig: TAKE ONE CAPSULE BY MOUTH AT BEDTIME    Antihistamine Refill Protocol Passed - 1/31/2019  6:10 PM       Passed - Patient has had office visit/physical in last year    Last office visit with prescriber/PCP: 2/14/2018 Laureano Rivera MD OR same dept: 2/14/2018 Laureano Rivera MD OR same specialty: 2/14/2018 Laureano Rivera MD  Last physical: Visit date not found Last MTM visit: Visit date not found   Next visit within 3 mo: Visit date not found  Next physical within 3 mo: Visit date not found  Prescriber OR PCP: Laureano Rivera MD  Last diagnosis associated with med order: 1. Mood disorder due to a general medical condition  - risperiDONE (RISPERDAL) 4 MG tablet [Pharmacy Med Name: RISPERIDONE  4MG TABLETS]; TAKE 1 TABLET BY MOUTH AT BEDTIME  Dispense: 26 tablet; Refill: 0  - gabapentin (NEURONTIN) 100 MG capsule [Pharmacy Med Name: GABAPENTIN 100MG CAPSULES]; TAKE 1 TO 3 CAPSULES BY MOUTH THREE TIMES DAILY AS NEEDED FOR ANXIETY  Dispense: 89 capsule; Refill: 0  - risperiDONE (RISPERDAL) 2 MG tablet [Pharmacy Med Name: RISPERIDONE 2MG TABLETS]; TAKE 1 TABLET(2 MG) BY MOUTH EVERY MORNING  Dispense: 30 tablet; Refill: 0  - hydrOXYzine pamoate (VISTARIL) 50 MG capsule [Pharmacy Med Name: HYDROXYZINE PAMOATE 50MG CAPSULES]; TAKE ONE CAPSULE BY MOUTH AT BEDTIME  Dispense: 30 capsule; Refill: 0    2. Intermittent explosive disorder  - risperiDONE (RISPERDAL) 4 MG tablet [Pharmacy Med Name: RISPERIDONE 4MG TABLETS]; TAKE 1 TABLET BY MOUTH AT BEDTIME  Dispense: 26 tablet; Refill: 0  - gabapentin (NEURONTIN) 100 MG capsule [Pharmacy Med Name: GABAPENTIN 100MG CAPSULES]; TAKE 1 TO 3 CAPSULES BY MOUTH THREE TIMES DAILY AS NEEDED FOR ANXIETY  Dispense: 89 capsule; Refill: 0  - risperiDONE (RISPERDAL) 2 MG tablet [Pharmacy Med Name: RISPERIDONE 2MG TABLETS]; TAKE 1 TABLET(2 MG) BY MOUTH EVERY MORNING  Dispense: 30 tablet; Refill: 0    If protocol passes may refill for 12 months if within 3 months of last provider visit (or a total of 15 months).

## 2021-06-23 NOTE — TELEPHONE ENCOUNTER
Refills Approved: Hydroxyzine and Gabapentin    Rx renewed per Medication Renewal Policy. Medication was last renewed on  Gabapentin=7/9/2018 with 6 refills  Hydroxyzine=6/7/2018 with 6 refills.  Last office visit:2/14/2018 with DR KYRA Rivera  Message sent to pharmacy: due for office visit in February.      Kendra Helm, [facility] Triage/Med Refill 2/3/2019     Requested Prescriptions   Pending Prescriptions Disp Refills     risperiDONE (RISPERDAL) 4 MG tablet [Pharmacy Med Name: RISPERIDONE 4MG TABLETS] 26 tablet 0     Sig: TAKE 1 TABLET BY MOUTH AT BEDTIME    There is no refill protocol information for this order        gabapentin (NEURONTIN) 100 MG capsule [Pharmacy Med Name: GABAPENTIN 100MG CAPSULES] 89 capsule 0     Sig: TAKE 1 TO 3 CAPSULES BY MOUTH THREE TIMES DAILY AS NEEDED FOR ANXIETY    Gabapentin/Levetiracetam/Tiagabine Refill Protocol  Passed - 1/31/2019  6:10 PM       Passed - PCP or prescribing provider visit in past 12 months or next 3 months    Last office visit with prescriber/PCP: 2/14/2018 Laureano Rivera MD OR same dept: 2/14/2018 Laureano Rivera MD OR same specialty: 2/14/2018 Laureano Rivera MD  Last physical: Visit date not found Last MTM visit: Visit date not found   Next visit within 3 mo: Visit date not found  Next physical within 3 mo: Visit date not found  Prescriber OR PCP: Laureano Rivera MD  Last diagnosis associated with med order: 1. Mood disorder due to a general medical condition  - risperiDONE (RISPERDAL) 4 MG tablet [Pharmacy Med Name: RISPERIDONE 4MG TABLETS]; TAKE 1 TABLET BY MOUTH AT BEDTIME  Dispense: 26 tablet; Refill: 0  - gabapentin (NEURONTIN) 100 MG capsule [Pharmacy Med Name: GABAPENTIN 100MG CAPSULES]; TAKE 1 TO 3 CAPSULES BY MOUTH THREE TIMES DAILY AS NEEDED FOR ANXIETY  Dispense: 89 capsule; Refill: 0  - risperiDONE (RISPERDAL) 2 MG tablet [Pharmacy Med Name: RISPERIDONE 2MG TABLETS]; TAKE 1 TABLET(2 MG) BY MOUTH EVERY MORNING  Dispense: 30 tablet; Refill: 0  - hydrOXYzine  pamoate (VISTARIL) 50 MG capsule [Pharmacy Med Name: HYDROXYZINE PAMOATE 50MG CAPSULES]; TAKE ONE CAPSULE BY MOUTH AT BEDTIME  Dispense: 30 capsule; Refill: 0    2. Intermittent explosive disorder  - risperiDONE (RISPERDAL) 4 MG tablet [Pharmacy Med Name: RISPERIDONE 4MG TABLETS]; TAKE 1 TABLET BY MOUTH AT BEDTIME  Dispense: 26 tablet; Refill: 0  - gabapentin (NEURONTIN) 100 MG capsule [Pharmacy Med Name: GABAPENTIN 100MG CAPSULES]; TAKE 1 TO 3 CAPSULES BY MOUTH THREE TIMES DAILY AS NEEDED FOR ANXIETY  Dispense: 89 capsule; Refill: 0  - risperiDONE (RISPERDAL) 2 MG tablet [Pharmacy Med Name: RISPERIDONE 2MG TABLETS]; TAKE 1 TABLET(2 MG) BY MOUTH EVERY MORNING  Dispense: 30 tablet; Refill: 0    If protocol passes may refill for 12 months if within 3 months of last provider visit (or a total of 15 months).             risperiDONE (RISPERDAL) 2 MG tablet [Pharmacy Med Name: RISPERIDONE 2MG TABLETS] 30 tablet 0     Sig: TAKE 1 TABLET(2 MG) BY MOUTH EVERY MORNING    There is no refill protocol information for this order        hydrOXYzine pamoate (VISTARIL) 50 MG capsule [Pharmacy Med Name: HYDROXYZINE PAMOATE 50MG CAPSULES] 30 capsule 0     Sig: TAKE ONE CAPSULE BY MOUTH AT BEDTIME    Antihistamine Refill Protocol Passed - 1/31/2019  6:10 PM       Passed - Patient has had office visit/physical in last year    Last office visit with prescriber/PCP: 2/14/2018 Laureano Rivera MD OR same dept: 2/14/2018 Laureano Rivera MD OR same specialty: 2/14/2018 Laureano Rivera MD  Last physical: Visit date not found Last MTM visit: Visit date not found   Next visit within 3 mo: Visit date not found  Next physical within 3 mo: Visit date not found  Prescriber OR PCP: Laureano Rivera MD  Last diagnosis associated with med order: 1. Mood disorder due to a general medical condition  - risperiDONE (RISPERDAL) 4 MG tablet [Pharmacy Med Name: RISPERIDONE 4MG TABLETS]; TAKE 1 TABLET BY MOUTH AT BEDTIME  Dispense: 26 tablet; Refill: 0  - gabapentin  (NEURONTIN) 100 MG capsule [Pharmacy Med Name: GABAPENTIN 100MG CAPSULES]; TAKE 1 TO 3 CAPSULES BY MOUTH THREE TIMES DAILY AS NEEDED FOR ANXIETY  Dispense: 89 capsule; Refill: 0  - risperiDONE (RISPERDAL) 2 MG tablet [Pharmacy Med Name: RISPERIDONE 2MG TABLETS]; TAKE 1 TABLET(2 MG) BY MOUTH EVERY MORNING  Dispense: 30 tablet; Refill: 0  - hydrOXYzine pamoate (VISTARIL) 50 MG capsule [Pharmacy Med Name: HYDROXYZINE PAMOATE 50MG CAPSULES]; TAKE ONE CAPSULE BY MOUTH AT BEDTIME  Dispense: 30 capsule; Refill: 0    2. Intermittent explosive disorder  - risperiDONE (RISPERDAL) 4 MG tablet [Pharmacy Med Name: RISPERIDONE 4MG TABLETS]; TAKE 1 TABLET BY MOUTH AT BEDTIME  Dispense: 26 tablet; Refill: 0  - gabapentin (NEURONTIN) 100 MG capsule [Pharmacy Med Name: GABAPENTIN 100MG CAPSULES]; TAKE 1 TO 3 CAPSULES BY MOUTH THREE TIMES DAILY AS NEEDED FOR ANXIETY  Dispense: 89 capsule; Refill: 0  - risperiDONE (RISPERDAL) 2 MG tablet [Pharmacy Med Name: RISPERIDONE 2MG TABLETS]; TAKE 1 TABLET(2 MG) BY MOUTH EVERY MORNING  Dispense: 30 tablet; Refill: 0    If protocol passes may refill for 12 months if within 3 months of last provider visit (or a total of 15 months).

## 2021-06-24 NOTE — TELEPHONE ENCOUNTER
FYI - Status Update  Who is Calling: ROM Perdueinator  Update: Calling to inform provider patient is currently admitted to [facility] due to fetal alcohol syndrome.   Okay to leave a detailed message?:  No return call needed
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
## 2021-06-25 NOTE — TELEPHONE ENCOUNTER
RN cannot approve Refill Request    RN can NOT refill this medication No established PCP. Last office visit: 10/31/2019 Laureano Rivera MD Last Physical: Visit date not found Last MTM visit: Visit date not found Last visit same specialty: 4/27/2021 Silvia Quan PA-C.  Next visit within 3 mo: Visit date not found  Next physical within 3 mo: Visit date not found      Yanira Easton, [facility] Triage/Med Refill 6/5/2021    Requested Prescriptions   Pending Prescriptions Disp Refills     loratadine (CLARITIN) 10 mg tablet [Pharmacy Med Name: LORATADINE 10MG] 28 tablet 12     Sig: TAKE 10MG (1 TABLET) BY MOUTH EVERY DAY       Antihistamine Refill Protocol Passed - 6/4/2021  2:48 PM        Passed - Patient has had office visit/physical in last year     Last office visit with prescriber/PCP: 10/31/2019 Laureano Rivera MD OR same dept: 4/27/2021 Silvia Quan PA-C OR same specialty: 4/27/2021 Silvia Quan PA-C  Last physical: Visit date not found Last MTM visit: Visit date not found   Next visit within 3 mo: Visit date not found  Next physical within 3 mo: Visit date not found  Prescriber OR PCP: Laureano Rivera MD  Last diagnosis associated with med order: 1. Allergy, sequela  - loratadine (CLARITIN) 10 mg tablet [Pharmacy Med Name: LORATADINE 10MG]; TAKE 10MG (1 TABLET) BY MOUTH EVERY DAY  Dispense: 28 tablet; Refill: 12    If protocol passes may refill for 12 months if within 3 months of last provider visit (or a total of 15 months).

## 2021-06-27 NOTE — PROGRESS NOTES
Progress Notes by Judy Holly LPN at 4/15/2019  2:30 PM     Author: Judy Holly LPN Service: -- Author Type: Licensed Nurse    Filed: 4/15/2019  4:30 PM Encounter Date: 4/15/2019 Status: Signed    : Judy Holly LPN (Licensed Nurse)       Last visit with Keeley: intake   Have you seen your PCP : yes  Pt is accompanied by  staff. According to staff, father is his legal guardian.  Provider requested urine sample for UTOX and PDSU but pt was refusing to void until he spoke to his father.     [place], [place], [place], and [place] :

## 2021-06-27 NOTE — PROGRESS NOTES
Progress Notes by Marie Wheeler CMA at 7/17/2019 12:00 PM     Author: Marie Wheeler CMA Service: Psychiatry Author Type: Certified Medical Assistant    Filed: 7/18/2019 12:40 PM Encounter Date: 7/17/2019 Status: Signed    : Marie Wheeler CMA (Certified Medical Assistant)       Pt is here for psychiatric follow up and medication management.  Pt is present with Group Home staff: Helen Andrade and Ced at pt's request.    Last Invega injection was 7/08/19 per  Lupe    Pt was seen in 2 ER's [facility] for Suicide Ideations. Released from both to  staff.       [place] :      Correct pharmacy verified with patient and confirmed in snapshot? [x] yes []no    Charge captured ? [x] yes  [] no    Medications Phoned  to Pharmacy [] yes [x]no  Name of Pharmacist:  List Medications, including dose, quantity and instructions      Medication Prescriptions given to patient   [] yes  [x] no   List the name of the drug the prescription was written for.       Medications ordered this visit were e-scribed.  Verified by order class [x] yes  [] no  Strattera 40 mg; paliperidone palmitate 234 mg IM; Risperdal 1 mg; Risperdal 2 mg; Trazodone 100 mg; No Print: gabapentin 100 mg beginning weaning off; propranolol 10 mg see taper schedule in encounter notes.   Medication changes or discontinuations were communicated to patient's pharmacy: [x] yes  [] no  Gabapentin 100 mg (sent in error, beginning weaning schedule) Clarified Risperdal scripts 1 is PRN, 2 is scheduled.     UA collected [] yes  [x] no    [place] Prescription Monitoring Program Reviewed? [x] yes  [] no    Referrals were made to: None     Future appointment was made: [x] yes  [] no  7/23/2019 1 hour   Dictation completed at time of chart check: [] yes  [x] no    I have checked the documentation for todays encounters and the above information has been reviewed and completed.

## 2021-06-27 NOTE — PROGRESS NOTES
Progress Notes by Marie Wheeler CMA at 8/20/2019  1:00 PM     Author: Marie Wheeler CMA Service: Psychiatry Author Type: Certified Medical Assistant    Filed: 8/20/2019  1:37 PM Encounter Date: 8/20/2019 Status: Signed    : Marie Wheeler CMA (Certified Medical Assistant)       Pt is here for psychiatric follow up and medication management.  Pt has been assigned to a new group home.     [place] :

## 2021-06-27 NOTE — PROGRESS NOTES
Progress Notes by Marie Wheeler CMA at 7/2/2019  1:30 PM     Author: Marie Wheeler CMA Service: Psychiatry Author Type: Certified Medical Assistant    Filed: 7/3/2019  2:13 PM Encounter Date: 7/2/2019 Status: Signed    : Marie Wheeler CMA (Certified Medical Assistant)       Pt is here to establish psychiatric medication management. Transfer from [facility], who left this clinic.    Present today is  staff and  today.   Last Invega injection was 6/08/19       Assessments were completed by  staff alone, she did not ask patient any questions while completing.   Mood-Current Score: 3  PHQ-9 Score 13  JAZMINE-7 Score 11    [place] :

## 2021-06-27 NOTE — PROGRESS NOTES
Progress Notes by Judy Holly LPN at 5/8/2019  9:30 AM     Author: Judy Holly LPN Service: -- Author Type: Licensed Nurse    Filed: 5/8/2019  2:51 PM Encounter Date: 5/8/2019 Status: Signed    : Judy Holly LPN (Licensed Nurse)       Last visit with Keeley: 5/1/19     Here today for Invega Sustenna 156 mg injection   Last injection given on 5/1/19  - initial dose  Site: left deltoid.    Status of last injection site:    Medication Given: Invega Sustenna 156 mg  Site: right deltoid  Tolerated: WELL  Reaction:NONE    Have you seen your PCP : yes       What medical conditions do we need to know about: none  Are you seeing a therapist: unclear  Are you taking your medications: mostly  Are you taking any supplements: no  Any concerns about your sleep: NA  Any concerns about your Appetite: NA  How is your Mood: good  Any Pain scale 0-10, ten being the worst: 0/10  Any Anxiety scale 0-5, five being the worst: denies  Any Depression scale 0-5, five being the worst: denies    Any Suicidal or homicidal ideation: denies  Discus: baseline today    [place], [place], [place], and [place] :

## 2021-06-28 ENCOUNTER — RECORDS - HEALTHEAST (OUTPATIENT)
Dept: FAMILY MEDICINE | Facility: CLINIC | Age: 21
End: 2021-06-28

## 2021-06-28 NOTE — PROGRESS NOTES
Progress Notes by Marie Wheeler CMA at 9/18/2019 10:30 AM     Author: Marie Wheeler CMA Service: Psychiatry Author Type: Certified Medical Assistant    Filed: 9/18/2019 10:48 AM Encounter Date: 9/18/2019 Status: Signed    : Marie Wheeler CMA (Certified Medical Assistant)       Pt is here for psychiatric follow up and medication management.  Pt is his own guardian per his CM [facility] Winslow from [employer].  Pt presents with  staff did not have current medication sheet at time of visit. Per staff No changes in medications.   Last Invega Sustenna injection was 09/02/2019           [place] :

## 2021-06-28 NOTE — PROGRESS NOTES
Progress Notes by Marie Wheeler CMA at 12/17/2019  1:00 PM     Author: Marie Wheeler CMA Service: Psychiatry Author Type: Certified Medical Assistant    Filed: 12/17/2019  2:31 PM Encounter Date: 12/17/2019 Status: Signed    : Marie Wheeler CMA (Certified Medical Assistant)       Pt is here for psychiatric follow up and medication management.  Presents with staff from  and .  Discus performed today Score:  0 ; due 6/2020  Next Invega is due 12/23/19 per staff    Per staff reports inappropriate conversions with female staff. Since that event, the female staff was removed from his house.         [place] :

## 2021-06-28 NOTE — PROGRESS NOTES
Progress Notes by Marie Wheeler CMA at 2/12/2020  1:30 PM     Author: Marie Wheeler CMA Service: Psychiatry Author Type: Certified Medical Assistant    Filed: 2/12/2020  2:11 PM Encounter Date: 2/12/2020 Status: Signed    : Marie Wheeler CMA (Certified Medical Assistant)       Pt is here for psychiatric follow up and medication management.  Last Invega was 1/20/2020.  Per staff Pt has sobeida moved to another group home with older males.  Currently things are going well at this time.  Per Staff when Pt had a family visit there was Alcohol present but Pt denies drinking.       [place] :

## 2021-06-28 NOTE — PROGRESS NOTES
Progress Notes by Marie Wheeler CMA at 3/24/2020 11:00 AM     Author: Marie Wheeler CMA Service: -- Author Type: Certified Medical Assistant    Filed: 3/24/2020 11:37 AM Encounter Date: 3/24/2020 Status: Signed    : Marie Wheeler CMA (Certified Medical Assistant)       [place] :

## 2021-06-28 NOTE — PROGRESS NOTES
Progress Notes by Marie Wheeler CMA at 1/15/2020 12:30 PM     Author: Marie Wheeler CMA Service: Psychiatry Author Type: Certified Medical Assistant    Filed: 1/15/2020  2:14 PM Encounter Date: 1/15/2020 Status: Signed    : Marie Wheeler CMA (Certified Medical Assistant)       Pt is here for psychiatric follow up and medication management.  Invega Sustenna 234 mg is given at  by Nurse. Last injection was: 1/06/2020  Here today with staff from  and  [facility]   Per staff believes he may be using cannabis    *Pt was placed on a transport hold and security request was made for 2 guards to accompany writer and Pt to  ED Room 14. Pt was transported via wheelchair and was calm, but voiced multiples that he did not want to go to the ED. Brief report given by writer and   was able to give more detail into Pt's prior behavior to ED Doctor and nursing staff. Room was secured as per policy.  did fax over supporting documentation of prior behaviors and this was faxed to  ED SW team [facility] after it was received in clinic. Copy was also given to provider who saw Pt today.   All paperwork was labeled.*      [place] :

## 2021-06-29 NOTE — PROGRESS NOTES
Progress Notes by Judy Holly LPN at 7/15/2020  2:30 PM     Author: Judy Holly LPN Service: -- Author Type: Licensed Nurse    Filed: 7/15/2020  3:06 PM Encounter Date: 7/15/2020 Status: Signed    : Judy Holly LPN (Licensed Nurse)       This video/telephone visit will be conducted via a call between you and your physician/provider. We have found that certain health care needs can be provided without the need for an in-person physical exam. This service lets us provide the care you need with a video /telephone conversation. If a prescription is necessary we can send it directly to your pharmacy. If lab work is needed we can place an order for that and you can then stop by our lab to have the test done at a later time.   Just as we bill insurance for in-person visits, we also bill insurance for video/telephone visits. If you have questions about your insurance coverage, we recommend that you speak with your insurance company.   Patient has given verbal consent for video/Telephone visit? No. Unable to obtain consent from the pt  Patient would like the video visit invitation sent by: Text to cell phone: NA or Send to email: NA  CMA/LPN: AD, LPN  Pt staff - Elvis has been using PRN Risperidone daily and asking for more.     Now pt went to his room and locked the door. Provider notified.   Staff verified allergies, medications and pharmacy via phone. Patient states he is ready for visit.    :

## 2021-07-03 NOTE — ADDENDUM NOTE
Addendum Note by Judy Holly LPN at 5/8/2019  9:30 AM     Author: Judy Holly LPN Service: -- Author Type: Licensed Nurse    Filed: 5/8/2019  4:19 PM Encounter Date: 5/8/2019 Status: Signed    : Judy Holly LPN (Licensed Nurse)    Addended by: JUDY HOLLY on: 5/8/2019 04:19 PM        Modules accepted: Orders

## 2021-07-03 NOTE — ADDENDUM NOTE
Addendum Note by Judy Holly LPN at 5/8/2019  9:30 AM     Author: Judy Holly LPN Service: -- Author Type: Licensed Nurse    Filed: 5/9/2019  8:49 AM Encounter Date: 5/8/2019 Status: Signed    : Judy Holly LPN (Licensed Nurse)    Addended by: JUDY HOLLY on: 5/9/2019 08:49 AM        Modules accepted: Orders

## 2021-07-06 ENCOUNTER — AMBULATORY - HEALTHEAST (OUTPATIENT)
Dept: FAMILY MEDICINE | Facility: CLINIC | Age: 21
End: 2021-07-06

## 2021-07-07 NOTE — TELEPHONE ENCOUNTER
Reason for Call:  Medication or medication refill:    Do you use a [facility]?  Name of the pharmacy and phone number for the current request: samm    Name of the medication requested: atropine sulfate solution?      Other request: I had a difficult time understanding what this medication is I didn't see it on his med list he said  had given it to him in the past they are drops for the mouth?     Can we leave a detailed message on this number? Yes    Phone number patient can be reached at: Cell number on file:    Telephone Information:   Mobile [phone]       Best Time: anytime  Call taken on 6/28/2021 at 11:33 AM by Maria E Stoll

## 2021-07-07 NOTE — TELEPHONE ENCOUNTER
Call and spoke Luke. Schedule appointment for lor to follow up summer For med check.   Chief Complaint:  Patient is a 57y old  Female who presents with a chief complaint of abnormal CT C spine (19 Feb 2020 07:30)      Interval Events:   - patient had an upper endoscopy done yesterday with no complications     Allergies:  No Known Allergies      Hospital Medications:  aspirin 325 milliGRAM(s) Oral daily  atorvastatin 40 milliGRAM(s) Oral at bedtime  clopidogrel Tablet 75 milliGRAM(s) Oral daily  diazepam    Tablet 10 milliGRAM(s) Oral four times a day PRN  furosemide    Tablet 40 milliGRAM(s) Oral daily  heparin  Injectable 5000 Unit(s) SubCutaneous every 12 hours  losartan 50 milliGRAM(s) Oral two times a day  metoprolol succinate ER 50 milliGRAM(s) Oral two times a day  oxyCODONE    IR 30 milliGRAM(s) Oral every 4 hours PRN  oxyCODONE  ER Tablet 10 milliGRAM(s) Oral every 12 hours  pantoprazole    Tablet 40 milliGRAM(s) Oral before breakfast  sodium chloride 0.9%. 1000 milliLiter(s) IV Continuous <Continuous>      PMHX/PSHX:  AICD (automatic cardioverter/defibrillator) present  Pseudoarthrosis of lumbar spine  Stented coronary artery  Degenerative disc disease, lumbar  PAD (peripheral artery disease)  Hypercholesteremia  Reflux  Back pain  MI (myocardial infarction)  Arthritis  S/P coronary artery stent placement  S/P coronary artery bypass graft x 4  CAD (Coronary Artery Disease)  Atrial Fibrillation  Elective surgery  H/O cervical spine surgery  History of back surgery  Elective surgery  S/P lumbar spine operation  Elective surgery  S/P coronary artery stent placement  S/P coronary artery bypass graft x 4      Family history:  Family history of heart disease  Family history of coronary arteriosclerosis      ROS:     General:  No wt loss, fevers, chills, night sweats, fatigue,   Eyes:  Good vision, no reported pain  ENT:  No sore throat, pain, runny nose, dysphagia  CV:  No pain, palpitations, hypo/hypertension  Resp:  No dyspnea, cough, tachypnea, wheezing  GI:  See HPI  :  No pain, bleeding, incontinence, nocturia  Muscle:  No pain, weakness  Neuro:  No weakness, tingling, memory problems  Psych:  No fatigue, insomnia, mood problems, depression  Endocrine:  No polyuria, polydipsia, cold/heat intolerance  Heme:  No petechiae, ecchymosis, easy bruisability  Skin:  No rash, edema      PHYSICAL EXAM:     GENERAL:  Appears stated age, well-groomed, well-nourished, no distress  HEENT:  NC/AT,  conjunctivae clear, sclera -anicteric  CHEST:  Full & symmetric excursion, no increased effort, breath sounds clear  HEART:  Regular rhythm, S1, S2, no murmur/rub/S3/S4,  no edema  ABDOMEN:  Soft, non-tender, non-distended, normoactive bowel sounds,  no masses ,no hepato-splenomegaly,   EXTREMITIES:  no cyanosis,clubbing or edema  SKIN:  No rash/erythema/ecchymoses/petechiae/wounds/abscess/warm/dry  NEURO:  Alert, oriented    Vital Signs:  Vital Signs Last 24 Hrs  T(C): 36.9 (19 Feb 2020 04:27), Max: 36.9 (19 Feb 2020 04:27)  T(F): 98.4 (19 Feb 2020 04:27), Max: 98.4 (19 Feb 2020 04:27)  HR: 63 (19 Feb 2020 04:27) (63 - 78)  BP: 109/65 (19 Feb 2020 04:27) (109/65 - 136/71)  BP(mean): --  RR: 18 (19 Feb 2020 04:27) (18 - 18)  SpO2: 95% (19 Feb 2020 04:27) (95% - 97%)  Daily     Daily     LABS:                        10.6   5.00  )-----------( 267      ( 19 Feb 2020 06:42 )             31.9     02-19    143  |  101  |  12  ----------------------------<  105<H>  3.9   |  29  |  1.05    Ca    9.7      19 Feb 2020 06:42      Imaging:  < from: Upper Endoscopy (02.18.20 @ 14:34) >  Findings:       The examined esophagus was normal. No evidence of esophageal tear, foreign body or other        abnormality.       The entire examined stomach was normal.       The examined duodenum was normal.                                                                                                        Impression:          - Normal esophagus, with no evidence of esophageal tear, foreign body or                        other abnormality.  Recommendation:      - Return patient to [facility] for ongoing care.    < end of copied text >    < from: Xray Esophagram Single Contrast (02.14.20 @ 18:22) >    IMPRESSION:  Unremarkable esophagram.  No extravasation of contrast to suggest esophageal perforation.  < end of copied text >

## 2021-07-07 NOTE — TELEPHONE ENCOUNTER
I have not seen any information on this.  I do not prescribe his psychiatric meds.  They should talk to his psychiatrist to review this.

## 2021-07-07 NOTE — TELEPHONE ENCOUNTER
Luke called from [facility] asking about this medication, Atropine? This medication is not on patient's med list. She stated that patient was hospitalized at [facility] and it looks like this was prescribed by provider there. Please call her to advise if patient should continue using this medication, if so, can you please send RX to pharmacy for this med. Please call Luke to advise. [phone].

## 2021-07-14 PROBLEM — F29 PSYCHOSIS, UNSPECIFIED PSYCHOSIS TYPE (H): Status: RESOLVED | Noted: 2020-08-14 | Resolved: 2021-02-17

## 2021-08-03 PROBLEM — D69.3 CHRONIC IDIOPATHIC THROMBOCYTOPENIA (H): Status: RESOLVED | Noted: 2020-09-04 | Resolved: 2021-02-17

## 2021-09-12 ENCOUNTER — HEALTH MAINTENANCE LETTER (OUTPATIENT)
Age: 21
End: 2021-09-12

## 2021-09-29 ENCOUNTER — VIRTUAL VISIT (OUTPATIENT)
Dept: BEHAVIORAL HEALTH | Facility: CLINIC | Age: 21
End: 2021-09-29
Payer: COMMERCIAL

## 2021-09-29 DIAGNOSIS — F90.9 ATTENTION DEFICIT HYPERACTIVITY DISORDER (ADHD), UNSPECIFIED ADHD TYPE: ICD-10-CM

## 2021-09-29 DIAGNOSIS — F63.81 INTERMITTENT EXPLOSIVE DISORDER: ICD-10-CM

## 2021-09-29 DIAGNOSIS — F43.23 ADJUSTMENT DISORDER WITH MIXED ANXIETY AND DEPRESSED MOOD: ICD-10-CM

## 2021-09-29 DIAGNOSIS — F39 SEVERE MOOD DISORDER WITH PSYCHOTIC FEATURES (H): Primary | ICD-10-CM

## 2021-09-29 PROCEDURE — 99215 OFFICE O/P EST HI 40 MIN: CPT | Mod: 95 | Performed by: NURSE PRACTITIONER

## 2021-09-29 PROCEDURE — 99417 PROLNG OP E/M EACH 15 MIN: CPT | Mod: 95 | Performed by: NURSE PRACTITIONER

## 2021-09-29 RX ORDER — ARIPIPRAZOLE 10 MG/1
10 TABLET ORAL EVERY MORNING
Qty: 30 TABLET | Refills: 2 | Status: SHIPPED | OUTPATIENT
Start: 2021-09-29 | End: 2021-12-17

## 2021-09-29 RX ORDER — TRAZODONE HYDROCHLORIDE 300 MG/1
300 TABLET ORAL AT BEDTIME
Qty: 30 TABLET | Refills: 2 | Status: SHIPPED | OUTPATIENT
Start: 2021-09-29 | End: 2021-12-20

## 2021-09-29 RX ORDER — GUANFACINE 3 MG/1
3 TABLET, EXTENDED RELEASE ORAL AT BEDTIME
COMMUNITY
Start: 2021-06-07 | End: 2021-09-29

## 2021-09-29 RX ORDER — ATOMOXETINE 100 MG/1
100 CAPSULE ORAL EVERY MORNING
Qty: 30 CAPSULE | Refills: 2 | Status: SHIPPED | OUTPATIENT
Start: 2021-09-29 | End: 2021-12-17

## 2021-09-29 RX ORDER — CHLORPROMAZINE HYDROCHLORIDE 50 MG/1
50 TABLET, FILM COATED ORAL 2 TIMES DAILY PRN
COMMUNITY
Start: 2021-06-07 | End: 2021-09-29

## 2021-09-29 RX ORDER — ATOMOXETINE 100 MG/1
100 CAPSULE ORAL EVERY MORNING
COMMUNITY
Start: 2021-08-31 | End: 2021-09-29

## 2021-09-29 RX ORDER — ARIPIPRAZOLE 10 MG/1
10 TABLET ORAL EVERY MORNING
COMMUNITY
Start: 2021-06-07 | End: 2021-09-29

## 2021-09-29 RX ORDER — LITHIUM CARBONATE 300 MG/1
900 CAPSULE ORAL
COMMUNITY
Start: 2021-08-31 | End: 2021-09-29

## 2021-09-29 RX ORDER — HYDROXYZINE HYDROCHLORIDE 50 MG/1
50 TABLET, FILM COATED ORAL 3 TIMES DAILY PRN
Qty: 90 TABLET | Refills: 2 | Status: SHIPPED | OUTPATIENT
Start: 2021-09-29 | End: 2021-12-20

## 2021-09-29 RX ORDER — METFORMIN HCL 500 MG
1000 TABLET, EXTENDED RELEASE 24 HR ORAL
COMMUNITY
Start: 2021-08-31

## 2021-09-29 RX ORDER — ATROPINE SULFATE 10 MG/ML
2 SOLUTION/ DROPS OPHTHALMIC
COMMUNITY
Start: 2021-07-01

## 2021-09-29 RX ORDER — GUANFACINE 3 MG/1
3 TABLET, EXTENDED RELEASE ORAL AT BEDTIME
Qty: 30 TABLET | Refills: 2 | Status: SHIPPED | OUTPATIENT
Start: 2021-09-29 | End: 2021-12-20

## 2021-09-29 RX ORDER — LORAZEPAM 1 MG/1
1 TABLET ORAL 3 TIMES DAILY PRN
COMMUNITY
Start: 2021-06-07 | End: 2021-09-29 | Stop reason: ALTCHOICE

## 2021-09-29 RX ORDER — TRAZODONE HYDROCHLORIDE 300 MG/1
300 TABLET ORAL AT BEDTIME
COMMUNITY
Start: 2021-08-31 | End: 2021-09-29

## 2021-09-29 RX ORDER — LORATADINE 10 MG/1
10 TABLET ORAL
COMMUNITY
Start: 2021-06-07

## 2021-09-29 RX ORDER — LITHIUM CARBONATE 300 MG/1
900 CAPSULE ORAL
Qty: 90 CAPSULE | Refills: 2 | Status: SHIPPED | OUTPATIENT
Start: 2021-09-29 | End: 2021-12-17

## 2021-09-29 RX ORDER — FERROUS SULFATE 325(65) MG
325 TABLET ORAL
COMMUNITY
Start: 2020-07-31

## 2021-09-29 RX ORDER — CHLORPROMAZINE HYDROCHLORIDE 50 MG/1
50 TABLET, FILM COATED ORAL 2 TIMES DAILY PRN
Qty: 60 TABLET | Refills: 2 | Status: SHIPPED | OUTPATIENT
Start: 2021-09-29 | End: 2021-12-20

## 2021-09-29 NOTE — PROGRESS NOTES
This video/telephone visit will be conducted via a call between you and your physician/provider. We have found that certain health care needs can be provided without the need for an in-person physical exam. This service lets us provide the care you need with a video /telephone conversation. If a prescription is necessary we can send it directly to your pharmacy. If lab work is needed we can place an order for that and you can then stop by our lab to have the test done at a later time.    Just as we bill insurance for in-person visits, we also bill insurance for video/telephone visits. If you have questions about your insurance coverage, we recommend that you speak with your insurance company.    Patient has given verbal consent for video/Telephone visit? Yes   Patient would like the video visit invitation sent by: [place] if connection issue: [phone]  TERRY/TEJ NAQVI CMA   AVS-Mail   : Escobar Fairbanks is present along with  Rojas   Under Civil Committed to  5/07/2021-11/07/2021   Last InVega Magdalena IM was: 9/14/2021; Due again: 10/14/2021  Medication refill is pended for review and approval by provider.    Patient's staff verified allergies, medications and pharmacy via phone.  Patient's staff states he is ready for visit.  MN  to be reviewed by provider.

## 2021-09-29 NOTE — PATIENT INSTRUCTIONS
Continue medications as prescribed  Have your pharmacy contact us for a refill if you are running low on medications (We may ask you to come into clinic to get a refill from the nurse  No Alcohol or drug use  No driving if sedated  Call the clinic with any questions or concerns   Reach out for help if you feel like hurting yourself or others ([facility] [phone]: [address] [facility] [facility], [address] or [facility] Suicide Hotline [phone] , call 911 or go to nearest Emergency room    Follow up as directed, for your appointments, per your After Visit Summary Form.

## 2021-09-29 NOTE — PROGRESS NOTES
"  The patient has been notified of following:      \"This virtual  visit will be conducted via a call between you and your physician/provider. We have found that certain health care needs can be provided without the need for a physical exam.  This service lets us provide the care you need virtually/via video   If a prescription is necessary we can send it directly to your pharmacy.  If lab work is needed we can place an order for that and you can then stop by our lab to have the test done at a later time.     Virtual/Video visits are billed at different rates depending on your insurance coverage. During this emergency period, for some insurers they may be billed the same as an in-person visit.  Please reach out to your insurance provider with any questions.          Dyllan virk would you like to obtain your AVS? Mail a copy  If the video visit is dropped, the invitation should be resent by: Send to e-mail at: [email or web address]  Will anyone else be joining your video visit? No            Psychiatric  Out- Patient  Follow Up Progress Note  Date of visit:9/29/2021           Discussion of Care and Treatment Recommendations:   This is a 21 year old male with a past a past  psychiatric history significant of explosive disorder, mild Intellectual disability, history of fetal alcohol spectrum,  and  mood Disorder, due to general medical condition.Pt resides in a group home.       Group Home Admin Ade [phone]- present for visit   Mental Health : Escobar Winslow, [facility]. Phone: [phone]; Fax: [phone]; Office: [phone]. - present   ---- CAD[facility] : Patsy Odell, [facility] Phone: [phone]; Fax: [phone].  ---- Representative Payee: Nathaniel Claros, [facility]. Phone: [phone].   ---- Care Coordination: SADE Boykin Case Manager, [facility]. Phone: [phone]          Under Civil Committed to [facility] 5/07/2021-11/07/2021   Last InVega Sustenna IM " was: 8/14/2021      ; Due again: 09/14/2021    Last visit  04/29/2021  Recommendation at last visit .  1: Continue current medications olanzapine 10 mg daily-psychoses  Olanzapine 5 mg 3 times daily as needed-anxiety psychoses  Hydroxyzine pamoate 50 mg every 4 hours as needed for anxiety  Medications continued include  Strattera 18 mg daily-ADHD  Trnvega Sustenna 234 /1.5 ml IM every 4 weeks-psychosis  Trazodone 150 mg at bedtimeazodone 50 mg 3 times daily as needed-behavior disorder ??  2.  Abstain from all mood altering substances.  Do not make mood altering substances medications.    3.Do not mix medication with modifying substances diet-  4. Return to the clinic in 4 weeks for management of medications or concerns patien  Patient and I reviewed diagnosis and treatment plan and patient agrees with following recommendations:  Ongoing education given regarding diagnostic and treatment options with adequate verbalization of understanding.  Plan   1-Continue with current medications :   Abilify  10 mg in the morning  quinicine  3 mg at bedtime   Invega 234 mg /IM monthly net dues 10/14 - given by group home   [id number] mg BID - agitation PRN   Statreta 100 mg in am   Trazodone 300 mg at bedtime   Lithium 900 mg very evening with mels   Hydroxyzine 50 mg TID PRN- Has only taken it 3 times since 9/1/2021   2-Discontinue Ativan 1 mg TID - agitation  /anxiety- only used 3 times-use alternate as needed medication for anxiety.  Patient has not been needing to take the medication  3.  High risk medication use-lipid panel basic metabolic panel labs were completed in August 2021 and were within normal limits.  Patient is due for DISCUS   4.  Return to the clinic in approximately 4 weeks-come to the clinic for DISCUS assessment         DIagnoses:   Schizoaffective disorder, unspecified type (HCC)  Active Problems:  Intermittent explosive disorder  Fetal alcohol spectrum disorder  History of traumatic brain injury  Tobacco  use disorder  Mild intellectual disability  ADHD (attention deficit hyperactivity disorder), combined type  Aggressive behavior    Patient Active Problem List   Diagnosis     Thrombocytopenia (H)     Chronic ITP (idiopathic thrombocytopenia) (H)             Chief Complaint / Subjective:    Chief complaint: : Intermittent mood disorder with aggression      HPI: The patient currently is under commitment to [facility] and the commitment lapses on 11/7/2021.  Telephone calls have been made to the patient's , Escobar Winslow, [phone]      Hospital admissions include the following date-May 3, 2021, June 2, 2021, June 23, 2021, August 6, 2021  Since our last visit patient has presented to the ED multiple times and been hospitalized multiple times.  He has been multiple medication changes including  Olanzapine was discontinued  Topiramate was discontinued  At some point patient was started on gabapentin and then it was discontinued    These medications were started during his recent hospitalization stays  Thorazine 50 mg twice daily for severe agitation as needed - has  taken the only 4 times in th e past 28 days   Lithium 900 mg with meals-   Lorazepam 1 mg 3 times daily as needed for anxiety agitation    Current he is approved and is on 1: l staffing only - he seems to be doing better with that .  ALS worker comes twice week for 3 hours visit each week.     and patient's mental health worker from the [facility] were present for visit today.  Both report the patient has been doing better since he left the hospital in August and moved to the new group home.  Has been compliant with current medications and denies side effects has utilized as needed medications very few times.  They have an RN who administers the IM long-term injectable at the group home.  Currently patient is alone at the group home with no other housemates and has one-on-one meal staffing.  Sleep and appetite are both remarkable.   They report no aggressive behaviors.  They report compliance with current medications.  Patient is following through with plan of care currently.  Per patient statement-he is feeling and doing a lot better.  He has not used any cannabis or prescription any nicotine since his last hospitalization.  He is feeling less paranoid.  He reports feeling calm.  He is sleeping well at night.  He is agreeable to continuing taking his medications.    Overall patient is responding positively to current medications regime and not needing as needed medications.  Behavior is currently well managed on current medications and ongoing plan of care and behavior modifications program at the group home.  We discussed with both patient and staff that I will be discontinuing lorazepam since patient has not been needing to use it and has only used it 3 times.  Also due to its risk for dependency tolerance and withdrawal.  Patient does have Thorazine 50 mg twice daily as needed and hydroxyzine 50 mg 3 times daily as needed for anxiety and agitation should he need those.  They are both agreeable to this plan.  No other medication changes will be made.  I will have patient return to the clinic in approximately 4 weeks.  He will need to come to the clinic at the telehealth station to complete a discussed evaluation.  His last lithium level was taken during his last hospitalization in August and it was within normal limits.  Also comprehensive basic metabolic panel labs were within normal limits.  We will check this again in another 3 months or so.  Patient and staff instructed call the clinic should they have any questions or concerns or should patient's condition change.  Mental health  will determine whether to renew commitment which expires in November.      We will mental Status Examination:   Appearance: Dressed appropriately well-groomed  Orientation: Patient alert and oriented to person, place, time, and  "situation  Reliability:  Patient appears to be an adequate historian.    Behavior: unable to assess  Speech: Speech is spontaneous and coherent, with a normal rate, rhythm and tone.    Language:There are no difficulties with expressive or receptive language as observed throughout the interview.    Mood: Described as \"ok\".    Affect: unable to assess  Judgement: Able to make basic decision regarding safety.  Insight: Good awareness of physical and mental health conditions and aware of needs around care for these.  Gait and station: unable to assess  Thought process: Logical   Thought content: No evidence of delusions or paranoia.    Hallucinations : No evidence of any hallucination  Thought content: No evidence of delusions or paranoia.   Suicidal /Homical Ideations:  No thoughts of self harm or suicide. No thoughts of harming others.  Associations: Connected  Fund of knowledge: Average  Attention / Concentration: Able to remain focused during the interview with minimal distractibility or need for redirection.  Short Term Memory: Grossly intact as evidence by client recalling themes and ideas discussed.  Long Term Memory: Intact  Motor Status: unable to asse    Drug/treatment history and current pattern of use:   History of cannabis use  History of nicotine use-vaping  Currently denies use of both cannabis and nicotine since August 2021    Medication changes: See Above   Medication adherence: compliant  Medication side effects: absent  Information about medications: Side effects, benefits and alternative treatments discussed and patient agrees .    Psychotherapy: Supportive therapy day-to-day living    Education: Diet, exercise, abstinence from drugs and alcohol, patient will not drive if sedated and medications or  under influence of any substance    Lab Results:   Personally reviewed and discussed with the patient    Lab Results   Component Value Date    WBC 9.8 02/17/2021    HGB 13.3 (L) 02/17/2021    HCT 40.3 " 02/17/2021    PLT 59 (L) 02/17/2021    CHOL 129 07/15/2015    TRIG 92 07/15/2015    HDL 28 (L) 07/15/2015    ALT 28 02/17/2021    AST 22 02/17/2021     02/17/2021    BUN 13 02/17/2021    CO2 22 02/17/2021    TSH 2.19 08/26/2020       Vital signs:  There were no vitals taken for this visit.  Telemedicine visit-no vital signs completed  Allergies: Depakote [valproic acid] and Other environmental allergy         Medications:     Current Outpatient Medications   Medication     ARIPiprazole (ABILIFY) 10 MG tablet     atomoxetine (STRATTERA) 100 MG capsule     atropine 1 % ophthalmic solution     chlorproMAZINE (THORAZINE) 50 MG tablet     cholecalciferol 25 MCG (1000 UT) TABS     ferrous sulfate (FEROSUL) 325 (65 Fe) MG tablet     guanFACINE HCl (INTUNIV) 3 MG TB24 24 hr tablet     lithium 300 MG capsule     loratadine (CLARITIN) 10 MG tablet     metFORMIN (GLUCOPHAGE-XR) 500 MG 24 hr tablet     paliperidone (INVEGA SUSTENNA) 234 MG/1.5ML JUANA     traZODone HCl 300 MG TABS     vitamin C (ASCORBIC ACID) 250 MG TABS tablet     hydrOXYzine (ATARAX) 50 MG tablet     No current facility-administered medications for this visit.             Medication adherence: Reviewed risk/benefits of medication , Patient able to verbalize understanding of side effects and Patient verbally consents to taking medications           Review of Systems:      ROS:    Subjective Data Only- Tele-Health Visit    10 point ROS was negative except for the items listed in HPI.      Coordination of Care:   More than 30 minutes spent on this visit  with more than 50% of time spent on coordination of care including: Educating patient about diagnosis, prognosis, side effects and benefits of medications, diet, exercise.  Time also spent providing supportive therapy regarding above issues.      Video-Visit Details    Type of service:  Video Visit    Originating Location (pt. Location): Home    Distant Location (provider location):  [facility]  [facility]     Platform used for Video Visit: Katie      This note was created using a dictation system. All typing errors or contextual distortion is unintentional and software inherent.  Start Time : 1230  End time : 3657

## 2021-09-30 NOTE — PROGRESS NOTES
________________________________________  Medications Phoned  to Pharmacy [] yes [x]no  Name of Pharmacist:  List Medications, including dose, quantity and instructions    Medications ordered this visit were e-scribed.  Verified by order class [x] yes  [] no  Abilify 10 mg; guanfacine HCl 3 mg; hydroxyzine HCl 50 mg; lithium 300 mg; InVega Sustenna 234 mg IM; Strattera 100 mg; Thorazine 50 mg; trazodone HCl 300 mg     Medication changes or discontinuations were communicated to patient's pharmacy: [] yes  [x] no    Dictation completed at time of chart check: [x] yes  [] no    I have checked the documentation for today s encounters and the above information has been reviewed and completed.

## 2021-10-01 ENCOUNTER — CARE COORDINATION (OUTPATIENT)
Dept: BEHAVIORAL HEALTH | Facility: HOSPITAL | Age: 21
End: 2021-10-01

## 2021-10-01 NOTE — PROGRESS NOTES
This RN received call from pt's assisted living facility RN called [facility] requesting information on medications as pt just admitted & they are working to get his medications sorted out. She asked about the following:  Loratadine  Metformin  Vit C   Ferrous Sulfate    Wtr reviewed NP Jocelin has not prescribed these in the past, that he does not have an established PCP in our system. She reported she will work to help him establish care.    She asked about Invega Sustenna 234mg IM - wtr confirmed that this is still ordered, most recently to [facility], does not get it administered in-clinic. She reported she would take this over & will check if it was administered during recent inpt hospital stay.    Javi Drummond RN on 10/1/2021 at 3:20 PM

## 2021-10-13 ENCOUNTER — LAB REQUISITION (OUTPATIENT)
Dept: LAB | Facility: CLINIC | Age: 21
End: 2021-10-13
Payer: COMMERCIAL

## 2021-10-13 DIAGNOSIS — Z00.00 ENCOUNTER FOR GENERAL ADULT MEDICAL EXAMINATION WITHOUT ABNORMAL FINDINGS: ICD-10-CM

## 2021-10-13 LAB
ALBUMIN SERPL-MCNC: 3.6 G/DL (ref 3.4–5)
ALP SERPL-CCNC: 71 U/L (ref 40–150)
ALT SERPL W P-5'-P-CCNC: 25 U/L (ref 0–70)
ANION GAP SERPL CALCULATED.3IONS-SCNC: 5 MMOL/L (ref 3–14)
AST SERPL W P-5'-P-CCNC: 13 U/L (ref 0–45)
BASOPHILS # BLD AUTO: 0 10E3/UL (ref 0–0.2)
BASOPHILS NFR BLD AUTO: 0 %
BILIRUB SERPL-MCNC: 0.3 MG/DL (ref 0.2–1.3)
BUN SERPL-MCNC: 7 MG/DL (ref 7–30)
CALCIUM SERPL-MCNC: 8.9 MG/DL (ref 8.5–10.1)
CHLORIDE BLD-SCNC: 106 MMOL/L (ref 94–109)
CHOLEST SERPL-MCNC: 139 MG/DL
CO2 SERPL-SCNC: 25 MMOL/L (ref 20–32)
CREAT SERPL-MCNC: 0.8 MG/DL (ref 0.66–1.25)
EOSINOPHIL # BLD AUTO: 0.3 10E3/UL (ref 0–0.7)
EOSINOPHIL NFR BLD AUTO: 2 %
ERYTHROCYTE [DISTWIDTH] IN BLOOD BY AUTOMATED COUNT: 15.2 % (ref 10–15)
FASTING STATUS PATIENT QL REPORTED: NO
GFR SERPL CREATININE-BSD FRML MDRD: >90 ML/MIN/1.73M2
GLUCOSE BLD-MCNC: 97 MG/DL (ref 70–99)
HCT VFR BLD AUTO: 45.4 % (ref 40–53)
HDLC SERPL-MCNC: 38 MG/DL
HGB BLD-MCNC: 14.7 G/DL (ref 13.3–17.7)
IMM GRANULOCYTES # BLD: 0 10E3/UL
IMM GRANULOCYTES NFR BLD: 0 %
LDLC SERPL CALC-MCNC: 77 MG/DL
LYMPHOCYTES # BLD AUTO: 2.4 10E3/UL (ref 0.8–5.3)
LYMPHOCYTES NFR BLD AUTO: 19 %
MCH RBC QN AUTO: 28.9 PG (ref 26.5–33)
MCHC RBC AUTO-ENTMCNC: 32.4 G/DL (ref 31.5–36.5)
MCV RBC AUTO: 89 FL (ref 78–100)
MONOCYTES # BLD AUTO: 0.7 10E3/UL (ref 0–1.3)
MONOCYTES NFR BLD AUTO: 6 %
NEUTROPHILS # BLD AUTO: 9.4 10E3/UL (ref 1.6–8.3)
NEUTROPHILS NFR BLD AUTO: 73 %
NONHDLC SERPL-MCNC: 101 MG/DL
NRBC # BLD AUTO: 0 10E3/UL
NRBC BLD AUTO-RTO: 0 /100
PLATELET # BLD AUTO: 206 10E3/UL (ref 150–450)
POTASSIUM BLD-SCNC: 4 MMOL/L (ref 3.4–5.3)
PROLACTIN SERPL-MCNC: 29 UG/L (ref 2–18)
PROT SERPL-MCNC: 7.7 G/DL (ref 6.8–8.8)
RBC # BLD AUTO: 5.09 10E6/UL (ref 4.4–5.9)
SODIUM SERPL-SCNC: 136 MMOL/L (ref 133–144)
TRIGL SERPL-MCNC: 118 MG/DL
TSH SERPL DL<=0.005 MIU/L-ACNC: 3.31 MU/L (ref 0.4–4)
WBC # BLD AUTO: 12.9 10E3/UL (ref 4–11)

## 2021-10-13 PROCEDURE — 82306 VITAMIN D 25 HYDROXY: CPT | Mod: ORL | Performed by: NURSE PRACTITIONER

## 2021-10-13 PROCEDURE — 84443 ASSAY THYROID STIM HORMONE: CPT | Mod: ORL | Performed by: NURSE PRACTITIONER

## 2021-10-13 PROCEDURE — 86780 TREPONEMA PALLIDUM: CPT | Mod: ORL | Performed by: NURSE PRACTITIONER

## 2021-10-13 PROCEDURE — 80053 COMPREHEN METABOLIC PANEL: CPT | Mod: ORL | Performed by: NURSE PRACTITIONER

## 2021-10-13 PROCEDURE — 84146 ASSAY OF PROLACTIN: CPT | Mod: ORL | Performed by: NURSE PRACTITIONER

## 2021-10-13 PROCEDURE — 85025 COMPLETE CBC W/AUTO DIFF WBC: CPT | Mod: ORL | Performed by: NURSE PRACTITIONER

## 2021-10-13 PROCEDURE — 86803 HEPATITIS C AB TEST: CPT | Mod: ORL | Performed by: NURSE PRACTITIONER

## 2021-10-13 PROCEDURE — 80061 LIPID PANEL: CPT | Mod: ORL | Performed by: NURSE PRACTITIONER

## 2021-10-14 LAB
DEPRECATED CALCIDIOL+CALCIFEROL SERPL-MC: 34 UG/L (ref 20–75)
HCV AB SERPL QL IA: NONREACTIVE
T PALLIDUM AB SER QL: NONREACTIVE

## 2021-12-01 ENCOUNTER — LAB REQUISITION (OUTPATIENT)
Dept: LAB | Facility: CLINIC | Age: 21
End: 2021-12-01
Payer: COMMERCIAL

## 2021-12-01 DIAGNOSIS — R79.89 OTHER SPECIFIED ABNORMAL FINDINGS OF BLOOD CHEMISTRY: ICD-10-CM

## 2021-12-01 LAB — PROLACTIN SERPL-MCNC: 26 UG/L (ref 2–18)

## 2021-12-01 PROCEDURE — 84146 ASSAY OF PROLACTIN: CPT | Mod: ORL | Performed by: NURSE PRACTITIONER

## 2021-12-17 DIAGNOSIS — F90.9 ATTENTION DEFICIT HYPERACTIVITY DISORDER (ADHD), UNSPECIFIED ADHD TYPE: ICD-10-CM

## 2021-12-17 DIAGNOSIS — F63.81 INTERMITTENT EXPLOSIVE DISORDER: ICD-10-CM

## 2021-12-17 DIAGNOSIS — F43.23 ADJUSTMENT DISORDER WITH MIXED ANXIETY AND DEPRESSED MOOD: ICD-10-CM

## 2021-12-17 RX ORDER — LITHIUM CARBONATE 300 MG/1
CAPSULE ORAL
Qty: 84 CAPSULE | Refills: 0 | Status: SHIPPED | OUTPATIENT
Start: 2021-12-17 | End: 2022-01-10

## 2021-12-17 RX ORDER — ATOMOXETINE 100 MG/1
CAPSULE ORAL
Qty: 28 CAPSULE | Refills: 0 | Status: SHIPPED | OUTPATIENT
Start: 2021-12-17 | End: 2022-01-10

## 2021-12-17 RX ORDER — ARIPIPRAZOLE 10 MG/1
TABLET ORAL
Qty: 28 TABLET | Refills: 0 | Status: SHIPPED | OUTPATIENT
Start: 2021-12-17 | End: 2022-01-10

## 2021-12-17 NOTE — TELEPHONE ENCOUNTER
[facility] also called regarding this medication refill. [facility] reported they were missing nearly all the patient's medications. Missing:   ARIPiprazole (ABILIFY) 10 MG tablet  atomoxetine (STRATTERA) 100 MG capsule  chlorproMAZINE (THORAZINE) 50 MG tablet  hydrOXYzine (ATARAX) 50 MG tablet  guanFACINE HCl (INTUNIV) 3 MG TB24 24 hr tablet   lithium 300 MG capsule  traZODone HCl 300 MG TABS    The pharmacy would like those sent as soon as possible. Pharmacy can be reached at [phone] with any questions or concern.

## 2021-12-17 NOTE — TELEPHONE ENCOUNTER
Date of Last Office Visit: 9/29/2021  Date of Next Office Visit: 01/10/2022  No shows since last visit: None  Cancellations since last visit: None    Medication requested: Atomoxetine 100 mg   Date last ordered: 9/29/2021 Qty: 30 Refills: 2  Medication requested: Aripiprazole 10 mg  Date last ordered: 9/29/2021 Qty: 30 Refills: 2  Medication requested: Lithium 300 mg Date last ordered: 9/29/2021 Qty: 90  Refills: 2    Review of MN ?: Writer does not have access.       Lapse in medication adherence greater than 5 days?: No  If yes, call patient and gather details: No  Medication refill request verified as identical to current order?: Yes  Result of Last DAM, VPA, Li+ Level, CBC, or Carbamazepine Level (at or since last visit): N/A    Last visit treatment plan: 9/29/2021  Plan    1-Continue with current medications :   Abilify  10 mg in the morning  quinicine  3 mg at bedtime   Invega 234 mg /IM monthly net dues 10/14 - given by group home   Gtbhybafcr74 mg BID - agitation PRN   Statreta 100 mg in am   Trazodone 300 mg at bedtime   Lithium 900 mg very evening with mels   Hydroxyzine 50 mg TID PRN- Has only taken it 3 times since 9/1/2021   2-Discontinue Ativan 1 mg TID - agitation  /anxiety- only used 3 times-use alternate as needed medication for anxiety.  Patient has not been needing to take the medication  3.  High risk medication use-lipid panel basic metabolic panel labs were completed in August 2021 and were within normal limits.  Patient is due for DISCUS   4.  Return to the clinic in approximately 4 weeks-come to the clinic for DISCUS assessment    []Medication refilled per  Medication Refill in Ambulatory Care  policy.  []Medication unable to be refilled by RN due to criteria not met as indicated below:    []Eligibility - not seen in the last year   []Supervision - no future appointment   []Compliance - no shows, cancellations or lapse in therapy   []Verification - order discrepancy   []Controlled  medication   []Medication not included in policy   []90-day supply request   [x]Other CMA pending refills due to low staffing. Per SMB

## 2021-12-17 NOTE — TELEPHONE ENCOUNTER
Reason for call:  Medication   If this is a refill request, has the caller requested the refill from the pharmacy already? Yes  Will the patient be using a [facility]? No  Name of the pharmacy and phone number for the current request: [facility] - [phone].     Name of the medication requested: Lithium, Atomoxetine, Trazodone, Guanfacine, Abilify     Other request: No    Phone number to reach patient:  Home number on file [phone] (home)  Can also call the nurse at the current living facility that he is living in .  Rlr- [phone]    Best Time:  ASAP    Can we leave a detailed message on this number?  YES    Travel screening: Not Applicable

## 2021-12-20 RX ORDER — CHLORPROMAZINE HYDROCHLORIDE 50 MG/1
50 TABLET, FILM COATED ORAL 2 TIMES DAILY PRN
Qty: 60 TABLET | Refills: 2 | Status: SHIPPED | OUTPATIENT
Start: 2021-12-20 | End: 2022-03-16

## 2021-12-20 RX ORDER — TRAZODONE HYDROCHLORIDE 300 MG/1
300 TABLET ORAL AT BEDTIME
Qty: 30 TABLET | Refills: 2 | Status: SHIPPED | OUTPATIENT
Start: 2021-12-20 | End: 2022-03-16

## 2021-12-20 RX ORDER — GUANFACINE 3 MG/1
3 TABLET, EXTENDED RELEASE ORAL AT BEDTIME
Qty: 30 TABLET | Refills: 2 | Status: SHIPPED | OUTPATIENT
Start: 2021-12-20 | End: 2022-03-16

## 2021-12-20 RX ORDER — HYDROXYZINE HYDROCHLORIDE 50 MG/1
50 TABLET, FILM COATED ORAL 3 TIMES DAILY PRN
Qty: 90 TABLET | Refills: 2 | Status: SHIPPED | OUTPATIENT
Start: 2021-12-20 | End: 2022-03-16

## 2021-12-20 NOTE — TELEPHONE ENCOUNTER
Date of Last Office Visit: 9/29/21  Date of Next Office Visit: 1/10/21  No shows since last visit: none  Cancellations since last visit: 10/27/21,11/17/21 (provider)    Medication requested: Chlorpromazine 50 mg Date last ordered: 9/29/21 Qty: 60 Refills: 2    Medication requested: Hydroxyzine 50 mg tab Date last ordered: 9/29/21 Qty: 90 Refills: 2    Medication requested: Guanfacine 3 mg Date last ordered: 9/29/21 Qty: 30 Refills: 2    Medication requested: Trazodone Date last ordered: 300 mg Qty: 30 Refills: 2     Review of MN ?: NA      Lapse in medication adherence greater than 5 days?: no  If yes, call patient and gather details: NA  Medication refill request verified as identical to current order?: yes  Result of Last DAM, VPA, Li+ Level, CBC, or Carbamazepine Level (at or since last visit): N/A    Last visit treatment plan:    Under [facility] to [facility] 5/07/2021-11/07/2021   Last InVega Sustenna IM was: 8/14/2021      ; Due again: 09/14/2021     Last visit  04/29/2021  Recommendation at last visit .  1: Continue current medications olanzapine 10 mg daily-psychoses  Olanzapine 5 mg 3 times daily as needed-anxiety psychoses  Hydroxyzine pamoate 50 mg every 4 hours as needed for anxiety  Medications continued include  Strattera 18 mg daily-ADHD  Trnvega Sustenna 234 /1.5 ml IM every 4 weeks-psychosis  Trazodone 150 mg at bedtimeazodone 50 mg 3 times daily as needed-behavior disorder ??  2.  Abstain from all mood altering substances.  Do not make mood altering substances medications.    3.Do not mix medication with modifying substances diet-  4. Return to the clinic in 4 weeks for management of medications or concerns patien  Patient and I reviewed diagnosis and treatment plan and patient agrees with following recommendations:  Ongoing education given regarding diagnostic and treatment options with adequate verbalization of understanding.  Plan   1-Continue with current medications :   Abilify   10 mg in the morning  quinicine  3 mg at bedtime   Invega 234 mg /IM monthly net dues 10/14 - given by group home   Lsnwrbitxy66 mg BID - agitation PRN   Statreta 100 mg in am   Trazodone 300 mg at bedtime   Lithium 900 mg very evening with mels   Hydroxyzine 50 mg TID PRN- Has only taken it 3 times since 9/1/2021   2-Discontinue Ativan 1 mg TID - agitation  /anxiety- only used 3 times-use alternate as needed medication for anxiety.  Patient has not been needing to take the medication  3.  High risk medication use-lipid panel basic metabolic panel labs were completed in August 2021 and were within normal limits.  Patient is due for DISCUS   4.  Return to the clinic in approximately 4 weeks-come to the clinic for DISCUS assessment       []Medication refilled per  Medication Refill in Ambulatory Care  policy.  [x]Medication unable to be refilled by RN due to criteria not met as indicated below:    []Eligibility - not seen in the last year   []Supervision - no future appointment   []Compliance - no shows, cancellations or lapse in therapy   []Verification - order discrepancy   []Controlled medication   []Medication not included in policy   [x]90-day supply request   []Other

## 2021-12-24 ENCOUNTER — HOSPITAL ENCOUNTER (OUTPATIENT)
Dept: MRI IMAGING | Facility: CLINIC | Age: 21
End: 2021-12-24
Attending: NURSE PRACTITIONER
Payer: COMMERCIAL

## 2021-12-24 ENCOUNTER — HOSPITAL ENCOUNTER (OUTPATIENT)
Dept: GENERAL RADIOLOGY | Facility: CLINIC | Age: 21
End: 2021-12-24
Attending: NURSE PRACTITIONER
Payer: COMMERCIAL

## 2021-12-24 DIAGNOSIS — R79.89 ELEVATED PROLACTIN LEVEL: ICD-10-CM

## 2021-12-24 DIAGNOSIS — F17.200 SMOKING: ICD-10-CM

## 2021-12-24 PROCEDURE — A9585 GADOBUTROL INJECTION: HCPCS

## 2021-12-24 PROCEDURE — 71046 X-RAY EXAM CHEST 2 VIEWS: CPT

## 2021-12-24 PROCEDURE — 255N000002 HC RX 255 OP 636

## 2021-12-24 PROCEDURE — 70553 MRI BRAIN STEM W/O & W/DYE: CPT

## 2021-12-24 RX ORDER — GADOBUTROL 604.72 MG/ML
12 INJECTION INTRAVENOUS ONCE
Status: COMPLETED | OUTPATIENT
Start: 2021-12-24 | End: 2021-12-24

## 2021-12-24 RX ADMIN — GADOBUTROL 12 ML: 604.72 INJECTION INTRAVENOUS at 09:35

## 2022-01-06 ENCOUNTER — LAB REQUISITION (OUTPATIENT)
Dept: LAB | Facility: CLINIC | Age: 22
End: 2022-01-06

## 2022-01-06 DIAGNOSIS — R05.9 COUGH, UNSPECIFIED: ICD-10-CM

## 2022-01-10 ENCOUNTER — VIRTUAL VISIT (OUTPATIENT)
Dept: BEHAVIORAL HEALTH | Facility: CLINIC | Age: 22
End: 2022-01-10
Payer: COMMERCIAL

## 2022-01-10 DIAGNOSIS — F63.81 INTERMITTENT EXPLOSIVE DISORDER: ICD-10-CM

## 2022-01-10 DIAGNOSIS — F39 SEVERE MOOD DISORDER WITH PSYCHOTIC FEATURES (H): ICD-10-CM

## 2022-01-10 DIAGNOSIS — F90.9 ATTENTION DEFICIT HYPERACTIVITY DISORDER (ADHD), UNSPECIFIED ADHD TYPE: ICD-10-CM

## 2022-01-10 DIAGNOSIS — Z79.899 HIGH RISK MEDICATION USE: Primary | ICD-10-CM

## 2022-01-10 LAB
SARS-COV-2 RNA RESP QL NAA+PROBE: POSITIVE
SCANNED LAB RESULT: ABNORMAL

## 2022-01-10 PROCEDURE — 99417 PROLNG OP E/M EACH 15 MIN: CPT | Mod: 95 | Performed by: NURSE PRACTITIONER

## 2022-01-10 PROCEDURE — G0463 HOSPITAL OUTPT CLINIC VISIT: HCPCS | Mod: PN,RTG | Performed by: NURSE PRACTITIONER

## 2022-01-10 PROCEDURE — 99215 OFFICE O/P EST HI 40 MIN: CPT | Mod: 95 | Performed by: NURSE PRACTITIONER

## 2022-01-10 RX ORDER — PALIPERIDONE PALMITATE 234 MG/1.5ML
INJECTION INTRAMUSCULAR
Qty: 1.5 ML | Refills: 2 | Status: SHIPPED | OUTPATIENT
Start: 2022-01-10 | End: 2022-03-16

## 2022-01-10 RX ORDER — ARIPIPRAZOLE 10 MG/1
10 TABLET ORAL EVERY MORNING
Qty: 31 TABLET | Refills: 2 | Status: SHIPPED | OUTPATIENT
Start: 2022-01-10 | End: 2022-03-16

## 2022-01-10 RX ORDER — ATOMOXETINE 100 MG/1
CAPSULE ORAL
Qty: 31 CAPSULE | Refills: 0 | Status: SHIPPED | OUTPATIENT
Start: 2022-01-17 | End: 2022-01-28

## 2022-01-10 RX ORDER — LITHIUM CARBONATE 300 MG/1
CAPSULE ORAL
Qty: 93 CAPSULE | Refills: 2 | Status: SHIPPED | OUTPATIENT
Start: 2022-01-10 | End: 2022-03-16

## 2022-01-10 NOTE — PROGRESS NOTES
This video/telephone visit will be conducted via a call between you and your physician/provider. We have found that certain health care needs can be provided without the need for an in-person physical exam. This service lets us provide the care you need with a video /telephone conversation. If a prescription is necessary we can send it directly to your pharmacy. If lab work is needed we can place an order for that and you can then stop by our lab to have the test done at a later time.    Just as we bill insurance for in-person visits, we also bill insurance for video/telephone visits. If you have questions about your insurance coverage, we recommend that you speak with your insurance company.    Patient has given verbal consent for video/Telephone visit? Yes  Patient would like video visit, please connect: [employer] if connection issue: [phone]  Reason for visit: Medication follow up  AVS-Mail preferred   Currently under isolation precautions due to recent lab tests.   Medication refill is pended for review and approval by provider.  Patient's staff state he is ready for visit.  MN  to be reviewed by provider.   Marie Wheeler January 10, 2022 12:36 PM

## 2022-01-10 NOTE — PROGRESS NOTES
"  The patient has been notified of following:      \"This virtual  visit will be conducted via a call between you and your physician/provider. We have found that certain health care needs can be provided without the need for a physical exam.  This service lets us provide the care you need virtually/via video   If a prescription is necessary we can send it directly to your pharmacy.  If lab work is needed we can place an order for that and you can then stop by our lab to have the test done at a later time.     Virtual/Video visits are billed at different rates depending on your insurance coverage. During this emergency period, for some insurers they may be billed the same as an in-person visit.  Please reach out to your insurance provider with any questions.          Dyllan virk would you like to obtain your AVS? Mail a copy  If the video visit is dropped, the invitation should be resent by: Send to e-mail at: [email or web address]  Will anyone else be joining your video visit? No            Psychiatric  Out- Patient  Follow Up Progress Note  Date of visit:1/10/2022         Discussion of Care and Treatment Recommendations:   This is a 21 year old male with Intellectual disability, history of fetal alcohol spectrum,  and  mood Disorder, due to general medical condition.Pt resides in a group home.       [facility] [phone]- present for visit   Mental Health : Escobar Winslow, [employer]. Phone: [phone]; Fax: [phone]; Office: [phone]. - present   ---- CADI : Patsy Odell [facility]. Phone: [phone]; Fax: [phone].  ---- Representative Payee: Nathaniel Claros [employer]. Phone: [phone].   ---- Care Coordination: SADE Boykin Case Manager, [facility]. Phone: [phone]            Last visit  09/29/2021Recommendation at last visit .  1-Continue with current medications :   Abilify  10 mg in the morning  quinicine  3 mg at bedtime   Invega 234 " mg /IM monthly net dues 10/14 - given by group home   Wxnvfcnzxg29 mg BID - agitation PRN   Statreta 100 mg in am   Trazodone 300 mg at bedtime   Lithium 900 mg very evening with mels   Hydroxyzine 50 mg TID PRN- Has only taken it 3 times since 9/1/2021   2-Discontinue Ativan 1 mg TID - agitation  /anxiety- only used 3 times-use alternate as needed medication for anxiety.  Patient has not been needing to take the medication  3.  High risk medication use-lipid panel basic metabolic panel labs were completed in August 2021 and were within normal limits.  Patient is due for DISCUS   4.  Return to the clinic in approximately 4 weeks-come to the clinic for DISCUS assessment  Patient and I reviewed diagnosis and treatment plan and patient agrees with following recommendations:  Ongoing education given regarding diagnostic and treatment options with adequate verbalization of understanding.  Plan     1-Continue with current medications :   Abilify  10 mg in the morning  quinicine  3 mg at bedtime   Invega 234 mg /IM monthly net dues 10/14 - given by group home   Rodtoiwvhq52 mg BID - agitation PRN   Statreta 100 mg in am   Trazodone 300 mg at bedtime   Lithium 900 mg very evening with mels   Hydroxyzine 50 mg TID PRN- Has only taken it 3 times since 9/1/2021   2-High risk medication use-lipid panel basic metabolic panel labs were completed in August 2021 and were within normal limits.  Will order Lithium level today   3.  Return to the clinic in approximately 8 weeks-come to the clinic for DISCUS assessment       DIagnoses:     Schizoaffective disorder, unspecified type (HCC)  Active Problems:  Intermittent explosive disorder  Fetal alcohol spectrum disorder  History of traumatic brain injury  Tobacco use disorder  Mild intellectual disability  ADHD (attention deficit hyperactivity disorder), combined type  Aggressive behavior       Patient Active Problem List   Diagnosis     Thrombocytopenia (H)     Chronic ITP (idiopathic  "thrombocytopenia) (H)             Chief Complaint / Subjective:    Chief complaint:ntermittent explosive disorder    History of Present Illness:  Per patient's statement : Patient reports compliance with current medications and denies side effects.  Sleep and appetite have been unremarkable.  Also feels stable on current medications.  Eyes also spoke to group home staff including the manager.  He states that patient has been calm and compliant with current medications.  No aggression reported whatsoever.  Sleep and appetite are remarkable.  Currently patient is in a group home but all by himself he currently does not have any housemates.  In the past he has struggled in group home settings and has been through multiple group homes and discharge due to aggressive behavior.  He appears to have less behavior issues if any at all with this group home and I did applaud the staff and patient for this.  I did order a lithium level as we have not had one since August 2021.  Informed me that patient's prolatin levels have been elevated and an MRI has been ordered and primary care is following.    Patient will current clear with current medications remind the group home staff members to make sure that he gets his lithium levels drawn.  We also discussed coming to the clinic so we can get DISCUS assessment completed.        Mental Status Examination:   Appearance: Good eye contact does not appear to be in any apparent distress  Orientation: Patient alert and oriented to person, place, time, and situation  Reliability:  Patient appears to be an poor to fair  historian.    Behavior: Pleasant and cooperative  Speech: Speech is spontaneous and coherent, with a normal rate, rhythm and tone.    Language:There are no difficulties with expressive or receptive language as observed throughout the interview.    Mood: Described as \"I am doing good\".    Affect:  Flat  Judgement: Able to make basic decision regarding safety.  Insight: Good " awareness of physical and mental health conditions and aware of needs around care for these.  Gait and station: unable to assess  Thought process: Logical   Thought content: No evidence of delusions or paranoia.    Hallucinations : No evidence of any hallucination  Thought content: No evidence of delusions or paranoia.   Suicidal /Homical Ideations:  No thoughts of self harm or suicide. No thoughts of harming others.  Associations: Connected  Fund of knowledge: Average  Attention / Concentration: Able to remain focused during the interview with minimal distractibility or need for redirection.  Short Term Memory: Grossly intact as evidence by client recalling themes and ideas discussed.  Long Term Memory: Intact  Motor Status: unable to asse    Drug/treatment history and current pattern of use:   History of cannabis use  History of nicotine use-vaping  Currently denies use of both cannabis and nicotine since August 2021    Medication changes: See Above   Medication adherence: compliant  Medication side effects: absent  Information about medications: Side effects, benefits and alternative treatments discussed and patient agrees .    Psychotherapy: Supportive therapy day-to-day living    Education: Diet, exercise, abstinence from drugs and alcohol, patient will not drive if sedated and medications or  under influence of any substance    Lab Results:  Personally reviewed and discussed with the patient    Lab Results   Component Value Date    WBC 12.9 (H) 10/13/2021    HGB 14.7 10/13/2021    HCT 45.4 10/13/2021     10/13/2021    CHOL 139 10/13/2021    TRIG 118 10/13/2021    HDL 38 (L) 10/13/2021    ALT 25 10/13/2021    AST 13 10/13/2021     10/13/2021    BUN 7 10/13/2021    CO2 25 10/13/2021    TSH 3.31 10/13/2021       Vital signs:  There were no vitals taken for this visit.  Telemedicine visit-no vital signs completed  Allergies: Depakote [valproic acid] and Other environmental allergy         Medications:      Current Outpatient Medications   Medication     ARIPiprazole (ABILIFY) 10 MG tablet     atomoxetine (STRATTERA) 100 MG capsule     atropine 1 % ophthalmic solution     chlorproMAZINE (THORAZINE) 50 MG tablet     cholecalciferol 25 MCG (1000 UT) TABS     ferrous sulfate (FEROSUL) 325 (65 Fe) MG tablet     guanFACINE HCl (INTUNIV) 3 MG TB24 24 hr tablet     hydrOXYzine (ATARAX) 50 MG tablet     INVEGA SUSTENNA 234 MG/1.5ML JUANA     lithium 300 MG capsule     loratadine (CLARITIN) 10 MG tablet     metFORMIN (GLUCOPHAGE-XR) 500 MG 24 hr tablet     traZODone HCl 300 MG TABS     vitamin C (ASCORBIC ACID) 250 MG TABS tablet     No current facility-administered medications for this visit.             Medication adherence: Reviewed risk/benefits of medication , Patient able to verbalize understanding of side effects and Patient verbally consents to taking medications           Review of Systems:      ROS:    Subjective Data Only- Tele-Health Visit    10 point ROS was negative except for the items listed in HPI.      Coordination of Care:   More than 30 minutes spent on this visit  with more than 50% of time spent on coordination of care including: Educating patient about diagnosis, prognosis, side effects and benefits of medications, diet, exercise.  Time also spent providing supportive therapy regarding above issues.      Video-Visit Details    Type of service:  Video Visit    Originating Location (pt. Location): Home    Distant Location (provider location):  [facility]     Platform used for Video Visit: Friendsee      This note was created using a dictation system. All typing errors or contextual distortion is unintentional and software inherent.  Start Time : 1254  End time : 1405

## 2022-01-10 NOTE — PROGRESS NOTES
Medications Phoned  to Pharmacy [] yes [x]no  Name of Pharmacist:  List Medications, including dose, quantity and instructions     Medications ordered this visit were e-scribed.  Verified by order class [x] yes  [] no  Abilify 10 mg; InVega Sustenna 234 mg IM; Lithium 300 mg; Post Dated 1/17/22  Strattera 100 mg  Medication changes or discontinuations were communicated to patient's pharmacy: [] yes  [x] no     Dictation completed at time of chart check: [x] yes  [] no     I have checked the documentation for today s encounters and the above information has been reviewed and completed.      Labs: Lithium level needed.     Marie Wheeler January 10, 2022 2:41 PM

## 2022-01-10 NOTE — PATIENT INSTRUCTIONS
Continue medications as prescribed  Have your pharmacy contact us for a refill if you are running low on medications (We may ask you to come into clinic to get a refill from the nurse  No Alcohol or drug use  No driving if sedated  Call the clinic with any questions or concerns   Reach out for help if you feel like hurting yourself or others ([facility] [phone]: [address], [address] or [facility] Suicide Hotline [phone] , call 911 or go to nearest Emergency room    Follow up as directed, for your appointments, per your After Visit Summary Form.

## 2022-01-18 ENCOUNTER — TELEPHONE (OUTPATIENT)
Dept: ONCOLOGY | Facility: HOSPITAL | Age: 22
End: 2022-01-18
Payer: COMMERCIAL

## 2022-01-18 NOTE — TELEPHONE ENCOUNTER
----- Message from Aissatou Jeronimo sent at 1/10/2022  7:17 AM CST -----  Regarding: Need Scheduling  Pt is to return in 1 yr for labs and provider visit.  Please call to schedule.    ThanksAissatou  ----- Message -----  From: SYSTEM  Sent: 1/7/2022  12:23 AM CST  To: [facility] Support Pool

## 2022-03-14 ENCOUNTER — TELEPHONE (OUTPATIENT)
Dept: BEHAVIORAL HEALTH | Facility: HOSPITAL | Age: 22
End: 2022-03-14
Payer: COMMERCIAL

## 2022-03-14 DIAGNOSIS — F90.9 ATTENTION DEFICIT HYPERACTIVITY DISORDER (ADHD), UNSPECIFIED ADHD TYPE: ICD-10-CM

## 2022-03-14 RX ORDER — ATOMOXETINE 100 MG/1
100 CAPSULE ORAL EVERY MORNING
Qty: 90 CAPSULE | Refills: 0 | Status: CANCELLED | OUTPATIENT
Start: 2022-03-14

## 2022-03-14 NOTE — TELEPHONE ENCOUNTER
Date of Last Office Visit: 01/10/2022  Date of Next Office Visit: None-will route to scheduling   No shows since last visit: 0  Cancellations since last visit: 0     Disp Refills Start End CIELO   atomoxetine (STRATTERA) 100 MG capsule 31 capsule 0 1/28/2022  No     Lapse in medication adherence greater than 5 days?: no  If yes, call patient and gather details: n/a  Medication refill request verified as identical to current order?: yes  Result of Last DAM, VPA, Li+ Level, CBC, or Carbamazepine Level (at or since last visit): N/A    Last visit treatment plan:   Abilify  10 mg in the morning  quinicine  3 mg at bedtime   Invega 234 mg /IM monthly net dues 10/14 - given by group home   Afihlevgzy48 mg BID - agitation PRN   Statreta 100 mg in am   Trazodone 300 mg at bedtime   Lithium 900 mg very evening with mels   Hydroxyzine 50 mg TID PRN- Has only taken it 3 times since 9/1/2021   2-High risk medication use-lipid panel basic metabolic panel labs were completed in August 2021 and were within normal limits.  Will order Lithium level today   3.  Return to the clinic in approximately 8 weeks-come to the clinic for DISCUS assessment    []Medication refilled per  Medication Refill in Ambulatory Care  policy.  [x]Medication unable to be refilled by RN due to criteria not met as indicated below:    []Eligibility - not seen in the last year   [x]Supervision - no future appointment   []Compliance - no shows, cancellations or lapse in therapy   []Verification - order discrepancy   []Controlled medication   [x]Medication not included in policy   []90-day supply request   []Other

## 2022-03-14 NOTE — TELEPHONE ENCOUNTER
Pt is now scheduled for 3/16/22    Made a phone call to [facility] and spoke to his nurse, pt has been out of the [facility] since Wednesday, nurse is asking refill for 90 days if possible

## 2022-03-14 NOTE — TELEPHONE ENCOUNTER
Reason for call:  Other   Patient called regarding (reason for call): call back  Additional comments: Nurse- [facility] requesting a callback    Phone number to reach patient:  Other phone number:  [phone]*    Best Time:  asap    Can we leave a detailed message on this number?  YES    Travel screening: Not Applicable

## 2022-03-16 ENCOUNTER — VIRTUAL VISIT (OUTPATIENT)
Dept: BEHAVIORAL HEALTH | Facility: CLINIC | Age: 22
End: 2022-03-16
Payer: COMMERCIAL

## 2022-03-16 VITALS — WEIGHT: 260 LBS | HEIGHT: 66 IN | BODY MASS INDEX: 41.78 KG/M2

## 2022-03-16 DIAGNOSIS — F39 SEVERE MOOD DISORDER WITH PSYCHOTIC FEATURES (H): ICD-10-CM

## 2022-03-16 DIAGNOSIS — F63.81 INTERMITTENT EXPLOSIVE DISORDER: ICD-10-CM

## 2022-03-16 DIAGNOSIS — F90.9 ATTENTION DEFICIT HYPERACTIVITY DISORDER (ADHD), UNSPECIFIED ADHD TYPE: ICD-10-CM

## 2022-03-16 DIAGNOSIS — F43.23 ADJUSTMENT DISORDER WITH MIXED ANXIETY AND DEPRESSED MOOD: ICD-10-CM

## 2022-03-16 PROCEDURE — G0463 HOSPITAL OUTPT CLINIC VISIT: HCPCS | Mod: PN,RTG | Performed by: NURSE PRACTITIONER

## 2022-03-16 PROCEDURE — 99214 OFFICE O/P EST MOD 30 MIN: CPT | Mod: 95 | Performed by: NURSE PRACTITIONER

## 2022-03-16 RX ORDER — LITHIUM CARBONATE 300 MG/1
CAPSULE ORAL
Qty: 93 CAPSULE | Refills: 2 | Status: SHIPPED | OUTPATIENT
Start: 2022-03-16 | End: 2022-06-08

## 2022-03-16 RX ORDER — CHLORPROMAZINE HYDROCHLORIDE 50 MG/1
50 TABLET, FILM COATED ORAL 2 TIMES DAILY PRN
Qty: 60 TABLET | Refills: 2 | Status: SHIPPED | OUTPATIENT
Start: 2022-03-16 | End: 2022-06-08

## 2022-03-16 RX ORDER — ARIPIPRAZOLE 10 MG/1
10 TABLET ORAL EVERY MORNING
Qty: 31 TABLET | Refills: 2 | Status: SHIPPED | OUTPATIENT
Start: 2022-03-16 | End: 2022-06-08

## 2022-03-16 RX ORDER — HYDROXYZINE HYDROCHLORIDE 50 MG/1
50 TABLET, FILM COATED ORAL 3 TIMES DAILY PRN
Qty: 90 TABLET | Refills: 2 | Status: SHIPPED | OUTPATIENT
Start: 2022-03-16 | End: 2022-06-08

## 2022-03-16 RX ORDER — ATOMOXETINE 100 MG/1
100 CAPSULE ORAL DAILY
Qty: 90 CAPSULE | Refills: 0 | Status: SHIPPED | OUTPATIENT
Start: 2022-03-16 | End: 2022-06-08

## 2022-03-16 RX ORDER — PALIPERIDONE PALMITATE 234 MG/1.5ML
INJECTION INTRAMUSCULAR
Qty: 1.5 ML | Refills: 2 | Status: SHIPPED | OUTPATIENT
Start: 2022-03-16 | End: 2022-06-08

## 2022-03-16 RX ORDER — GUANFACINE 3 MG/1
3 TABLET, EXTENDED RELEASE ORAL AT BEDTIME
Qty: 30 TABLET | Refills: 2 | Status: SHIPPED | OUTPATIENT
Start: 2022-03-16 | End: 2022-06-08

## 2022-03-16 RX ORDER — TRAZODONE HYDROCHLORIDE 300 MG/1
300 TABLET ORAL AT BEDTIME
Qty: 30 TABLET | Refills: 2 | Status: SHIPPED | OUTPATIENT
Start: 2022-03-16 | End: 2022-06-08

## 2022-03-16 NOTE — PROGRESS NOTES
This video/telephone visit will be conducted via a call between you and your physician/provider. We have found that certain health care needs can be provided without the need for an in-person physical exam. This service lets us provide the care you need with a video /telephone conversation. If a prescription is necessary we can send it directly to your pharmacy. If lab work is needed we can place an order for that and you can then stop by our lab to have the test done at a later time.    Just as we bill insurance for in-person visits, we also bill insurance for video/telephone visits. If you have questions about your insurance coverage, we recommend that you speak with your insurance company.    Patient has given verbal consent for video/Telephone visit? Yes  Patient would like video visit, please connect: Email: [email or web address] if connection issue: [phone]  Jane   Reason for visit: Follow up   AVS-Mail preferred per   Last Invega Sustenna 234 mg IM was 3/02/2022.  Patient's Staff verified allergies, medications and pharmacy via telephone verbally with writer.   Medication refill is pended for review and approval by provider.  Patient's Staff states patient is ready for visit.  MN  to be reviewed by provider.   Marie Wheeler March 16, 2022 3:11 PM

## 2022-03-16 NOTE — PATIENT INSTRUCTIONS
Per GH Manager Please Mail out AVS to address in chart.   Manager: When Scheduling appointments with Nkechi Monreal, please remind schedulers that 60 minutes are needed for patient's follow up's. Scheduling is [phone].     Continue medications as prescribed  Have your pharmacy contact us for a refill if you are running low on medications (We may ask you to come into clinic to get a refill from the nurse  No Alcohol or drug use  No driving if sedated  Call the clinic with any questions or concerns   Reach out for help if you feel like hurting yourself or others ([facility] [phone]: [address] [facility] [facility], [address] or [employer] Suicide Hotline [phone] , call 911 or go to nearest Emergency room    Follow up as directed, for your appointments, per your After Visit Summary Form  -come to the clinic telehub station  for next appointment  for DISCUS assessment    .

## 2022-03-17 NOTE — TELEPHONE ENCOUNTER
Reason for call:  Medication   If this is a refill request, has the caller requested the refill from the pharmacy already? Yes  Will the patient be using a [facility]? No  Name of the pharmacy and phone number for the current request:     [email or web address]     Name of the medication requested:    atomoxetine (STRATTERA) 100 MG capsule         Other request: pharmacist advised it was written oddly and would like to connect in regards to the medication     Phone number to reach patient:  Other phone number:  [phone]*    Best Time:  asap    Can we leave a detailed message on this number?  YES    Travel screening: Not Applicable

## 2022-03-17 NOTE — PROGRESS NOTES
MH&A Post-Appointment Cart -check      Correct pharmacy verified with patient and updated in chart? [x] yes []no    Charge captured ? [] yes  [] no [x] n/a-virtual     Medications ordered this visit were e-scribed.  Verified by order class [x] yes  [] no    List Medications: Abilify 10 mg; Strattera 100 mg; Thorazine 50 mg; guanfacine HCl 3 mg; hydroxyzine HCl 50 mg; lithium 300 mg; InVega Sustenna 234 mg IM; trazodone HCl 300 mg    Medication changes or discontinuations were communicated to patient's pharmacy: [] yes  [x] no    UA collected [] yes  [] no  [x] n/a-virtual     Outside referrals / labs, etc support staff to follow up: [] yes  [x] no    Future appointment was made: [x] yes  [] no  [] n/a   05/11/2022  Dictation completed at time of chart check: [x] yes  [] no    I have checked the documentation for today s encounters and the above information has been reviewed and completed.      Marie Wheeler on March 17, 2022 at 7:18 AM

## 2022-04-20 ENCOUNTER — TELEPHONE (OUTPATIENT)
Dept: ONCOLOGY | Facility: HOSPITAL | Age: 22
End: 2022-04-20
Payer: COMMERCIAL

## 2022-04-20 NOTE — TELEPHONE ENCOUNTER
RONDAI  LMTCB & Schedule based on patients recall.  This will be the final attempt to call the patient. A letter has been mailed out today as well.  The recall will be removed from the patients appointment desk

## 2022-04-20 NOTE — LETTER
Kaden Easton Jr.  [address] [place] [id number]          April 20, 2022    Dear Kaden:    Our clinic records indicate we have attempted to contact you to schedule a follow up appointment with Dr. Ponce. Unfortunately, we have been unable to reach you. To prevent further delays in your care, please contact our office at [phone] to schedule your appointment.      Sincerely,     [facility] [place] [facility]

## 2022-05-10 ENCOUNTER — TELEPHONE (OUTPATIENT)
Dept: BEHAVIORAL HEALTH | Facility: HOSPITAL | Age: 22
End: 2022-05-10
Payer: COMMERCIAL

## 2022-05-10 NOTE — TELEPHONE ENCOUNTER
Pt's  CM called to inform the team that he is faxing over some paperwork they need to obtain a state ID for pt.  Please complete and return at earliest convenience.

## 2022-05-11 ENCOUNTER — TELEPHONE (OUTPATIENT)
Dept: BEHAVIORAL HEALTH | Facility: CLINIC | Age: 22
End: 2022-05-11
Payer: COMMERCIAL

## 2022-05-11 NOTE — TELEPHONE ENCOUNTER
RECEIVED SSA REQUEST.  ONCE PROVIDER INFO RECEIVED WILL FORWARD ON FOR MEDICAL RECORD REQUEST.  EMAILED TO PROVIDER AND PLACED IN CLINIC MAILBOX    RECEIVED PAPERWORK FROM PROVIDER -  SENT ON THE MEDICAL RECORDS FOR RECORD REQUEST PIECE

## 2022-06-07 NOTE — PROGRESS NOTES
This video/telephone visit will be conducted via a call between you and your physician/provider. We have found that certain health care needs can be provided without the need for an in-person physical exam. This service lets us provide the care you need with a video /telephone conversation. If a prescription is necessary we can send it directly to your pharmacy. If lab work is needed we can place an order for that and you can then stop by our lab to have the test done at a later time.    Just as we bill insurance for in-person visits, we also bill insurance for video/telephone visits. If you have questions about your insurance coverage, we recommend that you speak with your insurance company.    Patient has given verbal consent for video/Telephone visit? yes    Patient would like video visit, please connect : e-mail to [email or web address]  Reason for visit: follow up  Patient verified allergies, medications and pharmacy via phone with his . His CM will be there for appt as well      Patient states he  is ready for visit.    Judy Holly June 7, 2022 12:52 PM    MH&A Post-Appointment Chart -check      Correct pharmacy verified with patient and updated in chart? [x] yes []no    Charge captured ? [] yes  [] no [x] n/a-virtual     Medications ordered this visit were e-scribed.  Verified by order class [x] yes  [] no    List Medications:  Abilify  Strattera  Thorazine  Guanfacine  Atarax  Li  Trazodone   paliperidone (INVEGA SUSTENNA) 234 MG/1.5ML JUANA  Class: E-Prescribe      Medication changes or discontinuations were communicated to patient's pharmacy: [x] yes  [] no    UA collected [] yes  [] no  [x] n/a-virtual     Outside referrals / labs, etc support staff to follow up: [] yes  [x] no    Future appointment was made: [] yes  [x] no  [] n/a    Dictation completed at time of chart check: [x] yes  [] no    I have checked the documentation for today s encounters and the above information  has been reviewed and completed.      Judy Holly on June 8, 2022 at 3:14 PM

## 2022-06-08 ENCOUNTER — VIRTUAL VISIT (OUTPATIENT)
Dept: BEHAVIORAL HEALTH | Facility: CLINIC | Age: 22
End: 2022-06-08
Attending: PSYCHIATRY & NEUROLOGY
Payer: COMMERCIAL

## 2022-06-08 DIAGNOSIS — F43.23 ADJUSTMENT DISORDER WITH MIXED ANXIETY AND DEPRESSED MOOD: ICD-10-CM

## 2022-06-08 DIAGNOSIS — F90.9 ATTENTION DEFICIT HYPERACTIVITY DISORDER (ADHD), UNSPECIFIED ADHD TYPE: ICD-10-CM

## 2022-06-08 DIAGNOSIS — F63.81 INTERMITTENT EXPLOSIVE DISORDER: ICD-10-CM

## 2022-06-08 DIAGNOSIS — F39 SEVERE MOOD DISORDER WITH PSYCHOTIC FEATURES (H): ICD-10-CM

## 2022-06-08 PROCEDURE — 99214 OFFICE O/P EST MOD 30 MIN: CPT | Mod: 95 | Performed by: NURSE PRACTITIONER

## 2022-06-08 PROCEDURE — G0463 HOSPITAL OUTPT CLINIC VISIT: HCPCS | Mod: PN,RTG | Performed by: NURSE PRACTITIONER

## 2022-06-08 RX ORDER — GUANFACINE 3 MG/1
3 TABLET, EXTENDED RELEASE ORAL AT BEDTIME
Qty: 30 TABLET | Refills: 2 | Status: SHIPPED | OUTPATIENT
Start: 2022-06-08 | End: 2022-08-31

## 2022-06-08 RX ORDER — ATOMOXETINE 100 MG/1
100 CAPSULE ORAL DAILY
Qty: 90 CAPSULE | Refills: 0 | Status: SHIPPED | OUTPATIENT
Start: 2022-06-08 | End: 2022-08-31

## 2022-06-08 RX ORDER — HYDROXYZINE HYDROCHLORIDE 50 MG/1
50 TABLET, FILM COATED ORAL 3 TIMES DAILY PRN
Qty: 90 TABLET | Refills: 2 | Status: SHIPPED | OUTPATIENT
Start: 2022-06-08 | End: 2022-08-31

## 2022-06-08 RX ORDER — ARIPIPRAZOLE 10 MG/1
10 TABLET ORAL EVERY MORNING
Qty: 31 TABLET | Refills: 2 | Status: SHIPPED | OUTPATIENT
Start: 2022-06-08 | End: 2022-08-31

## 2022-06-08 RX ORDER — TRAZODONE HYDROCHLORIDE 300 MG/1
300 TABLET ORAL AT BEDTIME
Qty: 30 TABLET | Refills: 2 | Status: SHIPPED | OUTPATIENT
Start: 2022-06-08 | End: 2022-08-31

## 2022-06-08 RX ORDER — LITHIUM CARBONATE 300 MG/1
CAPSULE ORAL
Qty: 93 CAPSULE | Refills: 2 | Status: SHIPPED | OUTPATIENT
Start: 2022-06-08 | End: 2022-08-31

## 2022-06-08 RX ORDER — CHLORPROMAZINE HYDROCHLORIDE 50 MG/1
50 TABLET, FILM COATED ORAL 2 TIMES DAILY PRN
Qty: 60 TABLET | Refills: 2 | Status: SHIPPED | OUTPATIENT
Start: 2022-06-08 | End: 2022-08-31

## 2022-06-08 RX ORDER — PALIPERIDONE PALMITATE 234 MG/1.5ML
INJECTION INTRAMUSCULAR
Qty: 1.5 ML | Refills: 2 | Status: SHIPPED | OUTPATIENT
Start: 2022-06-08 | End: 2022-09-12

## 2022-06-08 NOTE — PROGRESS NOTES
"  The patient has been notified of following:      \"This virtual  visit will be conducted via a call between you and your physician/provider. We have found that certain health care needs can be provided without the need for a physical exam.  This service lets us provide the care you need virtually/via video   If a prescription is necessary we can send it directly to your pharmacy.  If lab work is needed we can place an order for that and you can then stop by our lab to have the test done at a later time.     Virtual/Video visits are billed at different rates depending on your insurance coverage.Some insurers they may be billed the same as an in-person visit.  Please reach out to your insurance provider with any questions.          Dyllan virk would you like to obtain your AVS? MyChart  If the video visit is dropped, the invitation should be resent by: Send to e-mail at: [email or web address]  Will anyone else be joining your video visit? No            Psychiatric  Out- Patient  Follow Up Progress Note  Date of visit:6/8/2022         Discussion of Care and Treatment Recommendations:   This is a 21 year old male with This is a 21 year old male with  Intellectual disability,   history of fetal alcohol  spectrum,and  mood Disorder, dueto general medical condition.Pt resides in a group home.       Group Home Admin (Susan, [phone]- present for visit   Mental Health : Escobar Winslow, [employer]. Phone: [phone]; Fax: [phone]; Office: [phone]. - present   ---- CADI : Patsy Odell, [facility]. Phone: [phone]; Fax: [phone].  ---- Representative Payee: Nathaniel Claros [employer]. Phone: [phone].   ---- Care Coordination: SADE Boykin Case Manager, [facility]. Phone: [phone]         Last visit  03/16/2022 Recommendation at last visit .  1-Continue with current medications :   Abilify  10 mg in the morning  quinicine  3 mg at bedtime    Invega 234 mg /IM " "monthly -Last Invega Sustenna 234 mg IM was 3/02/2022 given by group home   Hrvqwnbhdm27 mg BID - agitation PRN   Statreta 100 mg in am   Trazodone 300 mg at bedtime   Lithium 900 mg very evening with mels   Hydroxyzine 50 mg TID PRN- Has only taken it 3 times since 9/1/2021   2-High risk medication use-lipid panel basic metabolic panel labs were completed in August 2021 and were within normal limits.  Will order Lithium level today   3.  Return to the clinic in approximately 8 weeks-come to the clinic for DISCUS assessment  Patient and I reviewed diagnosis and treatment plan and patient agrees with following recommendations:  Ongoing education given regarding diagnostic and treatment options with adequate verbalization of understanding.  Plan   1-Continue with current medications :   Abilify  10 mg in the morning  quinicine  3 mg at bedtime    Invega 234 mg /IM monthly -Last Invega Sustenna 234 mg IM was 3/02/2022 given by group home   Wrfiotlwww73 mg BID - agitation PRN   Statreta 100 mg in am   Trazodone 300 mg at bedtime   Lithium 900 mg very evening with mels   Hydroxyzine 50 mg TID PRN- Has only taken it 3 times since 9/1/2021   2-High risk medication use-lipid panel basic metabolic panel labs were completed in August 2021 and were within normal limits.  Will order Lithium level today   3.  Return to the clinic in approximately 8 weeks-come to the clinic for DISCUS assessment         DIagnoses:     Schizoaffective disorder, unspecified type (HCC)  Active Problems:  Intermittent explosive disorder  Fetal alcohol spectrum disorder  History of traumatic brain injury  Tobacco use disorder  Mild intellectual disability  ADHD (attention deficit hyperactivity disorder), combined type  Aggressive behavior    Patient Active Problem List   Diagnosis     Thrombocytopenia (H)     Chronic ITP (idiopathic thrombocytopenia) (H)             Chief Complaint / Subjective:    Chief complaint: \" I am doing well \"     History of " "Present Illness:   Per patient statement-he is doing well.  He reports compliance with current medications and denies side effects.  He is currently living in a group home his he only resident of group home at the moment.  He is getting along with the staff members.  I also spoke to his  who was present at the end appointment.  Patient reports that patient is doing extremely well at the current group home.  He is compliant with current medication.      I did also note the patient demeanor is more positive and he smiled during the appointment and answered most of my questions.  I believe this current group home is a good fit for him but also given that he is alone now would wait to see how he responds once the other housemates.  For now we will continue with current medications.  I did adviss the  that for the next appointment he needs to come to our clinic so we can get a neuroleptic medication side effects evaluation completed.  He endorsed understanding.  Mental Status Examination:   Appearance: Well groomed, good eye contact   Orientation: Patient alert and oriented to person, place, time, and situation  Reliability:  Patient appears to be an adequate historian.    Behavior: cooperative   Speech: Speech is spontaneous and coherent, with a normal rate, rhythm and tone.    Language:There are no difficulties with expressive or receptive language as observed throughout the interview.    Mood: Described as \"ok\".    Affect: congruent   Judgement: Able to make basic decision regarding safety.  Insight: Good awareness of physical and mental health conditions and aware of needs around care for these.  Gait and station: unable to assess  Thought process: Logical   Thought content: No evidence of delusions or paranoia.    Hallucinations : No evidence of any hallucination  Thought content: No evidence of delusions or paranoia.   Suicidal /Homical Ideations:  No thoughts of self harm or suicide. No " thoughts of harming others.  Associations: Connected  Fund of knowledge: Average  Attention / Concentration: Able to remain focused during the interview with minimal distractibility or need for redirection.  Short Term Memory: Grossly intact as evidence by client recalling themes and ideas discussed.  Long Term Memory: Intact  Motor Status: unable to asse    Drug/treatment history and current pattern of use:     History of cannabis use  History of nicotine use-vaping  Currently denies use of both cannabis and nicotine since August 2021    Medication changes: See Above   Medication adherence: compliant  Medication side effects: absent  Information about medications: Side effects, benefits and alternative treatments discussed and patient agrees .    Psychotherapy: Supportive therapy day-to-day living    Education: Diet, exercise, abstinence from drugs and alcohol, patient will not drive if sedated and medications or  under influence of any substance    Lab Results:   Personally reviewed and discussed with the patient    Lab Results   Component Value Date    WBC 12.9 (H) 10/13/2021    HGB 14.7 10/13/2021    HCT 45.4 10/13/2021     10/13/2021    CHOL 139 10/13/2021    TRIG 118 10/13/2021    HDL 38 (L) 10/13/2021    ALT 25 10/13/2021    AST 13 10/13/2021     10/13/2021    BUN 7 10/13/2021    CO2 25 10/13/2021    TSH 3.31 10/13/2021       Vital signs:  There were no vitals taken for this visit.  Telemedicine visit-no vital signs completed  Allergies: Depakote [valproic acid] and Other environmental allergy         Medications:     Current Outpatient Medications   Medication     ARIPiprazole (ABILIFY) 10 MG tablet     atomoxetine (STRATTERA) 100 MG capsule     chlorproMAZINE (THORAZINE) 50 MG tablet     guanFACINE HCl (INTUNIV) 3 MG TB24 24 hr tablet     hydrOXYzine (ATARAX) 50 MG tablet     lithium 300 MG capsule     loratadine (CLARITIN) 10 MG tablet     paliperidone (INVEGA SUSTENNA) 234 MG/1.5ML JUANA      traZODone HCl 300 MG TABS     atropine 1 % ophthalmic solution     cholecalciferol 25 MCG (1000 UT) TABS     ferrous sulfate (FEROSUL) 325 (65 Fe) MG tablet     metFORMIN (GLUCOPHAGE-XR) 500 MG 24 hr tablet     vitamin C (ASCORBIC ACID) 250 MG TABS tablet     No current facility-administered medications for this visit.               Medication adherence: Reviewed risk/benefits of medication , Patient able to verbalize understanding of side effects and Patient verbally consents to taking medications           Review of Systems:      ROS:    Subjective Data Only- Tele-Health Visit    10 point ROS was negative except for the items listed in HPI.      Crisis Resources:    1. Present to the Emergency Department as needed or call after hours crisis line at [phone] or [phone].   2. [place] Crisis Text Line: Text [place] to 853337.  3. Suicide LifeLine Chat: suicidepreBelgian Beer Discovery.org/chat/.  4. National Suicide Prevention Lifeline: 212.659.7014 (TTY: 861.901.4526). Call anytime for help.  (www.suicidepreKarmaloopline.org)  5. National [place] on Mental Illness (www.milvia.org): 262-209-1775 or 666-351-6583.  6. Mental Health Association (www.mentalhealth.org): 777.653.8582 or 415-980-9531.      Coordination of Care:   More than 30 minutes spent on this visit  with more than 50% of time spent on coordination of care including: Educating patient about diagnosis, prognosis, side effects and benefits of medications, diet, exercise.  Time also spent providing supportive therapy regarding above issues.    Video-Visit Details    Type of service:  Video Visit    Originating Location (pt. Location): Home    Distant Location (provider location):  [facility] MENTAL [facility]     Platform used for Video Visit: "[employer]      This note was created using a dictation system. All typing errors or contextual distortion is unintentional and software inherent.  Start Time : 1430  End time :  1500

## 2022-06-08 NOTE — PATIENT INSTRUCTIONS
Continue medications as prescribed  Have your pharmacy contact us for a refill if you are running low on medications (We may ask you to come into clinic to get a refill from the nurse  No Alcohol or drug use  No driving if sedated  Call the clinic with any questions or concerns   Reach out for help if you feel like hurting yourself or others ([facility] [phone]: [address] [facility], [address] or [facility] Suicide Hotline   [phone] , call 911 or go to nearest Emergency room     Crisis Resources:    Present to the Emergency Department as needed or call after hours crisis line at [phone] or [phone].   [place] Crisis Text Line: Text [place] to 170098.  Suicide LifeLine Chat: suicidepreventionlifeline.org/chat/.  National Suicide Prevention Lifeline: 724.778.6810 (TTY: 619.434.1296). Call anytime for help.  (www.suicidepreventionlifeline.org)  National [place] on Mental Illness (www.milvia.org): 366.141.9725 or 806-145-9533.  Mental Health Association (www.mentalhealth.org): 387.130.5464 or 597-496-8574.       Follow up as directed, for your appointments, per your After Visit Summary Form.

## 2022-06-14 ENCOUNTER — TELEPHONE (OUTPATIENT)
Dept: BEHAVIORAL HEALTH | Facility: CLINIC | Age: 22
End: 2022-06-14
Payer: COMMERCIAL

## 2022-06-16 NOTE — TELEPHONE ENCOUNTER
No forms have been received. Called CM but there was no answer. Left voicemail with clinic fax number and clinic phone number to return call if any questions.

## 2022-06-30 ENCOUNTER — LAB REQUISITION (OUTPATIENT)
Dept: LAB | Facility: CLINIC | Age: 22
End: 2022-06-30
Payer: COMMERCIAL

## 2022-06-30 DIAGNOSIS — R79.89 OTHER SPECIFIED ABNORMAL FINDINGS OF BLOOD CHEMISTRY: ICD-10-CM

## 2022-06-30 LAB
ALBUMIN SERPL BCG-MCNC: 4.3 G/DL (ref 3.5–5.2)
ALP SERPL-CCNC: 70 U/L (ref 40–129)
ALT SERPL W P-5'-P-CCNC: 21 U/L (ref 10–50)
ANION GAP SERPL CALCULATED.3IONS-SCNC: 12 MMOL/L (ref 7–15)
AST SERPL W P-5'-P-CCNC: 23 U/L (ref 10–50)
BILIRUB SERPL-MCNC: 0.3 MG/DL
BUN SERPL-MCNC: 5.3 MG/DL (ref 6–20)
CALCIUM SERPL-MCNC: 9.4 MG/DL (ref 8.6–10)
CHLORIDE SERPL-SCNC: 104 MMOL/L (ref 98–107)
CREAT SERPL-MCNC: 0.89 MG/DL (ref 0.67–1.17)
DEPRECATED HCO3 PLAS-SCNC: 22 MMOL/L (ref 22–29)
GFR SERPL CREATININE-BSD FRML MDRD: >90 ML/MIN/1.73M2
GLUCOSE SERPL-MCNC: 87 MG/DL (ref 70–99)
LITHIUM SERPL-SCNC: 0.8 MMOL/L (ref 0.6–1.2)
POTASSIUM SERPL-SCNC: 3.8 MMOL/L (ref 3.4–5.3)
PROLACTIN SERPL 3RD IS-MCNC: 36 NG/ML (ref 4–15)
PROT SERPL-MCNC: 7.3 G/DL (ref 6.4–8.3)
SODIUM SERPL-SCNC: 138 MMOL/L (ref 136–145)
TSH SERPL DL<=0.005 MIU/L-ACNC: 3.61 UIU/ML (ref 0.3–4.2)

## 2022-06-30 PROCEDURE — 84155 ASSAY OF PROTEIN SERUM: CPT | Mod: ORL | Performed by: NURSE PRACTITIONER

## 2022-06-30 PROCEDURE — 80053 COMPREHEN METABOLIC PANEL: CPT | Performed by: NURSE PRACTITIONER

## 2022-06-30 PROCEDURE — 84146 ASSAY OF PROLACTIN: CPT | Performed by: NURSE PRACTITIONER

## 2022-06-30 PROCEDURE — 80178 ASSAY OF LITHIUM: CPT | Mod: ORL | Performed by: NURSE PRACTITIONER

## 2022-06-30 PROCEDURE — 84443 ASSAY THYROID STIM HORMONE: CPT | Mod: ORL | Performed by: NURSE PRACTITIONER

## 2022-08-04 NOTE — TELEPHONE ENCOUNTER
While calling to make future f/u appointment with  he wanted you to be aware (not sure if it was mentioned) that patient has been aggressive at times especially when he thinks someone has taken something of his when in reality nobody has. Wanted you to be aware of this  
02-Aug-2022

## 2022-08-24 DIAGNOSIS — F39 SEVERE MOOD DISORDER WITH PSYCHOTIC FEATURES (H): ICD-10-CM

## 2022-08-24 DIAGNOSIS — F90.9 ATTENTION DEFICIT HYPERACTIVITY DISORDER (ADHD), UNSPECIFIED ADHD TYPE: ICD-10-CM

## 2022-08-24 DIAGNOSIS — F43.23 ADJUSTMENT DISORDER WITH MIXED ANXIETY AND DEPRESSED MOOD: ICD-10-CM

## 2022-08-24 DIAGNOSIS — F63.81 INTERMITTENT EXPLOSIVE DISORDER: ICD-10-CM

## 2022-08-25 ENCOUNTER — VIRTUAL VISIT (OUTPATIENT)
Dept: PSYCHIATRY | Facility: CLINIC | Age: 22
End: 2022-08-25
Payer: COMMERCIAL

## 2022-08-25 DIAGNOSIS — R46.89 AGGRESSIVE BEHAVIOR: ICD-10-CM

## 2022-08-25 DIAGNOSIS — F90.2 ADHD (ATTENTION DEFICIT HYPERACTIVITY DISORDER), COMBINED TYPE: ICD-10-CM

## 2022-08-25 DIAGNOSIS — F63.81 INTERMITTENT EXPLOSIVE DISORDER: ICD-10-CM

## 2022-08-25 DIAGNOSIS — F25.9 SCHIZOAFFECTIVE DISORDER, UNSPECIFIED TYPE (H): Primary | ICD-10-CM

## 2022-08-25 DIAGNOSIS — Z87.820 HISTORY OF TRAUMATIC BRAIN INJURY: ICD-10-CM

## 2022-08-25 DIAGNOSIS — F70 MILD INTELLECTUAL DISABILITY: ICD-10-CM

## 2022-08-25 PROCEDURE — 99214 OFFICE O/P EST MOD 30 MIN: CPT | Mod: 95 | Performed by: NURSE PRACTITIONER

## 2022-08-25 RX ORDER — TRAZODONE HYDROCHLORIDE 300 MG/1
TABLET ORAL
Qty: 28 TABLET | OUTPATIENT
Start: 2022-08-25

## 2022-08-25 RX ORDER — ARIPIPRAZOLE 10 MG/1
TABLET ORAL
Qty: 28 TABLET | OUTPATIENT
Start: 2022-08-25

## 2022-08-25 RX ORDER — GUANFACINE 3 MG/1
TABLET, EXTENDED RELEASE ORAL
Qty: 28 TABLET | OUTPATIENT
Start: 2022-08-25

## 2022-08-25 RX ORDER — ATOMOXETINE 100 MG/1
CAPSULE ORAL
Qty: 28 CAPSULE | OUTPATIENT
Start: 2022-08-25

## 2022-08-25 RX ORDER — LITHIUM CARBONATE 300 MG/1
CAPSULE ORAL
Qty: 84 CAPSULE | OUTPATIENT
Start: 2022-08-25

## 2022-08-25 RX ORDER — PALIPERIDONE PALMITATE 234 MG/1.5ML
INJECTION INTRAMUSCULAR
Qty: 1.5 ML | Refills: 2 | OUTPATIENT
Start: 2022-08-25

## 2022-08-25 NOTE — PROGRESS NOTES
This video/telephone visit will be conducted via a call between you and your physician/provider. We have found that certain health care needs can be provided without the need for an in-person physical exam. This service lets us provide the care you need with a video /telephone conversation. If a prescription is necessary we can send it directly to your pharmacy. If lab work is needed we can place an order for that and you can then stop by our lab to have the test done at a later time.    Just as we bill insurance for in-person visits, we also bill insurance for video/telephone visits. If you have questions about your insurance coverage, we recommend that you speak with your insurance company.    Patient has given verbal consent for video/Telephone visit? yes    Patient would like a video visit, please connect/call : [email or web address]  Reason for visit: follow up    Patient verified allergies, medications and pharmacy via phone.   PHQ-9 scores:   PHQ-9 SCORE 9/15/2020 7/1/2021   PHQ-9 Total Score 11 15       Patient states they are ready for visit.    Kayla Lewis CMA August 25, 2022 9:49 AM

## 2022-08-25 NOTE — PATIENT INSTRUCTIONS
Continue medications as prescribed  Have your pharmacy contact us for a refill if you are running low on medications (We may ask you to come into clinic to get a refill from the nurse  No Alcohol or drug use  No driving if sedated  Call the clinic with any questions or concerns   Reach out for help if you feel like hurting yourself or others ([facility] [phone]: [address] [facility], [address] or [facility] Suicide Hotline   [phone] , call 911 or go to nearest Emergency room     Crisis Resources:    Present to the Emergency Department as needed or call after hours crisis line at [phone] or [phone].   [place] Crisis Text Line: Text [place] to 425441.  Suicide LifeLine Chat: suicidepreventionlifeline.org/chat/.  National Suicide Prevention Lifeline: 144.622.7089 (TTY: 105.462.6350). Call anytime for help.  (www.suicidepreventionlifeline.org)  National [place] on Mental Illness (www.milvia.org): 707.215.2673 or 593-659-1571.  Mental Health Association (www.mentalhealth.org): 231.121.9249 or 891-804-5719.       Follow up as directed, for your appointments, per your After Visit Summary Form.

## 2022-08-25 NOTE — PROGRESS NOTES
"  The patient has been notified of following:      \"This virtual/Telephone   visit will be conducted via a call between you and your physician/provider. We have found that certain health care needs can be provided without the need for a physical exam.  This service lets us provide the care you need virtually/via video   If a prescription is necessary we can send it directly to your pharmacy.  If lab work is needed we can place an order for that and you can then stop by our lab to have the test done at a later time.     Virtual/Video/Telephone  visits are billed at different rates depending on your insurance coverage.Some insurers they may be billed the same as an in-person visit.  Please reach out to your insurance provider with any questions.    Patient has given verbal consent for l/Telephone   visit : Yes        Psychiatric  Out- Patient  Follow Up Progress Note  Date of visit:8/25/2022           Discussion of Care and Treatment Recommendations:   This is a 22 year old male with history of   Intellectual disability,  fetal alcohol  spectrum,and  mood Disorder, due to general medical condition.Pt resides in a group home.       Group Home Admin (Izabela, [phone]- present for visit   Mental Health : Escobar Winslow, [employer] Phone: [phone]; Fax: [phone]; Office: [phone]. - present   ---- CADI : Patsy Odell, [facility]. Phone: [phone]; Fax: [phone].  ---- Representative Payee: Nathaniel Claros, [facility] [place]. Phone: [phone].   ---- Care Coordination: SADE Boykin Case Manager, [facility]. Phone: [phone]       Last visit  06/08/2022 Recommendation at last visit .  1-Continue with current medications :   Abilify  10 mg in the morning  quinicine  3 mg at bedtime    Invega 234 mg /IM monthly -Last Invega Sustenna 234 mg IM was 3/02/2022 given by group home   Tvweqgsgwu26 mg BID - agitation PRN   Statreta 100 mg in am   Trazodone 300 mg at bedtime "   Lithium 900 mg very evening with mels   Hydroxyzine 50 mg TID PRN- Has only taken it 3 times since 9/1/2021   2-High risk medication use-lipid panel basic metabolic panel labs were completed in August 2021 and were within normal limits.  Will order Lithium level today   3.  Return to the clinic in approximately 8 weeks-come to the clinic for DISCUS assessment     Patient and I reviewed diagnosis and treatment plan and patient agrees with following recommendations:  Ongoing education given regarding diagnostic and treatment options with adequate verbalization of understanding around 80 in the morning for breakfast.  7 await OT good you have any questions for me   Plan   1-Continue with current medications :   Abilify  10 mg in the morning  quinicine  3 mg at bedtime    Invega 234 mg /IM monthly -Last Invega Sustenna 234 mg IM was 3/02/2022 given by group home   Loidetdckq23 mg BID - agitation PRN   Statreta 100 mg in am   Trazodone 300 mg at bedtime   Lithium 900 mg very evening with mels   Hydroxyzine 50 mg TID PRN- Has only taken it 3 times since 9/1/2021   2-High risk medication use-lipid panel basic metabolic panel labs were completed in August 2021 and were within normal limits.  Will order Lithium level today   3.  Return to the clinic in approximately 4 weeks-come to the clinic for DISCUS assessment            DIagnoses:     Schizoaffective disorder, unspecified type (HCC)  Active Problems:  Intermittent explosive disorder  Fetal alcohol spectrum disorder  History of traumatic brain injury  Tobacco use disorder  Mild intellectual disability  ADHD (attention deficit hyperactivity disorder), combined type  Aggressive behavior    Patient Active Problem List   Diagnosis     Thrombocytopenia (H)     Chronic ITP (idiopathic thrombocytopenia) (H)             Chief Complaint / Subjective:    Chief complaint: Schizoaffective disorder    History of Present Illness:   Per patient statement-he is doing well.  He has  "been compliant with current medications.  He denies side effects.  Sleep and appetite are remarkable.  He has been taking daily walks in the company of staff for physical exercises.  I also spoke to the  and he is safe for reported the patient has been doing well.  This week is his first year anniversary being in the home.  He has not had to be hospitalized over has not had to call in 1 1 for aggressive behaviors.  They feel that patient is progressing well.  He now has 1 housemate and he is getting along with him.  They are happy with current plan of care.  Patient to continue on current occasions.  Patient return to the clinic in approximately 4 weeks for follow-up appointment.  Patient will call interim dyspnea questions or concerns.      Mental Status Examination:   Appearance: Well groomed, good eye contact   Orientation: Patient alert and oriented to person, place, time, and situation  Reliability:  Patient appears to be an adequate historian.    Behavior: cooperative   Speech: Speech is spontaneous and coherent, with a normal rate, rhythm and tone.    Language:There are no difficulties with expressive or receptive language as observed throughout the interview.    Mood: Described as \"ok\".    Affect: congruent   Judgement: Able to make basic decision regarding safety.  Insight: Good awareness of physical and mental health conditions and aware of needs around care for these.  Gait and station: unable to assess  Thought process: Logical   Thought content: No evidence of delusions or paranoia.    Hallucinations : No evidence of any hallucination  Thought content: No evidence of delusions or paranoia.   Suicidal /Homical Ideations:  No thoughts of self harm or suicide. No thoughts of harming others.  Associations: Connected  Fund of knowledge: Average  Attention / Concentration: Able to remain focused during the interview with minimal distractibility or need for redirection.  Short Term Memory: " Grossly intact as evidence by client recalling themes and ideas discussed.  Long Term Memory: Intact  Motor Status: unable to asse    Drug/treatment history and current pattern of use:   History of cannabis use  History of nicotine use-vaping  Currently denies use of both cannabis and nicotine since August 2021    Medication changes: See Above   Medication adherence: compliant  Medication side effects: absent  Information about medications: Side effects, benefits and alternative treatments discussed and patient agrees .    Psychotherapy: Supportive therapy day-to-day living    Education: Diet, exercise, abstinence from drugs and alcohol, patient will not drive if sedated and medications or  under influence of any substance    Lab Results:   Personally reviewed and discussed with the patient    Lab Results   Component Value Date    WBC 12.9 (H) 10/13/2021    HGB 14.7 10/13/2021    HCT 45.4 10/13/2021     10/13/2021    CHOL 139 10/13/2021    TRIG 118 10/13/2021    HDL 38 (L) 10/13/2021    ALT 21 06/30/2022    AST 23 06/30/2022     06/30/2022    BUN 5.3 (L) 06/30/2022    CO2 22 06/30/2022    TSH 3.61 06/30/2022       Vital signs:  There were no vitals taken for this visit.  Telemedicine visit-no vital signs completed  Allergies: Depakote [valproic acid] and Other environmental allergy         Medications:     Current Outpatient Medications   Medication     ARIPiprazole (ABILIFY) 10 MG tablet     atomoxetine (STRATTERA) 100 MG capsule     atropine 1 % ophthalmic solution     chlorproMAZINE (THORAZINE) 50 MG tablet     cholecalciferol 25 MCG (1000 UT) TABS     ferrous sulfate (FEROSUL) 325 (65 Fe) MG tablet     guanFACINE HCl (INTUNIV) 3 MG TB24 24 hr tablet     hydrOXYzine (ATARAX) 50 MG tablet     lithium 300 MG capsule     loratadine (CLARITIN) 10 MG tablet     metFORMIN (GLUCOPHAGE-XR) 500 MG 24 hr tablet     paliperidone (INVEGA SUSTENNA) 234 MG/1.5ML JUANA     traZODone HCl 300 MG TABS     vitamin C  (ASCORBIC ACID) 250 MG TABS tablet     No current facility-administered medications for this visit.               Medication adherence: Reviewed risk/benefits of medication , Patient able to verbalize understanding of side effects and Patient verbally consents to taking medications           Review of Systems:      ROS:    Subjective Data Only- Tele-Health Visit    10 point ROS was negative except for the items listed in HPI.      Crisis Resources:    1. Present to the Emergency Department as needed or call after hours crisis line at [phone] or [phone].   2. [place] Crisis Text Line: Text [place] to 294735.  3. Suicide LifeLine Chat: suicideLumi [place].org/chat/.  4. National Suicide Prevention Lifeline: 722.128.5446 (TTY: 773.197.3279). Call anytime for help.  (www.suicidepreventionMemebox Corporationline.org)  5. National [place] on Mental Illness (www.milvia.org): 173.480.7893 or 549-161-7668.  6. Mental Health Association (www.mentalhealth.org): 937.578.9297 or 232-197-1285.      Coordination of Care:   More than 30 minutes spent on this visit  with more than 50% of time spent on coordination of care including: Educating patient about diagnosis, prognosis, side effects and benefits of medications, diet, exercise.  Time also spent providing supportive therapy regarding above issues.      This note was created using a dictation system. All typing errors or contextual distortion is unintentional and software inherent.  Start Time : 1000  End time : 0390

## 2022-08-29 DIAGNOSIS — F43.23 ADJUSTMENT DISORDER WITH MIXED ANXIETY AND DEPRESSED MOOD: ICD-10-CM

## 2022-08-29 DIAGNOSIS — F63.81 INTERMITTENT EXPLOSIVE DISORDER: ICD-10-CM

## 2022-08-29 DIAGNOSIS — F90.9 ATTENTION DEFICIT HYPERACTIVITY DISORDER (ADHD), UNSPECIFIED ADHD TYPE: ICD-10-CM

## 2022-08-29 NOTE — TELEPHONE ENCOUNTER
Pharmacy called stating they are waiting on refill authorizations for several medications for this pt.  States they need to get these out to his group home today.  Please call [phone] at earliest convenience.

## 2022-08-30 NOTE — TELEPHONE ENCOUNTER
Returned call to pharmacy. Pharmacist said that the patient needs refills for all medication because they need time to package them and send them out to the group home. Requesting all scripts from the provider. Patient was seen 8/25/22.

## 2022-08-31 RX ORDER — HYDROXYZINE HYDROCHLORIDE 50 MG/1
50 TABLET, FILM COATED ORAL 3 TIMES DAILY PRN
Qty: 90 TABLET | Refills: 2 | Status: SHIPPED | OUTPATIENT
Start: 2022-08-31 | End: 2022-12-14

## 2022-08-31 RX ORDER — ATOMOXETINE 100 MG/1
100 CAPSULE ORAL DAILY
Qty: 90 CAPSULE | Refills: 0 | Status: SHIPPED | OUTPATIENT
Start: 2022-08-31 | End: 2022-11-10

## 2022-08-31 RX ORDER — CHLORPROMAZINE HYDROCHLORIDE 50 MG/1
50 TABLET, FILM COATED ORAL 2 TIMES DAILY PRN
Qty: 60 TABLET | Refills: 2 | Status: SHIPPED | OUTPATIENT
Start: 2022-08-31 | End: 2022-12-14

## 2022-08-31 RX ORDER — GUANFACINE 3 MG/1
3 TABLET, EXTENDED RELEASE ORAL AT BEDTIME
Qty: 30 TABLET | Refills: 2 | Status: SHIPPED | OUTPATIENT
Start: 2022-08-31 | End: 2022-11-10

## 2022-08-31 RX ORDER — TRAZODONE HYDROCHLORIDE 300 MG/1
300 TABLET ORAL AT BEDTIME
Qty: 30 TABLET | Refills: 2 | Status: SHIPPED | OUTPATIENT
Start: 2022-08-31 | End: 2022-11-10

## 2022-08-31 RX ORDER — LITHIUM CARBONATE 300 MG/1
CAPSULE ORAL
Qty: 93 CAPSULE | Refills: 2 | Status: SHIPPED | OUTPATIENT
Start: 2022-08-31 | End: 2022-11-10

## 2022-08-31 RX ORDER — ARIPIPRAZOLE 10 MG/1
10 TABLET ORAL EVERY MORNING
Qty: 31 TABLET | Refills: 2 | Status: SHIPPED | OUTPATIENT
Start: 2022-08-31 | End: 2022-11-10

## 2022-09-02 ENCOUNTER — TELEPHONE (OUTPATIENT)
Dept: PSYCHIATRY | Facility: CLINIC | Age: 22
End: 2022-09-02

## 2022-09-02 DIAGNOSIS — F39 SEVERE MOOD DISORDER WITH PSYCHOTIC FEATURES (H): ICD-10-CM

## 2022-09-02 NOTE — TELEPHONE ENCOUNTER
Reason for Call:  Medication    Detailed comments: Received call from Escobar Winslow (  [phone]) reported the patient needs an Invega Injection    Phone Number Patient can be reached at: [phone]    Best Time: Anytime    Can we leave a detailed message on this number? Yes    Call taken on 9/2/2022 at 2:53 PM by Pascual Ojeda

## 2022-09-02 NOTE — TELEPHONE ENCOUNTER
Date of Last Office Visit: 8/25/22  Date of Next Office Visit: 10/12/22  No shows since last visit: none  Cancellations since last visit: none    Medication requested: Paliperidone 234 mg/1.5 ml Date last ordered: 6/8/22 Qty: 2 Refills: 0     Review of [place] ?: NA    Lapse in medication adherence greater than 5 days?: no  If yes, call patient and gather details: NA  Medication refill request verified as identical to current order?: yes  Result of Last DAM, VPA, Li+ Level, CBC, or Carbamazepine Level (at or since last visit): N/A    Last visit treatment plan:   Patient and I reviewed diagnosis and treatment plan and patient agrees with following recommendations:  Ongoing education given regarding diagnostic and treatment options with adequate verbalization of understanding around 80 in the morning for breakfast.  7 await OT good you have any questions for me   Plan   1-Continue with current medications :   Abilify  10 mg in the morning  quinicine  3 mg at bedtime    Invega 234 mg /IM monthly -Last Invega Sustenna 234 mg IM was 3/02/2022 given by group home   Yuvinmktwn11 mg BID - agitation PRN   Statreta 100 mg in am   Trazodone 300 mg at bedtime   Lithium 900 mg very evening with mels   Hydroxyzine 50 mg TID PRN- Has only taken it 3 times since 9/1/2021   2-High risk medication use-lipid panel basic metabolic panel labs were completed in August 2021 and were within normal limits.  Will order Lithium level today   3.  Return to the clinic in approximately 4 weeks-come to the clinic for DISCUS assessment    []Medication refilled per  Medication Refill in Ambulatory Care  policy.  [x]Medication unable to be refilled by RN due to criteria not met as indicated below:    []Eligibility - not seen in the last year   []Supervision - no future appointment   []Compliance - no shows, cancellations or lapse in therapy   []Verification - order discrepancy   []Controlled medication   [x]Medication not included in policy   []90-day  supply request   []Other

## 2022-09-12 ENCOUNTER — DOCUMENTATION ONLY (OUTPATIENT)
Dept: PSYCHIATRY | Facility: CLINIC | Age: 22
End: 2022-09-12

## 2022-09-12 DIAGNOSIS — F39 SEVERE MOOD DISORDER WITH PSYCHOTIC FEATURES (H): Primary | ICD-10-CM

## 2022-09-12 RX ORDER — PALIPERIDONE PALMITATE 156 MG/ML
INJECTION INTRAMUSCULAR
Qty: 1 ML | Refills: 1 | Status: SHIPPED | OUTPATIENT
Start: 2022-09-12 | End: 2022-09-12

## 2022-09-12 RX ORDER — PALIPERIDONE PALMITATE 234 MG/1.5ML
INJECTION INTRAMUSCULAR
Qty: 1.5 ML | Refills: 2 | Status: SHIPPED | OUTPATIENT
Start: 2022-09-12 | End: 2022-11-10

## 2022-09-12 NOTE — PROGRESS NOTES
Writer received message for call due to urgency of needing refill of Invega (paliperidone) Sustenna. Reina indicated last sent out 8/1/22.  Writer received return call from Marissa (nurse at [facility]) to verify Niwot's last injection was on 8/16/22. Therefore, pt does not need adjusted dose, as he is still within normal time frame for administration. It appears that MOSES Portillo, CNP, has put through order for his usual injection dose. Therefore, I've canceled the order for the adjusted dose I previously sent in and updated nurse Marissa that his standard injection order still stands and was sent in by Nke[facility] this evening.     Marissa also notified me that they are planning to switch psychiatric care to clinic where he also receives his medical care.     MOSES Salvador, CNP, PNP-PC, PMHNP-BC   [facility] ([facility])

## 2022-09-12 NOTE — TELEPHONE ENCOUNTER
Received return call from [place] pharmacy requesting medication refill paliperidone (INVEGA SUSTENNA) 234 MG/1.5ML JUANA for group home. Rerouting to provider high priority.    Ludmila Jeffrey RN on 9/12/2022 at 9:32 AM

## 2022-10-12 ENCOUNTER — ALLIED HEALTH/NURSE VISIT (OUTPATIENT)
Dept: NURSING | Facility: CLINIC | Age: 22
End: 2022-10-12
Payer: COMMERCIAL

## 2022-10-12 ENCOUNTER — VIRTUAL VISIT (OUTPATIENT)
Dept: PSYCHIATRY | Facility: CLINIC | Age: 22
End: 2022-10-12
Payer: COMMERCIAL

## 2022-10-12 VITALS
BODY MASS INDEX: 40.51 KG/M2 | WEIGHT: 251 LBS | HEART RATE: 94 BPM | SYSTOLIC BLOOD PRESSURE: 115 MMHG | DIASTOLIC BLOOD PRESSURE: 76 MMHG

## 2022-10-12 DIAGNOSIS — Q86.0 FETAL ALCOHOL SYNDROME: ICD-10-CM

## 2022-10-12 DIAGNOSIS — F79 INTELLECTUAL DISABILITY: ICD-10-CM

## 2022-10-12 DIAGNOSIS — F25.9 SCHIZOAFFECTIVE DISORDER, UNSPECIFIED TYPE (H): Primary | ICD-10-CM

## 2022-10-12 PROCEDURE — 99214 OFFICE O/P EST MOD 30 MIN: CPT | Mod: 95 | Performed by: NURSE PRACTITIONER

## 2022-10-12 ASSESSMENT — PAIN SCALES - GENERAL: PAINLEVEL: NO PAIN (0)

## 2022-10-12 NOTE — PROGRESS NOTES
Reason for visit: med management follow up  Anything the provider should be aware of for today's appointment: accompanied by his CM team, housing did not provide any ppwk, pt c/o of difficulties falling asleep, DISCUS score is a 1    Patient unable to verified medications and pharmacy .     Patient states they are ready for visit.    Judy Holly October 12, 2022 2:12 PM

## 2022-10-12 NOTE — PATIENT INSTRUCTIONS
Continue medications as prescribed  Have your pharmacy contact us for a refill if you are running low on medications (We may ask you to come into clinic to get a refill from the nurse  No Alcohol or drug use  No driving if sedated  Call the clinic with any questions or concerns   Reach out for help if you feel like hurting yourself or others ([facility] [phone]: [address] [facility], [address] or [facility] Suicide Hotline   [phone] , call 911 or go to nearest Emergency room     Crisis Resources:    Present to the Emergency Department as needed or call after hours crisis line at [phone] or [phone].   [place] Crisis Text Line: Text [place] to 475851.  Suicide LifeLine Chat: suicidepreventionlifeline.org/chat/.  National Suicide Prevention Lifeline: 873.925.6342 (TTY: 548.290.6241). Call anytime for help.  (www.suicidepreventionlifeline.org)  National [place] on Mental Illness (www.milvia.org): 904.898.8825 or 028-834-8526.  Mental Health Association (www.mentalhealth.org): 558.418.9198 or 253-823-7913.       Follow up as directed, for your appointments, per your After Visit Summary Form.

## 2022-10-12 NOTE — PROGRESS NOTES
"  The patient has been notified of following:      \"This virtual  visit will be conducted via a call between you and your physician/provider. We have found that certain health care needs can be provided without the need for a physical exam.  This service lets us provide the care you need virtually/via video   If a prescription is necessary we can send it directly to your pharmacy.  If lab work is needed we can place an order for that and you can then stop by our lab to have the test done at a later time.     Virtual/Video visits are billed at different rates depending on your insurance coverage.Some insurers they may be billed the same as an in-person visit.  Please reach out to your insurance provider with any questions.    Patient has given verbal consent for virtual  visit : Yes      Ho w would you like to obtain your AVS? Mail a copy  If the video visit is dropped, the invitation should be resent by: Send to e-mail at: [email or web address]  Will anyone else be joining your video visit? No            Psychiatric  Out- Patient  Follow Up Progress Note  Date of visit:10/12/2022         Discussion of Care and Treatment Recommendations:   This is a 22 year old male with history of  Intellectual disability,  fetal alcohol  spectrum,and  mood Disorder, due to general medical condition.Pt resides in a group home.       Group Home Admin (Izabela, [phone]- present for visit   Mental Health : Escobar Winslow, [employer]. Phone: [phone]; Fax: [phone]; Office: [phone]. - present   ---- CADI : Patsy Odell, [facility]. Phone: [phone]; Fax: [phone].  ---- Representative Payee: Nathaniel Claros, [employer]. Phone: [phone].   ---- Care Coordination: SADE Boykin Case Manager, [facility]. Phone: [phone]         Last visit  08/25/2022 Recommendation at last visit .  1-Continue with current medications :   Abilify  10 mg in the morning  quinicine  3 mg at " bedtime    Invega 234 mg /IM monthly -Last Invega Sustenna 234 mg IM was 3/02/2022 given by group home   Pzyzjxtonq29 mg BID - agitation PRN   Strattera 100 mg in am   Trazodone 300 mg at bedtime   Lithium 900 mg very evening with mels   Hydroxyzine 50 mg TID PRN- Has only taken it 3 times since 9/1/2021   2-High risk medication use-lipid panel basic metabolic panel labs were completed in August 2021 and were within normal limits.  Will order Lithium level today   3.  Return to the clinic in approximately 4 weeks-come to the clinic for DISCUS assessment     Patient and I reviewed diagnosis and treatment plan and patient agrees with following recommendations:  Ongoing education given regarding diagnostic and treatment options with adequate verbalization of understanding.  Plan   1-Continue with current medications :   Abilify  10 mg in the morning  Quifacine   3 mg at bedtime    Invega 234 mg /IM monthly -Last Invega Sustenna 234 mg IM was 3/02/2022 given by group home   Roivoqljni94 mg BID - agitation PRN   Statreta 100 mg in am   Trazodone 300 mg at bedtime   Lithium 900 mg very evening with mels   Hydroxyzine 50 mg TID PRN- Has only taken it 3 times since 9/1/2021   2-High risk medication use-lipid panel basic metabolic panel labs were completed in 06/30/2022and were within normal limits.  Last Lithium Level 06/30/2022- 0.8 WNL  DISCUS score  today =            DIagnoses:   Schizoaffective disorder, unspecified type (HCC)  Active Problems:  Intermittent explosive disorder  Fetal alcohol spectrum disorder  History of traumatic brain injury  Tobacco use disorder  Mild intellectual disability  ADHD (attention deficit hyperactivity disorder), combined type  Aggressive behavior      Patient Active Problem List   Diagnosis     Thrombocytopenia (H)     Chronic ITP (idiopathic thrombocytopenia) (H)             Chief Complaint / Subjective:    Chief complaint: Schizophrenia    History of Present Illness:   Patient is seen  "today at the telehealth station in the company of his  -Escobar.  He reports doing well on current medications and denies side effects.  He is getting along with his group home staff and there is only one other resident at his current group home.  He has been at the current group home for over 13 months which is longer than he has been with previous group homes where he lasted no more than 3 to 4 months.  He goes for walks but also spends a lot of time playing on his weartolookox.  We did discuss trying to find other land screen activities to keep busy.  He however seems to be responding well on current medications without much side effects.  DISCUS score today is 1 for leg movement.  Patient states that he does not have any involuntary movement on his legs or other extremities.  He also denies any dry mouth or stiff joints.  We will continue patient on current medications for now.  I will have him return to clinic in approximately 2 to 3 months for follow-up appointment.  He will call in between visits medications or concerns.       Mental Status Examination: Baseline Hx Mild Intellectual Disability,Fetal Alcohol Spectrum   Orientation: Patient alert and oriented to person, place, time, and situation  Reliability:  Patient appears to be a poor  historian.    Behavior: unable to assess  Speech: Speech is spontaneous and coherent, with a normal rate, rhythm and tone.    Language:There are no difficulties with expressive or receptive language as observed throughout the interview.    Mood: Described as \"I am doing good\" \".    Affect: unable to assess  Judgement: Fair s baseline Hx Mild Intellectual Disability  Insight: Fair -Baseline Hx Mild Intellectual Disability  Gait and station: unable to assess  Thought process: Logical   Hallucinations : No evidence of any hallucination  Thought content: No evidence of delusions or paranoia.   Suicidal /Homical Ideations:  No thoughts of self harm or suicide. No thoughts of " harming others.  Associations: Connected  Fund of knowledge: Fair- Baseline Hx Mild Intellectual Disability  Attention / Concentration:Needed redirection and reminders to stay focused   Short Term Memory: Grossly intact as evidence by client recalling themes and ideas discussed.  Long Term Memory: Fair  Motor Status: unable to assess       Drug/treatment history and current pattern of use:   History of cannabis use  History of nicotine use-vaping  Currently denies use of both cannabis and nicotine since August 2021    Medication changes: See Above   Medication adherence: compliant  Medication side effects: absent  Information about medications: Side effects, benefits and alternative treatments discussed and patient agrees .    Psychotherapy: Supportive therapy day-to-day living    Education: Diet, exercise, abstinence from drugs and alcohol, patient will not drive if sedated and medications or  under influence of any substance    Lab Results:   Personally reviewed and discussed with the patient    Lab Results   Component Value Date    WBC 12.9 (H) 10/13/2021    HGB 14.7 10/13/2021    HCT 45.4 10/13/2021     10/13/2021    CHOL 139 10/13/2021    TRIG 118 10/13/2021    HDL 38 (L) 10/13/2021    ALT 21 06/30/2022    AST 23 06/30/2022     06/30/2022    BUN 5.3 (L) 06/30/2022    CO2 22 06/30/2022    TSH 3.61 06/30/2022       Vital signs:  /76 (BP Location: Right arm, Patient Position: Sitting, Cuff Size: Adult Large)   Pulse 94   Wt 113.9 kg (251 lb)   BMI 40.51 kg/m    Telemedicine visit-no vital signs completed  Allergies: Depakote [valproic acid] and Other environmental allergy         Medications:               Medication adherence: Reviewed risk/benefits of medication , Patient able to verbalize understanding of side effects and Patient verbally consents to taking medications           Review of Systems:      ROS:    Subjective Data Only- Tele-Health Visit    10 point ROS was negative except for  the items listed in HPI.      Crisis Resources:    1. Present to the Emergency Department as needed or call after hours crisis line at [phone] or [phone].   2. [place] Crisis Text Line: Text [place] to 157844.  3. Suicide LifeLine Chat: suicidepreB[employer]lett Holdings.org/chat/.  4. National Suicide Prevention Lifeline: 363.464.2130 (TTY: 191.128.6814). Call anytime for help.  (www.suicidepreventionGeoli.st Classifiedsline.org)  5. National [place] on Mental Illness (www.milvia.org): 308.883.9686 or 226-261-1230.  6. Mental Health Association (www.mentalhealth.org): 614.933.2033 or 325-982-1444.      Coordination of Care:   More than 30 minutes spent on this visit  with more than 50% of time spent on coordination of care including: Educating patient about diagnosis, prognosis, side effects and benefits of medications, diet, exercise.  Time also spent providing supportive therapy regarding above issues.    Video-Visit Details    Type of service:  Video Visit    Originating Location (pt. Location): Home    Distant Location (provider location):  [facility]     Platform used for Video Visit: [employer]      This note was created using a dictation system. All typing errors or contextual distortion is unintentional and software inherent.  Start Time : 1400  End time : 1430

## 2022-11-10 DIAGNOSIS — F39 SEVERE MOOD DISORDER WITH PSYCHOTIC FEATURES (H): ICD-10-CM

## 2022-11-10 DIAGNOSIS — F63.81 INTERMITTENT EXPLOSIVE DISORDER: ICD-10-CM

## 2022-11-10 DIAGNOSIS — F43.23 ADJUSTMENT DISORDER WITH MIXED ANXIETY AND DEPRESSED MOOD: ICD-10-CM

## 2022-11-10 DIAGNOSIS — F90.9 ATTENTION DEFICIT HYPERACTIVITY DISORDER (ADHD), UNSPECIFIED ADHD TYPE: ICD-10-CM

## 2022-11-10 RX ORDER — LITHIUM CARBONATE 300 MG/1
CAPSULE ORAL
Qty: 90 CAPSULE | Refills: 1 | Status: SHIPPED | OUTPATIENT
Start: 2022-11-10 | End: 2022-12-14

## 2022-11-10 RX ORDER — PALIPERIDONE PALMITATE 234 MG/1.5ML
INJECTION INTRAMUSCULAR
Qty: 1.5 ML | Refills: 3 | Status: SHIPPED | OUTPATIENT
Start: 2022-11-10 | End: 2022-12-14

## 2022-11-10 RX ORDER — ARIPIPRAZOLE 10 MG/1
TABLET ORAL
Qty: 30 TABLET | Refills: 1 | Status: SHIPPED | OUTPATIENT
Start: 2022-11-10 | End: 2022-12-14

## 2022-11-10 RX ORDER — GUANFACINE 3 MG/1
TABLET, EXTENDED RELEASE ORAL
Qty: 30 TABLET | Refills: 1 | Status: SHIPPED | OUTPATIENT
Start: 2022-11-10 | End: 2022-12-14

## 2022-11-10 RX ORDER — TRAZODONE HYDROCHLORIDE 300 MG/1
TABLET ORAL
Qty: 30 TABLET | Refills: 3 | Status: SHIPPED | OUTPATIENT
Start: 2022-11-10 | End: 2022-12-14

## 2022-11-10 RX ORDER — ATOMOXETINE 100 MG/1
CAPSULE ORAL
Qty: 30 CAPSULE | Refills: 1 | Status: SHIPPED | OUTPATIENT
Start: 2022-11-10 | End: 2022-12-14

## 2022-11-10 NOTE — TELEPHONE ENCOUNTER
Date of Last Office Visit: 10/12/2022  Date of Next Office Visit: 12/14/2022  No shows since last visit: None  Cancellations since last visit: None    Medication requested:   paliperidone (INVEGA SUSTENNA) 234 MG/1.5ML JUANA 1.5 mL 2 9/12/2022  No   Sig: ADMINISTER 1.5ML (234MG) INTRAMUSCULARLY EVERY 30 DAYS      Date last ordered: 09/12/2022 Qty: 1.5 ml (1 prefilled syringe) Refills: 2     [place] of [place] ?: Do not have access    Lapse in medication adherence greater than 5 days?: No  If yes, call patient and gather details:   Medication refill request verified as identical to current order?: Yes  Result of Last DAM, VPA, Li+ Level, CBC, or Carbamazepine Level (at or since last visit): N/A    Last visit treatment plan:     Plan   1-Continue with current medications :   Abilify  10 mg in the morning  Quifacine   3 mg at bedtime    Invega 234 mg /IM monthly -Last Invega Sustenna 234 mg IM was 3/02/2022 given by group home   Lgkorkiyfa12 mg BID - agitation PRN   Statreta 100 mg in am   Trazodone 300 mg at bedtime   Lithium 900 mg very evening with mels   Hydroxyzine 50 mg TID PRN- Has only taken it 3 times since 9/1/2021   2-High risk medication use-lipid panel basic metabolic panel labs were completed in 06/30/2022and were within normal limits.  Last Lithium Level 06/30/2022- 0.8 WNL     Return in about 3 months (around 1/12/2023).    []Medication refilled per  Medication Refill in Ambulatory Care  policy.  [x]Medication unable to be refilled by RN due to criteria not met as indicated below:    []Eligibility - not seen in the last year   []Supervision - no future appointment   []Compliance - no shows, cancellations or lapse in therapy   []Verification - order discrepancy   []Controlled medication   [x]Medication not included in policy   []90-day supply request   []Other

## 2022-11-10 NOTE — TELEPHONE ENCOUNTER
Date of Last Office Visit: 10/12/2022  Date of Next Office Visit: 12/14/2022  No shows since last visit: None  Cancellations since last visit: None    Medication requested:   ARIPiprazole (ABILIFY) 10 MG tablet 31 tablet 2 8/31/2022  No   Sig - Route: Take 1 tablet (10 mg) by mouth every morning - Oral      Date last ordered: 08/31/2022 Qty: 31 Refills: 2     atomoxetine (STRATTERA) 100 MG capsule 90 capsule 0 8/31/2022  No   Sig - Route: Take 1 capsule (100 mg) by mouth daily - Oral     Date last ordered: 08/31/2022 Qty: 90 Refills: 0    guanFACINE HCl (INTUNIV) 3 MG TB24 24 hr tablet 30 tablet 2 8/31/2022  No   Sig - Route: Take 1 tablet (3 mg) by mouth At Bedtime - Oral     Date last ordered: 08/31/2022 Qty: 30 Refills: 2    lithium 300 MG capsule 93 capsule 2 8/31/2022  No   Sig: TAKE 3 CAPSULES BY MOUTH DAILY WITH DINNER     Date last ordered: 08/31/2022 Qty: 93 Refills: 2    traZODone HCl 300 MG TABS 30 tablet 2 8/31/2022  No   Sig - Route: Take 300 mg by mouth At Bedtime - Oral     Date last ordered: 08/31/2022 Qty: 30 Refills: 2        [place] of [place] ?: Do not have access    Lapse in medication adherence greater than 5 days?: No  If yes, call patient and gather details:   Medication refill request verified as identical to current order?: Yes  Result of Last DAM, VPA, Li+ Level, CBC, or Carbamazepine Level (at or since last visit): Lithium Level 0.8 06/30/2022    Last visit treatment plan:     Plan   1-Continue with current medications :   Abilify  10 mg in the morning  Quifacine   3 mg at bedtime    Invega 234 mg /IM monthly -Last Invega Sustenna 234 mg IM was 3/02/2022 given by group home   Giyqpedvem96 mg BID - agitation PRN   Statreta 100 mg in am   Trazodone 300 mg at bedtime   Lithium 900 mg very evening with mels   Hydroxyzine 50 mg TID PRN- Has only taken it 3 times since 9/1/2021   2-High risk medication use-lipid panel basic metabolic panel labs were completed in 06/30/2022and were within normal  limits.  Last Lithium Level 06/30/2022- 0.8 WNL    Return in about 3 months (around 1/12/2023).    []Medication refilled per  Medication Refill in Ambulatory Care  policy.  [x]Medication unable to be refilled by RN due to criteria not met as indicated below:    []Eligibility - not seen in the last year   []Supervision - no future appointment   []Compliance - no shows, cancellations or lapse in therapy   []Verification - order discrepancy   []Controlled medication   [x]Medication not included in policy   []90-day supply request   []Other

## 2022-11-19 ENCOUNTER — HEALTH MAINTENANCE LETTER (OUTPATIENT)
Age: 22
End: 2022-11-19

## 2022-12-14 ENCOUNTER — VIRTUAL VISIT (OUTPATIENT)
Dept: PSYCHIATRY | Facility: CLINIC | Age: 22
End: 2022-12-14
Payer: COMMERCIAL

## 2022-12-14 DIAGNOSIS — F90.9 ATTENTION DEFICIT HYPERACTIVITY DISORDER (ADHD), UNSPECIFIED ADHD TYPE: ICD-10-CM

## 2022-12-14 DIAGNOSIS — F43.23 ADJUSTMENT DISORDER WITH MIXED ANXIETY AND DEPRESSED MOOD: ICD-10-CM

## 2022-12-14 DIAGNOSIS — F63.81 INTERMITTENT EXPLOSIVE DISORDER: ICD-10-CM

## 2022-12-14 DIAGNOSIS — F39 SEVERE MOOD DISORDER WITH PSYCHOTIC FEATURES (H): ICD-10-CM

## 2022-12-14 PROCEDURE — 99214 OFFICE O/P EST MOD 30 MIN: CPT | Mod: 95 | Performed by: NURSE PRACTITIONER

## 2022-12-14 RX ORDER — CHLORPROMAZINE HYDROCHLORIDE 50 MG/1
50 TABLET, FILM COATED ORAL 2 TIMES DAILY PRN
Qty: 60 TABLET | Refills: 2 | Status: SHIPPED | OUTPATIENT
Start: 2022-12-14 | End: 2023-06-22

## 2022-12-14 RX ORDER — ATOMOXETINE 100 MG/1
CAPSULE ORAL
Qty: 30 CAPSULE | Refills: 1 | Status: SHIPPED | OUTPATIENT
Start: 2022-12-14 | End: 2023-02-16

## 2022-12-14 RX ORDER — ARIPIPRAZOLE 10 MG/1
10 TABLET ORAL EVERY MORNING
Qty: 30 TABLET | Refills: 2 | Status: SHIPPED | OUTPATIENT
Start: 2022-12-14 | End: 2023-03-16

## 2022-12-14 RX ORDER — TRAZODONE HYDROCHLORIDE 300 MG/1
1 TABLET ORAL AT BEDTIME
Qty: 30 TABLET | Refills: 3 | Status: SHIPPED | OUTPATIENT
Start: 2022-12-14 | End: 2023-04-17

## 2022-12-14 RX ORDER — HYDROXYZINE HYDROCHLORIDE 50 MG/1
50 TABLET, FILM COATED ORAL 3 TIMES DAILY PRN
Qty: 90 TABLET | Refills: 2 | Status: SHIPPED | OUTPATIENT
Start: 2022-12-14 | End: 2023-06-22

## 2022-12-14 RX ORDER — PALIPERIDONE PALMITATE 234 MG/1.5ML
234 INJECTION INTRAMUSCULAR
Qty: 1.5 ML | Refills: 3 | Status: SHIPPED | OUTPATIENT
Start: 2022-12-14 | End: 2023-04-17

## 2022-12-14 RX ORDER — LITHIUM CARBONATE 300 MG/1
CAPSULE ORAL
Qty: 90 CAPSULE | Refills: 2 | Status: SHIPPED | OUTPATIENT
Start: 2022-12-14 | End: 2023-03-16

## 2022-12-14 RX ORDER — GUANFACINE 3 MG/1
TABLET, EXTENDED RELEASE ORAL
Qty: 30 TABLET | Refills: 2 | Status: SHIPPED | OUTPATIENT
Start: 2022-12-14 | End: 2023-03-16

## 2022-12-14 NOTE — PROGRESS NOTES
This video/telephone visit will be conducted via a call between you and your physician/provider. We have found that certain health care needs can be provided without the need for an in-person physical exam. This service lets us provide the care you need with a video /telephone conversation. If a prescription is necessary we can send it directly to your pharmacy. If lab work is needed we can place an order for that and you can then stop by our lab to have the test done at a later time.    Just as we bill insurance for in-person visits, we also bill insurance for video/telephone visits. If you have questions about your insurance coverage, we recommend that you speak with your insurance company.    Patient has given verbal consent for video/Telephone visit? Yes     Patient would like a video  visit, please connect/call : Email to [email or web address] if connection issue: [phone]  Reason for visit: Follow up  Anything the provider should be aware of for today's appointment: No changes in daily behavior or sleep at this time.     Patient verified allergies, medications and pharmacy via telephone with writer .     JAZMINE-7 scores:  No flowsheet data found.    PHQ-9 scores:   PHQ-9 SCORE 9/15/2020 7/1/2021   PHQ-9 Total Score 11 15   Patient states they are ready for visit.    Marie Wheeler December 14, 2022 9:25 AM

## 2022-12-14 NOTE — PATIENT INSTRUCTIONS
Continue medications as prescribed  Have your pharmacy contact us for a refill if you are running low on medications (We may ask you to come into clinic to get a refill from the nurse  No Alcohol or drug use  No driving if sedated  Call the clinic with any questions or concerns   Reach out for help if you feel like hurting yourself or others ([facility] [phone]: [address] [facility], [address] or [facility] Suicide Hotline   [phone] , call 911 or go to nearest Emergency room     Crisis Resources:    Present to the Emergency Department as needed or call after hours crisis line at [phone] or [phone].   [place] Crisis Text Line: Text [place] to 734356.  Suicide LifeLine Chat: suicidepreventionlifeline.org/chat/.  National Suicide Prevention Lifeline: 641.450.5903 (TTY: 742.443.7717). Call anytime for help.  (www.suicidepreventionlifeline.org)  National [place] on Mental Illness (www.milvia.org): 236.115.9779 or 756-683-9619.  Mental Health Association (www.mentalhealth.org): 191.958.2446 or 376-593-3112.       Follow up as directed, for your appointments, per your After Visit Summary Form.

## 2022-12-14 NOTE — PROGRESS NOTES
"  The patient has been notified of following:      \"This virtual  visit will be conducted via a call between you and your physician/provider. We have found that certain health care needs can be provided without the need for a physical exam.  This service lets us provide the care you need virtually/via video   If a prescription is necessary we can send it directly to your pharmacy.  If lab work is needed we can place an order for that and you can then stop by our lab to have the test done at a later time.     Virtual/Video visits are billed at different rates depending on your insurance coverage.Some insurers they may be billed the same as an in-person visit.  Please reach out to your insurance provider with any questions.    Patient has given verbal consent for virtual  visit : Yes      Ho w would you like to obtain your AVS? Mail a copy  If the video visit is dropped, the invitation should be resent by: Send to e-mail at: [email or web address]  Will anyone else be joining your video visit? No            Psychiatric  Out- Patient  Follow Up Progress Note  Date of visit:12/14/2022           Discussion of Care and Treatment Recommendations:   This is a 22 year old male with  history of  Intellectual disability,  fetal alcohol  spectrum,and  mood Disorder, due to general medical condition.Pt resides in a group home.   Patient is seen virtually today accompanied by his  team from [employer]      [facility] Admin (Izabela, [phone]- present for visit   Mental Health : Escobar Winslow, [facility]. Phone: [phone]; Fax: [phone]; Office: [phone]. - present   ---- CADI : Patsy Odell, [facility]. Phone: [phone]; Fax: [phone].  ---- Representative Payee: Nathaniel Claros, [facility]. Phone: [phone].   ---- Care Coordination: SADE Boykin Case Manager, [facility]. Phone: [phone]         Last visit  Recommendation at last visit .  1-Continue " with current medications :   Abilify  10 mg in the morning  Quifacine   3 mg at bedtime    Invega 234 mg /IM monthly -Last Invega Sustenna 234 mg IM was 3/02/2022 given by group home   Ldavtveebh26 mg BID - agitation PRN   Statreta 100 mg in am   Trazodone 300 mg at bedtime   Lithium 900 mg very evening with mels   Hydroxyzine 50 mg TID PRN- Has only taken it 3 times since 9/1/2021   2-High risk medication use-lipid panel basic metabolic panel labs were completed in 06/30/2022and were within normal limits.  Last Lithium Level 06/30/2022- 0.8 WNL  DISCUS score  today =     Patient and I reviewed diagnosis and treatment plan and patient agrees with following recommendations:  Ongoing education given regarding diagnostic and treatment options with adequate verbalization of understanding.  Plan   1-Continue with current medications :   Abilify  10 mg in the morning  Quifacine   3 mg at bedtime    Invega 234 mg /IM monthly -Last Invega Sustenna 234 mg IM was 3/02/2022 given by group home   Wfnhefrpra46 mg BID - agitation PRN   Statreta 100 mg in am   Trazodone 300 mg at bedtime   Lithium 900 mg very evening with mels   Hydroxyzine 50 mg TID PRN- Has only taken it 3 times since 9/1/2021   2-High risk medication use-lipid panel basic metabolic panel labs were completed in 06/30/2022and were within normal limits.  Last Lithium Level 06/30/2022- 0.8 WNL  3. RTC: 2 months               DIagnoses:     Schizoaffective disorder, unspecified type (HCC)  Active Problems:  Intermittent explosive disorder  Fetal alcohol spectrum disorder  History of traumatic brain injury  Tobacco use disorder  Mild intellectual disability  ADHD (attention deficit hyperactivity disorder), combined type  Aggressive behavior    Patient Active Problem List   Diagnosis     Thrombocytopenia (H)     Chronic ITP (idiopathic thrombocytopenia) (H)             Chief Complaint / Subjective:    Chief complaint: Schizoaffective disorder    History of Present  "Illness:   Per patient statement-he reports compliance with current medications denies side effects.  Enjoyed his Thanksgiving at his mom's house.  Looking forward to spending Yandel with his mom also.  He is getting along with his housemates staff at his group home.  His mood has been better managed and controlled.  Spends a lot of time playing video games but has been trying to interact more.  I also spoke with his  team who was present for the appointment and he agrees the patient has been doing well.  Patient offers no new concerns.  Patient to continue on current plan of care    Mental Status Examination:   Appearance: Well groomed, good eye contact   Orientation: Patient alert and oriented to person, place, time, and situation  Reliability:  Patient appears to be an adequate historian.    Behavior: cooperative   Speech: Speech is spontaneous and coherent, with a normal rate, rhythm and tone.    Language:There are no difficulties with expressive or receptive language as observed throughout the interview.    Mood: Described as \"ok\".    Affect: congruent   Judgement: Able to make basic decision regarding safety.  Insight: Good awareness of physical and mental health conditions and aware of needs around care for these.  Gait and station: unable to assess  Thought process: Logical   Thought content: No evidence of delusions or paranoia.    Hallucinations : No evidence of any hallucination  Thought content: No evidence of delusions or paranoia.   Suicidal /Homical Ideations:  No thoughts of self harm or suicide. No thoughts of harming others.  Associations: Connected  Fund of knowledge: Average  Attention / Concentration: Able to remain focused during the interview with minimal distractibility or need for redirection.  Short Term Memory: Grossly intact as evidence by client recalling themes and ideas discussed.  Long Term Memory: Intact  Motor Status: unable to asse    Drug/treatment history and current " pattern of use:   History of cannabis use  History of nicotine use-vaping  Currently denies use of both cannabis and nicotine since August 2021    Medication changes: See Above   Medication adherence: compliant  Medication side effects: absent  Information about medications: Side effects, benefits and alternative treatments discussed and patient agrees .    Psychotherapy: Supportive therapy day-to-day living    Education: Diet, exercise, abstinence from drugs and alcohol, patient will not drive if sedated and medications or  under influence of any substance    Lab Results:  Personally reviewed and discussed with the patient    Lab Results   Component Value Date    WBC 12.9 (H) 10/13/2021    HGB 14.7 10/13/2021    HCT 45.4 10/13/2021     10/13/2021    CHOL 139 10/13/2021    TRIG 118 10/13/2021    HDL 38 (L) 10/13/2021    ALT 21 06/30/2022    AST 23 06/30/2022     06/30/2022    BUN 5.3 (L) 06/30/2022    CO2 22 06/30/2022    TSH 3.61 06/30/2022       Vital signs:  There were no vitals taken for this visit.  Telemedicine visit-no vital signs completed  Allergies: Depakote [valproic acid] and Other environmental allergy         Medications:               Medication adherence: Reviewed risk/benefits of medication , Patient able to verbalize understanding of side effects and Patient verbally consents to taking medications           Review of Systems:      ROS:    Subjective Data Only- Tele-Health Visit    10 point ROS was negative except for the items listed in HPI.      Crisis Resources:    1. Present to the Emergency Department as needed or call after hours crisis line at [phone] or [phone].   2. [place] Crisis Text Line: Text [place] to 722998.  3. Suicide LifeLine Chat: suicidepreventionBomboardline.org/chat/.  4. National Suicide Prevention Lifeline: 165.429.5971 (TTY: 915.806.7146). Call anytime for help.  (www.suicidepreventionlifeline.org)  5. National [place] on Mental Illness (www.milvia.org):  327-573-3090 or 829-256-5872.  6. Mental Health Association (www.mentalhealth.org): 896.761.9989 or 820-614-6220.      Coordination of Care:   More than 30 minutes spent on this visit  with more than 50% of time spent on coordination of care including: Educating patient about diagnosis, prognosis, side effects and benefits of medications, diet, exercise.  Time also spent providing supportive therapy regarding above issues.    Video-Visit Details    Type of service:  Video Visit    Originating Location (pt. Location): Home    Distant Location (provider location): Providers Remote Office     Platform used for Video Visit: Katie      This note was created using a dictation system. All typing errors or contextual distortion is unintentional and software inherent.  Start Time : 0930  End time : 1000

## 2023-01-27 ENCOUNTER — LAB REQUISITION (OUTPATIENT)
Dept: LAB | Facility: CLINIC | Age: 23
End: 2023-01-27
Payer: COMMERCIAL

## 2023-01-27 DIAGNOSIS — E66.09 OTHER OBESITY DUE TO EXCESS CALORIES: ICD-10-CM

## 2023-01-27 DIAGNOSIS — J06.9 ACUTE UPPER RESPIRATORY INFECTION, UNSPECIFIED: ICD-10-CM

## 2023-01-27 LAB
CHOLEST SERPL-MCNC: 119 MG/DL
HDLC SERPL-MCNC: 36 MG/DL
LDLC SERPL CALC-MCNC: 73 MG/DL
NONHDLC SERPL-MCNC: 83 MG/DL
TRIGL SERPL-MCNC: 51 MG/DL

## 2023-01-27 PROCEDURE — 80061 LIPID PANEL: CPT | Performed by: NURSE PRACTITIONER

## 2023-01-27 PROCEDURE — 87081 CULTURE SCREEN ONLY: CPT | Performed by: NURSE PRACTITIONER

## 2023-01-29 LAB — BACTERIA SPEC CULT: NORMAL

## 2023-02-08 ENCOUNTER — VIRTUAL VISIT (OUTPATIENT)
Dept: PSYCHIATRY | Facility: CLINIC | Age: 23
End: 2023-02-08
Payer: COMMERCIAL

## 2023-02-08 DIAGNOSIS — Z87.820 HISTORY OF TRAUMATIC BRAIN INJURY: ICD-10-CM

## 2023-02-08 DIAGNOSIS — F25.9 SCHIZOAFFECTIVE DISORDER, UNSPECIFIED TYPE (H): ICD-10-CM

## 2023-02-08 DIAGNOSIS — Q86.0 FETAL ALCOHOL SYNDROME: Primary | ICD-10-CM

## 2023-02-08 DIAGNOSIS — F90.2 ADHD (ATTENTION DEFICIT HYPERACTIVITY DISORDER), COMBINED TYPE: ICD-10-CM

## 2023-02-08 DIAGNOSIS — F70 MILD INTELLECTUAL DISABILITY: ICD-10-CM

## 2023-02-08 DIAGNOSIS — R46.89 AGGRESSIVE BEHAVIOR: ICD-10-CM

## 2023-02-08 PROCEDURE — 99214 OFFICE O/P EST MOD 30 MIN: CPT | Mod: VID | Performed by: NURSE PRACTITIONER

## 2023-02-08 ASSESSMENT — PATIENT HEALTH QUESTIONNAIRE - PHQ9
10. IF YOU CHECKED OFF ANY PROBLEMS, HOW DIFFICULT HAVE THESE PROBLEMS MADE IT FOR YOU TO DO YOUR WORK, TAKE CARE OF THINGS AT HOME, OR GET ALONG WITH OTHER PEOPLE: VERY DIFFICULT
SUM OF ALL RESPONSES TO PHQ QUESTIONS 1-9: 17
SUM OF ALL RESPONSES TO PHQ QUESTIONS 1-9: 17

## 2023-02-08 NOTE — PROGRESS NOTES
"  The patient has been notified of following:      \"This virtual  visit will be conducted via a call between you and your physician/provider. We have found that certain health care needs can be provided without the need for a physical exam.  This service lets us provide the care you need virtually/via video   If a prescription is necessary we can send it directly to your pharmacy.  If lab work is needed we can place an order for that and you can then stop by our lab to have the test done at a later time.     Virtual/Video visits are billed at different rates depending on your insurance coverage.Some insurers they may be billed the same as an in-person visit.  Please reach out to your insurance provider with any questions.    Patient has given verbal consent for virtual  visit : Yes      Ho w would you like to obtain your AVS? Mail a copy  If the video visit is dropped, the invitation should be resent by: Send to e-mail at: [email or web address]  Will anyone else be joining your video visit? No        Psychiatric  Out- Patient  Follow Up Progress Note  Date of visit:2/8/2023           Discussion of Care and Treatment Recommendations:   This is a 22 year old male with a  history of  Intellectual disability,  fetal alcohol  spectrum,and  mood Disorder, due to general medical condition.Pt resides in a group home.   Patient is seen virtually today accompanied by his  team from [employer]       [facility] Admin (Kofi ,[phone]- Group home [place] number    Mental Health : Escobar Winslow, [facility]. Phone: [phone]; Fax: [phone]; Office: [phone]. - present   ---- CADI : Sonia Matthews [facility]. Phone: [phone]; Fax: [phone].  ---- Representative Payee: Nathaniel Claros, [facility]. Phone: [phone].   ---- Care Coordination: SADE Boykin Case Manager, [facility]. Phone: [phone]     Last visit  12/14/2023.  Recommendation at last visit " .  1-Continue with current medications :   Abilify  10 mg in the morning  Quifacine   3 mg at bedtime    Invega 234 mg /IM monthly -Last Invega Sustenna 234 mg IM was 3/02/2022 given by group home   Wnumjtaxjy23 mg BID - agitation PRN   Statreta 100 mg in am   Trazodone 300 mg at bedtime   Lithium 900 mg very evening with mels   Hydroxyzine 50 mg TID PRN- Has only taken it 3 times since 9/1/2021   2-High risk medication use-lipid panel basic metabolic panel labs were completed in 06/30/2022and were within normal limits.  Last Lithium Level 06/30/2022- 0.8 WNL  3. RTC: 2 months   Patient and I reviewed diagnosis and treatment plan and patient agrees with following recommendations:  Ongoing education given regarding diagnostic and treatment options with adequate verbalization of understanding.  Plan   1-Continue with current medications :   Abilify  10 mg in the morning  Quifacine   3 mg at bedtime    Invega 234 mg /IM monthly -Last Invega Sustenna 234 mg IM was 3/02/2022 given by group home   Vezurmoprs80 mg BID - agitation PRN   Statreta 100 mg in am   Trazodone 300 mg at bedtime   Lithium 900 mg very evening with mels   Hydroxyzine 50 mg TID PRN- Has only taken it 3 times since 9/1/2021   2-High risk medication use-lipid panel basic metabolic panel labs were completed in 06/30/2022and were within normal limits.  Last Lithium Level 06/30/2022- 0.8 WNL  3. RTC: 2 months          DIagnoses:     Schizoaffective disorder, unspecified type (HCC)  Active Problems:  Intermittent explosive disorder  Fetal alcohol spectrum disorder  History of traumatic brain injury  Tobacco use disorder  Mild intellectual disability  ADHD (attention deficit hyperactivity disorder), combined type  Aggressive behavior    Patient Active Problem List   Diagnosis     Thrombocytopenia (H)     Chronic ITP (idiopathic thrombocytopenia) (H)             Chief Complaint / Subjective:    Chief complaint: Schizoaffective disorder    History of Present  "Illness:   Per patient statement : Reports compliance with current medications denies side effects.  He believes he is doing well and getting along with housemates.  Continues to spend a lot of time being video games.  Patient continues to endorse occasional non-commanding auditory hallucinations.  Distract himself from the hallucinations by retracting to his room but also finds speaking and spending more one-to-one time with the staff while engaging in activities helps quite a bit.  Per patient's group home staff: Patient often struggles with consistently following his care plan.  Gets verbally aggressive when wanting to go out.  Both big times during the day.  He has agreed with his [employer] how many times he should go out to smoke but he always ask to go almost double or triple the time agreed and when reminded he becomes verbally aggressive.  No physical aggression.  The staff has been trying to spend more one-on-one with patient.  Staff is one-to-one and other group activities when possible to keep patient busy.  In the summer they have more group outings outside the home including going for walks which is very beneficial and keeps patient tomorrow, but this can be challenging in the winter due to the cold weather.  Staff has been using as needed medications which seems to also be helpful.      Mental Status Examination:   Appearance: Well groomed, good eye contact   Orientation: Patient alert and oriented to person, place, time, and situation  Reliability:  Patient appears to be an adequate historian.    Behavior: cooperative   Speech: Speech is spontaneous and coherent, with a normal rate, rhythm and tone.    Language:There are no difficulties with expressive or receptive language as observed throughout the interview.    Mood: Described as \"ok\".    Affect: congruent   Judgement: Able to make basic decision regarding safety.  Insight: Good awareness of physical and mental health conditions and aware of needs " around care for these.  Gait and station: unable to assess  Thought process: Logical   Thought content: No evidence of delusions or paranoia.    Hallucinations : No evidence of any hallucination  Thought content: No evidence of delusions or paranoia.   Suicidal /Homical Ideations:  No thoughts of self harm or suicide. No thoughts of harming others.  Associations: Connected  Fund of knowledge: Average  Attention / Concentration: Able to remain focused during the interview with minimal distractibility or need for redirection.  Short Term Memory: Grossly intact as evidence by client recalling themes and ideas discussed.  Long Term Memory: Intact  Motor Status: unable to asse      Answers for HPI/ROS submitted by the patient on 2/8/2023  If you checked off any problems, how difficult have these problems made it for you to do your work, take care of things at home, or get along with other people?: Very difficult  PHQ9 TOTAL SCORE: 17      Drug/treatment history and current pattern of use:   History of cannabis use  History of nicotine use-vaping  Currently denies use of both cannabis and nicotine since August 2021    Medication changes: See Above   Medication adherence: compliant  Medication side effects: absent  Information about medications: Side effects, benefits and alternative treatments discussed and patient agrees .    Psychotherapy: Supportive therapy day-to-day living    Education: Diet, exercise, abstinence from drugs and alcohol, patient will not drive if sedated and medications or  under influence of any substance    Lab Results:   Personally reviewed and discussed with the patient    Lab Results   Component Value Date    WBC 12.9 (H) 10/13/2021    HGB 14.7 10/13/2021    HCT 45.4 10/13/2021     10/13/2021    CHOL 119 01/27/2023    TRIG 51 01/27/2023    HDL 36 (L) 01/27/2023    ALT 21 06/30/2022    AST 23 06/30/2022     06/30/2022    BUN 5.3 (L) 06/30/2022    CO2 22 06/30/2022    TSH 3.61  06/30/2022       Vital signs:  There were no vitals taken for this visit.  Telemedicine visit-no vital signs completed  Allergies: Depakote [valproic acid] and Other environmental allergy         Medications:     Current Outpatient Medications   Medication     ARIPiprazole (ABILIFY) 10 MG tablet     atomoxetine (STRATTERA) 100 MG capsule     atropine 1 % ophthalmic solution     chlorproMAZINE (THORAZINE) 50 MG tablet     cholecalciferol 25 MCG (1000 UT) TABS     ferrous sulfate (FEROSUL) 325 (65 Fe) MG tablet     guanFACINE HCl (INTUNIV) 3 MG TB24 24 hr tablet     hydrOXYzine (ATARAX) 50 MG tablet     lithium 300 MG capsule     loratadine (CLARITIN) 10 MG tablet     metFORMIN (GLUCOPHAGE-XR) 500 MG 24 hr tablet     paliperidone (INVEGA SUSTENNA) 234 MG/1.5ML JUANA     traZODone HCl 300 MG TABS     vitamin C (ASCORBIC ACID) 250 MG TABS tablet     No current facility-administered medications for this visit.       Medication adherence: Reviewed risk/benefits of medication , Patient able to verbalize understanding of side effects and Patient verbally consents to taking medications           Review of Systems:      ROS:    Subjective Data Only- Tele-Health Visit    10 point ROS was negative except for the items listed in HPI.      Crisis Resources:    1. Present to the Emergency Department as needed or call after hours crisis line at [phone] or [phone].   2. [place] Crisis Text Line: Text [place] to 910493.  3. Suicide LifeLine Chat: suicidepreventionmyMatrixxline.org/chat/.  4. National Suicide Prevention Lifeline: 534.970.7152 (TTY: 621.695.9751). Call anytime for help.  (www.suicidepreventionlifeline.org)  5. National [place] on Mental Illness (www.milvia.org): 553-245-3149 or 786-292-1190.  6. Mental Health Association (www.mentalhealth.org): 586.515.2755 or 484-910-1341.      Coordination of Care:   More than 30 minutes spent on this visit  with more than 50% of time spent on coordination of care including:  Educating patient about diagnosis, prognosis, side effects and benefits of medications, diet, exercise.  Time also spent providing supportive therapy regarding above issues.    Video-Visit Details    Type of service:  Video Visit    Originating Location (pt. Location): Home    Distant Location (provider location): Providers Remote Office     Platform used for Video Visit: [employer]      This note was created using a dictation system. All typing errors or contextual distortion is unintentional and software inherent.  Start Time : 1100  End time : 1130

## 2023-02-08 NOTE — PATIENT INSTRUCTIONS
Continue medications as prescribed  Have your pharmacy contact us for a refill if you are running low on medications (We may ask you to come into clinic to get a refill from the nurse  No Alcohol or drug use  No driving if sedated  Call the clinic with any questions or concerns   Reach out for help if you feel like hurting yourself or others ([facility][1] Urgent Care [phone]: [address] [facility], [address] or [facility] Suicide Hotline   [phone] , call 911 or go to nearest Emergency room     Crisis Resources:    Present to the Emergency Department as needed or call after hours crisis line at [phone] or [phone].   [place] Crisis Text Line: Text [place] to 790901.  Suicide LifeLine Chat: suicidepreventionlifeline.org/chat/.  National Suicide Prevention Lifeline: 809.107.7850 (TTY: 643.540.8363). Call anytime for help.  (www.suicidepreventionlifeline.org)  National [place] on Mental Illness (www.milvia.org): 191.827.7631 or 650-075-8498.  Mental Health Association (www.mentalhealth.org): 387.408.9314 or 578-980-6660.       Follow up as directed, for your appointments, per your After Visit Summary Form.

## 2023-02-08 NOTE — PROGRESS NOTES
Kaden CANALES Rustam Earl is a 22 year old who is being evaluated via a billable video visit.      Pt will join video visit via: Merkle  If there are problems joining the visit, send backup video invite via: Send to e-mail: [email or web address]    Reason for telehealth visit: Patient has requested telehealth visit    Originating location (patient location): Patient's home    Will anyone else be joining the visit? No

## 2023-02-15 DIAGNOSIS — F90.9 ATTENTION DEFICIT HYPERACTIVITY DISORDER (ADHD), UNSPECIFIED ADHD TYPE: ICD-10-CM

## 2023-02-16 RX ORDER — ATOMOXETINE 100 MG/1
CAPSULE ORAL
Qty: 30 CAPSULE | Refills: 1 | Status: SHIPPED | OUTPATIENT
Start: 2023-02-16 | End: 2023-04-17

## 2023-02-16 NOTE — TELEPHONE ENCOUNTER
Date of Last Office Visit: 2/8/2023  Date of Next Office Visit: Not yet scheduled  No shows since last visit: None  Cancellations since last visit: None    Medication requested: Strattera 100 mg cap Date last ordered: 12/14/2022 Qty: 30 Refills: 1     Review of [place] ?: N/A    Lapse in medication adherence greater than 5 days?: No  If yes, call patient and gather details: N/A  Medication refill request verified as identical to current order?: Yes  Result of Last DAM, VPA, Li+ Level, CBC, or Carbamazepine Level (at or since last visit): N/A    Last visit treatment plan:   Psychiatric  Out- Patient  Follow Up Progress Note  Date of visit:2/8/2023             Discussion of Care and Treatment Recommendations:   This is a 22 year old male with a  history of  Intellectual disability,  fetal alcohol  spectrum,and  mood Disorder, due to general medical condition.Pt resides in a group home.   Patient is seen virtually today accompanied by his  team from [place]       [facility] Admin (Kofi ,[phone]- Group home house number    Mental Health : Escobar Winslow, [place]. Phone: [phone]; Fax: [phone]; Office: [phone]. - present   ---- [place] : [facility] [place] Phone: [phone]; Fax: [phone].  ---- Representative Payee: Nathaniel Claros, [facility]. Phone: [phone].   ---- Care Coordination: SADE Boykin Case Manager, [facility]. Phone: [phone]     Last visit  12/14/2023.  Recommendation at last visit .  1-Continue with current medications :   Abilify  10 mg in the morning  Quifacine   3 mg at bedtime    Invega 234 mg /IM monthly -Last Invega Sustenna 234 mg IM was 3/02/2022 given by group home   Cygvrwqkpd42 mg BID - agitation PRN   Statreta 100 mg in am   Trazodone 300 mg at bedtime   Lithium 900 mg very evening with mels   Hydroxyzine 50 mg TID PRN- Has only taken it 3 times since 9/1/2021   2-High risk medication use-lipid panel basic  metabolic panel labs were completed in 06/30/2022and were within normal limits.  Last Lithium Level 06/30/2022- 0.8 WNL  3. RTC: 2 months   Patient and I reviewed diagnosis and treatment plan and patient agrees with following recommendations:  Ongoing education given regarding diagnostic and treatment options with adequate verbalization of understanding.  Plan   1-Continue with current medications :   Abilify  10 mg in the morning  Quifacine   3 mg at bedtime    Invega 234 mg /IM monthly -Last Invega Sustenna 234 mg IM was 3/02/2022 given by group home   Zkdeyavfwx55 mg BID - agitation PRN   Statreta 100 mg in am   Trazodone 300 mg at bedtime   Lithium 900 mg very evening with mels   Hydroxyzine 50 mg TID PRN- Has only taken it 3 times since 9/1/2021   2-High risk medication use-lipid panel basic metabolic panel labs were completed in 06/30/2022and were within normal limits.  Last Lithium Level 06/30/2022- 0.8 WNL  3. RTC: 2 months       []Medication refilled per  Medication Refill in Ambulatory Care  policy.  [x]Medication unable to be refilled by RN due to criteria not met as indicated below:    []Eligibility - not seen in the last year   [x]Supervision - no future appointment   []Compliance - no shows, cancellations or lapse in therapy   []Verification - order discrepancy   []Controlled medication   [x]Medication not included in policy   []90-day supply request   []Other

## 2023-03-15 DIAGNOSIS — F63.81 INTERMITTENT EXPLOSIVE DISORDER: ICD-10-CM

## 2023-03-15 DIAGNOSIS — F90.9 ATTENTION DEFICIT HYPERACTIVITY DISORDER (ADHD), UNSPECIFIED ADHD TYPE: ICD-10-CM

## 2023-03-16 RX ORDER — ARIPIPRAZOLE 10 MG/1
TABLET ORAL
Qty: 28 TABLET | Refills: 0 | Status: SHIPPED | OUTPATIENT
Start: 2023-03-16 | End: 2023-04-17

## 2023-03-16 RX ORDER — GUANFACINE 3 MG/1
TABLET, EXTENDED RELEASE ORAL
Qty: 28 TABLET | Refills: 0 | Status: SHIPPED | OUTPATIENT
Start: 2023-03-16 | End: 2023-04-17

## 2023-03-16 RX ORDER — LITHIUM CARBONATE 300 MG/1
CAPSULE ORAL
Qty: 84 CAPSULE | Refills: 0 | Status: SHIPPED | OUTPATIENT
Start: 2023-03-16 | End: 2023-04-17

## 2023-03-16 NOTE — TELEPHONE ENCOUNTER
Date of Last Office Visit: 2/8/23  Date of Next Office Visit: None, will contact [facility]  No shows since last visit: 0  Cancellations since last visit: 0    Medication requested: ARIPiprazole (ABILIFY) 10 MG tablet Date last ordered: 12/14/22 Qty: 30 Refills: 2    Medication requested:lithium 300 MG capsule  Date last ordered: 12/14/22 Qty: 90 Refills: 2    Medication requested:guanFACINE HCl (INTUNIV) 3 MG TB24 24 hr tablet Date last ordered: 12/14/22 Qty: 30 Refills: 2     Review of MN ?: NA    Lapse in medication adherence greater than 5 days?: No  If yes, call patient and gather details: na  Medication refill request verified as identical to current order?: yes  Result of Last DAM, VPA, Li+ Level, CBC, or Carbamazepine Level (at or since last visit): N/A No recent Li level done    Last visit treatment plan: 1-Continue with current medications :   Abilify  10 mg in the morning  Quifacine   3 mg at bedtime    Invega 234 mg /IM monthly -Last Invega Sustenna 234 mg IM was 3/02/2022 given by group home   Makxwpukwz87 mg BID - agitation PRN   Statreta 100 mg in am   Trazodone 300 mg at bedtime   Lithium 900 mg very evening with mels   Hydroxyzine 50 mg TID PRN- Has only taken it 3 times since 9/1/2021   2-High risk medication use-lipid panel basic metabolic panel labs were completed in 06/30/2022and were within normal limits.  Last Lithium Level 06/30/2022- 0.8 WNL  3. RTC: 2 months        []Medication refilled per  Medication Refill in Ambulatory Care  policy.  [x]Medication unable to be refilled by RN due to criteria not met as indicated below:    []Eligibility - not seen in the last year   [x]Supervision - no future appointment   []Compliance - no shows, cancellations or lapse in therapy   []Verification - order discrepancy   []Controlled medication   [x]Medication not included in policy   []90-day supply request   []Other

## 2023-04-14 DIAGNOSIS — F43.23 ADJUSTMENT DISORDER WITH MIXED ANXIETY AND DEPRESSED MOOD: ICD-10-CM

## 2023-04-14 DIAGNOSIS — F63.81 INTERMITTENT EXPLOSIVE DISORDER: ICD-10-CM

## 2023-04-14 DIAGNOSIS — F90.9 ATTENTION DEFICIT HYPERACTIVITY DISORDER (ADHD), UNSPECIFIED ADHD TYPE: ICD-10-CM

## 2023-04-14 DIAGNOSIS — F39 SEVERE MOOD DISORDER WITH PSYCHOTIC FEATURES (H): ICD-10-CM

## 2023-04-17 RX ORDER — GUANFACINE 3 MG/1
TABLET, EXTENDED RELEASE ORAL
Qty: 28 TABLET | Refills: 0 | Status: SHIPPED | OUTPATIENT
Start: 2023-04-17 | End: 2023-05-11

## 2023-04-17 RX ORDER — ATOMOXETINE 100 MG/1
CAPSULE ORAL
Qty: 28 CAPSULE | Refills: 0 | Status: SHIPPED | OUTPATIENT
Start: 2023-04-17 | End: 2023-05-11

## 2023-04-17 RX ORDER — PALIPERIDONE PALMITATE 234 MG/1.5ML
INJECTION INTRAMUSCULAR
Qty: 1.5 ML | Refills: 3 | Status: SHIPPED | OUTPATIENT
Start: 2023-04-17 | End: 2023-08-10

## 2023-04-17 RX ORDER — ARIPIPRAZOLE 10 MG/1
TABLET ORAL
Qty: 28 TABLET | Refills: 0 | Status: SHIPPED | OUTPATIENT
Start: 2023-04-17 | End: 2023-05-11

## 2023-04-17 RX ORDER — TRAZODONE HYDROCHLORIDE 300 MG/1
1 TABLET ORAL AT BEDTIME
Qty: 28 TABLET | Refills: 0 | Status: SHIPPED | OUTPATIENT
Start: 2023-04-17 | End: 2023-05-11

## 2023-04-17 RX ORDER — LITHIUM CARBONATE 300 MG/1
CAPSULE ORAL
Qty: 84 CAPSULE | Refills: 0 | Status: SHIPPED | OUTPATIENT
Start: 2023-04-17 | End: 2023-05-11

## 2023-04-17 NOTE — TELEPHONE ENCOUNTER
Date of Last Office Visit: 2/8/23  Date of Next Office Visit: RN notified [facility] and they will call and make a follow-up for this group home patient.   No shows since last visit: 0  Cancellations since last visit: 0     Medication requested: ARIPiprazole (ABILIFY) 10 MG tablet Date last ordered: 3/16/2023 Qty: 28 Refills: 0     Medication requested:lithium 300 MG capsule  Date last ordered: 3/16/2023 Qty: 84 Refills: 0     Medication requested:guanFACINE HCl (INTUNIV) 3 MG TB24 24 hr tablet Date last ordered: 3/16/2023 Qty: 28 Refills: 0    Medication requested: atomoxetine (STRATTERA) 100 MG capsule Date last ordered: 2/16/2023 Qty: 30 Refills: 1     Medication requested: paliperidone (INVEGA SUSTENNA) 234 MG/1.5ML JUANA  Date last ordered: 12/14/2022 Qty: 1.5 ml Refills: 3     Medication requested:traZODone HCl 300 MG TABS   Date last ordered: 12/14/22 Qty: 30 Refills: 3       Review of MN ?: NA     Lapse in medication adherence greater than 5 days?: No    Medication refill request verified as identical to current order?: yes  Result of Last DAM, VPA, Li+ Level, CBC, or Carbamazepine Level (at or since last visit): N/A No recent Li level done     Last visit treatment plan: 1-Continue with current medications :   Abilify  10 mg in the morning  Quifacine   3 mg at bedtime    Invega 234 mg /IM monthly -Last Invega Sustenna 234 mg IM was 3/02/2022 given by group home   Evynyjjqey91 mg BID - agitation PRN   Statreta 100 mg in am   Trazodone 300 mg at bedtime   Lithium 900 mg very evening with mels   Hydroxyzine 50 mg TID PRN- Has only taken it 3 times since 9/1/2021   2-High risk medication use-lipid panel basic metabolic panel labs were completed in 06/30/2022and were within normal limits.  Last Lithium Level 06/30/2022- 0.8 WNL  3. RTC: 2 months         []?Medication refilled per  Medication Refill in Ambulatory Care  policy.  [x]?Medication unable to be refilled by RN due to criteria not met  as indicated below:               []?Eligibility - not seen in the last year              [x]?Supervision - no future appointment              []?Compliance - no shows, cancellations or lapse in therapy              []?Verification - order discrepancy              []?Controlled medication              [x]?Medication not included in policy              []?90-day supply request              []?Other

## 2023-05-09 DIAGNOSIS — F90.9 ATTENTION DEFICIT HYPERACTIVITY DISORDER (ADHD), UNSPECIFIED ADHD TYPE: ICD-10-CM

## 2023-05-09 DIAGNOSIS — F63.81 INTERMITTENT EXPLOSIVE DISORDER: ICD-10-CM

## 2023-05-09 DIAGNOSIS — F43.23 ADJUSTMENT DISORDER WITH MIXED ANXIETY AND DEPRESSED MOOD: ICD-10-CM

## 2023-05-11 RX ORDER — ARIPIPRAZOLE 10 MG/1
TABLET ORAL
Qty: 28 TABLET | Refills: 1 | Status: SHIPPED | OUTPATIENT
Start: 2023-05-11 | End: 2023-06-22

## 2023-05-11 RX ORDER — ATOMOXETINE 100 MG/1
CAPSULE ORAL
Qty: 28 CAPSULE | Refills: 1 | Status: SHIPPED | OUTPATIENT
Start: 2023-05-11 | End: 2023-06-22

## 2023-05-11 RX ORDER — LITHIUM CARBONATE 300 MG/1
CAPSULE ORAL
Qty: 84 CAPSULE | Refills: 1 | Status: SHIPPED | OUTPATIENT
Start: 2023-05-11 | End: 2023-06-22

## 2023-05-11 RX ORDER — TRAZODONE HYDROCHLORIDE 300 MG/1
1 TABLET ORAL AT BEDTIME
Qty: 28 TABLET | Refills: 1 | Status: SHIPPED | OUTPATIENT
Start: 2023-05-11 | End: 2023-06-22

## 2023-05-11 RX ORDER — GUANFACINE 3 MG/1
TABLET, EXTENDED RELEASE ORAL
Qty: 28 TABLET | Refills: 1 | Status: SHIPPED | OUTPATIENT
Start: 2023-05-11 | End: 2023-06-22

## 2023-05-11 NOTE — TELEPHONE ENCOUNTER
Date of Last Office Visit: 2/8/2023  Date of Next Office Visit: 6/22/2023  No shows since last visit: None  Cancellations since last visit: 5/4/2023 (provider initiated)    Medication requested: Abilify 10 mg tab Date last ordered: 4/17/2023 Qty: 28 Refills: 0    Medication requested: Strattera 100 mg cap Date last ordered: 4/17/2023 Qty: 28 Refills: 0    Medication requested: Intuniv 3 mg tab Date last ordered: 4/17/2023 Qty: 28 Refills: 0    Medication requested: lithium 300 mg cap Date last ordered: 4/17/2023 Qty: 84 Refills: 0    Medication requested: trazodone 300 mg tab Date last ordered: 4/17/2023 Qty: 28 Refills: 0     Review of MN ?: N/A    Lapse in medication adherence greater than 5 days?: No  If yes, call patient and gather details: N/A  Medication refill request verified as identical to current order?: No, pending refill to get pt to appt on 6/22/2023  Result of Last DAM, VPA, Li+ Level, CBC, or Carbamazepine Level (at or since last visit): N/A    Last visit treatment plan:   Psychiatric  Out- Patient  Follow Up Progress Note  Date of visit:2/8/2023             Discussion of Care and Treatment Recommendations:   This is a 22 year old male with a  history of  Intellectual disability,  fetal alcohol  spectrum,and  mood Disorder, due to general medical condition.Pt resides in a group home.   Patient is seen virtually today accompanied by his  team from [place]       [facility] Admin (Kofi ,[phone]- Group home house number    Mental Health : Escobar Winslow, [employer] [facility]. Phone: [phone]; Fax: [phone]; Office: [phone]. - present   ---- CAD[facility] : [facility]. Phone: [phone]; Fax: [phone].  ---- Representative Payee: Nathaniel Claros, [facility]. Phone: [phone].   ---- Care Coordination: SADE Boykin Case Manager, [place]. Phone: [phone]     Last visit  12/14/2023.  Recommendation at last visit .  1-Continue with  current medications :   Abilify  10 mg in the morning  Quifacine   3 mg at bedtime    Invega 234 mg /IM monthly -Last Invega Sustenna 234 mg IM was 3/02/2022 given by group home   Eqckwccttl96 mg BID - agitation PRN   Statreta 100 mg in am   Trazodone 300 mg at bedtime   Lithium 900 mg very evening with mels   Hydroxyzine 50 mg TID PRN- Has only taken it 3 times since 9/1/2021   2-High risk medication use-lipid panel basic metabolic panel labs were completed in 06/30/2022and were within normal limits.  Last Lithium Level 06/30/2022- 0.8 WNL  3. RTC: 2 months   Patient and I reviewed diagnosis and treatment plan and patient agrees with following recommendations:  Ongoing education given regarding diagnostic and treatment options with adequate verbalization of understanding.  Plan   1-Continue with current medications :   Abilify  10 mg in the morning  Quifacine   3 mg at bedtime    Invega 234 mg /IM monthly -Last Invega Sustenna 234 mg IM was 3/02/2022 given by group home   Grrlzrhqzp02 mg BID - agitation PRN   Statreta 100 mg in am   Trazodone 300 mg at bedtime   Lithium 900 mg very evening with mels   Hydroxyzine 50 mg TID PRN- Has only taken it 3 times since 9/1/2021   2-High risk medication use-lipid panel basic metabolic panel labs were completed in 06/30/2022and were within normal limits.  Last Lithium Level 06/30/2022- 0.8 WNL  3. RTC: 2 months       []Medication refilled per  Medication Refill in Ambulatory Care  policy.  [x]Medication unable to be refilled by RN due to criteria not met as indicated below:    []Eligibility - not seen in the last year   []Supervision - no future appointment   []Compliance - no shows, cancellations or lapse in therapy   []Verification - order discrepancy   []Controlled medication   [x]Medication not included in policy   []90-day supply request   []Other

## 2023-06-22 ENCOUNTER — OFFICE VISIT (OUTPATIENT)
Dept: PSYCHIATRY | Facility: CLINIC | Age: 23
End: 2023-06-22
Payer: COMMERCIAL

## 2023-06-22 VITALS
DIASTOLIC BLOOD PRESSURE: 68 MMHG | SYSTOLIC BLOOD PRESSURE: 113 MMHG | WEIGHT: 265 LBS | HEART RATE: 81 BPM | BODY MASS INDEX: 42.77 KG/M2

## 2023-06-22 DIAGNOSIS — F43.23 ADJUSTMENT DISORDER WITH MIXED ANXIETY AND DEPRESSED MOOD: ICD-10-CM

## 2023-06-22 DIAGNOSIS — F63.81 INTERMITTENT EXPLOSIVE DISORDER: ICD-10-CM

## 2023-06-22 DIAGNOSIS — F90.9 ATTENTION DEFICIT HYPERACTIVITY DISORDER (ADHD), UNSPECIFIED ADHD TYPE: ICD-10-CM

## 2023-06-22 PROCEDURE — 99214 OFFICE O/P EST MOD 30 MIN: CPT | Performed by: NURSE PRACTITIONER

## 2023-06-22 RX ORDER — HYDROXYZINE HYDROCHLORIDE 50 MG/1
50 TABLET, FILM COATED ORAL 3 TIMES DAILY PRN
Qty: 90 TABLET | Refills: 2 | Status: SHIPPED | OUTPATIENT
Start: 2023-06-22 | End: 2023-09-21

## 2023-06-22 RX ORDER — TRAZODONE HYDROCHLORIDE 300 MG/1
1 TABLET ORAL AT BEDTIME
Qty: 28 TABLET | Refills: 1 | Status: SHIPPED | OUTPATIENT
Start: 2023-06-22 | End: 2023-08-10

## 2023-06-22 RX ORDER — LITHIUM CARBONATE 300 MG/1
900 CAPSULE ORAL DAILY
Qty: 90 CAPSULE | Refills: 3 | Status: SHIPPED | OUTPATIENT
Start: 2023-06-22 | End: 2023-09-21

## 2023-06-22 RX ORDER — ARIPIPRAZOLE 10 MG/1
10 TABLET ORAL EVERY MORNING
Qty: 28 TABLET | Refills: 1 | Status: SHIPPED | OUTPATIENT
Start: 2023-06-22 | End: 2023-08-10

## 2023-06-22 RX ORDER — ATOMOXETINE 100 MG/1
100 CAPSULE ORAL DAILY
Qty: 30 CAPSULE | Refills: 3 | Status: SHIPPED | OUTPATIENT
Start: 2023-06-22 | End: 2023-09-21

## 2023-06-22 RX ORDER — CHLORPROMAZINE HYDROCHLORIDE 50 MG/1
50 TABLET, FILM COATED ORAL 2 TIMES DAILY PRN
Qty: 60 TABLET | Refills: 2 | Status: SHIPPED | OUTPATIENT
Start: 2023-06-22 | End: 2023-09-21

## 2023-06-22 RX ORDER — GUANFACINE 3 MG/1
3 TABLET, EXTENDED RELEASE ORAL AT BEDTIME
Qty: 28 TABLET | Refills: 1 | Status: SHIPPED | OUTPATIENT
Start: 2023-06-22 | End: 2023-08-10

## 2023-06-22 ASSESSMENT — PAIN SCALES - GENERAL: PAINLEVEL: NO PAIN (0)

## 2023-06-22 NOTE — PROGRESS NOTES
Mental Health and Collaborative Care Psychiatry Service Rooming Note      Most pressing mental health concern at this time: Pr is accompanied by his CM Escobar. Just a f/u appt.      DISCUS score is a 0  Last Li level documented on 6/22/22    Any new physical health conditions or diagnoses affecting you that we should be aware of: denies      Side effects related to medications patient would like to discuss with the provider:  Yes. Pt reports having restless legs and cramps possible dt medications      Are you taking your medications as prescribed?  Yes. GH patient, was unable to recall all his medications  If not, why? NA      Do you need refills of any of the medications?  yes  If so, which ones? pended      Are you taking any recreational substances? denies        Add attendance guidelines .phrase here   Care team has reviewed attendance agreement with patient. Patient advised that two failed appointments within 6 months may lead to termination of current episode of care.            Judy Holly LPN  June 22, 2023  12:24 PM

## 2023-06-22 NOTE — PROGRESS NOTES
Psychiatric  Out patient Follow Up Progress Note  Date of visit:6/22/2023           Discussion of Care and Treatment Recommendations:   This is a 23 year old male with  a  history of  Intellectual disability,  fetal alcohol  spectrum,and  mood Disorder, due to general medical condition.Pt resides in a group home.   Patient is seen in person  today accompanied by his  team from [place]       [facility] Admin (Kofi ,[phone]- Group home house number    Mental Health : Escobar Winslow, [place]. Phone: [phone]; Fax: [phone]; Office: [phone]. - present   ---- CADI : [facility] [place]. Phone: [phone]; Fax: [phone].  ---- Representative Payee: Nathaniel Claros, [facility]. Phone: [phone].   ---- Care Coordination: SADE Boykin Case Manager, [facility]. Phone: [phone]      Last visit  02/08/2023.  Recommendation at last visit .  1-Continue with current medications :   Abilify  10 mg in the morning  Quifacine   3 mg at bedtime    Invega 234 mg /IM monthly -Last Invega Sustenna 234 mg IM was 3/02/2022 given by group home   Ldnxuspdlt56 mg BID - agitation PRN   Statreta 100 mg in am   Trazodone 300 mg at bedtime   Lithium 900 mg very evening with mels   Hydroxyzine 50 mg TID PRN- Has only taken it 3 times since 9/1/2021   2-High risk medication use-lipid panel basic metabolic panel labs were completed in 06/30/2022and were within normal limits.  Last Lithium Level 06/30/2022- 0.8 WNL  3. RTC: 2 months      Patient and I reviewed diagnosis and treatment plan and patient agrees with following recommendations:  Ongoing education given regarding diagnostic and treatment options with adequate verbalization of understanding.  Plan   1-Continue with current medications :   Abilify  10 mg in the morning  Quifacine   3 mg at bedtime    Invega 234 mg /IM monthly -Last Invega Sustenna 234 mg IM was 3/02/2022 given by group home    Pseeukdyii88 mg BID - agitation PRN   Statreta 100 mg in am   Trazodone 300 mg at bedtime   Lithium 900 mg very evening with mels   Hydroxyzine 50 mg TID PRN- Has only taken it 3 times since 9/1/2021   2-High risk medication use-lipid panel basic metabolic panel labs were completed in 06/30/2022and were within normal limits.  Last Lithium Level 06/30/2022- 0.8 WNL- Will complete labs during next vivit per patient's request   3. RTC: 2 months             DIagnoses:   Schizoaffective disorder, unspecified type (HCC)  Active Problems:  Intermittent explosive disorder  Fetal alcohol spectrum disorder  History of traumatic brain injury  Tobacco use disorder  Mild intellectual disability  ADHD (attention deficit hyperactivity disorder), combined type  Aggressive behavior      Patient Active Problem List   Diagnosis     Thrombocytopenia (H)     Chronic ITP (idiopathic thrombocytopenia) (H)             Chief Complaint / Subjective:    Chief complaint: Schizoaffective disorder    History of Present Illness:   Per patient's statement : He is doing well.  Reports compliance with current medications and denies effects.  Occasional auditory hallucinations remain but he is able to distract himself.  Assessment significant for.  He is eating at least 8 hours each night.  He does not get along with his housemate and tries to avoid a lot.  No beh  issues reported by the staff.  He would like to start working with his  has a wait list to place that he is willing to available.  He is looking forward to this.  He shared that his 15 year old brother was stabbed to death in an incident of a simple school that was in the news.  He was able to share pictures of his brother and family with me.  He still grieving the loss.  He is doing well.  I did recommend therapy with family but interested.  He states talks to his father and siblings frequently and does not want a therapist this time.  He has no other concerns  Continue  "current medications.        Mental Status Examination:     Appearance: unable to assess  Orientation: Patient alert and oriented to person, place, time, and situation  Reliability:  Patient appears to be an adequate historian.    Behavior: calm and coperative   Speech: Speech is spontaneous and coherent, with a normal rate, rhythm and tone.    Language:There are no difficulties with expressive or receptive language as observed throughout the interview.    Mood: Described as \"ok\".    Affect: unable to assess  Judgement: Able to make basic decision regarding safety.  Insight: Good awareness of physical and mental health conditions and aware of needs around care for these.  Gait and station: unable to assess  Thought process: Logical   Hallucinations : No evidence of any hallucination  Thought content: No evidence of delusions or paranoia.   Suicidal /Homical Ideations:  No thoughts of self harm or suicide. No thoughts of harming others.  Associations: Connected  Fund of knowledge: Average  Attention / Concentration: Able to remain focused during the interview with minimal distractibility or need for redirection.  Short Term Memory: Grossly intact as evidence by client recalling themes and ideas discussed.  Long Term Memory: Intact  Motor Status: unable to asses      Drug/treatment history and current pattern of use:   History of cannabis use  History of nicotine use-vaping      Medication changes: See Above   Medication adherence: compliant  Medication side effects: absent  Information about medications: Side effects, benefits and alternative treatments discussed and patient agrees .    Psychotherapy: Supportive therapy day-to-day living    Education: Diet, exercise, abstinence from drugs and alcohol, patient will not drive if sedated and medications or  under influence of any substance    Lab Results:   Personally reviewed and discussed with the patient    Lab Results   Component Value Date    WBC 12.9 (H) 10/13/2021 "    HGB 14.7 10/13/2021    HCT 45.4 10/13/2021     10/13/2021    CHOL 119 01/27/2023    TRIG 51 01/27/2023    HDL 36 (L) 01/27/2023    ALT 21 06/30/2022    AST 23 06/30/2022     06/30/2022    BUN 5.3 (L) 06/30/2022    CO2 22 06/30/2022    TSH 3.61 06/30/2022       Vital signs:  There were no vitals taken for this visit.  Unable to assess telephone visit  Allergies: Depakote [valproic acid] and Other environmental allergy           Review of Systems:      ROS:  Subjective data only - Tele-Health  Visit   10 point ROS was negative except for the items listed in HPI-              Medications:     Current Outpatient Medications   Medication     ARIPiprazole (ABILIFY) 10 MG tablet     atomoxetine (STRATTERA) 100 MG capsule     atropine 1 % ophthalmic solution     chlorproMAZINE (THORAZINE) 50 MG tablet     cholecalciferol 25 MCG (1000 UT) TABS     ferrous sulfate (FEROSUL) 325 (65 Fe) MG tablet     guanFACINE HCl (INTUNIV) 3 MG TB24 24 hr tablet     hydrOXYzine (ATARAX) 50 MG tablet     INVEGA SUSTENNA 234 MG/1.5ML JUANA     lithium 300 MG capsule     loratadine (CLARITIN) 10 MG tablet     metFORMIN (GLUCOPHAGE-XR) 500 MG 24 hr tablet     traZODone HCl 300 MG TABS     vitamin C (ASCORBIC ACID) 250 MG TABS tablet     No current facility-administered medications for this visit.         Medication adherence: Reviewed risk/benefits of medication , Patient able to verbalize understanding of side effects and Patient verbally consents to taking medications    PSYCHOEDUCATION:  Medication side effects and alternatives reviewed. Health promotion activities recommended and reviewed today. All questions addressed. Education and counseling completed regarding risks and benefits of medications and psychotherapy options.  Consent provided by patient/guardian  Call the psychiatric nurse line with medication questions or concerns at [phone].  MyChart may be used to communicate with your provider, but this is not intended  to be used for emergencies.  SEROTONIN SYNDROME:  Discussed risks of Serotonin syndrome (ie, serotonin toxicity) which is a potentially life-threatening condition associated with increased serotonergic activity in the central nervous system (CNS). It is seen with therapeutic medication use, inadvertent interactions between drugs, and intentional self-poisoning. Serotonin syndrome may involve a spectrum of clinical findings, which often include mental status changes, autonomic hyperactivity, and neuromuscular abnormalities.    STIMULANT THERAPY: Side effects discussed including but not limited to cardiac (including HTN, tachycardia, sudden death), motor/tic, appetite/growth, mood lability and sleep disruption. This is a controlled substance with risk for abuse, need to keep in a safe keep place and cannot replace lost scripts  HARM REDUCTION:  Discussions regarding effects of mood altering substances, alcohol and cannabis, on mood and that approach is harm reduction, will continue to prescribe meds as they work to cut back use.    SAFETY:  We all care about your loved one's safety. To reduce the risk of self-harm, remove access to all:  Firearms, Medicines (both prescribed and over-the-counter), Knives and other sharp objects, Ropes and like materials, and Alcohol  SLEEP HYGIENE: establish a sleep routine, limit screen time 1 hour prior to bed, use bed for sleep only, take sleep/medications on time (including sleepy time tea, trazadone or herbal treatments such as melatonin), aroma therapy, limit caffeine/sugar, yoga, guided imagery, stretch, meditation, limit naps to 20 minutes, make a temperature change in the room, white noise, be mindful of slowing down breathing, take a warm bath/shower, frequently wash sheets, and journaling.   Medlineplus.gov is information for patients.  It is run by the [employer] and it contains information about all disorders, diseases and all medications.       Crisis  Resources:    1. Present to the Emergency Department as needed or call after hours crisis line at [phone] or [phone].   2. [place] Crisis Text Line: Text [place] to 530794.  3. Suicide LifeLine Chat: suicidepreCollaberaline.org/chat/.  4. National Suicide Prevention Lifeline: 545.390.1220 (TTY: 838.369.9226). Call anytime for help.  (www.suicidepreventionChatosityline.org)  5. National [employer] on Mental Illness (www.milvia.org): 932.908.6066 or 785-332-8979.  6. Mental Health Association (www.mentalhealth.org): 106.417.8005 or 658-486-0683.      Coordination of Care:   More than 30 minutes spent on this visit  with more than 50% of time spent on coordination of care including: Educating patient about diagnosis, prognosis, side effects and benefits of medications, diet, exercise.  Time also spent providing supportive therapy regarding above issues.      This note was created using a dictation system. All typing errors or contextual distortion is unintentional and software inherent.  Start Time : 1230  End time : 1300

## 2023-06-22 NOTE — PATIENT INSTRUCTIONS
"The Panel Psychiatry Program  What to Expect  Here's what to expect in the Panel Psychiatry Program.   About the program  You'll be meeting with a psychiatric doctor to check your mental health. A psychiatric doctor helps you deal with troubling thoughts and feelings by giving you medicine. They'll make sure you know the plan for your care. You may see them for a long time. When you're feeling better, they may refer you back to seeing your family doctor.   If you have any questions, we'll be glad to talk to you.  About visits  Be open  At your visits, please talk openly about your problems. It may feel hard, but it's the best way for us to help you.  Cancelling visits  If you can't come to your visit, please call us right away at [phone]. If you don't cancel at least 24 hours (1 full day) before your visit, that's \"late cancellation.\"  Not showing up for your visits  Being very late is the same as not showing up. You'll be a \"no show\" if:  You're more than 15 minutes late for a 30-minute (half hour) visit.  You're more than 30 minutes late for a 60-minute (full hour) visit.  If you cancel late or don't show up 2 times within 6 months, we may end your care.  Getting help between visits  If you need help between visits, you can call us Monday to Friday from 8 a.m. to 4:30 p.m. at [phone].  Emergency care  Call 911 or go to the nearest emergency department if your life or someone else's life is in danger.  Call 988 anytime to reach the national Suicide and Crisis hotline.  Medicine refills  To refill your medicine, call your pharmacy. You can also call [facility]'s Behavioral Access at [phone], Monday to Friday, 8 a.m. to 4:30 p.m. It can take 1 to 3 business days to get a refill.   Forms, letters, and tests  You may have papers to fill out, like FMLA, short-term disability, and workability. We can help you with these forms at your visits, but you must have an appointment. You may need more " than 1 visit for this, to be in an intensive therapy program, or both.  Before we can give you medicine for ADHD, we may refer you to get tested for it or confirm it another way.  We may not be able to give you an emotional support animal letter.  We don't do mental health checks ordered by the court.   We don't do mental health testing, but we can refer you to get tested.   Thank you for choosing us for your care.  For informational purposes only. Not to replace the advice of your health care provider. Copyright   2022 [facility]. All rights reserved. Domee [id number] - 12/22      After Visit Summary   Continue medications as prescribed  Have your pharmacy contact us for a refill if you are running low on medications (We may ask you to come into clinic to get a refill from the nurse  No Alcohol or drug use  No driving if sedated  Call the clinic with any questions or concerns   Reach out for help if you feel like hurting yourself or others ([facility] [phone]: [address], [address] or [employer] Suicide Hotline   [phone] , call 911 or go to nearest Emergency room     Crisis Resources:    Present to the Emergency Department as needed or call after hours crisis line at [phone] or [phone].   [place] Crisis Text Line: Text [place] to 775500.  Suicide LifeLine Chat: suicidepreventionlifeline.org/chat/.  National Suicide Prevention Lifeline: 176.274.3354 (TTY: 326.151.9146). Call anytime for help.  (www.suicidepreventionlifeline.org)  National [place] on Mental Illness (www.milvia.org): 121.591.7054 or 586-099-9072.  Mental Health Association (www.mentalhealth.org): 151.993.5971 or 140-651-9885.       Follow up as directed, for your appointments, per your After Visit Summary Form.

## 2023-08-07 DIAGNOSIS — F39 SEVERE MOOD DISORDER WITH PSYCHOTIC FEATURES (H): ICD-10-CM

## 2023-08-07 DIAGNOSIS — F63.81 INTERMITTENT EXPLOSIVE DISORDER: ICD-10-CM

## 2023-08-07 DIAGNOSIS — F90.9 ATTENTION DEFICIT HYPERACTIVITY DISORDER (ADHD), UNSPECIFIED ADHD TYPE: ICD-10-CM

## 2023-08-07 DIAGNOSIS — F43.23 ADJUSTMENT DISORDER WITH MIXED ANXIETY AND DEPRESSED MOOD: ICD-10-CM

## 2023-08-09 NOTE — TELEPHONE ENCOUNTER
Date of Last Office Visit: 6/22/23  Date of Next Office Visit: 8/17/23  No shows since last visit: 0  Cancellations since last visit: 0    Medication requested:     ARIPiprazole (ABILIFY) 10 MG tablet    Date last ordered: 6/22/23 Qty: 28 Refills: 1     Medication requested: guanFACINE HCl (INTUNIV) 3 MG TB24 24 hr tablet Date last ordered: 6/22/23 Qty: 28 Refills: 1    Medication requested:     INVEGA SUSTENNA 234 MG/1.5ML JUANA    Date last ordered: 4/17/23 Qty:  1.5 ML Refills: 3    Medication requested: traZODone HCl 300 MG TABS  Date last ordered: 6/22/23 Qty: 28 Refills: 1      Review of [place] ?: NA    Lapse in medication adherence greater than 5 days?: no  If yes, call patient and gather details: NA  Medication refill request verified as identical to current order?: yes  Result of Last DAM, VPA, Li+ Level, CBC, or Carbamazepine Level (at or since last visit): N/A    Last visit treatment plan: Patient and I reviewed diagnosis and treatment plan and patient agrees with following recommendations:  Ongoing education given regarding diagnostic and treatment options with adequate verbalization of understanding.  Plan   1-Continue with current medications :   Abilify  10 mg in the morning  Quifacine   3 mg at bedtime    Invega 234 mg /IM monthly -Last Invega Sustenna 234 mg IM was 3/02/2022 given by group home   Freamuuzyj65 mg BID - agitation PRN   Statreta 100 mg in am   Trazodone 300 mg at bedtime   Lithium 900 mg very evening with mels   Hydroxyzine 50 mg TID PRN- Has only taken it 3 times since 9/1/2021   2-High risk medication use-lipid panel basic metabolic panel labs were completed in 06/30/2022and were within normal limits.  Last Lithium Level 06/30/2022- 0.8 WNL- Will complete labs during next vivit per patient's request   3. RTC: 2 months     []Medication refilled per  Medication Refill in Ambulatory Care  policy.  [x]Medication unable to be refilled by RN due to criteria not met as indicated below:     []Eligibility - not seen in the last year   []Supervision - no future appointment   []Compliance - no shows, cancellations or lapse in therapy   []Verification - order discrepancy   []Controlled medication   []Medication not included in policy   [x]60-90 day supply request   []Other

## 2023-08-10 RX ORDER — TRAZODONE HYDROCHLORIDE 300 MG/1
1 TABLET ORAL AT BEDTIME
Qty: 28 TABLET | Refills: 1 | Status: SHIPPED | OUTPATIENT
Start: 2023-08-10 | End: 2023-09-21

## 2023-08-10 RX ORDER — PALIPERIDONE PALMITATE 234 MG/1.5ML
INJECTION INTRAMUSCULAR
Qty: 1.5 ML | Refills: 3 | Status: SHIPPED | OUTPATIENT
Start: 2023-08-10 | End: 2023-11-29

## 2023-08-10 RX ORDER — GUANFACINE 3 MG/1
3 TABLET, EXTENDED RELEASE ORAL AT BEDTIME
Qty: 28 TABLET | Refills: 1 | Status: SHIPPED | OUTPATIENT
Start: 2023-08-10 | End: 2023-09-21

## 2023-08-10 RX ORDER — ARIPIPRAZOLE 10 MG/1
10 TABLET ORAL EVERY MORNING
Qty: 28 TABLET | Refills: 1 | Status: SHIPPED | OUTPATIENT
Start: 2023-08-10 | End: 2023-09-21

## 2023-08-17 ENCOUNTER — OFFICE VISIT (OUTPATIENT)
Dept: PSYCHIATRY | Facility: CLINIC | Age: 23
End: 2023-08-17
Payer: COMMERCIAL

## 2023-08-17 VITALS
BODY MASS INDEX: 43.23 KG/M2 | WEIGHT: 269 LBS | DIASTOLIC BLOOD PRESSURE: 61 MMHG | HEART RATE: 85 BPM | OXYGEN SATURATION: 97 % | SYSTOLIC BLOOD PRESSURE: 141 MMHG | HEIGHT: 66 IN

## 2023-08-17 DIAGNOSIS — F79 INTELLECTUAL DISABILITY: Primary | ICD-10-CM

## 2023-08-17 DIAGNOSIS — Z87.820 HISTORY OF TRAUMATIC BRAIN INJURY: ICD-10-CM

## 2023-08-17 DIAGNOSIS — F25.9 SCHIZOAFFECTIVE DISORDER, UNSPECIFIED TYPE (H): ICD-10-CM

## 2023-08-17 DIAGNOSIS — F63.81 INTERMITTENT EXPLOSIVE DISORDER: ICD-10-CM

## 2023-08-17 DIAGNOSIS — F90.2 ADHD (ATTENTION DEFICIT HYPERACTIVITY DISORDER), COMBINED TYPE: ICD-10-CM

## 2023-08-17 DIAGNOSIS — R46.89 AGGRESSIVE BEHAVIOR: ICD-10-CM

## 2023-08-17 PROCEDURE — 99214 OFFICE O/P EST MOD 30 MIN: CPT | Performed by: NURSE PRACTITIONER

## 2023-08-17 ASSESSMENT — ANXIETY QUESTIONNAIRES
GAD7 TOTAL SCORE: 6
5. BEING SO RESTLESS THAT IT IS HARD TO SIT STILL: NOT AT ALL
IF YOU CHECKED OFF ANY PROBLEMS ON THIS QUESTIONNAIRE, HOW DIFFICULT HAVE THESE PROBLEMS MADE IT FOR YOU TO DO YOUR WORK, TAKE CARE OF THINGS AT HOME, OR GET ALONG WITH OTHER PEOPLE: NOT DIFFICULT AT ALL
GAD7 TOTAL SCORE: 6
2. NOT BEING ABLE TO STOP OR CONTROL WORRYING: SEVERAL DAYS
7. FEELING AFRAID AS IF SOMETHING AWFUL MIGHT HAPPEN: SEVERAL DAYS
1. FEELING NERVOUS, ANXIOUS, OR ON EDGE: SEVERAL DAYS
6. BECOMING EASILY ANNOYED OR IRRITABLE: SEVERAL DAYS
4. TROUBLE RELAXING: SEVERAL DAYS
3. WORRYING TOO MUCH ABOUT DIFFERENT THINGS: SEVERAL DAYS

## 2023-08-17 ASSESSMENT — PATIENT HEALTH QUESTIONNAIRE - PHQ9: SUM OF ALL RESPONSES TO PHQ QUESTIONS 1-9: 3

## 2023-08-17 NOTE — PROGRESS NOTES
Mental Health and Collaborative Care Psychiatry Service Rooming Note      Most pressing mental health concern at this time: anxiety when run out of cigs and when check is not in on time.      Any new physical health conditions or diagnoses affecting you that we should be aware of: no      Side effects related to medications patient would like to discuss with the provider:  Yes      Are you taking your medications as prescribed?  yes  If not, why? N/a      Do you need refills of any of the medications?  Not sure  If so, which ones? Nurse takes care of them      Are you taking any recreational substances? no              Care team has reviewed attendance agreement with patient. Patient advised that two failed appointments within 6 months may lead to termination of current episode of care.             Yasmine Lugo CMA  August 17, 2023  10:41 AM

## 2023-08-17 NOTE — PATIENT INSTRUCTIONS
"The Panel Psychiatry Program  What to Expect  Here's what to expect in the Panel Psychiatry Program.   About the program  You'll be meeting with a psychiatric doctor to check your mental health. A psychiatric doctor helps you deal with troubling thoughts and feelings by giving you medicine. They'll make sure you know the plan for your care. You may see them for a long time. When you're feeling better, they may refer you back to seeing your family doctor.   If you have any questions, we'll be glad to talk to you.  About visits  Be open  At your visits, please talk openly about your problems. It may feel hard, but it's the best way for us to help you.  Cancelling visits  If you can't come to your visit, please call us right away at [phone]. If you don't cancel at least 24 hours (1 full day) before your visit, that's \"late cancellation.\"  Not showing up for your visits  Being very late is the same as not showing up. You'll be a \"no show\" if:  You're more than 15 minutes late for a 30-minute (half hour) visit.  You're more than 30 minutes late for a 60-minute (full hour) visit.  If you cancel late or don't show up 2 times within 6 months, we may end your care.  Getting help between visits  If you need help between visits, you can call us Monday to Friday from 8 a.m. to 4:30 p.m. at [phone].  Emergency care  Call 911 or go to the nearest emergency department if your life or someone else's life is in danger.  Call 988 anytime to reach the national Suicide and Crisis hotline.  Medicine refills  To refill your medicine, call your pharmacy. You can also call [facility]'s Behavioral Access at [phone], Monday to Friday, 8 a.m. to 4:30 p.m. It can take 1 to 3 business days to get a refill.   Forms, letters, and tests  You may have papers to fill out, like FMLA, short-term disability, and workability. We can help you with these forms at your visits, but you must have an appointment. You may need more " than 1 visit for this, to be in an intensive therapy program, or both.  Before we can give you medicine for ADHD, we may refer you to get tested for it or confirm it another way.  We may not be able to give you an emotional support animal letter.  We don't do mental health checks ordered by the court.   We don't do mental health testing, but we can refer you to get tested.   Thank you for choosing us for your care.  For informational purposes only. Not to replace the advice of your health care provider. Copyright   2022 [facility]. All rights reserved. [employer] [id number] - 12/22      After Visit Summary   Continue medications as prescribed  Have your pharmacy contact us for a refill if you are running low on medications (We may ask you to come into clinic to get a refill from the nurse  No Alcohol or drug use  No driving if sedated  Call the clinic with any questions or concerns   Reach out for help if you feel like hurting yourself or others ([facility] [phone]: [address], [address] or [facility] [place] [facility] Suicide Hotline   [phone] , call 911 or go to nearest Emergency room     Crisis Resources:    Present to the Emergency Department as needed or call after hours crisis line at [phone] or [phone].   [place] Crisis Text Line: Text [place] to 011615.  Suicide LifeLine Chat: suicidepreventionlifeline.org/chat/.  National Suicide Prevention Lifeline: 198.622.8552 (TTY: 733.607.3405). Call anytime for help.  (www.suicidepreventionlifeline.org)  National [place] on Mental Illness (www.milvia.org): 345.210.9088 or 014-450-5232.  Mental Health Association (www.mentalhealth.org): 593.767.2965 or 819-810-8148.       Follow up as directed, for your appointments, per your After Visit Summary Form.
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
## 2023-09-21 ENCOUNTER — OFFICE VISIT (OUTPATIENT)
Dept: PSYCHIATRY | Facility: CLINIC | Age: 23
End: 2023-09-21
Payer: COMMERCIAL

## 2023-09-21 VITALS
HEART RATE: 93 BPM | BODY MASS INDEX: 42.77 KG/M2 | SYSTOLIC BLOOD PRESSURE: 111 MMHG | WEIGHT: 265 LBS | DIASTOLIC BLOOD PRESSURE: 52 MMHG

## 2023-09-21 DIAGNOSIS — F63.81 INTERMITTENT EXPLOSIVE DISORDER: ICD-10-CM

## 2023-09-21 DIAGNOSIS — F43.23 ADJUSTMENT DISORDER WITH MIXED ANXIETY AND DEPRESSED MOOD: ICD-10-CM

## 2023-09-21 DIAGNOSIS — F90.9 ATTENTION DEFICIT HYPERACTIVITY DISORDER (ADHD), UNSPECIFIED ADHD TYPE: ICD-10-CM

## 2023-09-21 PROCEDURE — 99214 OFFICE O/P EST MOD 30 MIN: CPT | Performed by: NURSE PRACTITIONER

## 2023-09-21 RX ORDER — TRAZODONE HYDROCHLORIDE 300 MG/1
1 TABLET ORAL AT BEDTIME
Qty: 28 TABLET | Refills: 1 | Status: SHIPPED | OUTPATIENT
Start: 2023-09-21 | End: 2023-10-26

## 2023-09-21 RX ORDER — GUANFACINE 3 MG/1
3 TABLET, EXTENDED RELEASE ORAL AT BEDTIME
Qty: 28 TABLET | Refills: 1 | Status: SHIPPED | OUTPATIENT
Start: 2023-09-21 | End: 2023-10-26

## 2023-09-21 RX ORDER — ATOMOXETINE 100 MG/1
100 CAPSULE ORAL DAILY
Qty: 30 CAPSULE | Refills: 3 | Status: SHIPPED | OUTPATIENT
Start: 2023-09-21 | End: 2023-12-14

## 2023-09-21 RX ORDER — LITHIUM CARBONATE 300 MG/1
900 CAPSULE ORAL DAILY
Qty: 90 CAPSULE | Refills: 3 | Status: SHIPPED | OUTPATIENT
Start: 2023-09-21 | End: 2023-12-14

## 2023-09-21 RX ORDER — HYDROXYZINE HYDROCHLORIDE 50 MG/1
50 TABLET, FILM COATED ORAL 3 TIMES DAILY PRN
Qty: 90 TABLET | Refills: 2 | Status: SHIPPED | OUTPATIENT
Start: 2023-09-21 | End: 2023-12-14

## 2023-09-21 RX ORDER — CHLORPROMAZINE HYDROCHLORIDE 50 MG/1
50 TABLET, FILM COATED ORAL 2 TIMES DAILY PRN
Qty: 60 TABLET | Refills: 2 | Status: SHIPPED | OUTPATIENT
Start: 2023-09-21 | End: 2023-12-14

## 2023-09-21 RX ORDER — ARIPIPRAZOLE 10 MG/1
10 TABLET ORAL EVERY MORNING
Qty: 28 TABLET | Refills: 1 | Status: SHIPPED | OUTPATIENT
Start: 2023-09-21 | End: 2023-10-26

## 2023-09-21 ASSESSMENT — ANXIETY QUESTIONNAIRES
3. WORRYING TOO MUCH ABOUT DIFFERENT THINGS: NOT AT ALL
1. FEELING NERVOUS, ANXIOUS, OR ON EDGE: NOT AT ALL
4. TROUBLE RELAXING: NOT AT ALL
2. NOT BEING ABLE TO STOP OR CONTROL WORRYING: NOT AT ALL
GAD7 TOTAL SCORE: 0
7. FEELING AFRAID AS IF SOMETHING AWFUL MIGHT HAPPEN: NOT AT ALL
5. BEING SO RESTLESS THAT IT IS HARD TO SIT STILL: NOT AT ALL
GAD7 TOTAL SCORE: 0
6. BECOMING EASILY ANNOYED OR IRRITABLE: NOT AT ALL

## 2023-09-21 NOTE — PATIENT INSTRUCTIONS
"The [facility]  What to Expect  Here's what to expect in the Panel Psychiatry Program.   About the program  You'll be meeting with a psychiatric doctor to check your mental health. A psychiatric doctor helps you deal with troubling thoughts and feelings by giving you medicine. They'll make sure you know the plan for your care. You may see them for a long time. When you're feeling better, they may refer you back to seeing your family doctor.   If you have any questions, we'll be glad to talk to you.  About visits  Be open  At your visits, please talk openly about your problems. It may feel hard, but it's the best way for us to help you.  Cancelling visits  If you can't come to your visit, please call us right away at [phone]. If you don't cancel at least 24 hours (1 full day) before your visit, that's \"late cancellation.\"  Not showing up for your visits  Being very late is the same as not showing up. You'll be a \"no show\" if:  You're more than 15 minutes late for a 30-minute (half hour) visit.  You're more than 30 minutes late for a 60-minute (full hour) visit.  If you cancel late or don't show up 2 times within 6 months, we may end your care.  Getting help between visits  If you need help between visits, you can call us Monday to Friday from 8 a.m. to 4:30 p.m. at [phone].  Emergency care  Call 911 or go to the nearest emergency department if your life or someone else's life is in danger.  Call 988 anytime to reach the national Suicide and Crisis hotline.  Medicine refills  To refill your medicine, call your pharmacy. You can also call [facility] at [phone], Monday to Friday, 8 a.m. to 4:30 p.m. It can take 1 to 3 business days to get a refill.   Forms, letters, and tests  You may have papers to fill out, like FMLA, short-term disability, and workability. We can help you with these forms at your visits, but you must have an appointment. You may need more " than 1 visit for this, to be in an intensive therapy program, or both.  Before we can give you medicine for ADHD, we may refer you to get tested for it or confirm it another way.  We may not be able to give you an emotional support animal letter.  We don't do mental health checks ordered by the court.   We don't do mental health testing, but we can refer you to get tested.   Thank you for choosing us for your care.  For informational purposes only. Not to replace the advice of your health care provider. Copyright   2022 [facility]. All rights reserved. [employer] [id number] - 12/22      After Visit Summary   Continue medications as prescribed  Have your pharmacy contact us for a refill if you are running low on medications (We may ask you to come into clinic to get a refill from the nurse  No Alcohol or drug use  No driving if sedated  Call the clinic with any questions or concerns   Reach out for help if you feel like hurting yourself or others ([facility] [phone]: [address], [phone] or [facility] Suicide Hotline   [phone] , call 911 or go to nearest Emergency room     Crisis Resources:    Present to the Emergency Department as needed or call after hours crisis line at [phone] or [phone].   [place] Crisis Text Line: Text [place] to 482905.  Suicide LifeLine Chat: suicidepreventionlifeline.org/chat/.  National Suicide Prevention Lifeline: 206.172.3791 (TTY: 475.312.5015). Call anytime for help.  (www.suicidepreventionlifeline.org)  National [place] on Mental Illness (www.milvia.org): 185.721.1598 or 777-051-1396.  Mental Health Association (www.mentalhealth.org): 755.197.5316 or 754-781-6740.       Follow up as directed, for your appointments, per your After Visit Summary Form.

## 2023-09-21 NOTE — PROGRESS NOTES
Mental Health and Collaborative Care Psychiatry Service Rooming Note      Most pressing mental health concern at this time: None      Any new physical health conditions or diagnoses affecting you that we should be aware of: No      Side effects related to medications patient would like to discuss with the provider:  Yes Injection makes him fuzzy, shaky hands and foot, feels restless at times.       Are you taking your medications as prescribed?  Yes  If not, why? N/A      Do you need refills of any of the medications?  Yes  If so, which ones? All of them       Are you taking any recreational substances? No      Is there any chance you are pregnant? No      Add attendance guidelines .phrase here   Care team has reviewed attendance agreement with patient. Patient advised that two failed appointments within 6 months may lead to termination of current episode of care.          Stefanie Babcock RN  September 21, 2023  9:10 AM

## 2023-09-21 NOTE — PROGRESS NOTES
Psychiatric  Out- Patient  Follow Up Progress Note  Date of visit:9/21/2023           Discussion of Care and Treatment Recommendations:   This is a 23 year old male with  a  history of  Intellectual disability,  fetal alcohol  spectrum,and  mood Disorder, due to general medical condition.Pt resides in a group home.   Patient is seen in person  today accompanied by his  Mental Health  team       Last visit 08/17/2023  Recommendation at last visit .  -Continue with current medications :   Abilify  10 mg in the morning  Quifacine   3 mg at bedtime    Invega 234 mg /IM monthly -Last Invega Sustenna 234 mg IM was 3/02/2022 given by group home   Ilxmdyulwg27 mg BID - agitation PRN   Statreta 100 mg in am   Trazodone 300 mg at bedtime   Lithium 900 mg very evening with mels   Hydroxyzine 50 mg TID PRN- Has only taken it 3 times since 9/1/2021   2-High risk medication use-lipid panel basic metabolic panel labs were completed in 06/30/2022and were within normal limits.  Last Lithium Level 06/30/2022- 0.8 WNL- Will complete labs during next vivit per patient's request   3. RTC:4 weeks  Patient and I reviewed diagnosis and treatment plan and patient agrees with following recommendations:  Ongoing education given regarding diagnostic and treatment options with adequate verbalization of understanding.  Plan   -Continue with current medications :   Abilify  10 mg in the morning  Quifacine   3 mg at bedtime    Invega 234 mg /IM monthly -Lgiven by group home   Blgghnohiy20 mg BID - agitation PRN   Statreta 100 mg in am   Trazodone 300 mg at bedtime   Lithium 900 mg very evening with mels   Hydroxyzine 50 mg TID PRN- Has only taken it 3 times since 9/1/2021   2-High risk medication use-lipid panel basic metabolic panel labs were completed in 06/30/2022and were within normal limits.  Last Lithium Level 06/30/2022- 0.8 WNL- Will complete labs during next vivit per patient's request   3. RTC:  4 weeks          "DIagnoses:     Schizoaffective disorder, unspecified type (HCC)  Active Problems:  Intermittent explosive disorder  Fetal alcohol spectrum disorder  History of traumatic brain injury  Tobacco use disorder  Mild intellectual disability  ADHD (attention deficit hyperactivity disorder), combined type  Aggressive behavior    Patient Active Problem List   Diagnosis    Thrombocytopenia (H)    Chronic ITP (idiopathic thrombocytopenia) (H)             Chief Complaint / Subjective:    Chief complaint: Schizoaffective disorder      History of Present Illness:   Patient statement today-he is doing well.  The  has decided not to take him out of the group home.  He has been calm without any aggressive behaviors since her last visit.  He has a  who is very verbally aggressive and irritated him but he has maintained his calm.  His mental health  is accompanying him for the appointment and confirms that patient has been doing better.  He is taking his medications and symptoms are manageable at this time.  Offers no new concerns.    Mental Status Examination:   Appearance: Well groomed, good eye contact   Orientation: Patient alert and oriented to person, place, time, and situation  Reliability:  Patient appears to be an adequate historian.    Behavior: cooperative   Speech: Speech is spontaneous and coherent, with a normal rate, rhythm and tone.    Language:There are no difficulties with expressive or receptive language as observed throughout the interview.    Mood: Described as \"ok\".    Affect: congruent   Judgement: Able to make basic decision regarding safety.  Insight: Good awareness of physical and mental health conditions and aware of needs around care for these.  Gait and station: unable to assess  Thought process: Logical   Thought content: No evidence of delusions or paranoia.    Hallucinations : No evidence of any hallucination  Thought content: No evidence of delusions or paranoia. "   Suicidal /Homical Ideations:  No thoughts of self harm or suicide. No thoughts of harming others.  Associations: Connected  Fund of knowledge: Average  Attention / Concentration: Able to remain focused during the interview with minimal distractibility or need for redirection.  Short Term Memory: Grossly intact as evidence by client recalling themes and ideas discussed.  Long Term Memory: Intact  Motor Status: unable to asse    Drug/treatment history and current pattern of use:   History of cannabis use  History of nicotine use-vaping  Nicotine : Smokes daily     Medication changes: See Above   Medication adherence: compliant  Medication side effects: absent  Information about medications: Side effects, benefits and alternative treatments discussed and patient agrees .    Psychotherapy: Supportive therapy day-to-day living    Education: Diet, exercise, abstinence from drugs and alcohol, patient will not drive if sedated and medications or  under influence of any substance    Lab Results: \  Personally reviewed and discussed with the patient    Lab Results   Component Value Date    WBC 12.9 (H) 10/13/2021    HGB 14.7 10/13/2021    HCT 45.4 10/13/2021     10/13/2021    CHOL 119 01/27/2023    TRIG 51 01/27/2023    HDL 36 (L) 01/27/2023    ALT 21 06/30/2022    AST 23 06/30/2022     06/30/2022    BUN 5.3 (L) 06/30/2022    CO2 22 06/30/2022    TSH 3.61 06/30/2022       Vital signs:  /52 (BP Location: Right arm, Patient Position: Sitting, Cuff Size: Adult Large)   Pulse 93   Wt 120.2 kg (265 lb)   BMI 42.77 kg/m    Telemedicine visit-no vital signs completed  Allergies: Depakote [valproic acid] and Other environmental allergy         Medications:               Medication adherence: Reviewed risk/benefits of medication , Patient able to verbalize understanding of side effects and Patient verbally consents to taking medications      PSYCHOEDUCATION:  Medication side effects and alternatives reviewed.  Health promotion activities recommended and reviewed today. All questions addressed. Education and counseling completed regarding risks and benefits of medications and psychotherapy options.  Consent provided by patient/guardian  Call the psychiatric nurse line with medication questions or concerns at [phone].  MyChart may be used to communicate with your provider, but this is not intended to be used for emergencies.  SEROTONIN SYNDROME:  Discussed risks of Serotonin syndrome (ie, serotonin toxicity) which is a potentially life-threatening condition associated with increased serotonergic activity in the central nervous system (CNS). It is seen with therapeutic medication use, inadvertent interactions between drugs, and intentional self-poisoning. Serotonin syndrome may involve a spectrum of clinical findings, which often include mental status changes, autonomic hyperactivity, and neuromuscular abnormalities.    STIMULANT THERAPY: Side effects discussed including but not limited to cardiac (including HTN, tachycardia, sudden death), motor/tic, appetite/growth, mood lability and sleep disruption. This is a controlled substance with risk for abuse, need to keep in a safe keep place and cannot replace lost scripts  HARM REDUCTION:  Discussions regarding effects of mood altering substances, alcohol and cannabis, on mood and that approach is harm reduction, will continue to prescribe meds as they work to cut back use.    SAFETY:  We all care about your loved one's safety. To reduce the risk of self-harm, remove access to all:  Firearms, Medicines (both prescribed and over-the-counter), Knives and other sharp objects, Ropes and like materials, and Alcohol  SLEEP HYGIENE: establish a sleep routine, limit screen time 1 hour prior to bed, use bed for sleep only, take sleep/medications on time (including sleepy time tea, trazadone or herbal treatments such as melatonin), aroma therapy, limit caffeine/sugar, yoga, guided  imagery, stretch, meditation, limit naps to 20 minutes, make a temperature change in the room, white noise, be mindful of slowing down breathing, take a warm bath/shower, frequently wash sheets, and journaling.   Medlineplus.gov is information for patients.  It is run by the [employer] and it contains information about all disorders, diseases and all medications.              Review of Systems:      ROS:    Subjective Data Only- Tele-Health Visit    10 point ROS was negative except for the items listed in HPI.      Crisis Resources:    Present to the Emergency Department as needed or call after hours crisis line at [phone] or [phone].   [place] Crisis Text Line: Text [place] to 479439.  Suicide LifeLine Chat: suicideResverlogix.org/chat/.  [place] Suicide Prevention Lifeline: 118.682.8198 (TTY: 139.796.8255). Call anytime for help.  (www.suicidepreventionlifeline.org)  National [place] on Mental Illness (www.milvia.org): 359.844.8747 or 217-958-2884.  Mental Health Association (www.mentalhealth.org): 402.792.1383 or 133-177-2268.      Coordination of Care:   More than 30 minutes spent on this visit  with more than 50% of time spent on coordination of care including: Educating patient about diagnosis, prognosis, side effects and benefits of medications, diet, exercise.  Time also spent providing supportive therapy regarding above issues.        This note was created using a dictation system. All typing errors or contextual distortion is unintentional and software inherent.  Start Time : 1000  End time :  1390

## 2023-10-26 ENCOUNTER — OFFICE VISIT (OUTPATIENT)
Dept: PSYCHIATRY | Facility: CLINIC | Age: 23
End: 2023-10-26
Payer: COMMERCIAL

## 2023-10-26 VITALS
WEIGHT: 270 LBS | HEART RATE: 103 BPM | DIASTOLIC BLOOD PRESSURE: 58 MMHG | TEMPERATURE: 97.8 F | BODY MASS INDEX: 43.58 KG/M2 | SYSTOLIC BLOOD PRESSURE: 120 MMHG

## 2023-10-26 DIAGNOSIS — F90.9 ATTENTION DEFICIT HYPERACTIVITY DISORDER (ADHD), UNSPECIFIED ADHD TYPE: ICD-10-CM

## 2023-10-26 DIAGNOSIS — F63.81 INTERMITTENT EXPLOSIVE DISORDER: ICD-10-CM

## 2023-10-26 DIAGNOSIS — F43.23 ADJUSTMENT DISORDER WITH MIXED ANXIETY AND DEPRESSED MOOD: ICD-10-CM

## 2023-10-26 PROCEDURE — 99214 OFFICE O/P EST MOD 30 MIN: CPT | Performed by: NURSE PRACTITIONER

## 2023-10-26 RX ORDER — ARIPIPRAZOLE 10 MG/1
10 TABLET ORAL EVERY MORNING
Qty: 28 TABLET | Refills: 1 | Status: SHIPPED | OUTPATIENT
Start: 2023-10-26 | End: 2023-12-14

## 2023-10-26 RX ORDER — GUANFACINE 3 MG/1
3 TABLET, EXTENDED RELEASE ORAL AT BEDTIME
Qty: 28 TABLET | Refills: 1 | Status: SHIPPED | OUTPATIENT
Start: 2023-10-26 | End: 2023-12-14

## 2023-10-26 RX ORDER — TRAZODONE HYDROCHLORIDE 300 MG/1
1 TABLET ORAL AT BEDTIME
Qty: 28 TABLET | Refills: 1 | Status: SHIPPED | OUTPATIENT
Start: 2023-10-26 | End: 2023-12-14

## 2023-10-26 ASSESSMENT — PAIN SCALES - GENERAL: PAINLEVEL: NO PAIN (0)

## 2023-10-26 NOTE — PROGRESS NOTES
Psychiatric  Out- Patient  Follow Up Progress Note  Date of visit:10/26/2023           Discussion of Care and Treatment Recommendations:   This is a 23 year old male with  a  history of  Intellectual disability,  fetal alcohol  spectrum,and  mood Disorder, due to general medical condition.Pt resides in a group home.   Patient is seen in person  today accompanied by his  Mental Health  team          Last visit 09/21/2023.  Recommendation at last visit .  -Continue with current medications :   Abilify  10 mg in the morning  Quifacine   3 mg at bedtime    Invega 234 mg /IM monthly -Lgiven by group home   Zizpztahxd16 mg BID - agitation PRN   Statreta 100 mg in am   Trazodone 300 mg at bedtime   Lithium 900 mg very evening with mels   Hydroxyzine 50 mg TID PRN- Has only taken it 3 times since 9/1/2021   2-High risk medication use-lipid panel basic metabolic panel labs were completed in 06/30/2022and were within normal limits.  Last Lithium Level 06/30/2022- 0.8 WNL- Will complete labs during next vivit per patient's request   3. RTC:  4 week  Patient and I reviewed diagnosis and treatment plan and patient agrees with following recommendations:  Ongoing education given regarding diagnostic and treatment options with adequate verbalization of understanding.  Plan   -Continue with current medications :   Abilify  10 mg in the morning  Quifacine   3 mg at bedtime    Invega 234 mg /IM monthly -Lgiven by group home   Wmwmncoowb26 mg BID - agitation PRN   Statreta 100 mg in am   Trazodone 300 mg at bedtime   Lithium 900 mg very evening with mels   Hydroxyzine 50 mg TID PRN- Has only taken it 3 times since 9/1/2021   2-High risk medication use-lipid panel basic metabolic panel labs were completed in 06/30/2022and were within normal limits.  Last Lithium Level 06/30/2022- 0.8 WNL- Will complete labs during next vivit per patient's request   3. RTC:  4 week         DIagnoses:     Schizoaffective disorder,  "unspecified type (HCC)  Active Problems:  Intermittent explosive disorder  Fetal alcohol spectrum disorder  History of traumatic brain injury  Tobacco use disorder  Mild intellectual disability  ADHD (attention deficit hyperactivity disorder), combined type  Aggressive behavior     Patient Active Problem List   Diagnosis    Thrombocytopenia (H24)    Chronic ITP (idiopathic thrombocytopenia) (H)             Chief Complaint / Subjective:    Chief complaint: Aggression    History of Present Illness:   Per patient statement-he had an aggression episode at his group home.  He is a housemate was noisy which woke him up.  He became less upset and agitated and kicked the window in his bedroom and broke it.  He has been moved to a different room as he work on repairing.  He is remorseful for his actions.  He does have as needed medication but when he woke up there was no warning and he did not ask for as needed medications.  I did remind him to request for as needed medications to manage his agitation and aggression.  We also did speak about self restrain and de-escalation skills.  He he is agreeable to working on self restraint strategies and his  is helping him with that by his statement.  He is  was also present and agreeable with above statements.  No other concerns today.  Patient to continue on current medication    Mental Status Examination:   Appearance: Well groomed, good eye contact   Orientation: Patient alert and oriented to person, place, time, and situation  Reliability:  Patient appears to be an adequate historian.    Behavior: cooperative   Speech: Speech is spontaneous and coherent, with a normal rate, rhythm and tone.    Language:There are no difficulties with expressive or receptive language as observed throughout the interview.    Mood: Described as \"ok\".    Affect: congruent   Judgement: Able to make basic decision regarding safety.  Insight: Good awareness of physical and " mental health conditions and aware of needs around care for these.  Gait and station: unable to assess  Thought process: Logical   Thought content: No evidence of delusions or paranoia.    Hallucinations : No evidence of any hallucination  Thought content: No evidence of delusions or paranoia.   Suicidal /Homical Ideations:  No thoughts of self harm or suicide. No thoughts of harming others.  Associations: Connected  Fund of knowledge: Average  Attention / Concentration: Able to remain focused during the interview with minimal distractibility or need for redirection.  Short Term Memory: Grossly intact as evidence by client recalling themes and ideas discussed.  Long Term Memory: Intact  Motor Status: unable to asse    Drug/treatment history and current pattern of use:   History of cannabis use  History of nicotine use-vaping  Nicotine : Smokes daily        Medication changes: See Above   Medication adherence: compliant  Medication side effects: absent  Information about medications: Side effects, benefits and alternative treatments discussed and patient agrees .    Psychotherapy: Supportive therapy day-to-day living    Education: Diet, exercise, abstinence from drugs and alcohol, patient will not drive if sedated and medications or  under influence of any substance  Lab Results:   Personally reviewed and discussed with the patient    Lab Results   Component Value Date    WBC 12.9 (H) 10/13/2021    HGB 14.7 10/13/2021    HCT 45.4 10/13/2021     10/13/2021    CHOL 119 01/27/2023    TRIG 51 01/27/2023    HDL 36 (L) 01/27/2023    ALT 21 06/30/2022    AST 23 06/30/2022     06/30/2022    BUN 5.3 (L) 06/30/2022    CO2 22 06/30/2022    TSH 3.61 06/30/2022       Vital signs:  /58 (BP Location: Right arm, Patient Position: Sitting, Cuff Size: Adult Large)   Pulse 103   Temp 97.8  F (36.6  C)   Wt 122.5 kg (270 lb)   BMI 43.58 kg/m    Telemedicine visit-no vital signs completed  Allergies: Depakote  [valproic acid] and Other environmental allergy         Medications:     Current Outpatient Medications   Medication    ARIPiprazole (ABILIFY) 10 MG tablet    atomoxetine (STRATTERA) 100 MG capsule    atropine 1 % ophthalmic solution    chlorproMAZINE (THORAZINE) 50 MG tablet    cholecalciferol 25 MCG (1000 UT) TABS    ferrous sulfate (FEROSUL) 325 (65 Fe) MG tablet    guanFACINE HCl (INTUNIV) 3 MG TB24 24 hr tablet    hydrOXYzine (ATARAX) 50 MG tablet    INVEGA SUSTENNA 234 MG/1.5ML JUANA    lithium 300 MG capsule    loratadine (CLARITIN) 10 MG tablet    metFORMIN (GLUCOPHAGE-XR) 500 MG 24 hr tablet    traZODone HCl 300 MG TABS    vitamin C (ASCORBIC ACID) 250 MG TABS tablet     No current facility-administered medications for this visit.             Medication adherence: Reviewed risk/benefits of medication , Patient able to verbalize understanding of side effects and Patient verbally consents to taking medications      PSYCHOEDUCATION:  Medication side effects and alternatives reviewed. Health promotion activities recommended and reviewed today. All questions addressed. Education and counseling completed regarding risks and benefits of medications and psychotherapy options.  Consent provided by patient/guardian  Call the psychiatric nurse line with medication questions or concerns at [phone].  MyChart may be used to communicate with your provider, but this is not intended to be used for emergencies.  SEROTONIN SYNDROME:  Discussed risks of Serotonin syndrome (ie, serotonin toxicity) which is a potentially life-threatening condition associated with increased serotonergic activity in the central nervous system (CNS). It is seen with therapeutic medication use, inadvertent interactions between drugs, and intentional self-poisoning. Serotonin syndrome may involve a spectrum of clinical findings, which often include mental status changes, autonomic hyperactivity, and neuromuscular abnormalities.    STIMULANT  THERAPY: Side effects discussed including but not limited to cardiac (including HTN, tachycardia, sudden death), motor/tic, appetite/growth, mood lability and sleep disruption. This is a controlled substance with risk for abuse, need to keep in a safe keep place and cannot replace lost scripts  HARM REDUCTION:  Discussions regarding effects of mood altering substances, alcohol and cannabis, on mood and that approach is harm reduction, will continue to prescribe meds as they work to cut back use.    SAFETY:  We all care about your loved one's safety. To reduce the risk of self-harm, remove access to all:  Firearms, Medicines (both prescribed and over-the-counter), Knives and other sharp objects, Ropes and like materials, and Alcohol  SLEEP HYGIENE: establish a sleep routine, limit screen time 1 hour prior to bed, use bed for sleep only, take sleep/medications on time (including sleepy time tea, trazadone or herbal treatments such as melatonin), aroma therapy, limit caffeine/sugar, yoga, guided imagery, stretch, meditation, limit naps to 20 minutes, make a temperature change in the room, white noise, be mindful of slowing down breathing, take a warm bath/shower, frequently wash sheets, and journaling.   Medlineplus.gov is information for patients.  It is run by the National Library of Medicine and it contains information about all disorders, diseases and all medications.              Review of Systems:      ROS:    Subjective Data Only- Tele-Health Visit    10 point ROS was negative except for the items listed in HPI.      Crisis Resources:    Present to the Emergency Department as needed or call after hours crisis line at [phone] or [phone].   [place] Crisis Text Line: Text [place] to 310843.  Suicide LifeLine Chat: suicidepreventionlifeline.org/chat/.  National Suicide Prevention Lifeline: 476.503.7498 (TTY: 408.954.1752). Call anytime for help.  (www.suicidepreventionlifeline.org)  National [place] on  Mental Illness (www.milvia.org): 425-400-6090 or 338-802-7097.  Mental Health Association (www.mentalhealth.org): 477.748.1528 or 028-709-0457.      Coordination of Care:   More than 30 minutes spent on this visit  with more than 50% of time spent on coordination of care including: Educating patient about diagnosis, prognosis, side effects and benefits of medications, diet, exercise.  Time also spent providing supportive therapy regarding above issues.          This note was created using a dictation system. All typing errors or contextual distortion is unintentional and software inherent.  Start Time : 1000  End time :  7448

## 2023-11-10 ENCOUNTER — MEDICAL CORRESPONDENCE (OUTPATIENT)
Dept: HEALTH INFORMATION MANAGEMENT | Facility: CLINIC | Age: 23
End: 2023-11-10
Payer: COMMERCIAL

## 2023-11-13 ENCOUNTER — TRANSCRIBE ORDERS (OUTPATIENT)
Dept: OTHER | Age: 23
End: 2023-11-13

## 2023-11-13 DIAGNOSIS — E66.09 CLASS 2 OBESITY DUE TO EXCESS CALORIES WITHOUT SERIOUS COMORBIDITY WITH BODY MASS INDEX (BMI) OF 38.0 TO 38.9 IN ADULT: Primary | ICD-10-CM

## 2023-11-13 DIAGNOSIS — E66.812 CLASS 2 OBESITY DUE TO EXCESS CALORIES WITHOUT SERIOUS COMORBIDITY WITH BODY MASS INDEX (BMI) OF 38.0 TO 38.9 IN ADULT: Primary | ICD-10-CM

## 2023-11-28 DIAGNOSIS — F39 SEVERE MOOD DISORDER WITH PSYCHOTIC FEATURES (H): ICD-10-CM

## 2023-11-28 NOTE — TELEPHONE ENCOUNTER
Date of Last Office Visit: 10/26/23  Date of Next Office Visit: 12/14/23  No shows since last visit: 0  Cancellations since last visit: 0    Medication requested: INVEGA SUSTENNA 234 MG/1.5ML JUANA  Date last ordered: 8/10/23 Qty: 1.5 ml Refills: 3     Review of [place] ?: NA    Lapse in medication adherence greater than 5 days?: No    Medication refill request verified as identical to current order?: Yes  Result of Last DAM, VPA, Li+ Level, CBC, or Carbamazepine Level (at or since last visit): N/A    Last visit treatment plan:   This is a 23 year old male with  a  history of  Intellectual disability,  fetal alcohol  spectrum,and  mood Disorder, due to general medical condition.Pt resides in a group home.   Patient is seen in person  today accompanied by his  Mental Health  team            Last visit 09/21/2023.  Recommendation at last visit .  -Continue with current medications :   Abilify  10 mg in the morning  Quifacine   3 mg at bedtime    Invega 234 mg /IM monthly -Lgiven by group home   Tbmoxazvom37 mg BID - agitation PRN   Statreta 100 mg in am   Trazodone 300 mg at bedtime   Lithium 900 mg very evening with mels   Hydroxyzine 50 mg TID PRN- Has only taken it 3 times since 9/1/2021   2-High risk medication use-lipid panel basic metabolic panel labs were completed in 06/30/2022and were within normal limits.  Last Lithium Level 06/30/2022- 0.8 WNL- Will complete labs during next vivit per patient's request   3. RTC:  4 week  Patient and I reviewed diagnosis and treatment plan and patient agrees with following recommendations:  Ongoing education given regarding diagnostic and treatment options with adequate verbalization of understanding.  Plan   -Continue with current medications :   Abilify  10 mg in the morning  Quifacine   3 mg at bedtime    Invega 234 mg /IM monthly -Lgiven by group home   Fsblvxzvun97 mg BID - agitation PRN   Statreta 100 mg in am   Trazodone 300 mg at bedtime   Lithium 900 mg very  evening with mels   Hydroxyzine 50 mg TID PRN- Has only taken it 3 times since 9/1/2021   2-High risk medication use-lipid panel basic metabolic panel labs were completed in 06/30/2022and were within normal limits.  Last Lithium Level 06/30/2022- 0.8 WNL- Will complete labs during next vivit per patient's request   3. RTC:  4 week       []Medication refilled per  Medication Refill in Ambulatory Care  policy.  [x]Medication unable to be refilled by RN due to criteria not met as indicated below:    []Eligibility - not seen in the last year   []Supervision - no future appointment   []Compliance - no shows, cancellations or lapse in therapy   []Verification - order discrepancy   []Controlled medication   [x]Medication not included in policy   []90-day supply request   []Other

## 2023-11-29 RX ORDER — PALIPERIDONE PALMITATE 234 MG/1.5ML
INJECTION INTRAMUSCULAR
Qty: 1.5 ML | Refills: 3 | Status: SHIPPED | OUTPATIENT
Start: 2023-11-29 | End: 2024-03-26

## 2023-12-06 ENCOUNTER — HOSPITAL ENCOUNTER (EMERGENCY)
Facility: CLINIC | Age: 23
Discharge: HOME | End: 2023-12-06
Attending: EMERGENCY MEDICINE | Admitting: EMERGENCY MEDICINE
Payer: COMMERCIAL

## 2023-12-06 VITALS
TEMPERATURE: 98 F | DIASTOLIC BLOOD PRESSURE: 87 MMHG | RESPIRATION RATE: 16 BRPM | SYSTOLIC BLOOD PRESSURE: 129 MMHG | HEART RATE: 103 BPM | OXYGEN SATURATION: 100 %

## 2023-12-06 DIAGNOSIS — F63.81 INTERMITTENT EXPLOSIVE DISORDER IN ADULT: ICD-10-CM

## 2023-12-06 DIAGNOSIS — F79 INTELLECTUAL DISABILITY: ICD-10-CM

## 2023-12-06 LAB
AMPHETAMINES UR QL SCN: NORMAL
BARBITURATES UR QL SCN: NORMAL
BENZODIAZ UR QL SCN: NORMAL
BZE UR QL SCN: NORMAL
CANNABINOIDS UR QL SCN: NORMAL
FENTANYL UR QL: NORMAL
OPIATES UR QL SCN: NORMAL
PCP QUAL URINE (ROCHE): NORMAL

## 2023-12-06 PROCEDURE — 99285 EMERGENCY DEPT VISIT HI MDM: CPT

## 2023-12-06 PROCEDURE — 99285 EMERGENCY DEPT VISIT HI MDM: CPT | Performed by: EMERGENCY MEDICINE

## 2023-12-06 PROCEDURE — 80307 DRUG TEST PRSMV CHEM ANLYZR: CPT | Performed by: EMERGENCY MEDICINE

## 2023-12-06 ASSESSMENT — ACTIVITIES OF DAILY LIVING (ADL)
ADLS_ACUITY_SCORE: 35

## 2023-12-06 NOTE — ED NOTES
Pt came from the Group home and pt is currently calm.  Pt came on a ROBBY.  Pt requesting to go back to the group home.  Pt's group home info: [facility] [phone]  Per EMS call Kofi moeller, phone # [phone]  Second staff person to call: Marimar Palacios [phone]

## 2023-12-06 NOTE — ED PROVIDER NOTES
"ED Provider Note  [facility]      History     Chief Complaint   Patient presents with    Homicidal     BIBA per report pt has been bickering with another resident at the group home and now pt is feeling homicidal towards. Pt received his Hydroxyzine at the group home pt is currently calm cooperative     The history is provided by medical records.     Kaden Easton Jr. is a 23 year old male with a history of intellectual disability (IQ noted at 55), intermittent explosive disorder, mood disorder, schizoaffective disorder, FAS, and ADHD presents to the ED via EMS for homicidal ideation and agitation. He says there are 3 other housemates at his group home. He is his own guardian.  He feels well now. He was given his prn atarax prior to coming.  Today he got upset with another house mate and felt they were stealing his bank card.  He was yelling and made statements that he wanted to kill her.  He has hx of physical aggression but was not aggressive today.  Staff at the group home say he went to [facility] house on Thanksgiving and has had more agitation since then.  He sees a psychiatrist.  He was at [facility] 2 days ago and discharged back to the group home.     APS note 12/4/23:  \"Assessment/Impression:  Patient is a 23 year old male the intellectual disability (IQ noted at 55), Intermittent Explosive Disorder, Mood Disorder, FAS and ADHD. He is struggling at group home with anxiety (other behaviors noted by staff too) reporting that he is upset with grandfather for not picking his up for a visit. Staff suspect that patient wants grandfather to pick him up so he can use marijuana like he did during a previous pass to grandfather's home. Patient took a Vistaril PRN and report benefit and asking to return to group home. Denies SI/HI/AH/VH and feels safe to return. Discussed using coping skills - identifies video game and PRNs. \"      Past Medical History  Past Medical History:   Diagnosis Date "    ADD (attention deficit disorder)     ADHD (attention deficit hyperactivity disorder)     ADHD (attention deficit hyperactivity disorder)     Anxiety     Current smoker     Fetal alcohol syndrome     Forehead abrasion     History of transfusion     Obese     Obesity     Self-injurious behavior 8/17/2020     No past surgical history on file.  ARIPiprazole (ABILIFY) 10 MG tablet  atomoxetine (STRATTERA) 100 MG capsule  atropine 1 % ophthalmic solution  chlorproMAZINE (THORAZINE) 50 MG tablet  cholecalciferol 25 MCG (1000 UT) TABS  ferrous sulfate (FEROSUL) 325 (65 Fe) MG tablet  guanFACINE HCl (INTUNIV) 3 MG TB24 24 hr tablet  hydrOXYzine (ATARAX) 50 MG tablet  INVEGA SUSTENNA 234 MG/1.5ML JUANA  lithium 300 MG capsule  loratadine (CLARITIN) 10 MG tablet  metFORMIN (GLUCOPHAGE-XR) 500 MG 24 hr tablet  traZODone HCl 300 MG TABS  vitamin C (ASCORBIC ACID) 250 MG TABS tablet      Allergies   Allergen Reactions    Depakote [Valproic Acid] Unknown     Acute on Chronic Thrombocytopenia.    Other Environmental Allergy Other (See Comments)     Mild reaction-- runny nose, itchy/watery eyes, etc.      Family History  Family History   Problem Relation Age of Onset    No Known Problems Mother     No Known Problems Father     No Known Problems Sister     No Known Problems Brother     No Known Problems Maternal Grandmother     No Known Problems Maternal Grandfather     No Known Problems Paternal Grandmother     No Known Problems Paternal Grandfather     No Known Problems Other      Social History   Social History     Tobacco Use    Smoking status: Every Day     Packs/day: .5     Types: Vaping Device, Cigarettes    Smokeless tobacco: Never    Tobacco comments:     smokes daily about a pack 12/14/22  reduced to 5 cig per day - increased to 7 a day now   Substance Use Topics    Alcohol use: Not Currently     Alcohol/week: 2.0 standard drinks of alcohol     Comment: Alcoholic Drinks/day: Birthday/Beer per staff    Drug use: Yes      Types: Marijuana     Comment: Drug use: staff says yes         A medically appropriate review of systems was performed with pertinent positives and negatives noted in the HPI, and all other systems negative.    Physical Exam   BP: 119/79  Pulse: 110  Temp: 97.7  F (36.5  C)  Resp: 16  SpO2: 97 %  Physical Exam  Vitals and nursing note reviewed.   Constitutional:       General: He is not in acute distress.     Appearance: He is obese. He is not ill-appearing, toxic-appearing or diaphoretic.   HENT:      Head: Normocephalic and atraumatic.      Nose:      Comments: Mask on  Eyes:      General: No scleral icterus.     Extraocular Movements: Extraocular movements intact.   Cardiovascular:      Rate and Rhythm: Normal rate.   Pulmonary:      Effort: Pulmonary effort is normal.   Musculoskeletal:         General: No signs of injury.      Cervical back: Normal range of motion.   Skin:     Coloration: Skin is not jaundiced.   Neurological:      General: No focal deficit present.      Mental Status: He is alert.   Psychiatric:         Attention and Perception: Attention and perception normal.         Mood and Affect: Mood and affect normal.         Speech: Speech normal.         Behavior: Behavior normal. Behavior is cooperative.         Thought Content: Thought content normal.         Cognition and Memory: Cognition is impaired.         Judgment: Judgment is not impulsive.           ED Course, Procedures, & Data      Procedures                Results for orders placed or performed during the hospital encounter of 12/06/23   Urine Drug Screen     Status: None ()    Narrative    The following orders were created for panel order Urine Drug Screen.  Procedure                               Abnormality         Status                     ---------                               -----------         ------                     Urine Drug Screen Panel[[id number]]                                                       Please view results  for these tests on the individual orders.     Medications - No data to display  Labs Ordered and Resulted from Time of ED Arrival to Time of ED Departure - No data to display  No orders to display          Critical care was not performed.     Medical Decision Making  The patient's presentation was of high complexity (a chronic illness severe exacerbation, progression, or side effect of treatment).    The patient's evaluation involved:  an assessment requiring an independent historian (group home staff)  review of external note(s) from 1 sources (see above)  discussion of management or test interpretation with another health professional (dec )    The patient's management necessitated high risk (a decision regarding hospitalization).    Assessment & Plan    Kaden Easton Jr. is a 23 year old male with a history of intellectual disability (IQ noted at 55), intermittent explosive disorder, mood disorder, schizoaffective disorder, FAS, and ADHD presents to the ED via EMS for homicidal ideation and agitation. He was upset with a house mate today.  He was seen at [facility] 2 days ago.  Their note indicates use of thc.  He was given prn zyprexa prior to arrival and is calm and cooperative here. No psychosis noted.  No si/hi.  He is back to baseline and can return to his group home.  We recommended they give him his prn atarax for the next week and he should follow up with his psychiatrist within the week to discuss any further med changes.     I have reviewed the nursing notes. I have reviewed the findings, diagnosis, plan and need for follow up with the patient.    New Prescriptions    No medications on file       Final diagnoses:   Intermittent explosive disorder in adult   Intellectual disability   I, Gary Vega, am serving as a trained medical scribe to document services personally performed by Nakia Glass MD, based to the provider's statements to me.     I, Nakia Glass MD, was physically present and have  reviewed and verified the accuracy of this note documented by Gary Vega.     Nakia Glass MD  [facility] EMERGENCY DEPARTMENT  12/6/2023     Nakia Glass MD  12/06/23 1941       Nakia Glass MD  12/06/23 2018

## 2023-12-06 NOTE — ED NOTES
Bed: [facility]-  Expected date:   Expected time:   Means of arrival:   Comments:  Audra Drummond [address] 24 y/o M Homicidal on Hold

## 2023-12-06 NOTE — ED TRIAGE NOTES
BIBA per report pt has been bickering with another resident at the group home and now pt is feeling homicidal towards. Pt received his Hydroxyzine at the group home pt is currently calm cooperative     Triage Assessment (Adult)       Row Name 12/06/23 161          Triage Assessment    Airway WDL WDL        Respiratory WDL    Respiratory WDL WDL        Skin Circulation/Temperature WDL    Skin Circulation/Temperature WDL WDL        Cardiac WDL    Cardiac WDL WDL        Peripheral/Neurovascular WDL    Peripheral Neurovascular WDL WDL        Cognitive/Neuro/Behavioral WDL    Cognitive/Neuro/Behavioral WDL WDL

## 2023-12-07 ENCOUNTER — HOSPITAL ENCOUNTER (EMERGENCY)
Facility: CLINIC | Age: 23
Discharge: HOME | End: 2023-12-08
Attending: EMERGENCY MEDICINE | Admitting: EMERGENCY MEDICINE
Payer: COMMERCIAL

## 2023-12-07 DIAGNOSIS — R46.89 AGGRESSIVE BEHAVIOR: ICD-10-CM

## 2023-12-07 DIAGNOSIS — R45.850 HOMICIDAL IDEATION: ICD-10-CM

## 2023-12-07 DIAGNOSIS — R46.89 OUTBURSTS OF EXPLOSIVE BEHAVIOR: ICD-10-CM

## 2023-12-07 PROBLEM — F63.81 INTERMITTENT EXPLOSIVE DISORDER: Status: ACTIVE | Noted: 2023-12-07

## 2023-12-07 PROCEDURE — 250N000013 HC RX MED GY IP 250 OP 250 PS 637: Performed by: EMERGENCY MEDICINE

## 2023-12-07 PROCEDURE — 99283 EMERGENCY DEPT VISIT LOW MDM: CPT

## 2023-12-07 RX ORDER — HYDROXYZINE HYDROCHLORIDE 25 MG/1
50 TABLET, FILM COATED ORAL 3 TIMES DAILY PRN
Status: DISCONTINUED | OUTPATIENT
Start: 2023-12-07 | End: 2023-12-08 | Stop reason: HOSPADM

## 2023-12-07 RX ORDER — LORAZEPAM 1 MG/1
1 TABLET ORAL EVERY 8 HOURS PRN
Status: DISCONTINUED | OUTPATIENT
Start: 2023-12-07 | End: 2023-12-08 | Stop reason: HOSPADM

## 2023-12-07 RX ORDER — ATOMOXETINE 100 MG/1
100 CAPSULE ORAL DAILY
Status: DISCONTINUED | OUTPATIENT
Start: 2023-12-08 | End: 2023-12-08 | Stop reason: HOSPADM

## 2023-12-07 RX ORDER — GUANFACINE 3 MG/1
3 TABLET, EXTENDED RELEASE ORAL AT BEDTIME
COMMUNITY
End: 2024-02-16

## 2023-12-07 RX ORDER — LITHIUM CARBONATE 300 MG/1
900 CAPSULE ORAL EVERY EVENING
Status: DISCONTINUED | OUTPATIENT
Start: 2023-12-07 | End: 2023-12-08 | Stop reason: HOSPADM

## 2023-12-07 RX ORDER — ARIPIPRAZOLE 10 MG/1
10 TABLET ORAL DAILY
Status: DISCONTINUED | OUTPATIENT
Start: 2023-12-08 | End: 2023-12-08 | Stop reason: HOSPADM

## 2023-12-07 RX ORDER — HYDROXYZINE HYDROCHLORIDE 50 MG/1
50 TABLET, FILM COATED ORAL 3 TIMES DAILY PRN
COMMUNITY
End: 2024-02-26

## 2023-12-07 RX ORDER — FERROUS SULFATE 325(65) MG
325 TABLET ORAL DAILY
Status: DISCONTINUED | OUTPATIENT
Start: 2023-12-08 | End: 2023-12-08 | Stop reason: HOSPADM

## 2023-12-07 RX ORDER — FERROUS SULFATE 325(65) MG
325 TABLET, DELAYED RELEASE (ENTERIC COATED) ORAL DAILY
COMMUNITY

## 2023-12-07 RX ORDER — OLANZAPINE 10 MG/1
10 TABLET, ORALLY DISINTEGRATING ORAL 2 TIMES DAILY PRN
Status: DISCONTINUED | OUTPATIENT
Start: 2023-12-07 | End: 2023-12-08 | Stop reason: HOSPADM

## 2023-12-07 RX ORDER — NICOTINE 21 MG/24HR
1 PATCH, TRANSDERMAL 24 HOURS TRANSDERMAL EVERY 24 HOURS
COMMUNITY

## 2023-12-07 RX ORDER — TRAZODONE HYDROCHLORIDE 100 MG/1
300 TABLET ORAL AT BEDTIME
Status: DISCONTINUED | OUTPATIENT
Start: 2023-12-07 | End: 2023-12-08 | Stop reason: HOSPADM

## 2023-12-07 RX ORDER — ATOMOXETINE 100 MG/1
100 CAPSULE ORAL DAILY
COMMUNITY
End: 2024-04-23

## 2023-12-07 RX ORDER — TRAZODONE HYDROCHLORIDE 300 MG/1
300 TABLET ORAL AT BEDTIME
COMMUNITY
End: 2024-01-02 | Stop reason: DRUGHIGH

## 2023-12-07 RX ORDER — LITHIUM CARBONATE 300 MG/1
900 CAPSULE ORAL EVERY EVENING
COMMUNITY
End: 2024-04-23

## 2023-12-07 RX ORDER — ARIPIPRAZOLE 10 MG/1
10 TABLET ORAL DAILY
COMMUNITY
End: 2023-12-14

## 2023-12-07 RX ORDER — GUANFACINE 1 MG/1
3 TABLET, EXTENDED RELEASE ORAL AT BEDTIME
Status: DISCONTINUED | OUTPATIENT
Start: 2023-12-07 | End: 2023-12-08 | Stop reason: HOSPADM

## 2023-12-07 RX ORDER — NICOTINE 21 MG/24HR
1 PATCH, TRANSDERMAL 24 HOURS TRANSDERMAL DAILY
Status: DISCONTINUED | OUTPATIENT
Start: 2023-12-08 | End: 2023-12-08 | Stop reason: HOSPADM

## 2023-12-07 RX ADMIN — LITHIUM CARBONATE 900 MG: 300 CAPSULE, GELATIN COATED ORAL at 18:25

## 2023-12-07 RX ADMIN — TRAZODONE HYDROCHLORIDE 300 MG: 100 TABLET ORAL at 23:36

## 2023-12-07 RX ADMIN — GUANFACINE 3 MG: 1 TABLET, EXTENDED RELEASE ORAL at 23:36

## 2023-12-07 RX ADMIN — LORAZEPAM 1 MG: 1 TABLET ORAL at 17:24

## 2023-12-07 ASSESSMENT — ACTIVITIES OF DAILY LIVING (ADL)
ADLS_ACUITY_SCORE: 35

## 2023-12-07 NOTE — PROGRESS NOTES
"Larisa Easton  December 7, 2023  Plan of Care Hand-off Note     Patient Care Path: observation    Plan for Care:   Pt is a 24 y/o male with a psychiatric hx of ntellectual disability (IQ noted at 55), intermittent explosive disorder, mood disorder, schizoaffective disorder, FAS, and ADHD. At this time it does appear that Pt would benefit from further observation and psychiatric stabilization via medication management and ED level therapeutic interventions, due to increased agitation, HI. Today is Pts 3rd ED visit this month. Pts sx have persisted. Pt would benefit from psychiatric evaluation. Pt was not able to fully safety plan with writer. Pt does appear to be at higher risk of death by suicide accidental or intentional due to mental health hx and substance use sx. If Pt is able to effectively safety plan and/or Pts sx improve it would be beneficial to pursue a less restrictive alternative.    Identified Goals and Safety Issues:  Pt would benefit from psychiatric/ medication evaluation, per group home significant bx change. Writer feels that Pt would possibly be able to discharge back to group home with evaluation and possible referral to [facility]. Pt is willing to stay overnight for observation.     Overview:  Pt presents to the ED due to increased HI and SI. Pt has been having an increase in psychosis sx since 11/24/23. Pt was evaluated at [facility] ED on 12/6/23 and discharged back to group home. Per group home Pt did not sleep was pacing, and hallucinating, saying that people were \"forcing meds down his throat.\" Pt was also endorsing paranoid ideations towards staff and a another resident in the group home. Pt has a hx of agitation and aggression. Pt has not been physically aggressive towards staff or other residents but his agitation and verbal threats have increased. Per group home, today Pt was continuing to endorse HI w/out plan but was laughing and pointing at staff and another resident.         "     Legal Status: Legal Status at Admission: Voluntary/Patient has signed consent for treatment    Psychiatry Consult: placed        Updated   attending MD regarding plan of care.           Mary Ordoñez [facility]

## 2023-12-07 NOTE — ED NOTES
Pt walked to bathroom by self. Still remains calm and cooperative. Asking for phone back often and when he can go home

## 2023-12-07 NOTE — CONSULTS
"Diagnostic Evaluation Consultation  Crisis Assessment    Patient Name: Larisa Easton  Age:  23 year old  Legal Sex: male  Gender Identity: male  Pronouns: he/him  Race: Black or   Ethnicity: Not  or   Language: English      Patient was assessed: In person      Patient location: [facility] EMERGENCY DEPT                             ED16    Referral Data and Chief Complaint  Larisa Easton presents to the ED via EMS. Patient is presenting to the ED for the following concerns: Verbal agitation, Paranoia, Significant behavioral change.   Factors that make the mental health crisis life threatening or complex are:  Pt presents to the ED due to increased HI and SI. Pt has been having an increase in psychosis sx since 11/24/23. Pt was evaluated at [facility] ED on 12/6/23 and discharged back to group home. Per group home Pt did not sleep was pacing, and hallucinating, saying that people were \"forcing meds down his throat.\" Pt was also endorsing paranoid ideations towards staff and a another resident in the group home. Pt has a hx of agitation and aggression. Pt has not been physically aggressive towards staff or other residents but his agitation and verbal threats have increased. Per group home, today Pt was continuing to endorse HI w/out plan but was laughing and pointing at staff and another resident..      Informed Consent and Assessment Methods  Explained the crisis assessment process, including applicable information disclosures and limits to confidentiality, assessed understanding of the process, and obtained consent to proceed with the assessment.  Assessment methods included conducting a formal interview with patient, review of medical records, collaboration with medical staff, and obtaining relevant collateral information from family and community providers when available.  : done     Patient response to interventions: verbalizes understanding  Coping " "skills were attempted to reduce the crisis:  Pt was not able to fully safety plan with writer today. It would be beneficial for Pt to be seen by psychiatry to medication evaluation due to recent bx change.     History of the Crisis   Pt presents with a flat and guarded affect. Pts mood is congruent with this presentation. Pt is oriented x4. Pt was cooperative. Pt appears to be a limited historian and was minimally responsive during assessment today.  Pt has a psychiatric hx of intellectual disability (IQ noted at 55), intermittent explosive disorder, mood disorder, schizoaffective disorder, FAS, and ADHD. Pt has a hx of past [facility] admissions. Pts most recent admission was at [facility] from 8/16/21- 9/1/21. Pt has a hx of MI civil commitment through [facility]. Pt is not currently under civil commitment.  Today is Pts 3 ED visit this month. Pt has an outpatient psychiatrist through [facility] and there have not been any recent medication changes. Group home staff endorsed that they are in the process of working with Pts  and psychiatrist for follow up but Pts sx only have persisted after most recent ED visit.  Pt presents due to significant behavior change in the last few weeks per group home. Pt has a hx of violence and aggression at previous group home. Pt has been a resident at this facility since 2021 and has had episodes of damaging property but has not been physically aggressive with staff. Per staff Pt has been more paranoid of his roommate and has been endorsing HI towards staff and group home. Pt has not endorsed a specific plan. Other resident was informed of threats as Pt has endorsed this to the resident and staff are aware of threats. During assessment Pt did not appear to be responding to internal stimuli nor did he report any acute psychosis sx. Pt would often say \"no, yes\" to assessment questions, this appears to be due to Pts intelectual functioning rather than psychosis sx. Pt " currently denied any SI/SIB/HI but there is a possibility Pt is minimizing current sx. Per group home staff Pt has not been sleeping. Pt has been medication complaint but staff feel that Pts medications are not as effective as they have been in the past. There is possible concern for substance use, Pt denied any recent substance use but per group home Pt did report on 11/24/23 that he used marijuana when he was visiting family. Possible substance use may be an etiology for recent bx change.    Brief Psychosocial History  Family:  Single, Children no  Support System:  Facility resident(s)/Staff  Employment Status:  disabled  Source of Income:  disability  Financial Environmental Concerns:  none  Current Hobbies:  music, television/movies/videos, social media/computer activities  Barriers in Personal Life:  mental health concerns    Significant Clinical History  Current Anxiety Symptoms:  anxious, excessive worry  Current Depression/Trauma:  negativistic, impaired decision making, irritable  Current Somatic Symptoms:  anxious  Current Psychosis/Thought Disturbance:  impulsive, inattentive, agitation  Current Eating Symptoms:   n/a  Chemical Use History:  Alcohol: None  Benzodiazepines: None  Opiates: None  Cocaine: None  Marijuana: Other (comments) (Pt reported use on 11/24/23)  Last Use:: 11/24/23  Other Use: None   Past diagnosis:  Schizophrenia  Family history:  No known history of mental health or chemical health concerns  Past treatment:  Case management, Psychiatric Medication Management, Inpatient Hospitalization, Supportive Living Environment (group home, [facility] house, etc)  Details of most recent treatment:  Pt currently lives in an assisted living facility. Pt has an outpatient psychiatrsit through [facility] and a [facility] .  Other relevant history:  n/a       Collateral Information  Is there collateral information: Yes     Collateral information name, relationship, phone number:  Writer spoke to  " Jase #[phone] at [facility]    What happened today: Jase stated that Pt was assessed in the ED yesterday as [place]. Pt sx have not changed. Pt was not sleeping and was pacing all night long. Pt also was having increased HI and agitation today and this prompted staff to call 911.     What is different about patient's functioning: Jase stated that Pt has been having increasing HI towards staff and a group home resident. Pt has been laughing to himself and has been stating that someone has been \"forcing meds down his throat.\" There is footage of staff and there has not been any evidence of this happening. Pt also has reported to staff that people were in his room that were not there. Pt has a hx of psychical aggression at a previous group home but not at current facility. Pt has been destructive to property at this group home. Pt has been living at [facility] since 2021. Pt has been medication compliant but there is concern Pts medications are not effective. There is potential concern for substance use. Pt reported marijuana use when he was visiting family on thanksgiving, 11/24/23. Pt also today stated \"I am not using meth\" unprompted when staff were trying to deescalate Pt.     Concern about alcohol/drug use:  see above     What do you think the patient needs:  IP mh admission, medication evaluation.     Has patient made comments about wanting to kill themselves/others: yes    If d/c is recommended, can they take part in safety/aftercare planning:  yes    Additional collateral information:  n/a     Risk Assessment    H[place]ricks Suicide Severity Rating Scale Recent:   Suicidal Ideation (Recent)  Q1 Wished to be Dead (Past Month): yes  Q2 Suicidal Thoughts (Past Month): no  Intensity of Ideation (Recent)  Most Severe Ideation Rating (Past 1 Month): 2  Frequency (Past 1 Month): Less than once a week  Duration (Past 1 Month): Fleeting, few seconds or " minutes  Controllability (Past 1 Month): Easily able to control thoughts  Deterrents (Past 1 Month): Deterrents probably stopped you  Reasons for Ideation (Past 1 Month): Equally to get attention, revenge, or a reaction from others and to end/stop the pain  Suicidal Behavior (Recent)  Actual Attempt (Past 3 Months): No  Has subject engaged in non-suicidal self-injurious behavior? (Past 3 Months): No  Interrupted Attempts (Past 3 Months): No  Aborted or Self-Interrupted Attempt (Past 3 Months): No  Preparatory Acts or Behavior (Past 3 Months): No    Environmental or Psychosocial Events: history of TBI, challenging interpersonal relationships, other life stressors  Protective Factors: Protective Factors: able to access care without barriers, lives in a responsibly safe and stable environment    Does the patient have thoughts of harming others? Feels Like Hurting Others: other (see comments) (Pt denied HI or wanting to harm others but Pt has reported several times to staff that he wants to kill his roommate and kill staff)  Previous Attempt to Hurt Others: no (Pt has a hx of aggressive and violent bx at previous group eileen)  Current presentation: Irritable  Violence Threats in Past 6 Months: Pt denied HI or wanting to harm others but Pt has reported several times to staff that he wants to kill his roommate and kill staff  Current Violence Plan or Thoughts: No  Is the patient engaging in sexually inappropriate behavior?: no  Duty to warn initiated: no    Is the patient engaging in sexually inappropriate behavior?  no        Mental Status Exam   Affect: Flat  Appearance: Disheveled  Attention Span/Concentration: Inattentive  Eye Contact: Avoidant    Fund of Knowledge: Appropriate   Language /Speech Content: Fluent  Language /Speech Volume: Soft  Language /Speech Rate/Productions: Minimally Responsive  Recent Memory: Poor  Remote Memory: Poor  Mood: Anxious, Sad  Orientation to Person: Yes   Orientation to Place:  Yes  Orientation to Time of Day: Yes  Orientation to Date: Yes     Situation (Do they understand why they are here?): Yes  Psychomotor Behavior: Normal  Thought Content: Clear  Thought Form: Goal Directed       Medication  Psychotropic medications:   Medication Orders - Psychiatric (From admission, onward)      Start     Dose/Rate Route Frequency Ordered Stop    12/07/23 1235  LORazepam (ATIVAN) tablet 1 mg         1 mg Oral EVERY 8 HOURS PRN 12/07/23 1236      12/07/23 1235  OLANZapine zydis (zyPREXA) ODT tab 10 mg         10 mg Oral 2 TIMES DAILY PRN 12/07/23 1236               Current Care Team  Patient Care Team:  No Ref-Primary, Physician as PCP - General    Diagnosis  Patient Active Problem List   Diagnosis Code    Intermittent explosive disorder F63.81    Schizoaffective disorder, bipolar type (H) F25.0    Fetal alcohol syndrome Q86.0    Intellectual disability F79       Primary Problem This Admission  Active Hospital Problems    Schizoaffective disorder, bipolar type (H)      Intermittent explosive disorder      Intellectual disability      Fetal alcohol syndrome        Clinical Summary and Substantiation of Recommendations   Pt is a 24 y/o male with a psychiatric hx of ntellectual disability (IQ noted at 55), intermittent explosive disorder, mood disorder, schizoaffective disorder, FAS, and ADHD. At this time it does appear that Pt would benefit from further observation and psychiatric stabilization via medication management and ED level therapeutic interventions, due to increased agitation, HI. Today is Pts 3rd ED visit this month. Pts sx have persisted. Pt would benefit from psychiatric evaluation. Pt was not able to fully safety plan with writer. Pt does appear to be at higher risk of death by suicide accidental or intentional due to mental health hx and substance use sx. If Pt is able to effectively safety plan and/or Pts sx improve it would be beneficial to pursue a less restrictive  alternative.      Patient coping skills attempted to reduce the crisis:  Pt was not able to fully safety plan with writer today. It would be beneficial for Pt to be seen by psychiatry to medication evaluation due to recent bx change.    Disposition  Recommended disposition: Individual Therapy, Medication Management, Programmatic Care, Group Home        Reviewed case and recommendations with attending provider. Attending Name: MD Power       Attending concurs with disposition: yes       Patient and/or validated legal guardian concurs with disposition:   yes       Final disposition:  observation    Legal status on admission: Voluntary/Patient has signed consent for treatment    Assessment Details   Total duration spent with the patient: 30 min     CPT code(s) utilized: 32397 - Psychotherapy for Crisis - 60 (30-74*) min    Mary Ordoñez [facility], Psychotherapist  [facility] - Triage & Transition Services  Callback: [phone]

## 2023-12-07 NOTE — CONSULTS
Diagnostic Evaluation Consultation  Crisis Assessment    Patient Name: Kaden Easton Jr.  Age:  23 year old  Legal Sex: male  Gender Identity: male  Pronouns:   Race: Black or   Ethnicity: Not  or   Language: English    Patient was assessed: In person      Patient location: [facility] EMERGENCY DEPARTMENT                             ED16C    Referral Data and Chief Complaint  Kaden Easton Jr. presents to the ED by  self, via EMS. Patient is presenting to the ED for the following concerns: Verbal agitation, Anxiety.   Factors that make the mental health crisis life threatening or complex are:  Prior mental health diagnoses including FAS, intelllectual disability, mood disorder, Intermitten Explosive Disorder, ADHD; recent stressors.    Informed Consent and Assessment Methods  Explained the crisis assessment process, including applicable information disclosures and limits to confidentiality, assessed understanding of the process, and obtained consent to proceed with the assessment.  Assessment methods included conducting a formal interview with patient, review of medical records, collaboration with medical staff, and obtaining relevant collateral information from family and community providers when available : done     Patient response to interventions: eager to participate  Coping skills were attempted to reduce the crisis:  Took PRN, talked to staff, tried other distraction techniques.     History of the Crisis   Rustam is a 23 year old male presenting to the ED via EMS due to increased anxiety, agitation, and homicidal remarks. Rustam is a group home resident who was brought to the ED following an arguement with his group-home housemate. Throuhgout this assessment, the patient appeared calm and cooperative. He appears to be a limited historian. He is soft spoken, and at times difficult to understand. When asked about the events that lead to the ED visit, patient states  "\"I was having an anxiety attack. I thought I lost my bank card and I thought someone stole it. So I yelled at staff and my roomates. They called 911 and brought me here. They said that I need help with my meds. I feel okay now and I hope to go back to the group home.\" Patient did not endorse additional mental health concerns. Patient denies any current or history of suicidal ideation or homicidal ideation. When asked about history of non-suicidal self-injurious behaviors, the patient states \"I hit my head on things but that's only when I'm mad. I don't do it no more.\" Patient denies symptoms significant of psychosis. Patient denied any history of substance use. Though, chart review suggests that there is a history of Cannabis use. Patient is did not endorse concerns relating to abuse or neglect.    Brief Psychosocial History  Family:  Single, Children no  Support System:  Grandparent(s), Facility resident(s)/Staff  Employment Status:  disabled  Source of Income:  disability  Financial Environmental Concerns:  none  Current Hobbies:  games, music, outdoor activities, group/social activities, television/movies/videos  Barriers in Personal Life:  behavioral concerns, lack of motivation    Significant Clinical History  Current Anxiety Symptoms:  racing thoughts, anxious, excessive worry  Current Depression/Trauma:  irritable, helplessness  Current Somatic Symptoms:  racing thoughts, excessive worry, anxious  Current Psychosis/Thought Disturbance:  impulsive, inattentive, hostile/aggressive, displaces blame, anger, agitation  Current Eating Symptoms:     Chemical Use History:  Alcohol: None  Benzodiazepines: None  Opiates: None  Cocaine: None  Marijuana: None  Other Use: None   Past diagnosis:  Anxiety Disorder, ADHD, Other (FAS, moods disorder, Intermitten Explosive Disorer, Intellectual disability.)  Family history:  No known history of mental health or chemical health concerns  Past treatment:  Case management, Civil " Commitment, Psychiatric Medication Management, Supportive Living Environment (group home, penitentiary house, etc)  Details of most recent treatment:  Patient has prior mental health diagnoses including FAS, intelllectual disability, mood disorder, Intermitten Explosive Disorder, ADHD. Per chart review, Schizoaffective Disorder was noted. He is receiving psychiatry care from a provider [facility]. Patient has been living at this group home for about 2 years.  Other relevant history:  Patient has a hx of several psychiatric hospitalizations.  He was last admitted 5/3/21 to 21 due to aggression and paranoia at his group home.  Diagnoses have included unspecified psychotic disorder, mood disorder, intermittent explosive disorder, ADHD, and fetal alcohol syndrome. He has no known hx of suicide attempts but has a history of engaging in nonsuicidal self injury by head banging. He is under the care of psychiatric midlevel provider Nkechi Monreal NP.  His  is [facility] with [facility] (). He was under civil commitment with Novoa order ( 2021).  Per previous records, pt had abbreviated neuropsychological testing at some point ( or before).  Per that testing (actual report unavailable), FSIQ estimated at 55 with verbal IQ score of 62 and performance IQ of 55.  Patient denies using drugs or alcohol recently.  Per available records, he has a hx of alcohol and cannabis use.  He has not been in CD treatment.  He smokes about 1 ppd cigarettes.       Collateral Information  Is there collateral information: Yes     Collateral information name, relationship, phone number:  Kofi ( staff [phone])    What happened today: Per Kofi's report: Patient was upset (screaming and yelling) at the roomate and accused his roomate that she tried to kill him and overdose. The staffs called 911.     What is different about patient's functioning: Staff suspected that he was smoking Marijuana on  "11/24 when he went home for thanksgiving. Since then, his behaviors has been \"odd\". He became more angry, agitated, and started mentioning he is concerned that staff are being mean to him and trying to overdose. Patient also mentioned he is concerned that a housemate is picking on him and trying to harm him. Thought, staff reviewed cameras footages and saw no evidence of such behaviors from housemate.     Concern about alcohol/drug use:  Yes.    Has patient made comments about wanting to kill themselves/others: Yes- made HI remarks.    If d/c is recommended, can they take part in safety/aftercare planning:  Yes.     Risk Assessment     [place] Suicide Severity Rating Scale Recent:   Suicidal Ideation (Recent)  Q1 Wished to be Dead (Past Month): no  Q2 Suicidal Thoughts (Past Month): no     Suicidal Behavior (Recent)  Actual Attempt (Past 3 Months): No  Interrupted Attempts (Past 3 Months): No  Aborted or Self-Interrupted Attempt (Past 3 Months): No  Preparatory Acts or Behavior (Past 3 Months): No    Environmental or Psychosocial Events: challenging interpersonal relationships, impulsivity/recklessness, other life stressors  Protective Factors: Protective Factors: responsibilities and duties to others, including pets and children, lives in a responsibly safe and stable environment, good treatment engagement, help seeking, optimistic outlook - identification of future goals, reality testing ability    Does the patient have thoughts of harming others? Feels Like Hurting Others: no  Is the patient engaging in sexually inappropriate behavior?: no    Is the patient engaging in sexually inappropriate behavior?  no        Mental Status Exam   Affect: Blunted  Appearance: Appropriate  Attention Span/Concentration: Attentive  Eye Contact: Variable    Fund of Knowledge: Delayed   Language /Speech Content: Fluent  Language /Speech Volume: Soft  Language /Speech Rate/Productions: Normal  Recent Memory: Variable  Remote Memory: " "Intact  Mood: Anxious  Orientation to Person: Yes   Orientation to Place: Yes  Orientation to Time of Day: Yes  Orientation to Date: Yes     Situation (Do they understand why they are here?): Yes  Psychomotor Behavior: Normal  Thought Content: Clear  Thought Form: Goal Directed         Medication  Psychotropic medications:   Medication Orders - Psychiatric (From admission, onward)      None             Current Care Team  Patient Care Team:  No Ref-Primary, Physician as PCP - Frida Mckinnon MD as MD (Pediatrics)  Raisa Fitch MD as MD (Pediatric Rheumatology)  Wlimer Castellano MD as MD (Pediatric Hematology/Oncology)  Nkechi Monreal NP as Assigned Behavioral Health Provider  Silvia Quan PA-C as Assigned PCP    Diagnosis  Patient Active Problem List   Diagnosis Code    Thrombocytopenia (H24) D69.6    Chronic ITP (idiopathic thrombocytopenia) (H) D69.3    Attention deficit disorder F98.8    Intermittent explosive disorder F63.81    Anxiety disorder, unspecified F41.9    Schizoaffective disorder, unspecified (H) F25.9       Primary Problem This Admission  Active Hospital Problems    Attention deficit disorder      Intermittent explosive disorder      *Anxiety disorder, unspecified      Schizoaffective disorder, unspecified (H)        Clinical Summary and Substantiation of Recommendations   Rustam is a 23 year old male with prior mental health diagnoses including Intermittent Explosive Disorder, Mood Disorder, Schizoaffective Disorder, FAS and ADHD. He presents to the ED with anxiety, agitation, and homicidal remarks. He has been dealing with increased anxiety, reporting that he was upset with housemate and staff due to the housemate \"bugging him\" and that he thought he lost his bank card. Due to comorbidity, it is difficult to decipher what caused the increased in anxiety. Patient took his PRN, reports benefit. He is now calm and asking to return to group home. Denies SI/HI/AH/VH and " feels safe to return.     It is recommended that patient be dischagred to the group home with plan to follow up with outpatient care. The patient appears to struggle with anxiety and difficulty managing emotions. The patient would likely benefit from skills building to help him establish additional coping skills to ground and regulate in situations of intense emotions. Patient and  staff engaged in safety planning. Discussed using coping skills, patient identifies video game and PRNs as most effective ways.    Patient coping skills attempted to reduce the crisis:  Took PRN, talked to staff, tried other distraction techniques.    Disposition  Recommended disposition: Medication Management, Group Home        Reviewed case and recommendations with attending provider. Attending Name: Dr. Glass       Attending concurs with disposition: yes       Patient and/or validated legal guardian concurs with disposition:   yes       Final disposition:  discharge    Legal status on admission:      Assessment Details   Total duration spent with the patient: 40 min     CPT code(s) utilized: 15207 - Psychotherapy for Crisis - 60 (30-74*) min    Roxana Salvador Psychotherapist  DEC - Triage & Transition Services  Callback: [phone]

## 2023-12-07 NOTE — ED TRIAGE NOTES
Pt comes in via EMS from  on police hold. Pt making threats to staff at group home, showing aggressive behavior and spitting. Pt also has been talking to self and having hallucinations. Was here yesterday for possible SI reasons. Staff think his meds are not working for him, they do think he has been taking them correctly. Hx schizophrenia and TBI. Blood sugar 153, VSS. Pt was calm and cooperative with EMS.       Site: Forehead (05 May 2023 06:15)  Bilirubin: 7.2 (05 May 2023 06:15)  Bilirubin Comment: d/c tcb 32 HOL (05 May 2023 06:15)

## 2023-12-07 NOTE — ED NOTES
Belongings placed in locker, pt changed out of coat but kept own clothes on. Security patted pt down.    Video Observation initiated, patient informed.     Elaina Ojeda RN

## 2023-12-07 NOTE — ED NOTES
Bed: ED16  Expected date:   Expected time:   Means of arrival:   Comments:  Bhanu - 523 - 23 M police hold SI eta 1226

## 2023-12-07 NOTE — ED NOTES
Patient given discharge paper work, per group home, patient will be given a call when the Uber ride is ready at the front door.

## 2023-12-07 NOTE — ED PROVIDER NOTES
"  History     Chief Complaint:  Aggressive Behavior     The history is provided by the patient and the EMS personnel. The history is limited by the condition of the patient.      Rustam Peres is a 23 year old male with history of TBI, schizoaffective disorder, and intermittent explosive disorder who presents to the ED via EMS from his group home for evaluation of aggressive behavior. RN reports per EMS that the patient became aggressive with group home staff and spit on them. The patient states he called EMS \"to get help\". He denies any aggressive behavior earlier today.  staff believes the patient's medications may not be working due to increased number of recent outbursts. He has been compliant with his medication regimen. The patient was seen at [place] ED yesterday for an anxiety attack. He denies suicidal ideation, homicidal ideation, or hallucinations. No URI symptoms or abdominal pain. He complains of right wrist pain but denies injury.     Independent Historian:   EMS, RN, and  staff.    Review of External Notes:   Reviewed prior emergency department visit from yesterday at [facility] where patient was seen for anxiety and ultimately discharged home after mental health assessment.    Medications:    Abilify  Strattera  Thorazine  Intuniv  Invega sustenna  Lithium  Glucophage XR  Trazodone HCl  Claritin    Past Medical History:    Chronic idiopathic thrombocytopenia  Intermittent explosive disorder  JAZMINE  ADHD  ADD  Schizoaffective disorder  Self-injurious behavior  Current smoker  Fetal alcohol syndrome    Past Surgical History:    No past surgical history on file.     Physical Exam   Patient Vitals for the past 24 hrs:   BP Temp Temp src Pulse Resp SpO2 Height Weight   12/07/23 1233 128/73 98.8  F (37.1  C) Oral 73 16 100 % 1.905 m (6' 3\") (!) 162.8 kg (359 lb)      Physical Exam  General: Alert, appears well-developed and well-nourished. Cooperative.     In mild " distress  HEENT:  Head:  Atraumatic  Ears:  External ears are normal  Mouth/Throat:  Oropharynx is without erythema or exudate and mucous membranes are moist.   Eyes:   Conjunctivae normal and EOM are normal. No scleral icterus.  CV:  Normal rate, regular rhythm, normal heart sounds and radial pulses are 2+ and symmetric.  No murmur.  Resp:  Breath sounds are clear bilaterally    Non-labored, no retractions or accessory muscle use  GI:  Abdomen is soft, no distension, no tenderness. No rebound or guarding.  No CVA tenderness bilaterally  MS:  Normal range of motion. No edema.    Normal strength in all 4 extremities.     Back atraumatic.    No midline cervical, thoracic, or lumbar tenderness  Skin:  Warm and dry.  No rash or lesions noted.  Neuro:   Alert. Normal strength.  GCS: 15  Psych: Depressed mood and flat affect. Mild cognitive delay.     Emergency Department Course     Procedures     Emergency Department Course & Assessments:    Interventions:  Medications   OLANZapine zydis (zyPREXA) ODT tab 10 mg (has no administration in time range)   LORazepam (ATIVAN) tablet 1 mg (has no administration in time range)     Assessments:  1236 I obtained history and examined the patient as noted above.     Independent Interpretation (X-rays, CTs, rhythm strip):  None    Consultations/Discussion of Management or Tests:  None    Social Determinants of Health affecting care:   Stress/Adjustment Disorders    Disposition:  Care of the patient was transferred to my colleague Dr. Alonzo pending Psychiatry consultation and observation.     Impression & Plan    CMS Diagnoses: None    Medical Decision Making:  Patient is a 23-year-old male with a history of TBI and schizophrenia who presents from a group home due to outburst of aggressive behavior.  Patient apparently had spit on staff.  Patient was seen in a local [facility] emergency department yesterday for an anxiety attack.  He denies any anxiety today.  Given the  outburst of aggressive behavior today we will plan to have the patient seen by our DEC .  He has stable vital signs on arrival here today.  He has not complained of any acute medical complaints.  No indication for emergent workup.  Patient medically cleared at this time.  Given the patient's cognitive issues I am not certain whether he would be a good candidate for empath unit.  Our DEC  will attempt to see if the patient is willing to trial empath but I hate to have him go over and have an outburst of aggressive behavior leading to escalation of sedating medications etc.      Per DEC  did see the patient and felt that given ongoing homicidal ideation and aggressive behaviors at his group home facility it be beneficial for him to be seen by her psychiatrist.  This will either happen on a consult basis or in empath.  Plan for final disposition pending psychiatry assessment and recommendations.  Care signed out to my colleague Dr. Alonzo pending psychiatry consultation and observation.    Diagnosis:    ICD-10-CM    1. Aggressive behavior  R46.89       2. Outbursts of explosive behavior  R46.89       3. Homicidal ideation  R45.850           Discharge Medications:  New Prescriptions    No medications on file        Scribe Disclosure:  KAYLEIGH, Cristy Jose, am serving as a scribe at 12:52 PM on 12/7/2023 to document services personally performed by Rustam Power MD based on my observations and the provider's statements to me.   12/7/2023   Rustam Power MD White, Scott, MD  12/07/23 1334

## 2023-12-07 NOTE — ED NOTES
This evening, pt appeared to be getting more restless and anxious, stating he wanted to go back to his group home. RN talked pt through his care plan and pt thanked the nurse. Pt occasionally, yelling out, but is redirectable. RN administered 1 mg ativan (see MAR). Pt sitting in bed watching TV.

## 2023-12-07 NOTE — ED NOTES
Patient was noted to look sweaty when on the call light again, stated he had to use the restroom, was shown the restroom, was also asked if writer could take a temperature when he gets back, patient accepted and was shown how a temporal works before taking his temperature. Temp noted at 96.4.

## 2023-12-07 NOTE — ED NOTES
"Patient on call light again, asking when he will leave and go back to his group home. Patient was reassured that if its in the cards, he might be able to go back but he will have to wait for the okay from his team here. Patient also notes that his \"throat hurts\" patient was given a cup of water. Patient also starting to have small verbal outburst stating, \"who did that\", when he put his mask over his eyes. Patient coughing in his room, and water was placed near his bed. Patient also stating that he wants to go back to his group home because he feels unsafe here. Writer asked the patient in more detail, why he feels unsafe, he said the police, patient was told that there are no police here and that security here is to keep him safe. Patient was reassured that he is safe here. RN updated.  "

## 2023-12-07 NOTE — PHARMACY-ADMISSION MEDICATION HISTORY
Pharmacist Admission Medication History    Admission medication history is complete. The information provided in this note is only as accurate as the sources available at the time of the update.    Information Source(s): Facility (TCU/NH/) medication list/MAR via in-person    Pertinent Information:   - Patient presents from [facility], ph: [phone]  - Added all scheduled medications but did not add any standing prns orders, which include: Atropine, Benzonatate, Chlorpromazine, Deep sea nasal spray, Miralax, Nicotine lozenges/gum, Robitussin, Tylenol  - Victoza currently on hold (not currently active med, did not add to list)  - Invega: Confirmed last dose with NH, RN states last received 11/20/23    Changes made to PTA medication list:  Added: All  Deleted: None  Changed: None    Medication Affordability:       Allergies reviewed with patient and updates made in EHR: unable to assess    Medication History Completed By: Laura Finn [facility] 12/7/2023 3:06 PM    PTA Med List   Medication Sig Last Dose    ARIPiprazole (ABILIFY) 10 MG tablet Take 10 mg by mouth daily 12/7/2023 at am    atomoxetine (STRATTERA) 100 MG capsule Take 100 mg by mouth daily 12/7/2023 at am    ferrous sulfate (FE TABS) 325 (65 Fe) MG EC tablet Take 325 mg by mouth daily 12/7/2023 at am    guanFACINE HCl (INTUNIV) 3 MG TB24 24 hr tablet Take 1 mg by mouth at bedtime 12/6/2023 at hs    lithium 300 MG capsule Take 900 mg by mouth every evening With dinner. 12/6/2023 at pm    nicotine (NICODERM CQ) 14 MG/24HR 24 hr patch Place 1 patch onto the skin every 24 hours 12/7/2023 at am    paliperidone (INVEGA SUSTENNA) 234 MG/1.5ML JUANA Inject 234 mg into the muscle every 28 days Past Month    traZODone HCl 300 MG TABS Take 300 mg by mouth at bedtime 12/6/2023 at hs

## 2023-12-07 NOTE — DISCHARGE INSTRUCTIONS
Use the patient's prn atarax for the next week to help avoid agitation.     Follow up with his medication provider within 1 week to discuss further medication changes.     Discharge back to group home via Uber (group home will send Uber)

## 2023-12-08 VITALS
RESPIRATION RATE: 18 BRPM | HEIGHT: 75 IN | TEMPERATURE: 98.7 F | DIASTOLIC BLOOD PRESSURE: 98 MMHG | SYSTOLIC BLOOD PRESSURE: 134 MMHG | BODY MASS INDEX: 39.17 KG/M2 | WEIGHT: 315 LBS | HEART RATE: 87 BPM | OXYGEN SATURATION: 99 %

## 2023-12-08 PROCEDURE — 99243 OFF/OP CNSLTJ NEW/EST LOW 30: CPT | Performed by: NURSE PRACTITIONER

## 2023-12-08 PROCEDURE — 250N000013 HC RX MED GY IP 250 OP 250 PS 637: Performed by: EMERGENCY MEDICINE

## 2023-12-08 RX ADMIN — LORAZEPAM 1 MG: 1 TABLET ORAL at 04:28

## 2023-12-08 RX ADMIN — FERROUS SULFATE TAB 325 MG (65 MG ELEMENTAL FE) 325 MG: 325 (65 FE) TAB at 08:35

## 2023-12-08 RX ADMIN — ARIPIPRAZOLE 10 MG: 10 TABLET ORAL at 08:35

## 2023-12-08 RX ADMIN — ATOMOXETINE 100 MG: 100 CAPSULE ORAL at 08:35

## 2023-12-08 ASSESSMENT — COLUMBIA-SUICIDE SEVERITY RATING SCALE - C-SSRS
SUICIDE, SINCE LAST CONTACT: NO
2. HAVE YOU ACTUALLY HAD ANY THOUGHTS OF KILLING YOURSELF?: NO
TOTAL  NUMBER OF ABORTED OR SELF INTERRUPTED ATTEMPTS SINCE LAST CONTACT: NO
ATTEMPT SINCE LAST CONTACT: NO
1. SINCE LAST CONTACT, HAVE YOU WISHED YOU WERE DEAD OR WISHED YOU COULD GO TO SLEEP AND NOT WAKE UP?: NO
6. HAVE YOU EVER DONE ANYTHING, STARTED TO DO ANYTHING, OR PREPARED TO DO ANYTHING TO END YOUR LIFE?: NO
TOTAL  NUMBER OF INTERRUPTED ATTEMPTS SINCE LAST CONTACT: NO

## 2023-12-08 ASSESSMENT — ACTIVITIES OF DAILY LIVING (ADL)
ADLS_ACUITY_SCORE: 35

## 2023-12-08 NOTE — ED PROVIDER NOTES
Patient signed out to myself awaiting reassessment from psychiatry.  I received information that he is cleared to go back to his group home and has been seen by psychiatry.  He does not need medication changes.  He has been stable here and not behaving well.  He will go back to his group home.     Nick Wheeler MD  12/08/23 0332
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
## 2023-12-08 NOTE — ED NOTES
Called  @ [phone] to verify they are on the way to  pt; they said 2:45; pt on light multiple times asking for his ride.

## 2023-12-08 NOTE — ED NOTES
Pt resting in bed watching TV.  Asking to go back to his group home, pt currently denies SI or HI.

## 2023-12-08 NOTE — ED NOTES
[id number]    Pt laying in bed with eyes closed for majority of time; pt roused easily to take PM meds and laid back down and covered self with blankets. Respirations even and unlabored. Pt repositions independently for comfort. Continuous observation via video monitoring. No behavioral concerns at this time.

## 2023-12-08 NOTE — ED NOTES
Pt asked when he could go back to his group home because he's been good. I explained to him that it was 0400 and he would be reevaluated in the morning to see if that was going to be an option for him. He was encourage to get some more rest and he was accepting of this.

## 2023-12-08 NOTE — ED NOTES
The writer spoke to Kofi, who is staff at the pt's group home.  Plans for staff to  patient at ED between 1430 and 1500 today.

## 2023-12-08 NOTE — ED NOTES
Pt states he feels like someone is going to kill him. I reassured him he is safe here and offered medication for anxiety which he took.

## 2023-12-08 NOTE — DISCHARGE INSTRUCTIONS
Mental health recommendations    Please follow-up with your outpatient team about your visit today. Talk to outpatient  about possible day programing. Also attend upcoming psychiatry appointment on 12/14/23.     2.  One of our care coordinators will reach out to you after your discharge.  If you have any questions about additional resources please call our coordinators at # [phone]    3.  Please use aftercare plan and already established coping skills and safety planning as needed.     4.  Please call 911 and/or return to the emergency department if her symptoms worsen or your safety becomes compromised.       [facility] Mental [facility]     Examples of services  An assessment of needs  A short-term service plan with the individual  Referrals to community resources  Case management for mental health or developmental disabilities  Major cleanup to avoid losing your home  Talk to someone by phone  Call [facility]. You'll talk to a [facility] staff member about your options. These include referrals and information. Call [phone].      Aftercare Plan    If I am feeling unsafe or I am in a crisis, I will:   Contact my established care providers   Call the National Suicide Prevention Lifeline: 516.450.6544   Go to the nearest emergency room   Call 911   Your [facility] has a mental health crisis team you can call 24/7: [facility], [phone]    Warning signs that I or other people might notice when a crisis is developing for me: increased agitation, feeling scared     Things I am able to do on my own to cope or help me feel better:   -Practice square breathing when I begin to feel anxious - in breath through the nose for the count   of 4 and the first line on the square. Out breath through the mouth for the count of 4 for the second line   of the square. Repeat to complete the square. Repeat the square as many times as needed.    Things that I am able to do with others to  "cope or help me feel better: -Use community resources, including hotline numbers, [facility] crisis and support meetings    Things I can use or do for distraction: -Distraction skills of: going for walks, watching TV, spending time outside, calling a friend or family member  -Download a meditation christina and spend 15-20 minutes per day mediating/relaxing. Some apps to   download include: Calm, Headspace and Insight Timer. All 3 of these apps have free version    Changes I can make to support my mental health and wellness:   -Attend scheduled mental health therapy and psychiatric appointments and follow all recommendations  -Maintain a daily schedule/routine  -Practice deep breathing skills  -Abstain from all mood altering chemicals not currently prescribed to me     People in my life that I can ask for help: [employer] (STEPHANIE)  420.663.1020 or 1.888.STEPHANIE.HELPS    Other things that are important when I m in crisis: -Commit to 30 minutes of self care daily - this can be as simple as taking a shower, going for a walk, cooking a meal, read, writing, etc        Crisis Lines  Crisis Text Line  Text 394772  You will be connected with a trained live crisis counselor to provide support.    Por espanol, texto  CYNTHIA a 810394 o texto a 442-AYUDAME en WhatsApp    National Hope Line  1.800.SUICIDE [7638533]      Community Resources  Fast Tracker  Linking people to mental health and substance use disorder resources  fasttrackermn.org     [place] Mental [facility] Warm Line  Peer to peer support  Monday thru Saturday, 12 pm to 10 pm  550.137.7198 or 5.694.522.1470  Text \"Support\" to 94498    [employer] (STEPHANIE)  873.587.8103 or 1.888.STEPHANIE.HELPS        Mental Health Apps  My3  https://my3app.org/    VirtualHopeBox  https://[employer].org/apps/virtual-hope-box/      Additional Information  Today you were seen by a licensed mental health professional through Triage and Transition " services, Behavioral Healthcare Providers ([facility])  for a crisis assessment in the Emergency Department at [facility].  It is recommended that you follow up with your established providers (psychiatrist, mental health therapist, and/or primary care doctor - as relevant) as soon as possible. Coordinators from [facility] will be calling you in the next 24-48 hours to ensure that you have the resources you need.  You can also contact [facility] coordinators directly at [phone]. You may have been scheduled for or offered an appointment with a mental health provider. [facility] maintains an extensive network of licensed behavioral health providers to connect patients with the services they need.  We do not charge providers a fee to participate in our referral network.  We match patients with providers based on a patient's specific needs, insurance coverage, and location.  Our first effort will be to refer you to a provider within your care system, and will utilize providers outside your care system as needed.
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
## 2023-12-08 NOTE — ED PROVIDER NOTES
I received the patient in signout from Dr. Orozco at shift change.  The patient is here overnight and awaiting psych consult in the morning with the hopes that he may have improved and would be able to go back to the group home.  May eventually be okay for empath.  Reassessment is pending.  No acute events over the course of my shift.  Signed out to Dr. Wheeler at shift change.     Leela Oh MD  12/08/23 0722

## 2023-12-14 ENCOUNTER — VIRTUAL VISIT (OUTPATIENT)
Dept: PSYCHIATRY | Facility: CLINIC | Age: 23
End: 2023-12-14
Payer: COMMERCIAL

## 2023-12-14 ENCOUNTER — TELEPHONE (OUTPATIENT)
Dept: PSYCHIATRY | Facility: CLINIC | Age: 23
End: 2023-12-14
Payer: COMMERCIAL

## 2023-12-14 DIAGNOSIS — F90.9 ATTENTION DEFICIT HYPERACTIVITY DISORDER (ADHD), UNSPECIFIED ADHD TYPE: ICD-10-CM

## 2023-12-14 DIAGNOSIS — F25.9 SCHIZOAFFECTIVE DISORDER, UNSPECIFIED TYPE (H): Primary | ICD-10-CM

## 2023-12-14 DIAGNOSIS — F43.23 ADJUSTMENT DISORDER WITH MIXED ANXIETY AND DEPRESSED MOOD: ICD-10-CM

## 2023-12-14 DIAGNOSIS — F63.81 INTERMITTENT EXPLOSIVE DISORDER: ICD-10-CM

## 2023-12-14 PROCEDURE — 99214 OFFICE O/P EST MOD 30 MIN: CPT | Mod: VID | Performed by: PSYCHIATRY & NEUROLOGY

## 2023-12-14 RX ORDER — ARIPIPRAZOLE 15 MG/1
15 TABLET ORAL EVERY MORNING
Qty: 30 TABLET | Refills: 1 | Status: SHIPPED | OUTPATIENT
Start: 2023-12-14 | End: 2024-01-02

## 2023-12-14 RX ORDER — TRAZODONE HYDROCHLORIDE 300 MG/1
1 TABLET ORAL AT BEDTIME
Qty: 28 TABLET | Refills: 1 | Status: SHIPPED | OUTPATIENT
Start: 2023-12-14

## 2023-12-14 RX ORDER — GUANFACINE 3 MG/1
3 TABLET, EXTENDED RELEASE ORAL AT BEDTIME
Qty: 28 TABLET | Refills: 1 | Status: SHIPPED | OUTPATIENT
Start: 2023-12-14

## 2023-12-14 RX ORDER — HYDROXYZINE HYDROCHLORIDE 50 MG/1
TABLET, FILM COATED ORAL
Qty: 120 TABLET | Refills: 1 | Status: SHIPPED | OUTPATIENT
Start: 2023-12-14 | End: 2024-02-22

## 2023-12-14 RX ORDER — ATOMOXETINE 100 MG/1
100 CAPSULE ORAL DAILY
Qty: 30 CAPSULE | Refills: 3 | Status: SHIPPED | OUTPATIENT
Start: 2023-12-14 | End: 2024-07-03

## 2023-12-14 RX ORDER — CHLORPROMAZINE HYDROCHLORIDE 50 MG/1
50 TABLET, FILM COATED ORAL 2 TIMES DAILY PRN
Qty: 60 TABLET | Refills: 2 | Status: SHIPPED | OUTPATIENT
Start: 2023-12-14

## 2023-12-14 RX ORDER — LITHIUM CARBONATE 300 MG/1
900 CAPSULE ORAL DAILY
Qty: 90 CAPSULE | Refills: 3 | Status: SHIPPED | OUTPATIENT
Start: 2023-12-14

## 2023-12-14 ASSESSMENT — ANXIETY QUESTIONNAIRES
4. TROUBLE RELAXING: SEVERAL DAYS
GAD7 TOTAL SCORE: 7
GAD7 TOTAL SCORE: 21
2. NOT BEING ABLE TO STOP OR CONTROL WORRYING: SEVERAL DAYS
GAD7 TOTAL SCORE: 21
1. FEELING NERVOUS, ANXIOUS, OR ON EDGE: SEVERAL DAYS
7. FEELING AFRAID AS IF SOMETHING AWFUL MIGHT HAPPEN: NEARLY EVERY DAY
6. BECOMING EASILY ANNOYED OR IRRITABLE: NEARLY EVERY DAY
5. BEING SO RESTLESS THAT IT IS HARD TO SIT STILL: NOT AT ALL
IF YOU CHECKED OFF ANY PROBLEMS ON THIS QUESTIONNAIRE, HOW DIFFICULT HAVE THESE PROBLEMS MADE IT FOR YOU TO DO YOUR WORK, TAKE CARE OF THINGS AT HOME, OR GET ALONG WITH OTHER PEOPLE: NOT DIFFICULT AT ALL
5. BEING SO RESTLESS THAT IT IS HARD TO SIT STILL: NEARLY EVERY DAY
3. WORRYING TOO MUCH ABOUT DIFFERENT THINGS: SEVERAL DAYS
7. FEELING AFRAID AS IF SOMETHING AWFUL MIGHT HAPPEN: MORE THAN HALF THE DAYS
3. WORRYING TOO MUCH ABOUT DIFFERENT THINGS: NEARLY EVERY DAY
2. NOT BEING ABLE TO STOP OR CONTROL WORRYING: NEARLY EVERY DAY
1. FEELING NERVOUS, ANXIOUS, OR ON EDGE: NEARLY EVERY DAY
4. TROUBLE RELAXING: NEARLY EVERY DAY
IF YOU CHECKED OFF ANY PROBLEMS ON THIS QUESTIONNAIRE, HOW DIFFICULT HAVE THESE PROBLEMS MADE IT FOR YOU TO DO YOUR WORK, TAKE CARE OF THINGS AT HOME, OR GET ALONG WITH OTHER PEOPLE: SOMEWHAT DIFFICULT
6. BECOMING EASILY ANNOYED OR IRRITABLE: SEVERAL DAYS

## 2023-12-14 ASSESSMENT — PATIENT HEALTH QUESTIONNAIRE - PHQ9
SUM OF ALL RESPONSES TO PHQ QUESTIONS 1-9: 20
10. IF YOU CHECKED OFF ANY PROBLEMS, HOW DIFFICULT HAVE THESE PROBLEMS MADE IT FOR YOU TO DO YOUR WORK, TAKE CARE OF THINGS AT HOME, OR GET ALONG WITH OTHER PEOPLE: NOT DIFFICULT AT ALL
SUM OF ALL RESPONSES TO PHQ QUESTIONS 1-9: 20

## 2023-12-14 NOTE — Clinical Note
Nkechi,  Patient had 3 ED visits recently and escalating behaviors.  Within the last 2 to 3 days there seems to be improvement but unclear if it is going to sustain or not.  This behavior dysregulation occurred in context of family difficulties.  I thought about holding off on changes but symptoms are significant and the doses could always be reduced again upon stability.  Increase Abilify and hydroxyzine as needed.  Sincerely, Jatin Doherty M.D. Consultative Psychiatrist Program Medical Director, [facility] Psychiatry Service

## 2023-12-14 NOTE — PROGRESS NOTES
"  Mental Health and Collaborative Care Psychiatry Service Rooming Note      Most pressing mental health concern at this time: Visual Hallucinations that include seeing shadows and \"Opps\" which are enemies from face book  Recent anxiety that leads to negative behaviors such as aggression and property destruction.        Any new physical health conditions or diagnoses affecting you that we should be aware of: None      Side effects related to medications patient would like to discuss with the provider:  Occasional Mumbling      Are you taking your medications as prescribed?  Yes  If not, why? na      Do you need refills of any of the medications?  No   notifies clinic of refill needs.    If so, which ones?       Are you taking any recreational substances? TC x 1 when with family over Thanksgiving      Add attendance guidelines .phrase here   Care team has reviewed attendance agreement with patient. Patient advised that two failed appointments within 6 months may lead to termination of current episode of care.           Patient would like the video visit invitation sent by:   Link for video visit is email [email or web address]    Anil Casas RN  December 14, 2023  9:44 AM        "

## 2023-12-14 NOTE — NURSING NOTE
Is the patient currently in the state of [place]? YES    Visit mode:VIDEO    If the visit is dropped, the patient can be reconnected by: VIDEO VISIT: Send to e-mail at: [email or web address]    Will anyone else be joining the visit? NO  (If patient encounters technical issues they should call [phone]    How would you like to obtain your AVS? MyChart    Are changes needed to the allergy or medication list? No    Reason for visit: RECHECK    Patsy RUBY

## 2023-12-14 NOTE — PATIENT INSTRUCTIONS
"Patient Education   Collaborative Care Psychiatry Service  What to Expect  Here's what to expect from your Collaborative Care Psychiatry Service (CCPS).   About CCPS  CCPS means 2 people work together to help you get better. You'll meet with a behavioral health clinician and a psychiatric doctor. A behavioral health clinician helps people with mental health problems by talking with them. A psychiatric doctor helps people by giving them medicine.  How it works  At every visit, you'll see the behavioral health clinician (BHC) first. They'll talk with you about how you're doing and teach you how to feel better.   Then you'll see the psychiatric doctor. This doctor can help you deal with troubling thoughts and feelings by giving you medicine. They'll make sure you know the plan for your care.   CCPS usually takes 3 to 6 visits. If you need more visits, we may have you start seeing a different psychiatric doctor for ongoing care.  If you have any questions or concerns, we'll be glad to talk with you.  About visits  Be open  At your visits, please talk openly about your problems. It may feel hard, but it's the best way for us to help you.  Cancelling visits  If you can't come to your visit, please call us right away at [phone]. If you don't cancel at least 24 hours (1 full day) before your visit, that's \"late cancellation.\"  Being late to visits  Being very late is the same as not showing up. You will be a \"no show\" if:  Your appointment starts with a BHC, and you're more than 15 minutes late for a 30-minute (half hour) visit. This will also cancel your appointment with the psychiatric doctor.  Your appointment is with a psychiatric doctor only, and you're more than 15 minutes late for a 30-minute (half hour) visit.  Your appointment is with a psychiatric doctor only, and you're more than 30 minutes late for a 60-minute (full hour) visit.  If you cancel late or don't show up 2 times within 6 months, we may end your " care.   Getting help between visits  If you need help between visits, you can call us Monday to Friday from 8 a.m. to 4:30 p.m. at [phone].  Emergency care  Call 911 or go to the nearest emergency department if your life or someone else's life is in danger.  Call 988 anytime to reach the national Suicide and Crisis hotline.  Medicine refills  To refill your medicine, call your pharmacy. You can also call [facility]'s Behavioral Access at [phone], Monday to Friday, 8 a.m. to 4:30 p.m. It can take 1 to 3 business days to get a refill.   Forms, letters, and tests  You may have papers to fill out, like FMLA, short-term disability, and workability. We can help you with these forms at your visits, but you must have an appointment. You may need more than 1 visit for this, to be in an intensive therapy program, or both.  Before we can give you medicine for ADHD, we may refer you to get tested for it or confirm it another way.  We may not be able to give you an emotional support animal letter.  We don't do mental health checks ordered by the court.   We don't do mental health testing, but we can refer you to get tested.   Thank you for choosing us for your care.  For informational purposes only. Not to replace the advice of your health care provider. Copyright   2022 [facility]. All rights reserved. [employer] [id number] - 12/22.

## 2023-12-14 NOTE — PROGRESS NOTES
Virtual Visit Details    Type of service:  Video Visit   Video Start Time: 1:05 PM  Video End Time:1:33 PM    Originating Location (pt. Location): Other Group Home/Assisted Living - [facility]    Distant Location (provider location):  Off-site  Platform used for Video Visit: [facility] Psychiatry Consult Note    IDENTIFICATION   Name: Kaden Easton Jr.   : 2000/23 year old      Sex:    @ male          Telemedicine Visit: The patient's condition can be safely assessed and treated via synchronous audio and visual telemedicine encounter.        Face to Face/patient Contact total time: 28 minutes  Pre Charting time: 5 minutes; Post charting time, communication and other activities: 8 minutes; Total time 41 minutes            SUBJECTIVE   Pt reports seeing shadows, maybe once a day. Reports first seen last . Finds it scary. Associated with a gun noise. He reports he sees multiple people in a hoodie and dredds, crawling and on the walls. He reports mood has been good.     Hydroxyzine less effective, no side effects. It lasts 20 minutes and then he's back to feeling anxious. Sleep difficulties - 4-5 hours - since thanskgiving and family. Maybe napping multiple times a day - maybe two hours.      Shahida reports mood has been difficult, easily irritable and tied to seeing shadows - everyday almost all day. Staying up all night and morning because of the shadows. At times more aggressive and physical, but really mostly verbal aggression to peers and staff. Yelling more at people. Feels one housemate is out to get him who has been a close friend. Has been spitting.     Not listening sometimes, though relatively better the last two days. No spitting in the last couple of days. Less attacking roommates the last 3 days. Now peeing on himself.       The following assessments were completed by patient for this visit:  PROMIS 10-Global Health (only subscores and  total score):       1/10/2022    10:06 AM 1/10/2022     1:07 PM 12/14/2023    10:00 AM   PROMIS-10 Scores Only   Global Mental Health Score 9 9 10   Global Physical Health Score 11 11 13   PROMIS TOTAL - SUBSCORES 20 20 23             2/8/2023    10:50 AM 8/17/2023    11:00 AM 12/14/2023     9:00 AM   PHQ   PHQ-9 Total Score 17 3 7   Q9: Thoughts of better off dead/self-harm past 2 weeks Several days Not at all Not at all   F/U: Thoughts of suicide or self-harm No     F/U: Safety concerns No            8/17/2023    11:00 AM 9/21/2023    10:00 AM 12/14/2023    10:00 AM   JAZMINE-7 SCORE   Total Score 6 0 7        OBJECTIVE     Vital Signs:   There were no vitals taken for this visit.    Labs:  reviewed    Current Medications:  Current Outpatient Medications   Medication    ARIPiprazole (ABILIFY) 10 MG tablet    atomoxetine (STRATTERA) 100 MG capsule    atomoxetine (STRATTERA) 100 MG capsule    atropine 1 % ophthalmic solution    chlorproMAZINE (THORAZINE) 50 MG tablet    cholecalciferol 25 MCG (1000 UT) TABS    ferrous sulfate (FE TABS) 325 (65 Fe) MG EC tablet    ferrous sulfate (FEROSUL) 325 (65 Fe) MG tablet    guanFACINE HCl (INTUNIV) 3 MG TB24 24 hr tablet    guanFACINE HCl (INTUNIV) 3 MG TB24 24 hr tablet    hydrOXYzine (ATARAX) 50 MG tablet    hydrOXYzine HCl (ATARAX) 50 MG tablet    INVEGA SUSTENNA 234 MG/1.5ML JUANA    lithium 300 MG capsule    lithium 300 MG capsule    loratadine (CLARITIN) 10 MG tablet    metFORMIN (GLUCOPHAGE-XR) 500 MG 24 hr tablet    paliperidone (INVEGA SUSTENNA) 234 MG/1.5ML JUANA    traZODone HCl 300 MG TABS    traZODone HCl 300 MG TABS    vitamin C (ASCORBIC ACID) 250 MG TABS tablet    nicotine (NICODERM CQ) 14 MG/24HR 24 hr patch     No current facility-administered medications for this visit.        The [place] Prescription Monitoring Program has been reviewed and there are no concerns about diversionary activity for controlled substances at this time.        ADDED HISTORY    Grandfather over Thanksgiving gave him marijuana - though thought he has not otherwise had in two and a half years.  said maybe the triggering event really was father. Father told patient he was a disappointment 1.5 weeks. Sx bad the last two weeks. S/p ED visits x 3 with no med change.       MENTAL STATUS EXAMINATION:   Appearance: Intact attention to grooming and hygiene, casual  Attitude: Cooperative  Eye Contact: Fair  Gait and Station: Sitting  Psychomotor Behavior: Within normal limits current state  Oriented to: Grossly person place and time  Attention Span and Concentration: Grossly intact  Speech:  low tone  Language: english  Mood:  fair  Affect:  constricted  Associations:  no loose associations  Thought Process:  logical, linear and goal oriented  Thought Content: No evidence of delusions or suicidal or homicidal ideation plan or intent at time of visit  Memory: grossly intact  Fund of Knowledge: limited  Insight:  limited  Judgment:  intact, adequate for safety  Impulse Control:  fair        DIAGNOSES:   Schizoaffective disorder  intermittent explosive disorder  Fetal alcohol spectrum disorder  Hx TBI  Mild intellectual disability  ADHD, combined type  Nicotine use disorder      ASSESSMENT:   Patient is his own guardian, experienced significant difficulty with family members over Thanksgiving 2023 leading to worsened behaviors.  Symptoms may be starting to improve early but no evidence of sustainment yet.  Increase Abilify to better address reported visual hallucinations and mood.  Increase hydroxyzine.    Today Kaden Easton Jr. reports no suicidal ideations. In addition, notable risk factors for self-harm, including psychosis. However, risk is mitigated by history of seeking help when needed and support from group home. Therefore, based on all available evidence including the factors cited above, he does not appear to be at imminent risk for self-harm, does not meet criteria for a  72-hr hold, and therefore remains appropriate for ongoing outpatient level of care.       PLAN:     Patient advised of consult/bridge to Nkechi Monreal. Patient will continue to be seen for ongoing consultation and stabilization.  Does not meet criteria for involuntary treatment or hospitalization  Abilify 10 mg daily => 12/14/2023 15 mg daily-Risks, benefits and alternatives discussed.  Patient provides verbal consent to treatment.  Hydroxyzine 50 mg p.o. 3 times daily as needed anxiety => 12/14/2023 100 mg p.o. twice daily as needed anxiety-Risks, benefits and alternatives discussed.  Patient provides verbal consent to treatment.  Continue Invega Sustenna 234 mg every 30 days  continue lithium 900 mg daily  Continue Strattera 100 mg daily   continue Intuniv 3 mg nightly  Continue trazodone 300 mg nightly  Labs -follow-up lithium level, A1c, fasting lipid panel  Return in 2 weeks, with returning to CIERRA Monreal 1/25/2024    Administrative Billing:   Time spent with patient was 30 minutes and greater than 50% of time or 20 minutes was spent in counseling and coordination of care regarding above diagnoses and treatment plan.       Signed:   Jatin Doherty M.D.  [facility] Psychiatry Service    Disclaimer: This note consists of symbols derived from keyboarding, dictation and/or voice recognition software. As a result, there may be errors in the script that have gone undetected. Please consider this when interpreting information found in this chart.

## 2023-12-14 NOTE — TELEPHONE ENCOUNTER
"RN was notified by the [facility] staff that this patient had arrived with his  for a visit with Nkechi Stanleycarlos CAIN.  The  and the patient were not aware that Nkechi Monreal CNP was not in the office today and had cancelled clinic.       [facility] [phone]  Group Home  [facility]  [phone]  Group Home is [facility] [phone]  Link for visit is [email or web address]     RN reviewed this with [facility] Management and RN was instructed to meet with the patient and the .  The purpose of the meeting was to assess the patients and the 's needs or concerns.      Assessment     .  RN initially  met with the patient and  and patient in a private area outside the clinic.  The patient and  had the following concerns:    The patient has decompensated in the past few weeks since the patient went to visit family over Thanksgiving.    Main concern is Visual Hallucination.  The patient describes these as seeing Shadows.  These are intermittent but more at nighttime.  In addition to seeing Shadows the patient describes seeing what he calls \"Opps\" Opps are enemies.   He then pulled out his cell phone and showed RN and  what the Opps were.  These were [employer] pictures of  Gang members in aggressive poses such as giving the finger and fanning themself with money. He describes these hallucinations as very scary.  When he has these hallucinations he becomes anxious and fears for his safety.  He has thoughts of shooting them to defend himself but admits he has no access to guns.  He stated, I want to make sure I don't shoot em.\"  He was in the ED 12/7/23 regarding these concerns.    He denies depression but late admitted a little depression. PHQ- 9 score was 7.  He denies suicidal ideations.   He currently denies desire to hurt himself or other.  He has had aggression in the past.     He admits to " "significant anxiety.Intensity 8-9/10.    JAZMINE-7 score 7.  He is anxious when he has the frightening hallucinations.  His anxiety has caused him to have behavioral outbursts at the group home.     Second greatest concern is poor and intermittent sleep.  He reported being on Trazadone 300 mg and still having difficulty falling asleep and staying asleep.    He denies issues with appetite   He denies side effects from his medications.  His last Invega Sustena shot was 11/29/23.    He has had infrequent incontinence of urine and stool.  His  brought this up as a new concern.    He was quiet with his head down most of the time.  Occasional laughter.  He was pleasant and cooperative.  He was alert and oriented.    He has been medication compliant.       Comments.       The Case Manger indicated that the Group Home is concerned regarding his behavior and mood changes in the past few weeks.  They feel he has decompensated somewhat.  There has been a change.     Last week he was yelling out at night due to the hallucinations.    He had wanted to kill the staff and his roommate.  He denies this and reports that he apologized to the staff and roommate and asked for forgiveness.  .  He reported this was in response to keeping the Opps away from him.     indicated the \"Shadows\" are new.    He used THC x 1 while with family during Thanksgiving.  He denied this casued him to decompentate and it helps him relax.  He denies drugs or alcohol and reports this THC use was only once.     complimented him that he has had less frequesnt hospitalizations, no headbanging, no starting fires, no punching holes in the walls or doors and no window breaking.  These are behaviors he has demonstrated in the past.        Group Home Concerns.      The Group Home feels he is decompensating and would benefit from medication adjustment.      The Group Home is concerned about the hallucinations and anxiety " and more aggressive negative behaviors.     The patient is open to any medication changes needed.  The patient is agreeable to taking Thorazine more frequently PRN.   The patient and group home are concerned about his sleep and open to any help with this.      RN completed the rooming documentation for the 1:00 visit.    RN spoke with Virtual Facilitator Patsy who will reach out prior to the visit.    RN routed this documentation to Dr. Acevedo for his review.    The OBC's have been instrumental in getting the patient scheduled at 1:00 today with Dr. Doherty.    RN has been communicating with [facility] Management regarding this patient.      Anil Casas RN on 12/14/2023 at 11:29 AM

## 2023-12-15 ENCOUNTER — LAB (OUTPATIENT)
Dept: LAB | Facility: CLINIC | Age: 23
End: 2023-12-15
Payer: COMMERCIAL

## 2023-12-15 DIAGNOSIS — F25.9 SCHIZOAFFECTIVE DISORDER, UNSPECIFIED TYPE (H): ICD-10-CM

## 2023-12-15 LAB
ALBUMIN SERPL BCG-MCNC: 4.2 G/DL (ref 3.5–5.2)
ALP SERPL-CCNC: 58 U/L (ref 40–150)
ALT SERPL W P-5'-P-CCNC: 46 U/L (ref 0–70)
ANION GAP SERPL CALCULATED.3IONS-SCNC: 12 MMOL/L (ref 7–15)
AST SERPL W P-5'-P-CCNC: 35 U/L (ref 0–45)
BILIRUB SERPL-MCNC: 0.3 MG/DL
BUN SERPL-MCNC: 5.7 MG/DL (ref 6–20)
CALCIUM SERPL-MCNC: 9.4 MG/DL (ref 8.6–10)
CHLORIDE SERPL-SCNC: 102 MMOL/L (ref 98–107)
CHOLEST SERPL-MCNC: 150 MG/DL
CREAT SERPL-MCNC: 0.84 MG/DL (ref 0.67–1.17)
DEPRECATED HCO3 PLAS-SCNC: 23 MMOL/L (ref 22–29)
EGFRCR SERPLBLD CKD-EPI 2021: >90 ML/MIN/1.73M2
FASTING STATUS PATIENT QL REPORTED: YES
GLUCOSE SERPL-MCNC: 88 MG/DL (ref 70–99)
HBA1C MFR BLD: 5.6 % (ref 0–5.6)
HDLC SERPL-MCNC: 34 MG/DL
LDLC SERPL CALC-MCNC: 101 MG/DL
LITHIUM SERPL-SCNC: 0.5 MMOL/L (ref 0.6–1.2)
NONHDLC SERPL-MCNC: 116 MG/DL
POTASSIUM SERPL-SCNC: 3.9 MMOL/L (ref 3.4–5.3)
PROT SERPL-MCNC: 7.2 G/DL (ref 6.4–8.3)
SODIUM SERPL-SCNC: 137 MMOL/L (ref 135–145)
TRIGL SERPL-MCNC: 75 MG/DL

## 2023-12-15 PROCEDURE — 83036 HEMOGLOBIN GLYCOSYLATED A1C: CPT

## 2023-12-15 PROCEDURE — 80053 COMPREHEN METABOLIC PANEL: CPT

## 2023-12-15 PROCEDURE — 80061 LIPID PANEL: CPT

## 2023-12-15 PROCEDURE — 36415 COLL VENOUS BLD VENIPUNCTURE: CPT

## 2023-12-15 PROCEDURE — 80178 ASSAY OF LITHIUM: CPT

## 2023-12-29 NOTE — PROGRESS NOTES
[facility]  2024      Behavioral Health Clinician Progress Note    Patient Name: Kaden Easton Jr.           Service Type:  Individual      Service Location:   [facility] / Email (patient reached)     Session Start Time: 135pm  Session End Time: 156pm      Session Length: 16 - 37      Attendees: Patient and group home staff in the background, can't be seen but can be heard     Service Modality:  Video Visit:      Provider verified identity through the following two step process.  Patient provided:  Patient  and Patient was verified at admission/transfer    Telemedicine Visit: The patient's condition can be safely assessed and treated via synchronous audio and visual telemedicine encounter.      Reason for Telemedicine Visit: Services only offered telehealth    Originating Site (Patient Location): Patient's home    Distant Site (Provider Location): Provider Remote Setting- Home Office    Consent:  The patient/guardian has verbally consented to: the potential risks and benefits of telemedicine (video visit) versus in person care; bill my insurance or make self-payment for services provided; and responsibility for payment of non-covered services.     Patient would like the video invitation sent by:  My Chart    Mode of Communication:  Video Conference via [facility]    Distant Location (Provider):  Off-site    As the provider I attest to compliance with applicable laws and regulations related to telemedicine.    Visit Activities (Refresh list every visit): [facility] Only    Diagnostic Assessment Date: 2021 Nkechi Monreal  Treatment Plan Review Date: in the next few visits   See Flowsheets for today's PHQ-9 and JAZMINE-7 results  Previous PHQ-9:       2023    11:00 AM 2023     9:00 AM 2023    12:58 PM   PHQ-9 SCORE   PHQ-9 Total Score MyChart   20 (Severe depression)   PHQ-9 Total Score 3 7 20     Previous JAZMINE-7:       2023    10:00 AM  12/14/2023    10:00 AM 12/14/2023     1:00 PM   JAZMINE-7 SCORE   Total Score   21 (severe anxiety)   Total Score 0 7 21       DATA  Extended Session (60+ minutes): No  Interactive Complexity: No  Crisis: No  [facility] Patient: No    Treatment Objective(s) Addressed in This Session:  Target Behavior(s): disease management/lifestyle changes AH and VI, trouble with sleep related to this     Depressed Mood: Decrease frequency and intensity of feeling down, depressed, hopeless  Improve quantity and quality of night time sleep / decrease daytime naps  Feel less tired and more energy during the day   Anxiety: will experience a reduction in anxiety and will increase ability to function adaptively  Anger Management: will learn and practice positive anger management skills   Thought Disturbance: will continue to take medications as prescribed    Current Stressors / Issues:  During today's visit, pt asks for a water break.    [facility] introduces self and role to pt.     Medication Questions/Requests: no     update: Pt says that he has a lot of anxiety. He will play video games and listens to music. Pt says that he was sad last week since he wasn't able to see his mom. He wasn't feeling up for seeing her and was feeling tired. He hasn't talked with her about making up this visit. He was going to go out to her house. Pt will put his head phones in and the voices are more quiet. He reports that the voices tell him to hurt self. He will say what the voices say back to them. While playing video games the voices are still there. He hears a male voice. The voices aren't talking to him now. He gets along with his housemates. Staff say he gets mad at others 2-3 times a day. He will play video games. He will walk away and slam the door. The voices will cause him to feel mad. He is still seeing shadows at night and during the day. It is hard to sleep.   Stressors: the voices  Side Effects: no  Mood: mood shifts, after going to the hospital    Appetite: unremarkable  Sleep: doesn't sleep at night, play Barrett and will stop doing it and listen to music, he will feel a little tired from not sleeping, it's always been like this for him, take naps   It's hard to sleep with the AH and VH.     Impulsive spending/spending sprees: no, he will buy his own stuff and staff will advise him not to buy things   Suicidality: Pt denies   Self-harm: scratch self, punch wall  Substance Use: lozenges to quit smoking, he hasn't have as many cravings to smoke, he still smokes a little bit, one every 2 hours he hasn't taken the lozenges this week   Caffeine: not much, drink water and milk, drink coffee in am   Therapist: has a therapist and a psychiatrist according to  staff     He has a bruise on his finger that happened on the weekend.         Progress on Treatment Objective(s) / Homework:  New Objective established this session - ACTION (Actively working towards change); Intervened by reinforcing change plan / affirming steps taken    Pt reports that they will listen to music and play video games to cope. He says that he has his lozenges to help him quit smoking.     CBT: [facility] asks pt if he has fidgets to use to help distract him from his anger. Pt denies. [facility] says that it might be helpful for pt to have items they can use to fidget with to distract himself.     Motivational Interviewing    MI Intervention: Expressed Empathy/Understanding, Supported Autonomy, Collaboration, Evocation, Permission to raise concern or advise, Open-ended questions, and Reflections: simple and complex     Change Talk Expressed by the Patient: Activation Taking steps    Provider Response to Change Talk: E - Evoked more info from patient about behavior change    Assessments completed prior to visit:  The following assessments were completed by patient for this visit:  N/A.    Care Plan review completed: No    Medication Review:  No changes to current psychiatric medication(s)    Medication  Compliance:  Yes    Changes in Health Issues:   None reported    Chemical Use Review:   Substance Use: Chemical use reviewed, no active concerns identified      Tobacco Use: No change in amount of tobacco use since last session.  Provided encouragement to quit . Pt has lozenges and would like to quit.     Assessment: Current Emotional / Mental Status (status of significant symptoms):  Risk status (Self / Other harm or suicidal ideation)  Patient has had a history of self-injurious behavior: scratching self and hitting himself    Patient denies current fears or concerns for personal safety.  Patient denies current or recent suicidal ideation or behaviors. Pt denies when asked.   Patient denies current or recent homicidal ideation or behaviors.   Patient reports current or recent self injurious behavior or ideation including scratching himself.  Patient denies other safety concerns.  A safety and risk management plan has been developed including: Patient consented to co-developed safety plan.  A safety and risk management plan was completed.  Patient agreed to use safety plan should any safety concerns arise.  A copy was given to the patient. Pt has a safety plan from ED visit, see below.     Appearance:   Appropriate   Eye Contact:   Good   Psychomotor Behavior: Normal   Attitude:   Cooperative  Pleasant  Orientation:   All  Speech   Rate / Production: Mumbled   Volume:  Normal   Mood:    Anxious  Irritable  Sad   Affect:    Flat   Thought Content:  Clear   Thought Form:  Coherent  Logical   Insight:    Fair     Diagnoses:  1. Intermittent explosive disorder    2. Schizoaffective disorder, unspecified type (H)        Collateral Reports Completed:  Communicated with: Jatin Doherty M.D.     Plan: (Homework, other):  Patient was given information about behavioral services and encouraged to schedule a follow up appointment with the clinic [facility] in 1 month.  He was also given information about mental health symptoms and  treatment options .  CD Recommendations: No indications of CD issues.     Chuyita Manzanares, [facility]      Safety plan created 12/8/2023 in ED with Mary [facility]doñez [facility]    [facility] Safety Plan  Creation Date: 12/8/23  Step 1: Warning signs:  Warning Signs  Feeling scared  Worried someone is going to hurt me  Step 2: Internal coping strategies - Things I can do to take my mind off my problems without  contacting another person:  Strategies  Take medications as needed  Play video games  Make music  Step 3: People and social settings that provide distraction:  Name Contact Information  Group home staff  Step 4: People whom I can ask for help during a crisis:  Name Contact Information  Group home staff  Step 5: Professionals or agencies I can contact during a crisis:  Clinician/Agency Name Phone Emergency Contact  Outpatient psychiatrist  Suicide Prevention Lifeline Phone: Call or Text 402  Crisis Text Line: Text HOME to 655614  Step 6: Making the environment safer (plan for lethal means safety):  Take medications as prescribed  Follow outpatient recommendations  Use coping skills and distractions.  Optional: What is most important to me and worth living for?:  [place] Safety Plan. Yashira Cooper and Luis Keane. Used with permission of the  authors.

## 2024-01-02 ENCOUNTER — VIRTUAL VISIT (OUTPATIENT)
Dept: BEHAVIORAL HEALTH | Facility: CLINIC | Age: 24
End: 2024-01-02
Payer: COMMERCIAL

## 2024-01-02 ENCOUNTER — VIRTUAL VISIT (OUTPATIENT)
Dept: PSYCHIATRY | Facility: CLINIC | Age: 24
End: 2024-01-02
Payer: COMMERCIAL

## 2024-01-02 DIAGNOSIS — F63.81 INTERMITTENT EXPLOSIVE DISORDER: ICD-10-CM

## 2024-01-02 DIAGNOSIS — F25.9 SCHIZOAFFECTIVE DISORDER, UNSPECIFIED TYPE (H): ICD-10-CM

## 2024-01-02 DIAGNOSIS — F25.0 SCHIZOAFFECTIVE DISORDER, BIPOLAR TYPE (H): Primary | ICD-10-CM

## 2024-01-02 DIAGNOSIS — F63.81 INTERMITTENT EXPLOSIVE DISORDER: Primary | ICD-10-CM

## 2024-01-02 PROCEDURE — 99214 OFFICE O/P EST MOD 30 MIN: CPT | Mod: 95 | Performed by: PSYCHIATRY & NEUROLOGY

## 2024-01-02 PROCEDURE — 90832 PSYTX W PT 30 MINUTES: CPT | Mod: 95 | Performed by: COUNSELOR

## 2024-01-02 RX ORDER — OLANZAPINE 5 MG/1
TABLET ORAL
Qty: 60 TABLET | Refills: 1 | Status: SHIPPED | OUTPATIENT
Start: 2024-01-02 | End: 2024-02-01

## 2024-01-02 RX ORDER — TRAZODONE HYDROCHLORIDE 300 MG/1
300 TABLET ORAL AT BEDTIME
Qty: 30 TABLET | Refills: 1 | Status: SHIPPED | OUTPATIENT
Start: 2024-01-02 | End: 2024-02-27

## 2024-01-02 RX ORDER — ARIPIPRAZOLE 5 MG/1
5 TABLET ORAL EVERY MORNING
Qty: 30 TABLET | Refills: 0 | Status: SHIPPED | OUTPATIENT
Start: 2024-01-02 | End: 2024-02-01

## 2024-01-02 ASSESSMENT — PAIN SCALES - GENERAL: PAINLEVEL: NO PAIN (0)

## 2024-01-02 NOTE — PATIENT INSTRUCTIONS
"Patient Education   Collaborative Care Psychiatry Service  What to Expect  Here's what to expect from your Collaborative Care Psychiatry Service (CCPS).   About CCPS  CCPS means 2 people work together to help you get better. You'll meet with a behavioral health clinician and a psychiatric doctor. A behavioral health clinician helps people with mental health problems by talking with them. A psychiatric doctor helps people by giving them medicine.  How it works  At every visit, you'll see the behavioral health clinician (BHC) first. They'll talk with you about how you're doing and teach you how to feel better.   Then you'll see the psychiatric doctor. This doctor can help you deal with troubling thoughts and feelings by giving you medicine. They'll make sure you know the plan for your care.   CCPS usually takes 3 to 6 visits. If you need more visits, we may have you start seeing a different psychiatric doctor for ongoing care.  If you have any questions or concerns, we'll be glad to talk with you.  About visits  Be open  At your visits, please talk openly about your problems. It may feel hard, but it's the best way for us to help you.  Cancelling visits  If you can't come to your visit, please call us right away at [phone]. If you don't cancel at least 24 hours (1 full day) before your visit, that's \"late cancellation.\"  Being late to visits  Being very late is the same as not showing up. You will be a \"no show\" if:  Your appointment starts with a BHC, and you're more than 15 minutes late for a 30-minute (half hour) visit. This will also cancel your appointment with the psychiatric doctor.  Your appointment is with a psychiatric doctor only, and you're more than 15 minutes late for a 30-minute (half hour) visit.  Your appointment is with a psychiatric doctor only, and you're more than 30 minutes late for a 60-minute (full hour) visit.  If you cancel late or don't show up 2 times within 6 months, we may end your " care.   Getting help between visits  If you need help between visits, you can call us Monday to Friday from 8 a.m. to 4:30 p.m. at [phone].  Emergency care  Call 911 or go to the nearest emergency department if your life or someone else's life is in danger.  Call 988 anytime to reach the national Suicide and Crisis hotline.  Medicine refills  To refill your medicine, call your pharmacy. You can also call [facility]'s Behavioral Access at [phone], Monday to Friday, 8 a.m. to 4:30 p.m. It can take 1 to 3 business days to get a refill.   Forms, letters, and tests  You may have papers to fill out, like FMLA, short-term disability, and workability. We can help you with these forms at your visits, but you must have an appointment. You may need more than 1 visit for this, to be in an intensive therapy program, or both.  Before we can give you medicine for ADHD, we may refer you to get tested for it or confirm it another way.  We may not be able to give you an emotional support animal letter.  We don't do mental health checks ordered by the court.   We don't do mental health testing, but we can refer you to get tested.   Thank you for choosing us for your care.  For informational purposes only. Not to replace the advice of your health care provider. Copyright   2022 [facility]. All rights reserved. [employer] [id number] - 12/22.

## 2024-01-02 NOTE — NURSING NOTE
Is the patient currently in the state of [place]? YES    Visit mode:VIDEO    If the visit is dropped, the patient can be reconnected by: VIDEO VISIT: Send to e-mail at: [email or web address]     Will anyone else be joining the visit? NO  (If patient encounters technical issues they should call [phone]    How would you like to obtain your AVS? MyChart    Are changes needed to the allergy or medication list? No      Reason for visit: KENDY RUBY

## 2024-01-02 NOTE — PROGRESS NOTES
Virtual Visit Details    Type of service:  Video Visit   Video Start Time: 2:08 PM  Video End Time:2:40 PM    Originating Location (pt. Location): Home    Distant Location (provider location):  [place]  Platform used for Video Visit: [facility] Psychiatry Consult Note    IDENTIFICATION   Name: Kaden Easton Jr.   : 2000/23 year old      Sex:    @ male          Telemedicine Visit: The patient's condition can be safely assessed and treated via synchronous audio and visual telemedicine encounter.        Face to Face/patient Contact total time: 32 minutes  Pre Charting time: 1 minutes; Post charting time, communication and other activities: 2 minutes; Total time 35 minutes            SUBJECTIVE   Feeling okay. Difficulty with anxiety. Taking hydroxyzine -higher dose is helping. No side effects or drowsiness. Staff concur there is anxiety. Shadows/VH causing him to get angry and at times has slammed door. Hydroxyzine noted to work for 20 minutes. Good days - a couple a week. Sleeping during day is a calm day. He is getting along better with others. When irritated and upset still yelling. Pt says voices are worse. Yelling and jumping from shadows;  brother helping fight shadows.       The following assessments were completed by patient for this visit:  PROMIS 10-Global Health (only subscores and total score):       1/10/2022    10:06 AM 1/10/2022     1:07 PM 2023    10:00 AM   PROMIS-10 Scores Only   Global Mental Health Score 9 9 10   Global Physical Health Score 11 11 13   PROMIS TOTAL - SUBSCORES 20 20 23             2023    11:00 AM 2023     9:00 AM 2023    12:58 PM   PHQ   PHQ-9 Total Score 3 7 20   Q9: Thoughts of better off dead/self-harm past 2 weeks Not at all Not at all Nearly every day   F/U: Thoughts of suicide or self-harm   Yes   F/U: Self harm-plan   No   F/U: Self-harm action   Yes   F/U: Safety concerns   Yes          2023     10:00 AM 12/14/2023    10:00 AM 12/14/2023     1:00 PM   JAZMINE-7 SCORE   Total Score   21 (severe anxiety)   Total Score 0 7 21        OBJECTIVE     Vital Signs:   There were no vitals taken for this visit.      Current Medications:  Current Outpatient Medications   Medication    ARIPiprazole (ABILIFY) 15 MG tablet    atomoxetine (STRATTERA) 100 MG capsule    chlorproMAZINE (THORAZINE) 50 MG tablet    cholecalciferol 25 MCG (1000 UT) TABS    guanFACINE HCl (INTUNIV) 3 MG TB24 24 hr tablet    hydrOXYzine HCl (ATARAX) 50 MG tablet    INVEGA SUSTENNA 234 MG/1.5ML JUANA    lithium 300 MG capsule    loratadine (CLARITIN) 10 MG tablet    paliperidone (INVEGA SUSTENNA) 234 MG/1.5ML JUANA    traZODone HCl 300 MG TABS    vitamin C (ASCORBIC ACID) 250 MG TABS tablet    atomoxetine (STRATTERA) 100 MG capsule    atropine 1 % ophthalmic solution    ferrous sulfate (FE TABS) 325 (65 Fe) MG EC tablet    ferrous sulfate (FEROSUL) 325 (65 Fe) MG tablet    guanFACINE HCl (INTUNIV) 3 MG TB24 24 hr tablet    hydrOXYzine HCl (ATARAX) 50 MG tablet    lithium 300 MG capsule    metFORMIN (GLUCOPHAGE-XR) 500 MG 24 hr tablet    nicotine (NICODERM CQ) 14 MG/24HR 24 hr patch    traZODone HCl 300 MG TABS     No current facility-administered medications for this visit.        The [place] Prescription Monitoring Program has been reviewed and there are no concerns about diversionary activity for controlled substances at this time.          MENTAL STATUS EXAMINATION:   Appearance: Intact attention to grooming and hygiene, casual  Attitude: Cooperative  Eye Contact: Fair  Gait and Station: Sitting  Psychomotor Behavior: Within normal limits current state  Oriented to: Grossly person place and time  Attention Span and Concentration: Grossly intact  Speech:  low tone  Language: english  Mood:  fair  Affect:  constricted  Associations:  no loose associations  Thought Process:  logical, linear and goal oriented  Thought Content: No evidence of delusions  or suicidal or homicidal ideation plan or intent at time of visit  Memory: grossly intact  Fund of Knowledge: limited  Insight:  limited  Judgment:  intact, adequate for safety  Impulse Control:  fair        DIAGNOSES:   Schizoaffective disorder  intermittent explosive disorder  Fetal alcohol spectrum disorder  Hx TBI  Mild intellectual disability  ADHD, combined type  Nicotine use disorder      ASSESSMENT:   Patient is his own guardian, experienced significant difficulty with family members over Thanksgiving 2023 leading to worsened behaviors.  Symptoms may be starting to improve early but no evidence of sustainment yet.  Increase Abilify to better address reported visual hallucinations and mood.  Increase hydroxyzine.    Today Kaden Easton Jr. reports no suicidal ideations. In addition, notable risk factors for self-harm, including psychosis. However, risk is mitigated by history of seeking help when needed and support from group home. Therefore, based on all available evidence including the factors cited above, he does not appear to be at imminent risk for self-harm, does not meet criteria for a 72-hr hold, and therefore remains appropriate for ongoing outpatient level of care.       PLAN:     Patient advised of consult/bridge to Nkechi Monreal. Patient will continue to be seen for ongoing consultation and stabilization.  Does not meet criteria for involuntary treatment or hospitalization  Abilify 10 mg daily => 12/14/2023 15 mg daily worse visual hallucinations/anxiety => 1/2/24 5 mg po qhs -Risks, benefits and alternatives discussed.  Patient provides verbal consent to treatment.  Olanzapine 1/2/24 5 mg po at bedtime for 3 nights then increase to 10 mg po at bedtime -Risks, benefits and alternatives discussed.  Patient provides verbal consent to treatment.  Hydroxyzine 50 mg p.o. 3 times daily as needed anxiety => 12/14/2023 100 mg p.o. twice daily as needed anxiety-Risks, benefits and alternatives  discussed.  Patient provides verbal consent to treatment.  Continue Invega Sustenna 234 mg every 30 days  continue lithium 900 mg daily  Continue Strattera 100 mg daily   continue Intuniv 3 mg nightly  Continue trazodone 300 mg nightly limited sleep efficacy -Risks, benefits and alternatives discussed.  Patient provides verbal consent to treatment.  Labs -follow-up lithium level, A1c, fasting lipid panel  Return in 2 weeks, with returning to CIER[facility] Monreal 1/25/2024    Administrative Billing:   Time spent with patient was greater than 50% of time and/or significant time was spent in counseling and coordination of care regarding above diagnoses and treatment plan. Pre charting time and post charting time/documentation/coordination are done on date of service.      Signed:   Jatin Doherty M.D.  [facility] Psychiatry Service    Disclaimer: This note consists of symbols derived from keyboarding, dictation and/or voice recognition software. As a result, there may be errors in the script that have gone undetected. Please consider this when interpreting information found in this chart.

## 2024-01-02 NOTE — Clinical Note
Nkechi,  I increased this patient's abilify but he had worse visual halluicnations - tied to mood dysregulation and anxiety. Instead I am cross tapering aripiprazole back to olanzapine in the short term. Lithium appears to have room to be increased.   Thanks, Jatin

## 2024-01-25 ENCOUNTER — VIRTUAL VISIT (OUTPATIENT)
Dept: PSYCHIATRY | Facility: CLINIC | Age: 24
End: 2024-01-25
Payer: COMMERCIAL

## 2024-01-25 DIAGNOSIS — Z53.9 NO SHOW: Primary | ICD-10-CM

## 2024-01-25 NOTE — PROGRESS NOTES
"Virtual Visit Details    Type of service:  Video Visit   Video Start Time: {video visit start/end time for provider to select:407720}  Video End Time:{video visit start/end time for provider to select:611999}    Originating Location (pt. Location): {video visit patient location:926046::\"Home\"}  {PROVIDER LOCATION On-site should be selected for visits conducted from your clinic location or adjoining [facility] hospital, academic office, or other nearby [facility] building. Off-site should be selected for all other provider locations, including home:472503}  Distant Location (provider location):  {virtual location provider:879630}  Platform used for Video Visit: {Virtual Visit Platforms:894548::\"Bulb\"}      "

## 2024-01-27 ENCOUNTER — HEALTH MAINTENANCE LETTER (OUTPATIENT)
Age: 24
End: 2024-01-27

## 2024-02-01 DIAGNOSIS — F25.0 SCHIZOAFFECTIVE DISORDER, BIPOLAR TYPE (H): ICD-10-CM

## 2024-02-01 DIAGNOSIS — F63.81 INTERMITTENT EXPLOSIVE DISORDER: ICD-10-CM

## 2024-02-01 RX ORDER — ARIPIPRAZOLE 5 MG/1
5 TABLET ORAL EVERY MORNING
Qty: 30 TABLET | Refills: 0 | Status: SHIPPED | OUTPATIENT
Start: 2024-02-01 | End: 2024-02-19

## 2024-02-01 RX ORDER — OLANZAPINE 5 MG/1
TABLET ORAL
Qty: 60 TABLET | Refills: 0 | Status: SHIPPED | OUTPATIENT
Start: 2024-02-01 | End: 2024-02-22

## 2024-02-01 NOTE — TELEPHONE ENCOUNTER
RN received refill requests via Right Fax  from [id number] - [place], [place] - [address] for olanzapine (ZYPREXA) 5 MG tablet and ripiprazole (ABILIFY) 5 MG tablet    This is Nkechi Monreal NP  patent who saw Dr. Doherty on   Date of Last Office Visit: 1/2/2024 and 12/14/2023  Date of Next Office Visit: nothing scheduled - [facility] notified  No shows since last visit: 1/25/2024 with Nkechi Monreal NP   Cancellations since last visit: none    Medication requested: olanzapine (ZYPREXA) 5 MG tablet  Date last ordered: 1/2/2024 Qty: 60 Refills: 1  Take one tablet nightly for 3 nights then increase two tablets nightly     Medication requested: aripiprazole (ABILIFY) 5 MG tablet  Date last ordered: 1/2/2024 Qty: 30 Refills: 1  Take 1 tablet (5 mg) by mouth every morning        [place] of [place] ?: No, [place] not [place] the [place]-    Lapse in medication adherence greater than 5 days?: No, [place] needs refills on file d/t blister packing and home delivery processing time.  If yes, call patient and gather details: n/a  Medication refill request verified as identical to current order?: Yes  Result of Last DAM, VPA, Li+ Level, CBC, or Carbamazepine Level (at or since last visit):  No new lab work completed since appointment on 1/2/2024 with Dr. Doherty    Last visit treatment plan:   ASSESSMENT:   Patient is his own guardian, experienced significant difficulty with family members over Thanksgiving 2023 leading to worsened behaviors.  Symptoms may be starting to improve early but no evidence of sustainment yet.  Increase Abilify to better address reported visual hallucinations and mood.  Increase hydroxyzine.     Today Kaden Easton Jr. reports no suicidal ideations. In addition, notable risk factors for self-harm, including psychosis. However, risk is mitigated by history of seeking help when needed and support from group home. Therefore, based on all available evidence  including the factors cited above, he does not appear to be at imminent risk for self-harm, does not meet criteria for a 72-hr hold, and therefore remains appropriate for ongoing outpatient level of care.         PLAN:      Patient advised of consult/bridge to Nkechi Monreal. Patient will continue to be seen for ongoing consultation and stabilization.  Does not meet criteria for involuntary treatment or hospitalization  Abilify 10 mg daily => 12/14/2023 15 mg daily worse visual hallucinations/anxiety => 1/2/24 5 mg po qhs -Risks, benefits and alternatives discussed.  Patient provides verbal consent to treatment.  Olanzapine 1/2/24 5 mg po at bedtime for 3 nights then increase to 10 mg po at bedtime -Risks, benefits and alternatives discussed.  Patient provides verbal consent to treatment.  Hydroxyzine 50 mg p.o. 3 times daily as needed anxiety => 12/14/2023 100 mg p.o. twice daily as needed anxiety-Risks, benefits and alternatives discussed.  Patient provides verbal consent to treatment.  Continue Invega Sustenna 234 mg every 30 days  continue lithium 900 mg daily  Continue Strattera 100 mg daily   continue Intuniv 3 mg nightly  Continue trazodone 300 mg nightly limited sleep efficacy -Risks, benefits and alternatives discussed.  Patient provides verbal consent to treatment.  Labs -follow-up lithium level, A1c, fasting lipid panel  Return in 2 weeks, with returning to CIERRA Monreal 1/25/2024    []Medication refilled per  Medication Refill in Ambulatory Care  policy.  [x]Medication unable to be refilled by RN due to criteria not met as indicated below:    []Eligibility - not seen in the last year   [x]Supervision - no future appointment   []Compliance - no shows, cancellations or lapse in therapy   []Verification - order discrepancy   []Controlled medication   []Medication not included in policy   []90-day supply request   []Other    A 30 day refill is pended for provider review.    Stefanie Babcock RN on 2/1/2024 at  8:30 AM

## 2024-02-01 NOTE — TELEPHONE ENCOUNTER
Provider kimberly Doherty indicated in this patients last visit note on 1/2/2024 that a future/return appointment was indicated 2 weeks with Nkechi Monreal NP.    [facility] please call this patient to schedule a future appointment with SUMANTH Portillo RN on 2/1/2024 at 8:32 AM

## 2024-02-09 ENCOUNTER — LAB REQUISITION (OUTPATIENT)
Dept: LAB | Facility: CLINIC | Age: 24
End: 2024-02-09
Payer: COMMERCIAL

## 2024-02-09 DIAGNOSIS — R30.0 DYSURIA: ICD-10-CM

## 2024-02-09 LAB
IRON BINDING CAPACITY (ROCHE): 331 UG/DL (ref 240–430)
IRON SATN MFR SERPL: 41 % (ref 15–46)
IRON SERPL-MCNC: 135 UG/DL (ref 61–157)
VIT D+METAB SERPL-MCNC: 18 NG/ML (ref 20–50)

## 2024-02-09 PROCEDURE — 82306 VITAMIN D 25 HYDROXY: CPT | Mod: ORL | Performed by: NURSE PRACTITIONER

## 2024-02-09 PROCEDURE — 83550 IRON BINDING TEST: CPT | Mod: ORL | Performed by: NURSE PRACTITIONER

## 2024-02-16 DIAGNOSIS — F90.8 ATTENTION DEFICIT HYPERACTIVITY DISORDER (ADHD), OTHER TYPE: Primary | ICD-10-CM

## 2024-02-16 DIAGNOSIS — F63.81 INTERMITTENT EXPLOSIVE DISORDER: ICD-10-CM

## 2024-02-16 NOTE — TELEPHONE ENCOUNTER
Provider Dr. Doherty indicated in this patients last visit note on 1/2/2024 that a future/return appointment was indicated 2 weeks after that appointment with Nkechi Monreal on 1/25/2024.    [facility] please call this patient to schedule a future appointment with Clifton Monreal NP.    Stefanie Babcock RN on 2/16/2024 at 11:21 AM

## 2024-02-16 NOTE — TELEPHONE ENCOUNTER
Date of Last Office Visit: 1/2/24 Bessy  Date of Next Office Visit: 2/22/24 Jocelin  No shows since last visit: 1/25/24 2/15/24 Jocelin  Cancellations since last visit: no    Medication requested: guanFACINE HCl (INTUNIV) 3 MG TB24 24 hr tablet Date last ordered: 12/14/23 Qty: 28 Refills: 1     Lapse in medication adherence greater than 5 days?: no  If yes, call patient and gather details: na  Medication refill request verified as identical to current order?: yes  Result of Last DAM, VPA, Li+ Level, CBC, or Carbamazepine Level (at or since last visit): N/A    Last visit treatment plan:   PLAN:      Patient advised of consult/bridge to Nkechi Monreal. Patient will continue to be seen for ongoing consultation and stabilization.  Does not meet criteria for involuntary treatment or hospitalization  Abilify 10 mg daily => 12/14/2023 15 mg daily worse visual hallucinations/anxiety => 1/2/24 5 mg po qhs -Risks, benefits and alternatives discussed.  Patient provides verbal consent to treatment.  Olanzapine 1/2/24 5 mg po at bedtime for 3 nights then increase to 10 mg po at bedtime -Risks, benefits and alternatives discussed.  Patient provides verbal consent to treatment.  Hydroxyzine 50 mg p.o. 3 times daily as needed anxiety => 12/14/2023 100 mg p.o. twice daily as needed anxiety-Risks, benefits and alternatives discussed.  Patient provides verbal consent to treatment.  Continue Invega Sustenna 234 mg every 30 days  continue lithium 900 mg daily  Continue Strattera 100 mg daily   continue Intuniv 3 mg nightly  Continue trazodone 300 mg nightly limited sleep efficacy -Risks, benefits and alternatives discussed.  Patient provides verbal consent to treatment.  Labs -follow-up lithium level, A1c, fasting lipid panel  Return in 2 weeks, with returning to CIERRA Monreal 1/25/2024    []Medication refilled per  Medication Refill in Ambulatory Care  policy.  [x]Medication unable to be refilled by RN due to criteria not met as indicated below:     []Eligibility - not seen in the last year   []Supervision - no future appointment   [x]Compliance - no shows, cancellations or lapse in therapy   []Verification - order discrepancy   []Controlled medication   [x]Medication not included in policy   []90-day supply request   []Other

## 2024-02-16 NOTE — TELEPHONE ENCOUNTER
Date of Last Office Visit: 1/2/2024 with Dr. Doherty  Date of Next Office Visit: nothing scheduled - A notified  Update The patient now has an appt scheduled on 2/22/2024 with SUMANTH Portillo, RN on 2/16/2024 at 1:44 PM  No shows since last visit: 1/25/2024 NO show w/ Nkechi Monreal  Cancellations since last visit: none    Medication requested: ARIPiprazole (ABILIFY) 5 MG tablet  Date last ordered: 2/1/2024 Qty: 30 Refills: 0     [place] of [place] ?: no, [place] is not [place] on the       Lapse in medication adherence greater than 5 days?: no  If yes, call patient and gather details: n/a  Medication refill request verified as identical to current order?: Yes  Result of Last DAM, VPA, Li+ Level, CBC, or Carbamazepine Level (at or since last visit):  Vitamin D and iron checked on 2x/9/2024    Last visit treatment plan:   PLAN:      Patient advised of consult/bridge to Nkechi Monreal. Patient will continue to be seen for ongoing consultation and stabilization.  Does not meet criteria for involuntary treatment or hospitalization  Abilify 10 mg daily => 12/14/2023 15 mg daily worse visual hallucinations/anxiety => 1/2/24 5 mg po qhs -Risks, benefits and alternatives discussed.  Patient provides verbal consent to treatment.  Olanzapine 1/2/24 5 mg po at bedtime for 3 nights then increase to 10 mg po at bedtime -Risks, benefits and alternatives discussed.  Patient provides verbal consent to treatment.  Hydroxyzine 50 mg p.o. 3 times daily as needed anxiety => 12/14/2023 100 mg p.o. twice daily as needed anxiety-Risks, benefits and alternatives discussed.  Patient provides verbal consent to treatment.  Continue Invega Sustenna 234 mg every 30 days  continue lithium 900 mg daily  Continue Strattera 100 mg daily   continue Intuniv 3 mg nightly  Continue trazodone 300 mg nightly limited sleep efficacy -Risks, benefits and alternatives discussed.  Patient provides verbal consent to  treatment.  Labs -follow-up lithium level, A1c, fasting lipid panel  Return in 2 weeks, with returning to CIERRA Monreal 1/25/2024    []Medication refilled per  Medication Refill in Ambulatory Care  policy.  [x]Medication unable to be refilled by RN due to criteria not met as indicated below:    []Eligibility - not seen in the last year   []Supervision - no future appointment   [x]Compliance - no shows, cancellations or lapse in therapy   []Verification - order discrepancy   []Controlled medication   []Medication not included in policy   []90-day supply request   []Other    RN forwarding to Nkechi Monreal NP for review - [facility] likes to have refills on file d/t blister packing and delivery.    Stefanie Babcock RN on 2/16/2024 at 11:20 AM

## 2024-02-19 RX ORDER — GUANFACINE 3 MG/1
3 TABLET, EXTENDED RELEASE ORAL AT BEDTIME
Qty: 30 TABLET | Refills: 0 | Status: SHIPPED | OUTPATIENT
Start: 2024-02-19 | End: 2024-03-26

## 2024-02-19 RX ORDER — ARIPIPRAZOLE 5 MG/1
5 TABLET ORAL EVERY MORNING
Qty: 30 TABLET | Refills: 0 | Status: SHIPPED | OUTPATIENT
Start: 2024-02-19 | End: 2024-03-26

## 2024-02-22 ENCOUNTER — APPOINTMENT (OUTPATIENT)
Dept: LAB | Facility: CLINIC | Age: 24
End: 2024-02-22
Payer: COMMERCIAL

## 2024-02-22 ENCOUNTER — OFFICE VISIT (OUTPATIENT)
Dept: PSYCHIATRY | Facility: CLINIC | Age: 24
End: 2024-02-22
Payer: COMMERCIAL

## 2024-02-22 ENCOUNTER — TELEPHONE (OUTPATIENT)
Dept: PSYCHIATRY | Facility: CLINIC | Age: 24
End: 2024-02-22

## 2024-02-22 VITALS
HEART RATE: 82 BPM | RESPIRATION RATE: 16 BRPM | BODY MASS INDEX: 41.75 KG/M2 | WEIGHT: 266 LBS | OXYGEN SATURATION: 98 % | HEIGHT: 67 IN | SYSTOLIC BLOOD PRESSURE: 137 MMHG | DIASTOLIC BLOOD PRESSURE: 89 MMHG

## 2024-02-22 DIAGNOSIS — F43.23 ADJUSTMENT DISORDER WITH MIXED ANXIETY AND DEPRESSED MOOD: ICD-10-CM

## 2024-02-22 DIAGNOSIS — F25.0 SCHIZOAFFECTIVE DISORDER, BIPOLAR TYPE (H): ICD-10-CM

## 2024-02-22 LAB — LITHIUM SERPL-SCNC: 0.45 MMOL/L (ref 0.6–1.2)

## 2024-02-22 PROCEDURE — 80178 ASSAY OF LITHIUM: CPT | Performed by: NURSE PRACTITIONER

## 2024-02-22 PROCEDURE — 36415 COLL VENOUS BLD VENIPUNCTURE: CPT | Performed by: NURSE PRACTITIONER

## 2024-02-22 PROCEDURE — 99214 OFFICE O/P EST MOD 30 MIN: CPT | Performed by: NURSE PRACTITIONER

## 2024-02-22 RX ORDER — OLANZAPINE 10 MG/1
10 TABLET ORAL AT BEDTIME
Qty: 90 TABLET | Refills: 0 | Status: SHIPPED | OUTPATIENT
Start: 2024-02-22 | End: 2024-05-22

## 2024-02-22 RX ORDER — HYDROXYZINE HYDROCHLORIDE 50 MG/1
50 TABLET, FILM COATED ORAL 3 TIMES DAILY PRN
Qty: 90 TABLET | Refills: 0 | Status: SHIPPED | OUTPATIENT
Start: 2024-02-22 | End: 2024-02-26

## 2024-02-22 ASSESSMENT — ANXIETY QUESTIONNAIRES
6. BECOMING EASILY ANNOYED OR IRRITABLE: SEVERAL DAYS
1. FEELING NERVOUS, ANXIOUS, OR ON EDGE: SEVERAL DAYS
7. FEELING AFRAID AS IF SOMETHING AWFUL MIGHT HAPPEN: NOT AT ALL
4. TROUBLE RELAXING: SEVERAL DAYS
IF YOU CHECKED OFF ANY PROBLEMS ON THIS QUESTIONNAIRE, HOW DIFFICULT HAVE THESE PROBLEMS MADE IT FOR YOU TO DO YOUR WORK, TAKE CARE OF THINGS AT HOME, OR GET ALONG WITH OTHER PEOPLE: SOMEWHAT DIFFICULT
3. WORRYING TOO MUCH ABOUT DIFFERENT THINGS: SEVERAL DAYS
GAD7 TOTAL SCORE: 6
5. BEING SO RESTLESS THAT IT IS HARD TO SIT STILL: SEVERAL DAYS
GAD7 TOTAL SCORE: 6
2. NOT BEING ABLE TO STOP OR CONTROL WORRYING: SEVERAL DAYS

## 2024-02-22 ASSESSMENT — PAIN SCALES - GENERAL: PAINLEVEL: NO PAIN (0)

## 2024-02-22 ASSESSMENT — PATIENT HEALTH QUESTIONNAIRE - PHQ9: SUM OF ALL RESPONSES TO PHQ QUESTIONS 1-9: 3

## 2024-02-22 NOTE — TELEPHONE ENCOUNTER
RN performed semi annual Discus exam.  The patient scored 0.  Exam normal with no evidence of EPSE.       RN completed Discus paperwork and Provider reviewed and signed the paperwork  Discus paperwork scanned to [facility].    RN updated the Specialty Comments in Epic.      Anil Casas RN on 2/22/2024 at 12:12 PM

## 2024-02-22 NOTE — PATIENT INSTRUCTIONS
"The Panel Psychiatry Program  What to Expect  Here's what to expect in the Panel Psychiatry Program.   About the program  You'll be meeting with a psychiatric doctor to check your mental health. A psychiatric doctor helps you deal with troubling thoughts and feelings by giving you medicine. They'll make sure you know the plan for your care. You may see them for a long time. When you're feeling better, they may refer you back to seeing your family doctor.   If you have any questions, we'll be glad to talk to you.  About visits  Be open  At your visits, please talk openly about your problems. It may feel hard, but it's the best way for us to help you.  Cancelling visits  If you can't come to your visit, please call us right away at [phone]. If you don't cancel at least 24 hours (1 full day) before your visit, that's \"late cancellation.\"  Not showing up for your visits  Being very late is the same as not showing up. You'll be a \"no show\" if:  You're more than 15 minutes late for a 30-minute (half hour) visit.  You're more than 30 minutes late for a 60-minute (full hour) visit.  If you cancel late or don't show up 2 times within 6 months, we may end your care.  Getting help between visits  If you need help between visits, you can call us Monday to Friday from 8 a.m. to 4:30 p.m. at [phone].  Emergency care  Call 911 or go to the nearest emergency department if your life or someone else's life is in danger.  Call 988 anytime to reach the national Suicide and Crisis hotline.  Medicine refills  To refill your medicine, call your pharmacy. You can also call [facility]'s [facility] at [phone], Monday to Friday, 8 a.m. to 4:30 p.m. It can take 1 to 3 business days to get a refill.   Forms, letters, and tests  You may have papers to fill out, like FMLA, short-term disability, and workability. We can help you with these forms at your visits, but you must have an appointment. You may need more " than 1 visit for this, to be in an intensive therapy program, or both.  Before we can give you medicine for ADHD, we may refer you to get tested for it or confirm it another way.  We may not be able to give you an emotional support animal letter.  We don't do mental health checks ordered by the court.   We don't do mental health testing, but we can refer you to get tested.   Thank you for choosing us for your care.  For informational purposes only. Not to replace the advice of your health care provider. Copyright   2022 [facility]. All rights reserved. [employer] [id number] - 12/22      After Visit Summary   Continue medications as prescribed  Have your pharmacy contact us for a refill if you are running low on medications (We may ask you to come into clinic to get a refill from the nurse  No Alcohol or drug use  No driving if sedated  Call the clinic with any questions or concerns   Reach out for help if you feel like hurting yourself or others ([facility] [phone]: [address], [address] or [facility] Suicide Hotline   [phone] , call 911 or go to nearest Emergency room     Crisis Resources:    Present to the Emergency Department as needed or call after hours crisis line at [phone] or [phone].   [place] Crisis Text Line: Text [place] to 264875.  Suicide LifeLine Chat: suicidepreventionlifeline.org/chat/.  National Suicide Prevention Lifeline: 696.761.7201 (TTY: 554.982.7163). Call anytime for help.  (www.suicidepreventionlifeline.org)  National [place] on Mental Illness (www.milvia.org): 911.791.2687 or 247-620-6494.  Mental Health Association (www.mentalhealth.org): 402.898.9012 or 444-662-7588.       Follow up as directed, for your appointments, per your After Visit Summary Form.

## 2024-02-22 NOTE — PROGRESS NOTES
Mental Health and Collaborative Care Psychiatry Service Rooming Note      Most pressing mental health concern at this time: Difficulty going to the bathroom.  Denies SI.  Denies auditory hallucinations or visual hallucinations.        Any new physical health conditions or diagnoses affecting you that we should be aware of: None      Side effects related to medications patient would like to discuss with the provider:  No      Are you taking your medications as prescribed?  yes  If not, why? na      Do you need refills of any of the medications?  Nkechi Monreal CNP aware  If so, which ones? No      Are you taking any recreational substances? No      Add attendance guidelines .phrase here   Care team has reviewed attendance agreement with patient. Patient advised that two failed appointments within 6 months may lead to termination of current episode of care.       Patient would like the video visit invitation sent by: In Person      Anil Casas RN  February 22, 2024  8:13 AM

## 2024-02-22 NOTE — PROGRESS NOTES
Psychiatric  Out- Patient  Follow Up Progress Note  Date of visit:2/22/2024           Discussion of Care and Treatment Recommendations:   This is a 23 year old male with a  history of  Intellectual disability,  fetal alcohol  spectrum,and  mood Disorder, due to general medical condition.Pt resides in a group home.   Patient is seen in person  today accompanied by his group home nursing staff .      Last visit 10/26/2023.  Recommendation at last visit .  -Continue with current medications :   Abilify  10 mg in the morning  Quifacine   3 mg at bedtime    Invega 234 mg /IM monthly -Lgiven by group home   [id number] mg BID - agitation PRN   Statreta 100 mg in am   Trazodone 300 mg at bedtime   Lithium 900 mg very evening with mels   Hydroxyzine 50 mg TID PRN- Has only taken it 3 times since 9/1/2021   2-High risk medication use-lipid panel basic metabolic panel labs were completed in 06/30/2022and were within normal limits.  Last Lithium Level 06/30/2022- 0.8 WNL- Will complete labs during next vivit per patient's request   3. RTC:  4 week      Patient and I reviewed diagnosis and treatment plan and patient agrees with following recommendations:  Ongoing education given regarding diagnostic and treatment options with adequate verbalization of understanding.  Plan   Continue with current medications :   Abilify  5 mg in the morning  Quifacine ER   3 mg at bedtime    Invega 234 mg /IM monthly -given by group home   Thorazine 50 mg BID - agitation PRN   Statreta 100 mg in am   Trazodone 300 mg at bedtime   Lithium 900 mg very evening with meals   Olanzapine 10 mg daily - new added after ER visit in Dec 2023  Hydroxyzine 50 mg TID PRN-    2-High risk medication use-lithium level ordered    3. RTC:  4 week         DIagnoses:     Schizoaffective disorder, unspecified type (HCC)  Active Problems:  Intermittent explosive disorder  Fetal alcohol spectrum disorder  History of traumatic brain injury  Tobacco use  disorder  Mild intellectual disability  ADHD (attention deficit hyperactivity disorder), combined type  Aggressive behavior      Patient Active Problem List   Diagnosis    Thrombocytopenia (H24)    Chronic ITP (idiopathic thrombocytopenia) (H)    Attention deficit disorder    Intermittent explosive disorder    Anxiety disorder, unspecified    Schizoaffective disorder, unspecified (H)    Intermittent explosive disorder    Schizoaffective disorder, bipolar type (H)    Fetal alcohol syndrome    Intellectual disability             Chief Complaint / Subjective:    Chief complaint: Aggression     History of Present Illness:   There has been improvement in aggressive behaviors since medication changes were made-olanzapine initiated at 10 mg daily and Abilify decreased to 5 mg daily.  He did punch the wall today in his group home citing difficulty in urinating.  Group home RN tells me that patient has been evaluated and cleared by medical provider and he has no voiding issues.  I did recommend that they continue using as needed medications as this may be part of his paranoia.  Overall the RN feels that there has been some improvement and we continue to work with him and his family specifically to avoid family providing mood altering substances-cannabis when patient visits family.  Patient also reports overall feeling a lot better and sleeping a lot much better.  Last lithium level was slightly low at 0.5.  Will repeat lithium level.  Patient to return in approximately 3 months for a follow-up appointment.    Mental Status Examination:   Appearance: Well groomed, good eye contact   Orientation: Patient alert and oriented to person, place, time, and situation  Reliability:  Patient appears to be an adequate historian.    Behavior: cooperative   Speech: Speech is spontaneous and coherent, with a normal rate, rhythm and tone.    Language:There are no difficulties with expressive or receptive language as observed throughout the  "interview.    Mood: Described as \"ok\".    Affect: congruent   Judgement: Able to make basic decision regarding safety.  Insight: Good awareness of physical and mental health conditions and aware of needs around care for these.  Gait and station: unable to assess  Thought process: Logical   Thought content: No evidence of delusions or paranoia.    Hallucinations : No evidence of any hallucination  Thought content: No evidence of delusions or paranoia.   Suicidal /Homical Ideations:  No thoughts of self harm or suicide. No thoughts of harming others.  Associations: Connected  Fund of knowledge: Average  Attention / Concentration: Able to remain focused during the interview with minimal distractibility or need for redirection.  Short Term Memory: Grossly intact as evidence by client recalling themes and ideas discussed.  Long Term Memory: Intact  Motor Status: unable to asse    Drug/treatment history and current pattern of use:   History of cannabis use  History of nicotine use-vaping  Nicotine : Smokes daily        Medication changes: See Above   Medication adherence: compliant  Medication side effects: absent  Information about medications: Side effects, benefits and alternative treatments discussed and patient agrees .    .phqPsychotherapy: Supportive therapy day-to-day living    Education: Diet, exercise, abstinence from drugs and alcohol, patient will not drive if sedated and medications or  under influence of any substance    Lab Results:   Personally reviewed and discussed with the patient    Lab Results   Component Value Date    WBC 12.9 (H) 10/13/2021    HGB 14.7 10/13/2021    HCT 45.4 10/13/2021     10/13/2021    CHOL 150 12/15/2023    TRIG 75 12/15/2023    HDL 34 (L) 12/15/2023    ALT 46 12/15/2023    AST 35 12/15/2023     12/15/2023    BUN 5.7 (L) 12/15/2023    CO2 23 12/15/2023    TSH 3.61 06/30/2022       Vital signs:    Allergies: Depakote [valproic acid], Depakote [valproic acid], and Other " environmental allergy         Medications:     Current Outpatient Medications   Medication    ARIPiprazole (ABILIFY) 5 MG tablet    atomoxetine (STRATTERA) 100 MG capsule    atomoxetine (STRATTERA) 100 MG capsule    atropine 1 % ophthalmic solution    chlorproMAZINE (THORAZINE) 50 MG tablet    cholecalciferol 25 MCG (1000 UT) TABS    ferrous sulfate (FE TABS) 325 (65 Fe) MG EC tablet    ferrous sulfate (FEROSUL) 325 (65 Fe) MG tablet    guanFACINE HCl (INTUNIV) 3 MG TB24 24 hr tablet    guanFACINE HCl (INTUNIV) 3 MG TB24 24 hr tablet    hydrOXYzine HCl (ATARAX) 50 MG tablet    hydrOXYzine HCl (ATARAX) 50 MG tablet    INVEGA SUSTENNA 234 MG/1.5ML JUANA    lithium 300 MG capsule    lithium 300 MG capsule    loratadine (CLARITIN) 10 MG tablet    metFORMIN (GLUCOPHAGE-XR) 500 MG 24 hr tablet    nicotine (NICODERM CQ) 14 MG/24HR 24 hr patch    OLANZapine (ZYPREXA) 5 MG tablet    paliperidone (INVEGA SUSTENNA) 234 MG/1.5ML JUANA    traZODone HCl 300 MG TABS    traZODone HCl 300 MG TABS    vitamin C (ASCORBIC ACID) 250 MG TABS tablet     No current facility-administered medications for this visit.         Medication adherence: Reviewed risk/benefits of medication , Patient able to verbalize understanding of side effects and Patient verbally consents to taking medications      PSYCHOEDUCATION:  Medication side effects and alternatives reviewed. Health promotion activities recommended and reviewed today. All questions addressed. Education and counseling completed regarding risks and benefits of medications and psychotherapy options.  Consent provided by patient/guardian  Call the psychiatric nurse line with medication questions or concerns at [phone].  MyChart may be used to communicate with your provider, but this is not intended to be used for emergencies.  SEROTONIN SYNDROME:  Discussed risks of Serotonin syndrome (ie, serotonin toxicity) which is a potentially life-threatening condition associated with increased  serotonergic activity in the central nervous system (CNS). It is seen with therapeutic medication use, inadvertent interactions between drugs, and intentional self-poisoning. Serotonin syndrome may involve a spectrum of clinical findings, which often include mental status changes, autonomic hyperactivity, and neuromuscular abnormalities.    STIMULANT THERAPY: Side effects discussed including but not limited to cardiac (including HTN, tachycardia, sudden death), motor/tic, appetite/growth, mood lability and sleep disruption. This is a controlled substance with risk for abuse, need to keep in a safe keep place and cannot replace lost scripts  HARM REDUCTION:  Discussions regarding effects of mood altering substances, alcohol and cannabis, on mood and that approach is harm reduction, will continue to prescribe meds as they work to cut back use.    SAFETY:  We all care about your loved one's safety. To reduce the risk of self-harm, remove access to all:  Firearms, Medicines (both prescribed and over-the-counter), Knives and other sharp objects, Ropes and like materials, and Alcohol  SLEEP HYGIENE: establish a sleep routine, limit screen time 1 hour prior to bed, use bed for sleep only, take sleep/medications on time (including sleepy time tea, trazadone or herbal treatments such as melatonin), aroma therapy, limit caffeine/sugar, yoga, guided imagery, stretch, meditation, limit naps to 20 minutes, make a temperature change in the room, white noise, be mindful of slowing down breathing, take a warm bath/shower, frequently wash sheets, and journaling.   Medlineplus.gov is information for patients.  It is run by the National Library of Medicine and it contains information about all disorders, diseases and all medications.              Review of Systems:      ROS:    Subjective Data Only- Tele-Health Visit    10 point ROS was negative except for the items listed in HPI.      Crisis Resources:    Present to the Emergency  Department as needed or call after hours crisis line at [phone] or [phone].   [place] Crisis Text Line: Text [place] to 459277.  Suicide LifeLine Chat: suicidepreVenitiline.org/chat/.  National Suicide Prevention Lifeline: 231.567.4412 (TTY: 725.300.5990). Call anytime for help.  (www.suicidepreventionlifeline.org)  National Barton on Mental Illness (www.milvia.org): 408.903.1090 or 259-073-1757.  Mental Health Association (www.mentalhealth.org): 524.159.9451 or 484-747-3081.      Coordination of Care:   More than 30 minutes spent on this visit  with more than 50% of time spent on coordination of care including: Educating patient about diagnosis, prognosis, side effects and benefits of medications, diet, exercise.  Time also spent providing supportive therapy regarding above issues.    This note was created using a dictation system. All typing errors or contextual distortion is unintentional and software inherent.  Start Time : 1030  End time :  7824

## 2024-02-23 ENCOUNTER — TELEPHONE (OUTPATIENT)
Dept: PSYCHIATRY | Facility: CLINIC | Age: 24
End: 2024-02-23
Payer: COMMERCIAL

## 2024-02-23 DIAGNOSIS — F43.23 ADJUSTMENT DISORDER WITH MIXED ANXIETY AND DEPRESSED MOOD: ICD-10-CM

## 2024-02-23 NOTE — TELEPHONE ENCOUNTER
Fax received from pharmacy wanting more clarification for prescription for hydrOXYzine HCl (ATARAX) 50 MG tablet. There is two sets of instructions, which set should be used? Please Clarify    Detail     Disp Refills Start End CIELO   hydrOXYzine HCl (ATARAX) 50 MG tablet 90 tablet 0 2/22/2024 -- No   Sig - Route: Take 1 tablet (50 mg) by mouth 3 times daily as needed for itching Take two tablets twice a day as needed for anxiety or sleep - Oral   Sent to pharmacy as: hydrOXYzine HCl 50 MG Oral Tablet (ATARAX)     Vonda Cavazos MA on 2/23/2024 at 12:55 PM

## 2024-02-26 ENCOUNTER — HOSPITAL ENCOUNTER (EMERGENCY)
Facility: CLINIC | Age: 24
Discharge: HOME | End: 2024-02-26
Attending: EMERGENCY MEDICINE | Admitting: EMERGENCY MEDICINE
Payer: COMMERCIAL

## 2024-02-26 VITALS
OXYGEN SATURATION: 98 % | TEMPERATURE: 98.2 F | HEIGHT: 66 IN | DIASTOLIC BLOOD PRESSURE: 88 MMHG | RESPIRATION RATE: 16 BRPM | SYSTOLIC BLOOD PRESSURE: 117 MMHG | BODY MASS INDEX: 41.62 KG/M2 | HEART RATE: 76 BPM | WEIGHT: 259 LBS

## 2024-02-26 DIAGNOSIS — F41.9 ANXIETY: ICD-10-CM

## 2024-02-26 DIAGNOSIS — R10.13 ABDOMINAL PAIN, EPIGASTRIC: ICD-10-CM

## 2024-02-26 DIAGNOSIS — R19.7 NAUSEA VOMITING AND DIARRHEA: ICD-10-CM

## 2024-02-26 DIAGNOSIS — R11.2 NAUSEA VOMITING AND DIARRHEA: ICD-10-CM

## 2024-02-26 LAB
ALBUMIN SERPL BCG-MCNC: 4 G/DL (ref 3.5–5.2)
ALBUMIN UR-MCNC: NEGATIVE MG/DL
ALP SERPL-CCNC: 60 U/L (ref 40–150)
ALT SERPL W P-5'-P-CCNC: 29 U/L (ref 0–70)
ANION GAP SERPL CALCULATED.3IONS-SCNC: 10 MMOL/L (ref 7–15)
APPEARANCE UR: CLEAR
AST SERPL W P-5'-P-CCNC: 23 U/L (ref 0–45)
B-OH-BUTYR SERPL-SCNC: <0.18 MMOL/L
BASOPHILS # BLD AUTO: ABNORMAL 10*3/UL
BASOPHILS # BLD MANUAL: 0 10E3/UL (ref 0–0.2)
BASOPHILS NFR BLD AUTO: ABNORMAL %
BASOPHILS NFR BLD MANUAL: 0 %
BILIRUB SERPL-MCNC: 0.2 MG/DL
BILIRUB UR QL STRIP: NEGATIVE
BUN SERPL-MCNC: 9.8 MG/DL (ref 6–20)
BURR CELLS BLD QL SMEAR: SLIGHT
CALCIUM SERPL-MCNC: 9 MG/DL (ref 8.6–10)
CHLORIDE SERPL-SCNC: 103 MMOL/L (ref 98–107)
COLOR UR AUTO: NORMAL
CREAT SERPL-MCNC: 0.82 MG/DL (ref 0.67–1.17)
DEPRECATED HCO3 PLAS-SCNC: 22 MMOL/L (ref 22–29)
EGFRCR SERPLBLD CKD-EPI 2021: >90 ML/MIN/1.73M2
EOSINOPHIL # BLD AUTO: ABNORMAL 10*3/UL
EOSINOPHIL # BLD MANUAL: 0 10E3/UL (ref 0–0.7)
EOSINOPHIL NFR BLD AUTO: ABNORMAL %
EOSINOPHIL NFR BLD MANUAL: 0 %
ERYTHROCYTE [DISTWIDTH] IN BLOOD BY AUTOMATED COUNT: 13.9 % (ref 10–15)
GLUCOSE SERPL-MCNC: 93 MG/DL (ref 70–99)
GLUCOSE UR STRIP-MCNC: NEGATIVE MG/DL
HCT VFR BLD AUTO: 40.1 % (ref 40–53)
HGB BLD-MCNC: 13.1 G/DL (ref 13.3–17.7)
HGB UR QL STRIP: NEGATIVE
IMM GRANULOCYTES # BLD: ABNORMAL 10*3/UL
IMM GRANULOCYTES NFR BLD: ABNORMAL %
KETONES UR STRIP-MCNC: NEGATIVE MG/DL
LEUKOCYTE ESTERASE UR QL STRIP: NEGATIVE
LIPASE SERPL-CCNC: 17 U/L (ref 13–60)
LITHIUM SERPL-SCNC: 0.46 MMOL/L (ref 0.6–1.2)
LYMPHOCYTES # BLD AUTO: ABNORMAL 10*3/UL
LYMPHOCYTES # BLD MANUAL: 2.9 10E3/UL (ref 0.8–5.3)
LYMPHOCYTES NFR BLD AUTO: ABNORMAL %
LYMPHOCYTES NFR BLD MANUAL: 23 %
MCH RBC QN AUTO: 29 PG (ref 26.5–33)
MCHC RBC AUTO-ENTMCNC: 32.7 G/DL (ref 31.5–36.5)
MCV RBC AUTO: 89 FL (ref 78–100)
MONOCYTES # BLD AUTO: ABNORMAL 10*3/UL
MONOCYTES # BLD MANUAL: 0.2 10E3/UL (ref 0–1.3)
MONOCYTES NFR BLD AUTO: ABNORMAL %
MONOCYTES NFR BLD MANUAL: 2 %
NEUTROPHILS # BLD AUTO: ABNORMAL 10*3/UL
NEUTROPHILS # BLD MANUAL: 9.3 10E3/UL (ref 1.6–8.3)
NEUTROPHILS NFR BLD AUTO: ABNORMAL %
NEUTROPHILS NFR BLD MANUAL: 75 %
NITRATE UR QL: NEGATIVE
NRBC # BLD AUTO: 0 10E3/UL
NRBC BLD AUTO-RTO: 0 /100
PH UR STRIP: 7 [PH] (ref 5–7)
PLAT MORPH BLD: ABNORMAL
PLATELET # BLD AUTO: 154 10E3/UL (ref 150–450)
POTASSIUM SERPL-SCNC: 3.7 MMOL/L (ref 3.4–5.3)
PROT SERPL-MCNC: 7 G/DL (ref 6.4–8.3)
RBC # BLD AUTO: 4.52 10E6/UL (ref 4.4–5.9)
RBC MORPH BLD: ABNORMAL
RBC URINE: <1 /HPF
SODIUM SERPL-SCNC: 135 MMOL/L (ref 135–145)
SP GR UR STRIP: 1.01 (ref 1–1.03)
TARGETS BLD QL SMEAR: SLIGHT
UROBILINOGEN UR STRIP-MCNC: NORMAL MG/DL
VARIANT LYMPHS BLD QL SMEAR: PRESENT
WBC # BLD AUTO: 12.4 10E3/UL (ref 4–11)
WBC URINE: 0 /HPF

## 2024-02-26 PROCEDURE — 258N000003 HC RX IP 258 OP 636: Performed by: EMERGENCY MEDICINE

## 2024-02-26 PROCEDURE — 96374 THER/PROPH/DIAG INJ IV PUSH: CPT

## 2024-02-26 PROCEDURE — 250N000013 HC RX MED GY IP 250 OP 250 PS 637: Performed by: EMERGENCY MEDICINE

## 2024-02-26 PROCEDURE — 80178 ASSAY OF LITHIUM: CPT | Performed by: EMERGENCY MEDICINE

## 2024-02-26 PROCEDURE — 85007 BL SMEAR W/DIFF WBC COUNT: CPT | Performed by: EMERGENCY MEDICINE

## 2024-02-26 PROCEDURE — 85027 COMPLETE CBC AUTOMATED: CPT | Performed by: EMERGENCY MEDICINE

## 2024-02-26 PROCEDURE — 81001 URINALYSIS AUTO W/SCOPE: CPT | Performed by: EMERGENCY MEDICINE

## 2024-02-26 PROCEDURE — 83690 ASSAY OF LIPASE: CPT | Performed by: EMERGENCY MEDICINE

## 2024-02-26 PROCEDURE — 99284 EMERGENCY DEPT VISIT MOD MDM: CPT | Mod: 25

## 2024-02-26 PROCEDURE — 250N000011 HC RX IP 250 OP 636: Performed by: EMERGENCY MEDICINE

## 2024-02-26 PROCEDURE — 82010 KETONE BODYS QUAN: CPT | Performed by: EMERGENCY MEDICINE

## 2024-02-26 PROCEDURE — 96361 HYDRATE IV INFUSION ADD-ON: CPT

## 2024-02-26 PROCEDURE — 36415 COLL VENOUS BLD VENIPUNCTURE: CPT | Performed by: EMERGENCY MEDICINE

## 2024-02-26 PROCEDURE — 80053 COMPREHEN METABOLIC PANEL: CPT | Performed by: EMERGENCY MEDICINE

## 2024-02-26 RX ORDER — ONDANSETRON 2 MG/ML
4 INJECTION INTRAMUSCULAR; INTRAVENOUS ONCE
Status: COMPLETED | OUTPATIENT
Start: 2024-02-26 | End: 2024-02-26

## 2024-02-26 RX ORDER — HYDROXYZINE HYDROCHLORIDE 25 MG/1
50 TABLET, FILM COATED ORAL ONCE
Status: COMPLETED | OUTPATIENT
Start: 2024-02-26 | End: 2024-02-26

## 2024-02-26 RX ORDER — ONDANSETRON 4 MG/1
4 TABLET, ORALLY DISINTEGRATING ORAL EVERY 6 HOURS PRN
Qty: 15 TABLET | Refills: 0 | Status: SHIPPED | OUTPATIENT
Start: 2024-02-26

## 2024-02-26 RX ORDER — HYDROXYZINE HYDROCHLORIDE 50 MG/1
50 TABLET, FILM COATED ORAL 3 TIMES DAILY PRN
Qty: 90 TABLET | Refills: 0 | Status: SHIPPED | OUTPATIENT
Start: 2024-02-26 | End: 2024-08-06

## 2024-02-26 RX ADMIN — HYDROXYZINE HYDROCHLORIDE 50 MG: 25 TABLET, FILM COATED ORAL at 15:47

## 2024-02-26 RX ADMIN — ONDANSETRON 4 MG: 2 INJECTION INTRAMUSCULAR; INTRAVENOUS at 15:40

## 2024-02-26 RX ADMIN — SODIUM CHLORIDE 1000 ML: 9 INJECTION, SOLUTION INTRAVENOUS at 15:31

## 2024-02-26 ASSESSMENT — ACTIVITIES OF DAILY LIVING (ADL)
ADLS_ACUITY_SCORE: 35

## 2024-02-26 ASSESSMENT — COLUMBIA-SUICIDE SEVERITY RATING SCALE - C-SSRS
1. IN THE PAST MONTH, HAVE YOU WISHED YOU WERE DEAD OR WISHED YOU COULD GO TO SLEEP AND NOT WAKE UP?: NO
6. HAVE YOU EVER DONE ANYTHING, STARTED TO DO ANYTHING, OR PREPARED TO DO ANYTHING TO END YOUR LIFE?: NO
2. HAVE YOU ACTUALLY HAD ANY THOUGHTS OF KILLING YOURSELF IN THE PAST MONTH?: NO

## 2024-02-26 NOTE — ED NOTES
Bed: ED01  Expected date:   Expected time:   Means of arrival:   Comments:  A 524 23 M group home pt anxiety

## 2024-02-26 NOTE — ED PROVIDER NOTES
History     Chief Complaint:  Anxiety and Dysuria       HPI   Kaden Easton Jr. is a 23 year old male with a history of ADD, fetal alcohol syndrome,intermittent explosive disorder, schizoaffective disorder who arrives to the emergency department due to anxiety and feeling sad.  The patient was sent from his group home.  He reports that he has been sad over his brother's death.  He denies any suicidal thoughts.  He denies homicidal thoughts.  He denies any thoughts of harming others.  Denies hallucinations.  He also reports that he has been drinking a lot of energy drinks over the weekend until yesterday when he began with midepigastric abdominal pain, nausea and vomited.  He states that the pain is improved today but still present in the mid epigastric region.  No nausea or, vomiting or diarrhea today.  He did not drink any energy drinks today.  He states that his urine was a darker yellow color than usual today.  He denies any fevers, chills, chest pain, shortness of breath.      Independent Historian:   None - Patient Only    Review of External Notes:   none      Medications:    ARIPiprazole (ABILIFY) 5 MG tablet  atomoxetine (STRATTERA) 100 MG capsule  atomoxetine (STRATTERA) 100 MG capsule  atropine 1 % ophthalmic solution  chlorproMAZINE (THORAZINE) 50 MG tablet  cholecalciferol 25 MCG (1000 UT) TABS  ferrous sulfate (FE TABS) 325 (65 Fe) MG EC tablet  ferrous sulfate (FEROSUL) 325 (65 Fe) MG tablet  guanFACINE HCl (INTUNIV) 3 MG TB24 24 hr tablet  guanFACINE HCl (INTUNIV) 3 MG TB24 24 hr tablet  hydrOXYzine HCl (ATARAX) 50 MG tablet  hydrOXYzine HCl (ATARAX) 50 MG tablet  INVEGA SUSTENNA 234 MG/1.5ML JUANA  lithium 300 MG capsule  lithium 300 MG capsule  loratadine (CLARITIN) 10 MG tablet  metFORMIN (GLUCOPHAGE-XR) 500 MG 24 hr tablet  nicotine (NICODERM CQ) 14 MG/24HR 24 hr patch  OLANZapine (ZYPREXA) 10 MG tablet  paliperidone (INVEGA SUSTENNA) 234 MG/1.5ML JUANA  traZODone HCl 300 MG TABS  traZODone HCl  300 MG TABS  vitamin C (ASCORBIC ACID) 250 MG TABS tablet        Past Medical History:    Past Medical History:   Diagnosis Date    ADD (attention deficit disorder)     ADHD (attention deficit hyperactivity disorder)     ADHD (attention deficit hyperactivity disorder)     Anxiety     Current smoker     Fetal alcohol syndrome     Forehead abrasion     History of transfusion     Obese     Obesity     Self-injurious behavior 8/17/2020       Past Surgical History:    No past surgical history on file.     Physical Exam   No data found.     Physical Exam  Nursing note and vitals reviewed.  Constitutional:  Appears well-developed and well-nourished.   HENT:   Head:    Atraumatic.   Mouth/Throat:   Oropharynx is clear and moist. No oropharyngeal exudate.   Eyes:    Pupils are equal, round, and reactive to light.   Neck:    Normal range of motion. Neck supple.      No tracheal deviation present. No thyromegaly present.   Cardiovascular:  Normal rate, regular rhythm, no murmur   Pulmonary/Chest: Breath sounds are clear and equal without wheezes or crackles.  Abdominal:   Soft. Bowel sounds are normal. Exhibits no distension and      no mass. There is mild midepigastric tenderness.      There is no rebound and no guarding.   Musculoskeletal:  Exhibits no edema.   Lymphadenopathy:  No cervical adenopathy.   Psych:                        Flat affect.  Calm and cooperative.  Makes good eye contact.  Neurological:   Alert and oriented to person, place, and time.   Skin:    Skin is warm and dry. No rash noted. No pallor.       Emergency Department Course     Imaging:  No orders to display       Laboratory:  Labs Ordered and Resulted from Time of ED Arrival to Time of ED Departure   LITHIUM LEVEL - Abnormal       Result Value    Lithium 0.46 (*)    CBC WITH PLATELETS AND DIFFERENTIAL - Abnormal    WBC Count 12.4 (*)     RBC Count 4.52      Hemoglobin 13.1 (*)     Hematocrit 40.1      MCV 89      MCH 29.0      MCHC 32.7      RDW 13.9       Platelet Count 154      % Neutrophils        % Lymphocytes        % Monocytes        % Eosinophils        % Basophils        % Immature Granulocytes        NRBCs per 100 WBC 0      Absolute Neutrophils        Absolute Lymphocytes        Absolute Monocytes        Absolute Eosinophils        Absolute Basophils        Absolute Immature Granulocytes        Absolute NRBCs 0.0     DIFFERENTIAL - Abnormal    % Neutrophils 75      % Lymphocytes 23      % Monocytes 2      % Eosinophils 0      % Basophils 0      Absolute Neutrophils 9.3 (*)     Absolute Lymphocytes 2.9      Absolute Monocytes 0.2      Absolute Eosinophils 0.0      Absolute Basophils 0.0      RBC Morphology Confirmed RBC Indices      Platelet Assessment        Value: Automated Count Confirmed. Platelet morphology is normal.    Sunny Side Cells Slight (*)     Reactive Lymphocytes Present (*)     Target Cells Slight (*)    COMPREHENSIVE METABOLIC PANEL - Normal    Sodium 135      Potassium 3.7      Carbon Dioxide (CO2) 22      Anion Gap 10      Urea Nitrogen 9.8      Creatinine 0.82      GFR Estimate >90      Calcium 9.0      Chloride 103      Glucose 93      Alkaline Phosphatase 60      AST 23      ALT 29      Protein Total 7.0      Albumin 4.0      Bilirubin Total 0.2     LIPASE - Normal    Lipase 17     KETONE BETA-HYDROXYBUTYRATE QUANTITATIVE, RAPID - Normal    Ketone (Beta-Hydroxybutyrate) Quantitative <0.18     ROUTINE UA WITH MICROSCOPIC REFLEX TO CULTURE - Normal    Color Urine Straw      Appearance Urine Clear      Glucose Urine Negative      Bilirubin Urine Negative      Ketones Urine Negative      Specific Gravity Urine 1.006      Blood Urine Negative      pH Urine 7.0      Protein Albumin Urine Negative      Urobilinogen Urine Normal      Nitrite Urine Negative      Leukocyte Esterase Urine Negative      RBC Urine <1      WBC Urine 0          Procedures   none    Emergency Department Course & Assessments:    Interventions:  Medications   ondansetron  (ZOFRAN) injection 4 mg (4 mg Intravenous $Given 2/26/24 1540)   sodium chloride 0.9% BOLUS 1,000 mL (1,000 mLs Intravenous $New Bag 2/26/24 1531)   hydrOXYzine HCl (ATARAX) tablet 50 mg (50 mg Oral $Given 2/26/24 1547)        Assessments:  16:30 spoke with Steve who evaluated the patient in the emergency department feels that he can be safely discharged home.  She stated she would speak with his group home staff to see if they can fix his Xbox and to check and see if they will give him a ride home.  16:45: I spoke with the patient and he is feeling much improved.  He does not have any abdominal pain or tenderness.  He ate a meal tray here.  He was instructed return if his symptoms worsen.    Independent Interpretation (X-rays, CTs, rhythm strip):  None    Consultations/Discussion of Management or Tests:  Spoke with JORI Rush who feels that the patient can be safely discharged home.    Social Determinants of Health affecting care:   Healthcare Access/Compliance, Stress/Adjustment Disorders, and Social Connections/Isolation    Disposition:  The patient was discharged.     Impression & Plan      Medical Decision Making:  This patient arrives due to anxiety in the setting of recent illness with nausea, vomiting diarrhea and epigastric abdominal pain which are currently resolved.  I evaluated the patient and his white blood cell count is not significantly elevated and other laboratory testing are unremarkable.  There was no sign of lithium toxicity.  I rechecked him after he received IV fluids and nausea medicine he is feeling much improved.  He ate a meal tray and he does not have any abdominal pain or tenderness.  I feel that likely his symptoms are related to gastroenteritis I do not feel CT imaging of his abdomen is indicated at this time since I feel that appendicitis is highly unlikely.  He was however told to return if his symptoms worsen.  He was evaluated by JORI Rush who also feels he can be safely  discharged home and that he does not require any further mental health evaluation.  She reports that he told her that he was bored at home because his Xbox is not working.  He denies any suicidal thoughts, homicidal thoughts, psychotic symptoms or hallucinations.  The plan is that the patient will be discharged back to his care facility.    Diagnosis:    ICD-10-CM    1. Anxiety  F41.9       2. Nausea vomiting and diarrhea  R11.2     R19.7       3. Abdominal pain, epigastric  R10.13            Discharge Medications:  New Prescriptions    No medications on file        2/26/2024   Indiana Camp MD [place], Cheri Lee, MD  02/26/24 1955

## 2024-02-26 NOTE — ED NOTES
Writer RN called patients group home, and spoke with Radha. Writer STUART provided staff member with an update, and went over discharge instructions. Radha reports that the manager of the group home will come  the patient.

## 2024-02-26 NOTE — ED TRIAGE NOTES
Pt BIBA from group home in [place]. Pt reports having group home send him in over feelings of anxiety. Pt reports been feeling sad over last few days. Pt reports dysuria and constipation over last few days. Pt denies chest pain, SOB. Pt states he doesn't want to hurt self or others.

## 2024-02-27 DIAGNOSIS — F43.23 ADJUSTMENT DISORDER WITH MIXED ANXIETY AND DEPRESSED MOOD: ICD-10-CM

## 2024-02-27 DIAGNOSIS — F25.0 SCHIZOAFFECTIVE DISORDER, BIPOLAR TYPE (H): ICD-10-CM

## 2024-02-27 RX ORDER — TRAZODONE HYDROCHLORIDE 300 MG/1
1 TABLET ORAL AT BEDTIME
Qty: 28 TABLET | Refills: 1 | Status: CANCELLED | OUTPATIENT
Start: 2024-02-27

## 2024-02-27 NOTE — TELEPHONE ENCOUNTER
Date of Last Office Visit: 01/02/2024 with KATE Doherty  Date of Next Office Visit: 03/21/2024 with [facility]  No shows since last visit: 01/25/2024  Cancellations since last visit: 0    Medication requested: traZODone HCl 300 MG TABS  Date last ordered: 01/02/2024 Qty: 30 tablet Refills: 1     Review of [place] ?: N/A    Lapse in medication adherence greater than 5 days?: no  If yes, call patient and gather details: n/a  Medication refill request verified as identical to current order?: yes  Result of Last DAM, VPA, Li+ Level, CBC, or Carbamazepine Level (at or since last visit): N/A    Last visit treatment plan:   PLAN:      Patient advised of consult/bridge to [facility]. Patient will continue to be seen for ongoing consultation and stabilization.  Does not meet criteria for involuntary treatment or hospitalization  Abilify 10 mg daily => 12/14/2023 15 mg daily worse visual hallucinations/anxiety => 1/2/24 5 mg po qhs -Risks, benefits and alternatives discussed.  Patient provides verbal consent to treatment.  Olanzapine 1/2/24 5 mg po at bedtime for 3 nights then increase to 10 mg po at bedtime -Risks, benefits and alternatives discussed.  Patient provides verbal consent to treatment.  Hydroxyzine 50 mg p.o. 3 times daily as needed anxiety => 12/14/2023 100 mg p.o. twice daily as needed anxiety-Risks, benefits and alternatives discussed.  Patient provides verbal consent to treatment.  Continue Invega Sustenna 234 mg every 30 days  continue lithium 900 mg daily  Continue Strattera 100 mg daily   continue Intuniv 3 mg nightly  Continue trazodone 300 mg nightly limited sleep efficacy -Risks, benefits and alternatives discussed.  Patient provides verbal consent to treatment.  Labs -follow-up lithium level, A1c, fasting lipid panel  Return in 2 weeks, with returning to [facility] 1/25/2024    []Medication refilled per  Medication Refill in Ambulatory Care  policy.  [x]Medication unable to be refilled by RN due to criteria  not met as indicated below:    []Eligibility - not seen in the last year   []Supervision - no future appointment   [x]Compliance - no shows, cancellations or lapse in therapy   []Verification - order discrepancy   []Controlled medication   []Medication not included in policy   []90-day supply request   [x]Other- mA processed    Vonda Cavazos MA on 2/27/2024 at 4:10 PM

## 2024-02-27 NOTE — CONSULTS
Diagnostic Evaluation Consultation  Crisis Assessment    Patient Name: Kaden Easton Jr.  Age:  23 year old  Legal Sex: male  Gender Identity: male  Pronouns:   Race: Black or   Ethnicity: Not  or   Language: English      Patient was assessed: In person      Patient location: [facility] EMERGENCY DEPT                                 Referral Data and Chief Complaint  Kaden Easton Jr. presents to the ED via EMS. Patient is presenting to the ED for the following concerns: Anxiety, Health stressors.   Factors that make the mental health crisis life threatening or complex are:  The patient presents to the emergency department due to not being able to urinate and anxiety. He reports that his anxiety has been worse due to being bored at the group home. He states he is bored because his xbox is broken and he hasn't been able to fix it. He denies any SI/ HI, AH/ VH and feels once he is able to urinate his anxiety will decrease..    Informed Consent and Assessment Methods  Explained the crisis assessment process, including applicable information disclosures and limits to confidentiality, assessed understanding of the process, and obtained consent to proceed with the assessment.  Assessment methods included conducting a formal interview with patient, review of medical records, collaboration with medical staff, and obtaining relevant collateral information from family and community providers when available.  : done     Patient response to interventions: eager to participate, acceptance expressed, verbalizes understanding  Coping skills were attempted to reduce the crisis:  help seeking, voluntary presentation.     History of the Crisis   The patient has a significant mental health history including diagnosis of ADD, fetal alcohol syndrome,intermittent explosive disorder, schizoaffective disorder. He has lived in a group home for most of his adult life stating that he feels  safe there. He states his anxiety is largely related to his xbox not working and that he needs to speak to his  manager about fixing it to help with his anxiety. He additionally states that he has been drinking energy drinks and pops for the last few days, no water intake. Discussed necessity for daily water intake to be greater than his energy drink/ pop consumption. The patient laughed at this stated he knew he shouldn't have been drinking as much.    Brief Psychosocial History  Family:  Single, Children    Support System:  Parent(s), Sibling(s)  Employment Status:  disabled  Source of Income:  disability  Financial Environmental Concerns:  none  Current Hobbies:  social media/computer activities, music, outdoor activities  Barriers in Personal Life:  emotional concerns    Significant Clinical History  Current Anxiety Symptoms:  racing thoughts, excessive worry, anxious  Current Depression/Trauma:   (Denies)  Current Somatic Symptoms:  somatic symptoms (abdominal pain, headache, tension)  Current Psychosis/Thought Disturbance:   (Denies)  Current Eating Symptoms:   (Normal appetite)  Chemical Use History:  Alcohol: None  Benzodiazepines: None  Opiates: None  Cocaine: None  Marijuana: None  Other Use: None   Past diagnosis:   (ADD, fetal alcohol syndrome,intermittent explosive disorder, schizoaffective disorder)  Family history:  No known history of mental health or chemical health concerns  Past treatment:  Individual therapy, Case management, Psychiatric Medication Management, Inpatient Hospitalization, Civil Commitment  Details of most recent treatment:  The patient currently has a therapist, psychiatrist, and mental health .  Other relevant history:          Collateral Information  Is there collateral information: Yes     Collateral information name, relationship, phone number:  , P: [phone]    What happened today: The patient reported increase in anxiety     What is  different about patient's functioning: More isolated to his room     Concern about alcohol/drug use:  No    What do you think the patient needs:  unclear    Has patient made comments about wanting to kill themselves/others: no    If d/c is recommended, can they take part in safety/aftercare planning:  Yes      Risk Assessment  Basile Suicide Severity Rating Scale Full Clinical Version (02/26/2024):  Suicidal Ideation  Q1 Wish to be Dead (Lifetime): No  Q2 Non-Specific Active Suicidal Thoughts (Lifetime): No  Q6 Suicide Behavior (Lifetime): no     Suicidal Behavior (Lifetime)  Actual Attempt (Lifetime): No  Has subject engaged in non-suicidal self-injurious behavior? (Lifetime): No  Interrupted Attempts (Lifetime): No  Aborted or Self-Interrupted Attempt (Lifetime): No  Preparatory Acts or Behavior (Lifetime): No    Basile Suicide Severity Rating Scale Recent (02/26/2024):   Suicidal Ideation (Recent)  Q1 Wished to be Dead (Past Month): no (pt states he's been feeling sad and anxious.)  Q2 Suicidal Thoughts (Past Month): no  Level of Risk per Screen: no risks indicated  Intensity of Ideation (Recent)  Deterrents (Past 1 Month): Does not apply  Reasons for Ideation (Past 1 Month): Does not apply  Suicidal Behavior (Recent)  Actual Attempt (Past 3 Months): No  Total Number of Actual Attempts (Past 3 Months): 0  Has subject engaged in non-suicidal self-injurious behavior? (Past 3 Months): No  Interrupted Attempts (Past 3 Months): No  Total Number of Interrupted Attempts (Past 3 Months): 0  Aborted or Self-Interrupted Attempt (Past 3 Months): No  Total Number of Aborted or Self-Interrupted Attempts (Past 3 Months): 0  Preparatory Acts or Behavior (Past 3 Months): No  Total Number of Preparatory Acts (Past 3 Months): 0    Environmental or Psychosocial Events: other (see comment) (Broken Xbox)  Protective Factors: Protective Factors: strong bond to family unit, community support, or employment, responsibilities and  duties to others, including pets and children, lives in a responsibly safe and stable environment, good treatment engagement, help seeking    Does the patient have thoughts of harming others? Feels Like Hurting Others: no  Previous Attempt to Hurt Others: no  Is the patient engaging in sexually inappropriate behavior?: no  Duty to warn initiated: no    Is the patient engaging in sexually inappropriate behavior?  no        Mental Status Exam   Affect: Appropriate  Appearance: Appropriate  Attention Span/Concentration: Attentive  Eye Contact: Engaged    Fund of Knowledge: Delayed   Language /Speech Content: Fluent  Language /Speech Volume: Normal  Language /Speech Rate/Productions: Articulate, Slow  Recent Memory: Intact  Remote Memory: Intact  Mood: Apathetic  Orientation to Person: Yes   Orientation to Place: Yes  Orientation to Time of Day: Yes  Orientation to Date: Yes     Situation (Do they understand why they are here?): Yes  Psychomotor Behavior: Normal  Thought Content: Clear  Thought Form: Goal Directed      Medication  Psychotropic medications:   Medication Orders - Psychiatric (From admission, onward)      None             Current Care Team  Patient Care Team:  No Ref-Primary, Physician as PCP - Frida Mckinnon MD as MD (Pediatrics)  Raisa Fitch MD as MD (Pediatric Rheumatology)  Wilmer Castellano MD as MD (Pediatric Hematology/Oncology)  Nkechi Monreal NP as Assigned Behavioral Health Provider  Silvia Quan PA-C as Assigned PCP  No Ref-Primary, Physician    Diagnosis  Patient Active Problem List   Diagnosis Code    Thrombocytopenia (H24) D69.6    Chronic ITP (idiopathic thrombocytopenia) (H) D69.3    Attention deficit disorder F98.8    Intermittent explosive disorder F63.81    Anxiety disorder, unspecified F41.9    Schizoaffective disorder, unspecified (H) F25.9    Intermittent explosive disorder F63.81    Schizoaffective disorder, bipolar type (H) F25.0    Fetal alcohol  syndrome Q86.0    Intellectual disability F79       Primary Problem This Admission  Active Hospital Problems    Anxiety disorder, unspecified        Clinical Summary and Substantiation of Recommendations   The patient presented to the emergency department due to not being able to urinate and increase in anxiety. He was observed to be dehydrated by the medical provider and received IV fluids. He reported his anxiety has been increased due to his Xbox not working and being unable to urinate. He denies any SI/ HI and AH/ VH. He reports looking forward to returning to his group home and discussing with staff fixing his xbox. After mental health crisis assessment and aftercare planning by DEC and consultation with attending provider, the patient's circumstances and mental state were safe for outpatient management. Patient denies any mental health or safety concerns at this time. Close follow-up with a psychiatrist and/or therapist was recommended and community psychiatric resources were provided. Patient is to return to the ED if any urgent or potentially life-threatening concerns arise.        At the time of discharge, the patient's acute suicide risk was determined to be low due to the following factors: reduction in the intensity of mood/anxiety symptoms that preceded the admission, denial of suicidal thoughts, denies feeling helpless or hopeless, not currently under the influence of alcohol or illicit substances, denies experiencing command hallucinations and no immediate access to firearms. Protective factors include: social support, displays resiliency , future focused thinking, displays insight and safe/stable housing    Patient coping skills attempted to reduce the crisis:  help seeking, voluntary presentation.    Disposition  Recommended disposition: Individual Therapy, Medication Management        Reviewed case and recommendations with attending provider. Attending Name: Dr. Camp       Attending concurs with  disposition: yes       Patient and/or validated legal guardian concurs with disposition:   yes       Final disposition:  discharge    Legal status on admission:      Assessment Details   Total duration spent with the patient: 15 min     CPT code(s) utilized: Non-Billable    LEONEL Botello MSW- Intern  DEC - Triage & Transition Services  Callback: [phone]

## 2024-02-28 RX ORDER — TRAZODONE HYDROCHLORIDE 300 MG/1
300 TABLET ORAL AT BEDTIME
Qty: 30 TABLET | Refills: 1 | Status: SHIPPED | OUTPATIENT
Start: 2024-02-28 | End: 2024-04-24

## 2024-02-29 ENCOUNTER — HOSPITAL ENCOUNTER (EMERGENCY)
Facility: CLINIC | Age: 24
Discharge: HOME | End: 2024-03-01
Attending: EMERGENCY MEDICINE | Admitting: EMERGENCY MEDICINE
Payer: COMMERCIAL

## 2024-02-29 VITALS
DIASTOLIC BLOOD PRESSURE: 100 MMHG | TEMPERATURE: 98 F | HEART RATE: 75 BPM | OXYGEN SATURATION: 97 % | SYSTOLIC BLOOD PRESSURE: 164 MMHG | RESPIRATION RATE: 16 BRPM

## 2024-02-29 DIAGNOSIS — R44.3 HALLUCINATIONS: ICD-10-CM

## 2024-02-29 PROCEDURE — 99284 EMERGENCY DEPT VISIT MOD MDM: CPT

## 2024-02-29 PROCEDURE — 81001 URINALYSIS AUTO W/SCOPE: CPT | Performed by: EMERGENCY MEDICINE

## 2024-03-01 LAB
ALBUMIN UR-MCNC: NEGATIVE MG/DL
APPEARANCE UR: CLEAR
BILIRUB UR QL STRIP: NEGATIVE
COLOR UR AUTO: ABNORMAL
GLUCOSE UR STRIP-MCNC: NEGATIVE MG/DL
HGB UR QL STRIP: NEGATIVE
HYALINE CASTS: 1 /LPF
KETONES UR STRIP-MCNC: NEGATIVE MG/DL
LEUKOCYTE ESTERASE UR QL STRIP: NEGATIVE
MUCOUS THREADS #/AREA URNS LPF: PRESENT /LPF
NITRATE UR QL: NEGATIVE
PH UR STRIP: 7 [PH] (ref 5–7)
RBC URINE: 2 /HPF
SP GR UR STRIP: 1.01 (ref 1–1.03)
UROBILINOGEN UR STRIP-MCNC: NORMAL MG/DL
WBC URINE: <1 /HPF

## 2024-03-01 NOTE — ED PROVIDER NOTES
History     Chief Complaint:  Mental Health Problem and Urinary Retention       HPI   Kaden Easton Jr. is a 23 year old male who presented to the emergency department with mental health problem.  Patient reporting that he is seeing ghosts in the bathroom which is causing him to not want to urinate in bathroom.  Reports that he would feel comfortable going to the bathroom if there is staff standing outside the door. denies any other mental health concerns.  Denies suicidal ideation. There is no dysuria, vomiting, or abdominal pain currently.    Independent Historian:   None - Patient Only    Review of External Notes:   Reviewed emergency department note from Feb 26, 2024 where he was seen for nausea vomiting diarrhea    Medications:    ARIPiprazole (ABILIFY) 5 MG tablet  atomoxetine (STRATTERA) 100 MG capsule  atomoxetine (STRATTERA) 100 MG capsule  atropine 1 % ophthalmic solution  chlorproMAZINE (THORAZINE) 50 MG tablet  cholecalciferol 25 MCG (1000 UT) TABS  ferrous sulfate (FE TABS) 325 (65 Fe) MG EC tablet  ferrous sulfate (FEROSUL) 325 (65 Fe) MG tablet  guanFACINE HCl (INTUNIV) 3 MG TB24 24 hr tablet  guanFACINE HCl (INTUNIV) 3 MG TB24 24 hr tablet  hydrOXYzine HCl (ATARAX) 50 MG tablet  INVEGA SUSTENNA 234 MG/1.5ML JUANA  lithium 300 MG capsule  lithium 300 MG capsule  loratadine (CLARITIN) 10 MG tablet  metFORMIN (GLUCOPHAGE-XR) 500 MG 24 hr tablet  nicotine (NICODERM CQ) 14 MG/24HR 24 hr patch  OLANZapine (ZYPREXA) 10 MG tablet  ondansetron (ZOFRAN ODT) 4 MG ODT tab  paliperidone (INVEGA SUSTENNA) 234 MG/1.5ML JUANA  traZODone HCl 300 MG TABS  traZODone HCl 300 MG TABS  vitamin C (ASCORBIC ACID) 250 MG TABS tablet      Past Medical History:    Past Medical History:   Diagnosis Date    ADD (attention deficit disorder)     ADHD (attention deficit hyperactivity disorder)     ADHD (attention deficit hyperactivity disorder)     Anxiety     Current smoker     Fetal alcohol syndrome     Forehead abrasion      History of transfusion     Obese     Obesity     Self-injurious behavior 8/17/2020     Physical Exam   Patient Vitals for the past 24 hrs:   BP Temp Temp src Pulse Resp SpO2   02/29/24 2100 (!) 164/100 98  F (36.7  C) Oral 75 16 97 %        Physical Exam  General: Sitting up in bed  Eyes:  The pupils are equal and round    Conjunctivae and sclerae are normal  ENT:    Atraumatic face  Neck:  Normal range of motion  CV:  Regular rate    Skin warm and well perfused   Resp:  Non labored breathing on room air    No tachypnea    No cough heard  MS:  Normal muscular tone  Skin:  No rash or acute skin lesions noted  Neuro:   Awake, alert.      Speech is normal and fluent.    Face is symmetric.     Moves all extremities equally  Psych: Normal affect.  Appropriate interactions.    Emergency Department Course     Laboratory:  Labs Ordered and Resulted from Time of ED Arrival to Time of ED Departure   ROUTINE UA WITH MICROSCOPIC REFLEX TO CULTURE - Abnormal       Result Value    Color Urine Light Yellow      Appearance Urine Clear      Glucose Urine Negative      Bilirubin Urine Negative      Ketones Urine Negative      Specific Gravity Urine 1.014      Blood Urine Negative      pH Urine 7.0      Protein Albumin Urine Negative      Urobilinogen Urine Normal      Nitrite Urine Negative      Leukocyte Esterase Urine Negative      Mucus Urine Present (*)     RBC Urine 2      WBC Urine <1      Hyaline Casts Urine 1       Emergency Department Course & Assessments:    Assessments:  Patient evaluated by myself    Social Determinants of Health affecting care:   None    Disposition:  The patient was discharged.     Impression & Plan      Medical Decision Making:  Kaden CANALES Rustam Earl Is a 23 year old male who presented to the ED with hallucinations.  Patient was able to urinate here in the emergency department without difficulty.  He reports that he would feel comfortable if he had staff member standing outside of the bathroom door at  his group home but easily and readily went to the bathroom here in the emergency department.  There are no other acute issues and do not think that patient needs DEC assessment.  Nurse contacted group home to arrange for him to be discharged and can follow-up with outpatient providers    Diagnosis:    ICD-10-CM    1. Hallucinations  R44.3          2/29/2024   Noemi Vinson MD Goertz, Maria Kristine, MD  03/01/24 0008

## 2024-03-01 NOTE — DISCHARGE INSTRUCTIONS
Talk to your outpatient mental health providers for follow-up as needed  Talk to staff at your facility to see if they can stand outside the bathroom door when going to the bathroom

## 2024-03-01 NOTE — ED TRIAGE NOTES
"Pt c/o Urinary retention since 1730. Pt states he is scared to use the bathroom at his group home due to ghosts. Pt states he is scared of \"ghosts\".      Triage Assessment (Adult)       Row Name 02/29/24 3518          Triage Assessment    Airway WDL WDL        Respiratory WDL    Respiratory WDL WDL        Skin Circulation/Temperature WDL    Skin Circulation/Temperature WDL WDL        Cardiac WDL    Cardiac WDL WDL        Peripheral/Neurovascular WDL    Peripheral Neurovascular WDL WDL        Cognitive/Neuro/Behavioral WDL    Cognitive/Neuro/Behavioral WDL WDL                     "

## 2024-03-14 NOTE — TELEPHONE ENCOUNTER
Date of Last Office Visit: 7/15/20  Date of Next Office Visit: None - patient is hospitalized  No shows since last visit: none  Cancellations since last visit: 8/14/20 - hospitalized  ED visits since last visit:  none  Medication  paliperidone palmitate (INVEGA SUSTENNA) 234 mg/1.5 mL Syrg IM injection  date last ordered: 6/9/20  Qty: 1.5mg  Refills: 2  Lapse in therapy greater than 7 days: no  Medication refill request verified as identical to current order: yes  Result of Last DAM, VPA, Li+ Level, CBC, or Carbamazepine Level (at or since last visit): N/A     [] Medication refilled per [facility] M-1.   [x] Medication unable to be refilled by RN due to criteria not met as indicated below:     []Eligibility - not seen in last year    []Supervision - no future appointment    []Compliance     []Verification - order discrepancy    []Controlled Medication    [x]Medication not included in RN Protocol    []90 - day supply request    []Other   Current Medication list:    ALLERGY RELIEF, LORATADINE, 10 mg tablet  TAKE 10MG (1 TABLET) BY MOUTH EVERY DAY    atomoxetine (STRATTERA) 40 MG capsule  TAKE 40MG (1 CAPSULE) BY MOUTH EVERY DAY..    ferrous sulfate 325 (65 FE) MG tablet  TAKE 325MG (1 TABLET) BY MOUTH EVERY DAY WITH BREAKFAST    fluPHENAZine (PROLIXIN) 2.5 MG tablet  Take 2.5 mg by mouth 2 (two) times a day. In the morning and in the afternoon.    metFORMIN (GLUCOPHAGE) 500 MG tablet  TAKE 500MG (1 TABLET) BY MOUTH 2 TIMES A DAY WITH MEALS.    paliperidone palmitate (INVEGA SUSTENNA) 234 mg/1.5 mL Syrg IM injection  INJECT 1.5ML INTRAMUSCULARLY EVERY 4 WEEKS.    polyethylene glycol (GLYCOLAX) 17 gram/dose powder  Take 17 g by mouth daily as needed (constipation).        risperiDONE (RISPERDAL) 0.5 MG tablet  Take 1 tablet (0.5 mg total) by mouth 2 (two) times a day as needed.    risperiDONE (RISPERDAL) 1 MG tablet  Take 1 tablet (1 mg total) by mouth 2 (two) times a day.    sodium chloride (OCEAN) 0.65 % nasal spray  To  both nostrils 4 times a day as needed    TAB-A-TUSHAR per tablet  TAKE 1 TABLET BY MOUTH EVERY DAY.    topiramate (TOPAMAX) 50 MG tablet  TAKE 50MG (1 TABLET) BY MOUTH 2 TIMES A DAY    traZODone (DESYREL) 100 MG tablet  Take 1 tablet (100 mg total) by mouth at bedtime       Medication Plan of Care at last office visit with MD/CNP:    Plan   1- Continue  Risperdal  1 mg two  times a day scheduled   Continue other  current med's as follows   -Topamax 50 mg two times a day   -Invega 234 mg Q 4 weeks - last give  7/7/2020- at the group home   -Fluphenazine (prolixin)5 mg two times a day    Atomoxetine   40 mg daily  -Trazodone  100 mg  at HS  -Start Risperdal 0.5  mg two times a day PRN - aggressive beh   2. RTC : 2 weeks, call in between visits with any  questions or concerns     Never smoker

## 2024-03-21 ENCOUNTER — TELEPHONE (OUTPATIENT)
Dept: PSYCHIATRY | Facility: CLINIC | Age: 24
End: 2024-03-21
Payer: COMMERCIAL

## 2024-03-21 NOTE — TELEPHONE ENCOUNTER
was contacted regarding patient no show today. Staff was not aware of the appt and was transferred to [facility] to schedule.

## 2024-03-26 DIAGNOSIS — F90.8 ATTENTION DEFICIT HYPERACTIVITY DISORDER (ADHD), OTHER TYPE: ICD-10-CM

## 2024-03-26 DIAGNOSIS — F39 SEVERE MOOD DISORDER WITH PSYCHOTIC FEATURES (H): ICD-10-CM

## 2024-03-26 DIAGNOSIS — F63.81 INTERMITTENT EXPLOSIVE DISORDER: ICD-10-CM

## 2024-03-26 RX ORDER — GUANFACINE 3 MG/1
3 TABLET, EXTENDED RELEASE ORAL AT BEDTIME
Qty: 28 TABLET | Refills: 0 | Status: SHIPPED | OUTPATIENT
Start: 2024-03-26 | End: 2024-05-03

## 2024-03-26 RX ORDER — ARIPIPRAZOLE 5 MG/1
5 TABLET ORAL EVERY MORNING
Qty: 28 TABLET | Refills: 0 | Status: SHIPPED | OUTPATIENT
Start: 2024-03-26 | End: 2024-04-26

## 2024-03-26 RX ORDER — PALIPERIDONE PALMITATE 234 MG/1.5ML
INJECTION INTRAMUSCULAR
Qty: 1.5 ML | Refills: 0 | Status: SHIPPED | OUTPATIENT
Start: 2024-03-26 | End: 2024-04-29

## 2024-03-26 NOTE — TELEPHONE ENCOUNTER
[facility] - Provider Nkechi Monreal CNP  indicated in this patients last visit note on 2/22/2024 that a future/return appointment was indicated 4 weeks after after that appointment.    [facility] please call this patient to schedule a future appointment with Nkechi Monreal CNP      Date of Last Office Visit: 2/22/2024  Date of Next Office Visit: noting scheduled - [facility] notified  No shows since last visit: 3/21/2024  Cancellations since last visit: none    Medication requested: guanfacine HCl (INTUNIV) 3 MG TB24 24 hr tablet  Date last ordered: 2/19/2024 Qty: 30 Refills: 0  Take 1 tablet (3 mg) by mouth at bedtime     Medication requested: aripiprazole (ABILIFY) 5 MG tablet  Date last ordered: 2/19/2024 Qty: 30 Refills: 0  TAKE 1 TABLET BY MOUTH EVERY MORNING     Medication requested: INVEGA SUSTENNA 234 MG/1.5ML JUANA  Date last ordered: 11/29/2023 Qty: 1.5 mL Refills: 3    Review of [place] ?: RN is not an approved delegate for Nkechi Monreal CNP     Lapse in medication adherence greater than 5 days?: No  If yes, call patient and gather details: n/a  Medication refill request verified as identical to current order?: Yes  Result of Last DAM, VPA, Li+ Level, CBC, or Carbamazepine Level (at or since last visit):  Various labs completed 2/22/2024 - 2/29/2024    Last visit treatment plan:     Plan   Continue with current medications :   Abilify  5 mg in the morning  Quifacine ER   3 mg at bedtime    Invega 234 mg /IM monthly -given by group home   Thorazine 50 mg BID - agitation PRN   Statreta 100 mg in am   Trazodone 300 mg at bedtime   Lithium 900 mg very evening with meals   Olanzapine 10 mg daily - new added after ER visit in Dec 2023  Hydroxyzine 50 mg TID PRN-    2-High risk medication use-lithium level ordered    3. RTC:  4 week       []Medication refilled per  Medication Refill in Ambulatory Care  policy.  [x]Medication unable to be refilled by RN due to criteria not met as indicated below:    []Eligibility - not seen in the  last year   [x]Supervision - no future appointment   [x]Compliance - no shows, cancellations or lapse in therapy   []Verification - order discrepancy   []Controlled medication   [x]Medication not included in policy   []90-day supply request   []Other    Forwarding to [facility] provider group as Nkechi Monreal CNP is not in clinic.    Stefanie Babcock, RN on 3/26/2024 at 1:54 PM

## 2024-04-16 ENCOUNTER — MYC REFILL (OUTPATIENT)
Dept: PSYCHIATRY | Facility: CLINIC | Age: 24
End: 2024-04-16
Payer: COMMERCIAL

## 2024-04-16 NOTE — TELEPHONE ENCOUNTER
[facility] received faxed refill request for guanFACINE HCl (INTUNIV) 3 MG TB24 24 hr tablet Patient has an appointment with provider in 2 days. This will get filled then.     Yasmine Lugo CMA April 16, 2024

## 2024-04-18 ENCOUNTER — OFFICE VISIT (OUTPATIENT)
Dept: PSYCHIATRY | Facility: CLINIC | Age: 24
End: 2024-04-18
Payer: COMMERCIAL

## 2024-04-18 VITALS
TEMPERATURE: 98.8 F | OXYGEN SATURATION: 98 % | BODY MASS INDEX: 43.39 KG/M2 | WEIGHT: 270 LBS | RESPIRATION RATE: 16 BRPM | HEART RATE: 88 BPM | DIASTOLIC BLOOD PRESSURE: 64 MMHG | SYSTOLIC BLOOD PRESSURE: 117 MMHG | HEIGHT: 66 IN

## 2024-04-18 DIAGNOSIS — F90.8 ATTENTION DEFICIT HYPERACTIVITY DISORDER (ADHD), OTHER TYPE: ICD-10-CM

## 2024-04-18 DIAGNOSIS — F25.0 SCHIZOAFFECTIVE DISORDER, BIPOLAR TYPE (H): ICD-10-CM

## 2024-04-18 DIAGNOSIS — F90.9 ATTENTION DEFICIT HYPERACTIVITY DISORDER (ADHD), UNSPECIFIED ADHD TYPE: ICD-10-CM

## 2024-04-18 DIAGNOSIS — F79 INTELLECTUAL DISABILITY: ICD-10-CM

## 2024-04-18 DIAGNOSIS — Z87.820 HISTORY OF TRAUMATIC BRAIN INJURY: ICD-10-CM

## 2024-04-18 DIAGNOSIS — R46.89 AGGRESSIVE BEHAVIOR: ICD-10-CM

## 2024-04-18 DIAGNOSIS — F70 MILD INTELLECTUAL DISABILITY: ICD-10-CM

## 2024-04-18 DIAGNOSIS — F63.81 INTERMITTENT EXPLOSIVE DISORDER: ICD-10-CM

## 2024-04-18 DIAGNOSIS — F39 SEVERE MOOD DISORDER WITH PSYCHOTIC FEATURES (H): Primary | ICD-10-CM

## 2024-04-18 PROCEDURE — 99214 OFFICE O/P EST MOD 30 MIN: CPT | Performed by: NURSE PRACTITIONER

## 2024-04-18 ASSESSMENT — PAIN SCALES - GENERAL: PAINLEVEL: NO PAIN (0)

## 2024-04-18 NOTE — PROGRESS NOTES
Mental Health and Collaborative Care Psychiatry Service Rooming Note      Most pressing mental health concern at this time: continuing to maintain stable mental health.       Any new physical health conditions or diagnoses affecting you that we should be aware of: no      Side effects related to medications patient would like to discuss with the provider:  No      Are you taking your medications as prescribed?  yes        Do you need refills of any of the medications?  no      Are you taking any recreational substances? no      Is there any chance you are pregnant? No      Attendance guidelines reviewed: Yes      Roxana Del Rio RN on 4/18/2024 at 2:55 PM

## 2024-04-18 NOTE — PROGRESS NOTES
Psychiatric  Out- Patient  Follow Up Progress Note  Date of visit:4/18/2024           Discussion of Care and Treatment Recommendations:   This is a 23 year old male with  a  history of  Intellectual disability,  fetal alcohol  spectrum,and  mood Disorder, due to general medical condition.Pt resides in a group home.   Patient is seen in person  today accompanied by his group home nursing staff.      Last visit 02/24/24.  Recommendation at last visit .  Continue with current medications :   Abilify  5 mg in the morning  Quifacine ER   3 mg at bedtime    Invega 234 mg /IM monthly -given by group home   Thorazine 50 mg BID - agitation PRN   Statreta 100 mg in am   Trazodone 300 mg at bedtime   Lithium 900 mg very evening with meals   Olanzapine 10 mg daily - new added after ER visit in Dec 2023  Hydroxyzine 50 mg TID PRN-    2-High risk medication use-lithium level ordered    3. RTC:  4 week  Patient and I reviewed diagnosis and treatment plan and patient agrees with following recommendations:  Ongoing education given regarding diagnostic and treatment options with adequate verbalization of understanding.  Plan   Continue with current medications :   Abilify  5 mg in the morning  Quifacine ER   3 mg at bedtime    Invega 234 mg /IM monthly -given by group home   Thorazine 50 mg BID - agitation PRN   Statreta 100 mg in am   Trazodone 300 mg at bedtime   Lithium 900 mg very evening with meals   Olanzapine 10 mg daily -  Hydroxyzine 50 mg TID PRN-    2-High risk medication use-lithium level ordered    3. RTC:  4 week         DIagnoses:     Schizoaffective disorder, unspecified type (HCC)  Active Problems:  Intermittent explosive disorder  Fetal alcohol spectrum disorder  History of traumatic brain injury  Tobacco use disorder  Mild intellectual disability  ADHD (attention deficit hyperactivity disorder), combined type  Aggressive behavior      Patient Active Problem List   Diagnosis    Thrombocytopenia (H24)    Chronic  ITP (idiopathic thrombocytopenia) (H)    Attention deficit disorder    Intermittent explosive disorder    Anxiety disorder, unspecified    Schizoaffective disorder, unspecified (H)    Intermittent explosive disorder    Schizoaffective disorder, bipolar type (H)    Fetal alcohol syndrome    Intellectual disability             Chief Complaint / Subjective:    Chief complaint: Aggressive behavior    History of Present Illness:   Since our last visit patient has been to the ER twice on 2/26/2024 and 2/29/2024.  Both encounters have been reviewed.  Staff reports compliance with current medications with no notable side effects.  Occasional behavior issues including punching walls and yelling at staff.  Mostly happens patient uses THC after visiting his grandparents.  Also patient is obsessed with urinating and will attempt to urinate in the bathroom hourly and when he cannot get any urine he gets frustrated and keeps asking staff to come to the bathroom.  Staff has been trying to redirect him but this is still working progress.  Staff report that he is getting along with other residents in the house.  They have been utilizing as needed hydroxyzine which has been more helpful.  Patient is already medicated with multiple neuroleptics and other psychotropics.  We spent a lot of time discussing behavior modification issues and things patient would need to be working on.  Staff at the group home to also work on a behavior modification plan in addition to giving him medications to manage patient's behaviors.  Patient to continue with current plan of care.    Mental Status Examination:   Appearance: Well groomed, good eye contact   Orientation: Patient alert and oriented to person, place, time, and situation  Reliability:  Patient appears to be an adequate historian.    Behavior: cooperative   Speech: Speech is spontaneous and coherent, with a normal rate, rhythm and tone.    Language:There are no difficulties with expressive or  "receptive language as observed throughout the interview.    Mood: Described as \"ok\".    Affect: congruent   Judgement: Able to make basic decision regarding safety.  Insight: Good awareness of physical and mental health conditions and aware of needs around care for these.  Gait and station:  gait is stable  Thought process: Logical   Thought content: History of paranoia.  No evidence of delusions or paranoia.   Hallucinations : History of hallucinations.  No evidence of any hallucination during today's  Thought content: No evidence of delusions or paranoia.   Suicidal /Homical Ideations:  No thoughts of self harm or suicide. No thoughts of harming others.  Associations: Connected  Fund of knowledge: Average  Attention / Concentration: Needs redirection to stay for  Short Term Memory: Grossly intact as evidence by client recalling themes and ideas discussed.  Long Term Memory: Intact  Motor Status: No issues no    Drug/treatment history and current pattern of use:   History of cannabis use  History of nicotine use-vaping  Nicotine : Smokes daily     Medication changes: See Above   Medication adherence: compliant  Medication side effects: absent  Information about medications: Side effects, benefits and alternative treatments discussed and patient agrees .    Psychotherapy: Supportive therapy day-to-day living    Education: Diet, exercise, abstinence from drugs and alcohol, patient will not drive if sedated and medications or  under influence of any substance    Lab Results:  Personally reviewed and discussed with the patient    Lab Results   Component Value Date    WBC 12.4 (H) 02/26/2024    HGB 13.1 (L) 02/26/2024    HCT 40.1 02/26/2024     02/26/2024    CHOL 150 12/15/2023    TRIG 75 12/15/2023    HDL 34 (L) 12/15/2023    ALT 29 02/26/2024    AST 23 02/26/2024     02/26/2024    BUN 9.8 02/26/2024    CO2 22 02/26/2024    TSH 3.61 06/30/2022       Vital signs:  There were no vitals taken for this " visit.  Telemedicine visit-no vital signs completed  Allergies: Depakote [valproic acid], Depakote [valproic acid], and Other environmental allergy         Medications:       No current facility-administered medications for this visit.     No current facility-administered medications for this visit.     Current Outpatient Medications   Medication Sig Dispense Refill    ARIPiprazole (ABILIFY) 5 MG tablet TAKE 1 TABLET BY MOUTH EVERY MORNING 28 tablet 0    atomoxetine (STRATTERA) 100 MG capsule Take 1 capsule (100 mg) by mouth daily 30 capsule 3    chlorproMAZINE (THORAZINE) 50 MG tablet Take 1 tablet (50 mg) by mouth 2 times daily as needed for anxiety 60 tablet 2    ferrous sulfate (FEROSUL) 325 (65 Fe) MG tablet Take 325 mg by mouth      guanFACINE HCl (INTUNIV) 3 MG TB24 24 hr tablet Take 1 tablet (3 mg) by mouth at bedtime 28 tablet 1    hydrOXYzine HCl (ATARAX) 50 MG tablet Take 1 tablet (50 mg) by mouth 3 times daily as needed for anxiety 90 tablet 0    lithium 300 MG capsule Take 3 capsules (900 mg) by mouth daily 90 capsule 3    OLANZapine (ZYPREXA) 10 MG tablet Take 1 tablet (10 mg) by mouth at bedtime 90 tablet 0    ondansetron (ZOFRAN ODT) 4 MG ODT tab Take 1 tablet (4 mg) by mouth every 6 hours as needed for nausea or vomiting 15 tablet 0    paliperidone (INVEGA SUSTENNA) 234 MG/1.5ML JUANA ADMINISTER 1.5ML (234MG) INTRAMUSCULARLY EVERY 30 DAYS 1.5 mL 0    traZODone HCl 300 MG TABS Take 1 tablet by mouth at bedtime 28 tablet 1    vitamin C (ASCORBIC ACID) 250 MG TABS tablet Take 250 mg by mouth      atomoxetine (STRATTERA) 100 MG capsule Take 100 mg by mouth daily      atropine 1 % ophthalmic solution Place 2 drops under the tongue (Patient not taking: Reported on 1/2/2024)      cholecalciferol 25 MCG (1000 UT) TABS Take 25 mcg by mouth (Patient not taking: Reported on 4/18/2024)      ferrous sulfate (FE TABS) 325 (65 Fe) MG EC tablet Take 325 mg by mouth daily      guanFACINE HCl (INTUNIV) 3 MG TB24 24 hr  tablet TAKE 1 TABLET BY MOUTH AT BEDTIME 28 tablet 0    lithium 300 MG capsule Take 900 mg by mouth every evening With dinner.      loratadine (CLARITIN) 10 MG tablet Take 10 mg by mouth (Patient not taking: Reported on 4/18/2024)      metFORMIN (GLUCOPHAGE-XR) 500 MG 24 hr tablet Take 1,000 mg by mouth (Patient not taking: Reported on 1/2/2024)      nicotine (NICODERM CQ) 14 MG/24HR 24 hr patch Place 1 patch onto the skin every 24 hours (Patient not taking: Reported on 12/14/2023)      paliperidone (INVEGA SUSTENNA) 234 MG/1.5ML JUANA Inject 234 mg into the muscle every 28 days      traZODone HCl 300 MG TABS Take 300 mg by mouth at bedtime 30 tablet 1     No current facility-administered medications for this visit.         Medication adherence: Reviewed risk/benefits of medication , Patient able to verbalize understanding of side effects and Patient verbally consents to taking medications      PSYCHOEDUCATION:  Medication side effects and alternatives reviewed. Health promotion activities recommended and reviewed today. All questions addressed. Education and counseling completed regarding risks and benefits of medications and psychotherapy options.  Consent provided by patient/guardian  Call the psychiatric nurse line with medication questions or concerns at [phone].  MyChart may be used to communicate with your provider, but this is not intended to be used for emergencies.  SEROTONIN SYNDROME:  Discussed risks of Serotonin syndrome (ie, serotonin toxicity) which is a potentially life-threatening condition associated with increased serotonergic activity in the central nervous system (CNS). It is seen with therapeutic medication use, inadvertent interactions between drugs, and intentional self-poisoning. Serotonin syndrome may involve a spectrum of clinical findings, which often include mental status changes, autonomic hyperactivity, and neuromuscular abnormalities.    STIMULANT THERAPY: Side effects discussed  including but not limited to cardiac (including HTN, tachycardia, sudden death), motor/tic, appetite/growth, mood lability and sleep disruption. This is a controlled substance with risk for abuse, need to keep in a safe keep place and cannot replace lost scripts  HARM REDUCTION:  Discussions regarding effects of mood altering substances, alcohol and cannabis, on mood and that approach is harm reduction, will continue to prescribe meds as they work to cut back use.    SAFETY:  We all care about your loved one's safety. To reduce the risk of self-harm, remove access to all:  Firearms, Medicines (both prescribed and over-the-counter), Knives and other sharp objects, Ropes and like materials, and Alcohol  SLEEP HYGIENE: establish a sleep routine, limit screen time 1 hour prior to bed, use bed for sleep only, take sleep/medications on time (including sleepy time tea, trazadone or herbal treatments such as melatonin), aroma therapy, limit caffeine/sugar, yoga, guided imagery, stretch, meditation, limit naps to 20 minutes, make a temperature change in the room, white noise, be mindful of slowing down breathing, take a warm bath/shower, frequently wash sheets, and journaling.   Medlineplus.gov is information for patients.  It is run by the National Library of Medicine and it contains information about all disorders, diseases and all medications.              Review of Systems:      ROS:    Subjective Data Only- Tele-Health Visit    10 point ROS was negative except for the items listed in HPI.      Crisis Resources:    Present to the Emergency Department as needed or call after hours crisis line at [phone] or [phone].   [place] Crisis Text Line: Text [place] to 403706.  Suicide LifeLine Chat: suicidepreventionlifeline.org/chat/.  National Suicide Prevention Lifeline: 254.548.5005 (TTY: 464.199.5528). Call anytime for help.  (www.suicidepreventionlifeline.org)  National [place] on Mental Illness (www.milvia.org):  499-083-8080 or 504-270-5989.  Mental Health Association (www.mentalhealth.org): 222.247.6362 or 724-606-4989.      Coordination of Care:   More than 30 minutes spent on this visit  with more than 50% of time spent on coordination of care including: Educating patient about diagnosis, prognosis, side effects and benefits of medications, diet, exercise.  Time also spent providing supportive therapy regarding above issues.    Disclaimer: This note consists of symbols derived from keyboarding, dictation and/or voice recognition software. As a result, there may be errors in the script that have gone undetected. Please consider this when interpreting information found in this chart.    Start Time : 1400  End time :  1430

## 2024-04-18 NOTE — PATIENT INSTRUCTIONS
"The [facility]  What to Expect  Here's what to expect in the Panel Psychiatry Program.   About the program  You'll be meeting with a psychiatric doctor to check your mental health. A psychiatric doctor helps you deal with troubling thoughts and feelings by giving you medicine. They'll make sure you know the plan for your care. You may see them for a long time. When you're feeling better, they may refer you back to seeing your family doctor.   If you have any questions, we'll be glad to talk to you.  About visits  Be open  At your visits, please talk openly about your problems. It may feel hard, but it's the best way for us to help you.  Cancelling visits  If you can't come to your visit, please call us right away at [phone]. If you don't cancel at least 24 hours (1 full day) before your visit, that's \"late cancellation.\"  Not showing up for your visits  Being very late is the same as not showing up. You'll be a \"no show\" if:  You're more than 15 minutes late for a 30-minute (half hour) visit.  You're more than 30 minutes late for a 60-minute (full hour) visit.  If you cancel late or don't show up 2 times within 6 months, we may end your care.  Getting help between visits  If you need help between visits, you can call us Monday to Friday from 8 a.m. to 4:30 p.m. at [phone].  Emergency care  Call 911 or go to the nearest emergency department if your life or someone else's life is in danger.  Call 988 anytime to reach the national Suicide and Crisis hotline.  Medicine refills  To refill your medicine, call your pharmacy. You can also call [facility]'s [facility] at [phone], Monday to Friday, 8 a.m. to 4:30 p.m. It can take 1 to 3 business days to get a refill.   Forms, letters, and tests  You may have papers to fill out, like FMLA, short-term disability, and workability. We can help you with these forms at your visits, but you must have an appointment. You may need more " than 1 visit for this, to be in an intensive therapy program, or both.  Before we can give you medicine for ADHD, we may refer you to get tested for it or confirm it another way.  We may not be able to give you an emotional support animal letter.  We don't do mental health checks ordered by the court.   We don't do mental health testing, but we can refer you to get tested.   Thank you for choosing us for your care.  For informational purposes only. Not to replace the advice of your health care provider. Copyright   2022 [facility]. All rights reserved. [employer] [id number] - 12/22      After Visit Summary   Continue medications as prescribed  Have your pharmacy contact us for a refill if you are running low on medications (We may ask you to come into clinic to get a refill from the nurse  No Alcohol or drug use  No driving if sedated  Call the clinic with any questions or concerns   Reach out for help if you feel like hurting yourself or others ([facility] [phone]: [address] [place], [address] or [facility] Suicide Hotline   [phone] , call 911 or go to nearest Emergency room     Crisis Resources:    Present to the Emergency Department as needed or call after hours crisis line at [phone] or [phone].   [place] Crisis Text Line: Text [place] to 037418.  Suicide LifeLine Chat: suicidepreventionlifeline.org/chat/.  National Suicide Prevention Lifeline: 979.893.8396 (TTY: 637.175.3180). Call anytime for help.  (www.suicidepreventionlifeline.org)  National [place] on Mental Illness (www.milvia.org): 678.650.1132 or 208-573-1652.  Mental Health Association (www.mentalhealth.org): 283.349.9511 or 695-464-1773.       Follow up as directed, for your appointments, per your After Visit Summary Form.

## 2024-04-23 DIAGNOSIS — F25.0 SCHIZOAFFECTIVE DISORDER, BIPOLAR TYPE (H): ICD-10-CM

## 2024-04-23 DIAGNOSIS — F90.8 ATTENTION DEFICIT HYPERACTIVITY DISORDER (ADHD), OTHER TYPE: ICD-10-CM

## 2024-04-23 DIAGNOSIS — F25.0 SCHIZOAFFECTIVE DISORDER, BIPOLAR TYPE (H): Primary | ICD-10-CM

## 2024-04-23 RX ORDER — LITHIUM CARBONATE 300 MG/1
900 CAPSULE ORAL EVERY EVENING
Qty: 90 CAPSULE | Refills: 0 | Status: SHIPPED | OUTPATIENT
Start: 2024-04-23 | End: 2024-06-11

## 2024-04-23 RX ORDER — ATOMOXETINE 100 MG/1
100 CAPSULE ORAL DAILY
Qty: 30 CAPSULE | Refills: 0 | Status: SHIPPED | OUTPATIENT
Start: 2024-04-23

## 2024-04-23 NOTE — TELEPHONE ENCOUNTER
Date of Last Office Visit: 04/18/2024  Date of Next Office Visit: 06/20/2024  No shows since last visit: 0  Cancellations since last visit: 0    Medication requested: atomoxetine (STRATTERA) 100 MG capsule Date last ordered:12/14/2023 Qty: 30 Refills:3      Medication requested: lithium 300 MG capsule   Date last ordered: 12/14/2023 Qty: 90 Refills: 3      Review of [place] ?: N/A  Lapse in medication adherence greater than 5 days?: no  Medication refill request verified as identical to current order?: yes  Result of Last DAM, VPA, Li+ Level, CBC, or Carbamazepine Level (at or since last visit): N/A    Last visit treatment plan:   RTC:  4 week  Abilify  5 mg in the morning  Quifacine ER   3 mg at bedtime    Invega 234 mg /IM monthly -given by group home   Thorazine 50 mg BID - agitation PRN   Statreta 100 mg in am   Trazodone 300 mg at bedtime   Lithium 900 mg very evening with meals   Olanzapine 10 mg daily -  Hydroxyzine 50 mg TID PRN-    2-High risk medication use-lithium level ordered        []Medication refilled per  Medication Refill in Ambulatory Care  policy.  []Medication unable to be refilled by RN due to criteria not met as indicated below:    []Eligibility - not seen in the last year   []Supervision - no future appointment   []Compliance - no shows, cancellations or lapse in therapy   []Verification - order discrepancy   []Controlled medication   [x]Medication not included in policy   []90-day supply request   [x]Other

## 2024-04-24 RX ORDER — TRAZODONE HYDROCHLORIDE 300 MG/1
1 TABLET ORAL AT BEDTIME
Qty: 28 TABLET | Refills: 1 | Status: SHIPPED | OUTPATIENT
Start: 2024-04-24 | End: 2024-06-18

## 2024-04-24 NOTE — TELEPHONE ENCOUNTER
Date of Last Office Visit: 4/18/24  Date of Next Office Visit: 6/20/24  No shows since last visit: 0  Cancellations since last visit: 0    Medication requested: traZODone HCl 300 MG TABS Date last ordered: 2/28/24 Qty: 30 Refills: 1     [place] of [place] ?: NA    Lapse in medication adherence greater than 5 days?: No  If yes, call patient and gather details: NA  Medication refill request verified as identical to current order?: Yes  Result of Last DAM, VPA, Li+ Level, CBC, or Carbamazepine Level (at or since last visit): N/A    Last visit treatment plan: 4/18/24     Last visit 02/24/24.  Recommendation at last visit .  Continue with current medications :   Abilify  5 mg in the morning  Quifacine ER   3 mg at bedtime    Invega 234 mg /IM monthly -given by group home   Thorazine 50 mg BID - agitation PRN   Statreta 100 mg in am   Trazodone 300 mg at bedtime   Lithium 900 mg very evening with meals   Olanzapine 10 mg daily - new added after ER visit in Dec 2023  Hydroxyzine 50 mg TID PRN-    2-High risk medication use-lithium level ordered    3. RTC:  4 week  Patient and I reviewed diagnosis and treatment plan and patient agrees with following recommendations:  Ongoing education given regarding diagnostic and treatment options with adequate verbalization of understanding.  Plan   Continue with current medications :   Abilify  5 mg in the morning  Quifacine ER   3 mg at bedtime    Invega 234 mg /IM monthly -given by group home   Thorazine 50 mg BID - agitation PRN   Statreta 100 mg in am   Trazodone 300 mg at bedtime   Lithium 900 mg very evening with meals   Olanzapine 10 mg daily -  Hydroxyzine 50 mg TID PRN-    2-High risk medication use-lithium level ordered    3. RTC:  4 week       [x]Medication refilled per  Medication Refill in Ambulatory Care  policy.  []Medication unable to be refilled by RN due to criteria not met as indicated below:    []Eligibility - not seen in the last year   []Supervision - no future  appointment   []Compliance - no shows, cancellations or lapse in therapy   []Verification - order discrepancy   []Controlled medication   []Medication not included in policy   []90-day supply request   []Other

## 2024-04-26 ENCOUNTER — MYC REFILL (OUTPATIENT)
Dept: PSYCHIATRY | Facility: CLINIC | Age: 24
End: 2024-04-26
Payer: COMMERCIAL

## 2024-04-26 DIAGNOSIS — F63.81 INTERMITTENT EXPLOSIVE DISORDER: ICD-10-CM

## 2024-04-26 RX ORDER — ARIPIPRAZOLE 5 MG/1
5 TABLET ORAL EVERY MORNING
Qty: 28 TABLET | Refills: 0 | Status: SHIPPED | OUTPATIENT
Start: 2024-04-26 | End: 2024-05-15

## 2024-04-26 NOTE — TELEPHONE ENCOUNTER
Date of Last Office Visit: 04/18/2024  Date of Next Office Visit: 06/20/2024  No shows since last visit: 0  Cancellations since last visit: 0    Medication requested: ARIPiprazole (ABILIFY) 5 MG tablet  Date last ordered: 03/26/2024 Qty: 28 Refills: 0     [place] of [place] ?: N/A  Lapse in medication adherence greater than 5 days?: no  Medication refill request verified as identical to current order?: Yes  Result of Last DAM, VPA, Li+ Level, CBC, or Carbamazepine Level (at or since last visit): N/A    Last visit treatment plan:   RTC:  4 week  Abilify  5 mg in the morning  Quifacine ER   3 mg at bedtime    Invega 234 mg /IM monthly -given by group home   Thorazine 50 mg BID - agitation PRN   Statreta 100 mg in am   Trazodone 300 mg at bedtime   Lithium 900 mg very evening with meals   Olanzapine 10 mg daily -  Hydroxyzine 50 mg TID PRN-          []Medication refilled per  Medication Refill in Ambulatory Care  policy.  []Medication unable to be refilled by RN due to criteria not met as indicated below:    []Eligibility - not seen in the last year   []Supervision - no future appointment   []Compliance - no shows, cancellations or lapse in therapy   []Verification - order discrepancy   []Controlled medication   [x]Medication not included in policy   []90-day supply request   [x]Other

## 2024-04-29 DIAGNOSIS — F39 SEVERE MOOD DISORDER WITH PSYCHOTIC FEATURES (H): ICD-10-CM

## 2024-04-29 RX ORDER — PALIPERIDONE PALMITATE 234 MG/1.5ML
234 INJECTION INTRAMUSCULAR
Qty: 1.5 ML | Refills: 2 | Status: SHIPPED | OUTPATIENT
Start: 2024-04-29 | End: 2024-08-12

## 2024-04-29 NOTE — TELEPHONE ENCOUNTER
Reason for call:  Medication   If this is a refill request, has the caller requested the refill from the pharmacy already? Yes  Will the patient be using a [facility]? No  Name of the pharmacy and phone number for the current request: [facility] [phone]     Name of the medication requested: Klonopin invega and aripiprazole need refills.     Other request: Pt is scheduled for delivery on 5/3/24 has a week left     Phone number to reach patient: [phone]    Best Time:      Can we leave a detailed message on this number?  YES    Travel screening: Not Applicable

## 2024-04-29 NOTE — TELEPHONE ENCOUNTER
Date of Last Office Visit: 4/18/24  Date of Next Office Visit: 6/20/24  No shows since last visit: 0  Cancellations since last visit: 0    Medication requested: paliperidone (INVEGA SUSTENNA) 234 MG/1.5ML JUANA  Date last ordered: 3/26/24 Qty: 1.5 mg Refills: 0        Disp Refills Start End CIELO   paliperidone (INVEGA SUSTENNA) 234 MG/1.5ML JUANA 1.5 mL 0 3/26/2024 -- No   Sig: ADMINISTER 1.5ML (234MG) INTRAMUSCULARLY EVERY 30 DAYS   Sent to pharmacy as: Invega Sustenna 234 MG/1.5ML Intramuscular Suspension Prefilled Syringe (paliperidone)   Class: E-Prescribe         Lapse in medication adherence greater than 5 days?: no  If yes, call patient and gather details: NA  Medication refill request verified as identical to current order?: yes  Result of Last DAM, VPA, Li+ Level, CBC, or Carbamazepine Level (at or since last visit): N/A    Last visit treatment plan:     Plan   Continue with current medications :   Abilify  5 mg in the morning  Quifacine ER   3 mg at bedtime    Invega 234 mg /IM monthly -given by group home   Thorazine 50 mg BID - agitation PRN   Statreta 100 mg in am   Trazodone 300 mg at bedtime   Lithium 900 mg very evening with meals   Olanzapine 10 mg daily -  Hydroxyzine 50 mg TID PRN-    2-High risk medication use-lithium level ordered    3. RTC:  4 week    []Medication refilled per  Medication Refill in Ambulatory Care  policy.  [x]Medication unable to be refilled by RN due to criteria not met as indicated below:    []Eligibility - not seen in the last year   []Supervision - no future appointment   []Compliance - no shows, cancellations or lapse in therapy   []Verification - order discrepancy   []Controlled medication   []Medication not included in policy   []90-day supply request   [x]Other: LPN processed

## 2024-05-03 ENCOUNTER — TELEPHONE (OUTPATIENT)
Dept: PSYCHIATRY | Facility: CLINIC | Age: 24
End: 2024-05-03
Payer: COMMERCIAL

## 2024-05-03 ENCOUNTER — MEDICAL CORRESPONDENCE (OUTPATIENT)
Dept: HEALTH INFORMATION MANAGEMENT | Facility: CLINIC | Age: 24
End: 2024-05-03
Payer: COMMERCIAL

## 2024-05-03 DIAGNOSIS — F90.8 ATTENTION DEFICIT HYPERACTIVITY DISORDER (ADHD), OTHER TYPE: ICD-10-CM

## 2024-05-03 RX ORDER — GUANFACINE 3 MG/1
3 TABLET, EXTENDED RELEASE ORAL AT BEDTIME
Qty: 28 TABLET | Refills: 1 | Status: SHIPPED | OUTPATIENT
Start: 2024-05-03 | End: 2024-06-18

## 2024-05-03 NOTE — TELEPHONE ENCOUNTER
RN reviewed refill request for guanfacine HCl (INTUNIV) 3 MG TB24 24 hr tablet       Date of Last Office Visit: 4/18/2024  Date of Next Office Visit: 6/20/2024  No shows since last visit: none  Cancellations since last visit: none     Medication requested: guanfacine HCl (INTUNIV) 3 MG TB24 24 hr tablet  Date last ordered: 3/26/2024 Qty: 28 Refills: 0     Review of MN ?: N/A for this medication     Lapse in medication adherence greater than 5 days?: No, the pharmacy needs this so they put together the blister packs and get it to the patient's home.   If yes, call patient and gather details: NA  Medication refill request verified as identical to current order?: yes  Result of Last DAM, VPA, Li+ Level, CBC, or Carbamazepine Level (at or since last visit): N/A    Last visit treatment plan:   Plan   Continue with current medications :   Abilify  5 mg in the morning  Quifacine ER   3 mg at bedtime    Invega 234 mg /IM monthly -given by group home   Thorazine 50 mg BID - agitation PRN   Statreta 100 mg in am   Trazodone 300 mg at bedtime   Lithium 900 mg very evening with meals   Olanzapine 10 mg daily -  Hydroxyzine 50 mg TID PRN-    2-High risk medication use-lithium level ordered    3. RTC:  4 week    [x]Medication refilled per  Medication Refill in Ambulatory Care  policy.  []Medication unable to be refilled by RN due to criteria not met as indicated below:    []Eligibility - not seen in the last year   []Supervision - no future appointment   []Compliance - no shows, cancellations or lapse in therapy   []Verification - order discrepancy   []Controlled medication   []Medication not included in policy   []90-day supply request   []Other    Stefanie Babcock RN on 5/3/2024 at 11:03 AM

## 2024-05-03 NOTE — TELEPHONE ENCOUNTER
Reason for Call:  Other prescription refill     Detailed comments: Pt pharmacy calling about guanfacine 3 mg tablet, reports have requested the medication over a week ago and has not been filled and pt meds need to be filled today. If there is any reason as to why the prescription cannot be filled please call back [facility] [place] pharmacy at below number.    Phone Number Pharmacy can be reached at: [phone]     Best Time: Business Hours    Can we leave a detailed message on this number? Not Applicable    Call taken on 5/3/2024 at 10:22 AM by Katherine Wray

## 2024-05-07 ENCOUNTER — TRANSCRIBE ORDERS (OUTPATIENT)
Dept: OTHER | Age: 24
End: 2024-05-07

## 2024-05-07 DIAGNOSIS — E66.812 CLASS 2 OBESITY DUE TO EXCESS CALORIES WITHOUT SERIOUS COMORBIDITY WITH BODY MASS INDEX (BMI) OF 38.0 TO 38.9 IN ADULT: Primary | ICD-10-CM

## 2024-05-07 DIAGNOSIS — E66.09 CLASS 2 OBESITY DUE TO EXCESS CALORIES WITHOUT SERIOUS COMORBIDITY WITH BODY MASS INDEX (BMI) OF 38.0 TO 38.9 IN ADULT: Primary | ICD-10-CM

## 2024-05-15 DIAGNOSIS — F63.81 INTERMITTENT EXPLOSIVE DISORDER: ICD-10-CM

## 2024-05-15 RX ORDER — ARIPIPRAZOLE 5 MG/1
5 TABLET ORAL EVERY MORNING
Qty: 28 TABLET | Refills: 0 | Status: SHIPPED | OUTPATIENT
Start: 2024-05-15 | End: 2024-06-18

## 2024-05-15 NOTE — TELEPHONE ENCOUNTER
Date of Last Office Visit: 04/18/2024  Date of Next Office Visit: 06/20/2024  No shows since last visit: 0   Cancellations since last visit: 0    Medication requested: ARIPiprazole (ABILIFY) 5 MG tablet  Date last ordered: 04/26/2024 Qty: 28 Refills: 0     Review of MN ?: N/A  Lapse in medication adherence greater than 5 days?: NO  Medication refill request verified as identical to current order?: yes  Result of Last DAM, VPA, Li+ Level, CBC, or Carbamazepine Level (at or since last visit): N/A    Last visit treatment plan:   RTC:  4 week  Abilify  5 mg in the morning  Quifacine ER   3 mg at bedtime    Invega 234 mg /IM monthly -given by group home   Thorazine 50 mg BID - agitation PRN   Statreta 100 mg in am   Trazodone 300 mg at bedtime   Lithium 900 mg very evening with meals   Olanzapine 10 mg daily -  Hydroxyzine 50 mg TID PRN-    2-High risk medication use-lithium level ordered        []Medication refilled per  Medication Refill in Ambulatory Care  policy.  []Medication unable to be refilled by RN due to criteria not met as indicated below:    []Eligibility - not seen in the last year   []Supervision - no future appointment   []Compliance - no shows, cancellations or lapse in therapy   []Verification - order discrepancy   []Controlled medication   [x]Medication not included in policy   []90-day supply request   [x]Other

## 2024-05-21 DIAGNOSIS — F25.0 SCHIZOAFFECTIVE DISORDER, BIPOLAR TYPE (H): ICD-10-CM

## 2024-05-21 NOTE — TELEPHONE ENCOUNTER
Date of Last Office Visit: 4/18/24  Date of Next Office Visit: 6/20/24  No shows since last visit: 0  Cancellations since last visit: 0    Medication requested: OLANZapine (ZYPREXA) 10 MG tablet  Date last ordered: 2/22/24 Qty: 90 Refills: 0     Lapse in medication adherence greater than 5 days?: no  If yes, call patient and gather details: na  Medication refill request verified as identical to current order?: yes  Result of Last DAM, VPA, Li+ Level, CBC, or Carbamazepine Level (at or since last visit): N/A    Last visit treatment plan:     Plan   Continue with current medications :   Abilify  5 mg in the morning  Quifacine ER   3 mg at bedtime    Invega 234 mg /IM monthly -given by group home   Thorazine 50 mg BID - agitation PRN   Statreta 100 mg in am   Trazodone 300 mg at bedtime   Lithium 900 mg very evening with meals   Olanzapine 10 mg daily -  Hydroxyzine 50 mg TID PRN-    2-High risk medication use-lithium level ordered    3. RTC:  4 week    []Medication refilled per  Medication Refill in Ambulatory Care  policy.  [x]Medication unable to be refilled by RN due to criteria not met as indicated below:    []Eligibility - not seen in the last year   []Supervision - no future appointment   []Compliance - no shows, cancellations or lapse in therapy   []Verification - order discrepancy   []Controlled medication   []Medication not included in policy   [x]90-day supply request   []Other

## 2024-05-22 RX ORDER — OLANZAPINE 10 MG/1
10 TABLET ORAL AT BEDTIME
Qty: 90 TABLET | Refills: 0 | Status: SHIPPED | OUTPATIENT
Start: 2024-05-22 | End: 2024-08-13

## 2024-06-11 DIAGNOSIS — F25.0 SCHIZOAFFECTIVE DISORDER, BIPOLAR TYPE (H): ICD-10-CM

## 2024-06-11 DIAGNOSIS — F63.81 INTERMITTENT EXPLOSIVE DISORDER: ICD-10-CM

## 2024-06-11 NOTE — TELEPHONE ENCOUNTER
Reason for call:  Medication   If this is a refill request, has the caller requested the refill from the pharmacy already? Yes  Will the patient be using a [facility]? No  Name of the pharmacy and phone number for the current request:   [facility]-[id number] - [address]     Name of the medication requested:   lithium 300 MG capsule     Other request: rcvd call from Sanam STUART @ [facility] assisted [facility] # [phone].  Client only has 2 doses left. Sanam is asking for call back ASAP.   Stating she may need to send to Urgent care if they do not get refill in time.     Phone number to reach patient:  Other phone number:  [phone]    Best Time:  any    Can we leave a detailed message on this number?  YES    Travel screening: Not Applicable

## 2024-06-11 NOTE — TELEPHONE ENCOUNTER
1) Received notification from [facility] that the patient has 2 doses left of Lithium and assisted living facility will have to bring the patient to urgent care if they don't get a refill in time.     2) Per review of the medical record, there is not a valid [facility] for [facility] # [phone]. The patient's name is not listed on the TIGRE.     3) Pending the refill of Lithium for provider review          Date of Last Office Visit: 4/18/24  Date of Next Office Visit: 6/20/24  No shows since last visit: 0  Cancellations since last visit: 0    Medication requested: lithium 300 MG capsule  Date last ordered: 4/23/24 Qty: 90 Refills: 0     Lapse in medication adherence greater than 5 days?: no - patient' assisted living facility reports he has 2 doses left  If yes, call patient and gather details: na  Medication refill request verified as identical to current order?: yes  Result of Last DAM, VPA, Li+ Level, CBC, or Carbamazepine Level (at or since last visit): N/A      Last visit treatment plan:     Plan   Continue with current medications :   Abilify  5 mg in the morning  Quifacine ER   3 mg at bedtime    Invega 234 mg /IM monthly -given by group home   Thorazine 50 mg BID - agitation PRN   Statreta 100 mg in am   Trazodone 300 mg at bedtime   Lithium 900 mg very evening with meals   Olanzapine 10 mg daily -  Hydroxyzine 50 mg TID PRN-    2-High risk medication use-lithium level ordered    3. RTC:  4 week    []Medication refilled per  Medication Refill in Ambulatory Care  policy.  [x]Medication unable to be refilled by RN due to criteria not met as indicated below:    []Eligibility - not seen in the last year   []Supervision - no future appointment   []Compliance - no shows, cancellations or lapse in therapy   []Verification - order discrepancy   []Controlled medication   [x]Medication not included in policy   []90-day supply request   []Other

## 2024-06-12 RX ORDER — LITHIUM CARBONATE 300 MG/1
900 CAPSULE ORAL EVERY EVENING
Qty: 90 CAPSULE | Refills: 0 | Status: SHIPPED | OUTPATIENT
Start: 2024-06-12 | End: 2024-06-18

## 2024-07-03 DIAGNOSIS — F90.9 ATTENTION DEFICIT HYPERACTIVITY DISORDER (ADHD), UNSPECIFIED ADHD TYPE: ICD-10-CM

## 2024-07-03 NOTE — TELEPHONE ENCOUNTER
Reason for call:  Medication   If this is a refill request, has the caller requested the refill from the pharmacy already? No  Will the patient be using a [facility]? No  Name of the pharmacy and phone number for the current request: [facility], [id number] [address]  [phone]     Name of the medication requested: atomoxetine (STRATTERA) 100 MG capsule     Other request: n/a    Phone number to reach patient:  Home number on file [phone] (home)    Best Time:  ASAP    Can we leave a detailed message on this number?  Not Applicable    Travel screening: Not Applicable

## 2024-07-03 NOTE — TELEPHONE ENCOUNTER
Date of Last Office Visit: 4/18/24  Date of Next Office Visit: None; routing for A to assist pt with scheduling.    No shows since last visit: Yes  More than 2 cancellations since last visit? No    []Medication refilled per  Medication Refill in Ambulatory Care  policy.  [x]Medication unable to be refilled by RN due to criteria not met as indicated below:    []Eligibility: has not had a provider visit within last 6 months   [x]Supervision: no future appointment; < 7 days before next appointment   [x]Compliance: no shows; cancellations; lapse in therapy   []Verification: order discrepancy; may need modification...   []90-day supply request   []Advanced refill request: > 7 days before refill date   []Controlled medication   []Medication not included in policy   []Review: new med; med adjusted ? 30 days; safety alert; requires lab monitoring...   []Scope of Practice: refill request processed by LPN/MA   []Other:      Medication(s) requested:     -  atomoxetine (STRATTERA) 100 MG capsule   Date last ordered: 4/23/24  Qty: 30  Refills: 0     Any Controlled Substance(s)? No    Requested medication(s) verified as identical to current order? Yes    Any lapse in adherence to medication(s) greater than 5 days? Yes     Action required? patient contacted via phone Called the patient. He has been out of the Strattera since 6/27/24 .    Last visit treatment plan:       Plan   Continue with current medications :   Abilify  5 mg in the morning  Quifacine ER   3 mg at bedtime    Invega 234 mg /IM monthly -given by group home   Thorazine 50 mg BID - agitation PRN   Statreta 100 mg in am   Trazodone 300 mg at bedtime   Lithium 900 mg very evening with meals   Olanzapine 10 mg daily -  Hydroxyzine 50 mg TID PRN-    2-High risk medication use-lithium level ordered    3. RTC:  4 week    Any medication(s) require lab monitoring? No

## 2024-07-10 RX ORDER — ATOMOXETINE 100 MG/1
100 CAPSULE ORAL DAILY
Qty: 30 CAPSULE | Refills: 3 | Status: SHIPPED | OUTPATIENT
Start: 2024-07-10

## 2024-08-12 DIAGNOSIS — F39 SEVERE MOOD DISORDER WITH PSYCHOTIC FEATURES (H): ICD-10-CM

## 2024-08-12 RX ORDER — PALIPERIDONE PALMITATE 234 MG/1.5ML
234 INJECTION INTRAMUSCULAR
Qty: 1.5 ML | Refills: 2 | Status: SHIPPED | OUTPATIENT
Start: 2024-08-12

## 2024-08-12 NOTE — TELEPHONE ENCOUNTER
RN reviewed message and called assisted living facility back ([facility]) in chart. There was no answer and call went to . No message left.    RN called [facility] and they states they sent the last paliperidone (INVEGA SUSTENNA) 234 MG/1.5ML JUANA out to the facility at the end of June but they do not have a current record of when the injection was last given so they are not sure if he is over due but this is possible.     Routing refill request to covering provider pool while SUMANTH Jocelin is out of clinic.     Date of Last Office Visit: 4/18/24  Date of Next Office Visit:  8/29/24  No shows since last visit: Yes on 6/20/24  More than one patient-initiated cancellation (with reschedule) since last seen in clinic? No    []Medication refilled per  Medication Refill in Ambulatory Care  policy.  [x]Medication unable to be refilled by RN due to criteria not met as indicated below:    []Eligibility: has not had a provider visit within last 6 months   []Supervision: no future appointment; < 7 days before next appointment   []Compliance: no shows; cancellations; lapse in therapy   []Verification: order discrepancy; may need modification...   [] > 30-day supply request   []Advanced refill request: > 7 days before refill date   []Controlled medication   [x]Medication not included in policy   []Review: new med; med adjusted ? 30 days; safety alert; requires lab monitoring...   []Scope of Practice: refill request processed by LPN/MA   []Other:      Medication(s) requested:     -  paliperidone (INVEGA SUSTENNA) 234 MG/1.5ML JUANA   Date last ordered: 4/29/24  Qty: 1  Refills: 2  Appropriate for refill? Yes    Requested medication(s) verified as identical to current order? Yes    Any lapse in adherence to medication(s) greater than 5 days? Unknown     Additional action taken? spoke with pharmacy staff at [facility] .      Last visit treatment plan:   Plan   Continue with current medications :   Abilify  5 mg in the morning  Quifacine ER   3  mg at bedtime    Invega 234 mg /IM monthly -given by group home   Thorazine 50 mg BID - agitation PRN   Statreta 100 mg in am   Trazodone 300 mg at bedtime   Lithium 900 mg very evening with meals   Olanzapine 10 mg daily -  Hydroxyzine 50 mg TID PRN-    2-High risk medication use-lithium level ordered    3. RTC:  4 week    DIagnoses:      Schizoaffective disorder, unspecified type (HCC)  Active Problems:  Intermittent explosive disorder  Fetal alcohol spectrum disorder  History of traumatic brain injury  Tobacco use disorder  Mild intellectual disability  ADHD (attention deficit hyperactivity disorder), combined type  Aggressive behavior     History of Present Illness:   Since our last visit patient has been to the ER twice on 2/26/2024 and 2/29/2024.  Both encounters have been reviewed.  Staff reports compliance with current medications with no notable side effects.  Occasional behavior issues including punching walls and yelling at staff.  Mostly happens patient uses THC after visiting his grandparents.  Also patient is obsessed with urinating and will attempt to urinate in the bathroom hourly and when he cannot get any urine he gets frustrated and keeps asking staff to come to the bathroom.  Staff has been trying to redirect him but this is still working progress.  Staff report that he is getting along with other residents in the house.  They have been utilizing as needed hydroxyzine which has been more helpful.  Patient is already medicated with multiple neuroleptics and other psychotropics.  We spent a lot of time discussing behavior modification issues and things patient would need to be working on.  Staff at the group home to also work on a behavior modification plan in addition to giving him medications to manage patient's behaviors.  Patient to continue with current plan of care.

## 2024-08-12 NOTE — TELEPHONE ENCOUNTER
Reason for call:  Medication   If this is a refill request, has the caller requested the refill from the pharmacy already? Yes  Will the patient be using a [facility]? No  Name of the pharmacy and phone number for the current request: [facility] [phone]     Name of the medication requested: Invega     Other request: assisted living states they have requested invega a while ago, today is his last dose, pharmacy does not have a refill. I couldn't see prior documentation of this request. Next appt scheduled for 8/29/24.   Phone number to reach patient:  Other phone number: [phone] [facility] (assisted living)     Best Time:  asap    Can we leave a detailed message on this number?  YES    Travel screening: Not Applicable

## 2024-08-16 DIAGNOSIS — F90.8 ATTENTION DEFICIT HYPERACTIVITY DISORDER (ADHD), OTHER TYPE: ICD-10-CM

## 2024-08-16 DIAGNOSIS — F63.81 INTERMITTENT EXPLOSIVE DISORDER: ICD-10-CM

## 2024-08-16 DIAGNOSIS — F25.0 SCHIZOAFFECTIVE DISORDER, BIPOLAR TYPE (H): ICD-10-CM

## 2024-08-16 RX ORDER — ARIPIPRAZOLE 5 MG/1
5 TABLET ORAL EVERY MORNING
Qty: 28 TABLET | Refills: 1 | Status: SHIPPED | OUTPATIENT
Start: 2024-08-16

## 2024-08-16 RX ORDER — LITHIUM CARBONATE 300 MG/1
CAPSULE ORAL
Qty: 84 CAPSULE | Refills: 1 | Status: SHIPPED | OUTPATIENT
Start: 2024-08-16

## 2024-08-16 RX ORDER — GUANFACINE 3 MG/1
3 TABLET, EXTENDED RELEASE ORAL AT BEDTIME
Qty: 28 TABLET | Refills: 1 | Status: SHIPPED | OUTPATIENT
Start: 2024-08-16

## 2024-08-16 RX ORDER — TRAZODONE HYDROCHLORIDE 300 MG/1
1 TABLET ORAL AT BEDTIME
Qty: 28 TABLET | Refills: 1 | Status: SHIPPED | OUTPATIENT
Start: 2024-08-16

## 2024-08-16 NOTE — TELEPHONE ENCOUNTER
Date of Last Office Visit: 4/18/24  Date of Next Office Visit:  8/29/24  No shows since last visit: Yes  6/20/24  More than one patient-initiated cancellation (with reschedule) since last seen in clinic? No    []Medication refilled per  Medication Refill in Ambulatory Care  policy.  [x]Medication unable to be refilled by RN due to criteria not met as indicated below:    []Eligibility: has not had a provider visit within last 6 months   []Supervision: no future appointment; < 7 days before next appointment   []Compliance: no shows; cancellations; lapse in therapy   []Verification: order discrepancy; may need modification...   [] > 30-day supply request   []Advanced refill request: > 7 days before refill date   []Controlled medication   [x]Medication not included in policy   []Review: new med; med adjusted ? 30 days; safety alert; requires lab monitoring...   []Scope of Practice: refill request processed by LPN/MA   []Other:      Medication(s) requested:     -  ARIPiprazole (ABILIFY) 5 MG tablet   Date last ordered: 6/18/24  Qty: 28  Refills: 1  Appropriate for refill? Yes    -  guanFACINE HCl (INTUNIV) 3 MG TB24 24 hr tablet   Date last ordered: 6/18/24  Qty: 28  Refills: 1  Appropriate for refill? Yes    -  lithium 300 MG capsule   Date last ordered: 6/18/24  Qty: 84  Refills: 1  Appropriate for refill? Yes      -  traZODone HCl 300 MG TABS   Date last ordered: 6/18/24  Qty: 28  Refills: 1  Appropriate for refill? yes      Any Controlled Substance(s)? No      Requested medication(s) verified as identical to current order? Yes    Any lapse in adherence to medication(s) greater than 5 days? No      Additional action taken? none.      Last visit treatment plan:   Plan   Continue with current medications :   Abilify  5 mg in the morning  Quifacine ER   3 mg at bedtime    Invega 234 mg /IM monthly -given by group home   Thorazine 50 mg BID - agitation PRN   Statreta 100 mg in am   Trazodone 300 mg at bedtime   Lithium  900 mg very evening with meals   Olanzapine 10 mg daily -  Hydroxyzine 50 mg TID PRN-    2-High risk medication use-lithium level ordered    3. RTC:  4 week       Any medication(s) require lab monitoring? Yes   LITHIUM   Last Lithium Level:   Lithium   Date Value Ref Range Status   02/26/2024 0.46 (L) 0.60 - 1.20 mmol/L Final     Comment:     Therapeutic: 0.60 - 1.20 mmol/L;   Toxic: >2.00 mmol/L     Last BMP/CMP:   Sodium   Date Value Ref Range Status   02/26/2024 135 135 - 145 mmol/L Final     Comment:     Reference intervals for this test were updated on 09/26/2023 to more accurately reflect our healthy population. There may be differences in the flagging of prior results with similar values performed with this method. Interpretation of those prior results can be made in the context of the updated reference intervals.      Potassium   Date Value Ref Range Status   02/26/2024 3.7 3.4 - 5.3 mmol/L Final     Chloride   Date Value Ref Range Status   02/26/2024 103 98 - 107 mmol/L Final     Carbon Dioxide (CO2)   Date Value Ref Range Status   02/26/2024 22 22 - 29 mmol/L Final     Anion Gap   Date Value Ref Range Status   02/26/2024 10 7 - 15 mmol/L Final     Urea Nitrogen   Date Value Ref Range Status   02/26/2024 9.8 6.0 - 20.0 mg/dL Final     Creatinine   Date Value Ref Range Status   02/26/2024 0.82 0.67 - 1.17 mg/dL Final     GFR Estimate   Date Value Ref Range Status   02/26/2024 >90 >60 mL/min/1.73m2 Final     Calcium   Date Value Ref Range Status   02/26/2024 9.0 8.6 - 10.0 mg/dL Final     Glucose   Date Value Ref Range Status   02/26/2024 93 70 - 99 mg/dL Final      Last TSH with Free T4 Reflex:   TSH   Date Value Ref Range Status   06/30/2022 3.61 0.30 - 4.20 uIU/mL Final       Anil Casas RN on 8/16/2024 at 4:06 PM

## 2024-08-19 ENCOUNTER — TELEPHONE (OUTPATIENT)
Dept: PSYCHIATRY | Facility: CLINIC | Age: 24
End: 2024-08-19
Payer: COMMERCIAL

## 2024-08-19 NOTE — TELEPHONE ENCOUNTER
RN reviewed [facility] notes -    RN reviewed valid TIGRE on file dated 4/18/2024 to speak with Elaina at [facility] phone [phone].    RN reviewed that the refills for lithium, trazodone, abilify. and quifacine were all sent on 8/16/2024 at 4:31 PM to [facility].    RN called Jeramy at the above phone number and she indicated that she just had received a call from [facility] indicating they had received the refills and were working on filling them.    Stefanie Babcock RN on 8/19/2024 at 1:51 PM

## 2024-08-19 NOTE — TELEPHONE ENCOUNTER
Reason for call:  Medication   If this is a refill request, has the caller requested the refill from the pharmacy already? Yes  Will the patient be using a [facility]? No  Name of the pharmacy and phone number for the current request: [facility] on file    Name of the medication requested: lithium, trazodone, abilify. and quifacine      Other request: Assisted living staff was under the impression [facility] had requested these refills last week.    Phone number to reach patient:  Home number on file [phone] (home)    Best Time:  any    Can we leave a detailed message on this number?  YES    Travel screening: Not Applicable

## 2024-08-29 ENCOUNTER — OFFICE VISIT (OUTPATIENT)
Dept: PSYCHIATRY | Facility: CLINIC | Age: 24
End: 2024-08-29
Payer: COMMERCIAL

## 2024-08-29 VITALS
OXYGEN SATURATION: 97 % | HEART RATE: 87 BPM | HEIGHT: 66 IN | BODY MASS INDEX: 46.45 KG/M2 | SYSTOLIC BLOOD PRESSURE: 123 MMHG | WEIGHT: 289 LBS | DIASTOLIC BLOOD PRESSURE: 81 MMHG

## 2024-08-29 DIAGNOSIS — F90.9 ATTENTION DEFICIT HYPERACTIVITY DISORDER (ADHD), UNSPECIFIED ADHD TYPE: ICD-10-CM

## 2024-08-29 DIAGNOSIS — Q86.0 FETAL ALCOHOL SYNDROME: ICD-10-CM

## 2024-08-29 DIAGNOSIS — F79 INTELLECTUAL DISABILITY: ICD-10-CM

## 2024-08-29 DIAGNOSIS — F63.81 INTERMITTENT EXPLOSIVE DISORDER: ICD-10-CM

## 2024-08-29 DIAGNOSIS — R46.89 AGGRESSIVE BEHAVIOR: Primary | ICD-10-CM

## 2024-08-29 DIAGNOSIS — F70 MILD INTELLECTUAL DISABILITY: ICD-10-CM

## 2024-08-29 DIAGNOSIS — Z87.820 HISTORY OF TRAUMATIC BRAIN INJURY: ICD-10-CM

## 2024-08-29 DIAGNOSIS — F90.8 ATTENTION DEFICIT HYPERACTIVITY DISORDER (ADHD), OTHER TYPE: ICD-10-CM

## 2024-08-29 DIAGNOSIS — F25.0 SCHIZOAFFECTIVE DISORDER, BIPOLAR TYPE (H): ICD-10-CM

## 2024-08-29 DIAGNOSIS — F39 SEVERE MOOD DISORDER WITH PSYCHOTIC FEATURES (H): ICD-10-CM

## 2024-08-29 PROCEDURE — 99214 OFFICE O/P EST MOD 30 MIN: CPT | Performed by: NURSE PRACTITIONER

## 2024-08-29 PROCEDURE — G2211 COMPLEX E/M VISIT ADD ON: HCPCS | Performed by: NURSE PRACTITIONER

## 2024-08-29 RX ORDER — POLYETHYLENE GLYCOL 3350 17 G/17G
1 POWDER, FOR SOLUTION ORAL DAILY
COMMUNITY

## 2024-08-29 RX ORDER — BENZONATATE 100 MG/1
100 CAPSULE ORAL 3 TIMES DAILY PRN
COMMUNITY

## 2024-08-29 ASSESSMENT — ANXIETY QUESTIONNAIRES
GAD7 TOTAL SCORE: 8
3. WORRYING TOO MUCH ABOUT DIFFERENT THINGS: SEVERAL DAYS
7. FEELING AFRAID AS IF SOMETHING AWFUL MIGHT HAPPEN: SEVERAL DAYS
6. BECOMING EASILY ANNOYED OR IRRITABLE: SEVERAL DAYS
2. NOT BEING ABLE TO STOP OR CONTROL WORRYING: SEVERAL DAYS
1. FEELING NERVOUS, ANXIOUS, OR ON EDGE: MORE THAN HALF THE DAYS
5. BEING SO RESTLESS THAT IT IS HARD TO SIT STILL: SEVERAL DAYS
4. TROUBLE RELAXING: SEVERAL DAYS
GAD7 TOTAL SCORE: 8

## 2024-08-29 ASSESSMENT — PAIN SCALES - GENERAL: PAINLEVEL: NO PAIN (0)

## 2024-08-29 ASSESSMENT — PATIENT HEALTH QUESTIONNAIRE - PHQ9: SUM OF ALL RESPONSES TO PHQ QUESTIONS 1-9: 10

## 2024-08-29 NOTE — PROGRESS NOTES
Mental Health and Collaborative Care Psychiatry Service Rooming Note      Most pressing mental health concern at this time: Recheck      Any new physical health conditions or diagnoses affecting you that we should be aware of: Weight gain ( +19 lbs since 4/18/24)       Side effects related to medications patient would like to discuss with the provider:  c/o of having body tremors      Are you taking your medications as prescribed?  yes        Do you need refills of any of the medications?  no      Are you taking any recreational substances? Denies. Smoking only      Add attendance guidelines .phrase here   Care team has reviewed attendance agreement with patient. Patient advised that two failed appointments within 6 months may lead to termination of current episode of care.         Judy Holly LPN  August 29, 2024  12:07 PM

## 2024-08-29 NOTE — PROGRESS NOTES
Psychiatric  Out- Patient  Follow Up Progress Note  Date of visit:8/29/2024           Discussion of Care and Treatment Recommendations:   This is a 24 year old male with  history of  Intellectual disability,  fetal alcohol  spectrum,and  mood Disorder, due to general medical condition.Pt resides in a group home.   Patient is seen in person  today accompanied by his group home nursing staff.      Last visit 04/08/2024.  Recommendation at last visit .  Continue with current medications :   Abilify  5 mg in the morning  Quifacine ER   3 mg at bedtime    Invega 234 mg /IM monthly -given by group home   Thorazine 50 mg BID - agitation PRN   Statreta 100 mg in am   Trazodone 300 mg at bedtime   Lithium 900 mg very evening with meals   Olanzapine 10 mg daily -  Hydroxyzine 50 mg TID PRN-    2-High risk medication use-lithium level ordered    3. RTC:  4 week  Patient and I reviewed diagnosis and treatment plan and patient agrees with following recommendations:  Ongoing education given regarding diagnostic and treatment options with adequate verbalization of understanding.  Plan   Continue with current medications :   Abilify  5 mg in the morning  Quifacine ER   3 mg at bedtime    Invega 234 mg /IM monthly -given by group home   Thorazine 50 mg BID - agitation PRN   Statreta 100 mg in am   Trazodone 300 mg at bedtime   Lithium 900 mg very evening with meals   Olanzapine 10 mg daily -  Hydroxyzine 50 mg TID PRN-    2-High risk medication use-lithium level ordered    3. RTC:  4 week         DIagnoses:     Schizoaffective disorder, unspecified type (HCC)  Active Problems:  Intermittent explosive disorder  Fetal alcohol spectrum disorder  History of traumatic brain injury  Tobacco use disorder  Mild intellectual disability  ADHD (attention deficit hyperactivity disorder), combined type  Aggressive behavior         Patient Active Problem List   Diagnosis    Thrombocytopenia (H24)    Chronic ITP (idiopathic  "thrombocytopenia) (H)    Attention deficit disorder    Intermittent explosive disorder    Anxiety disorder, unspecified    Schizoaffective disorder, unspecified (H)    Intermittent explosive disorder    Schizoaffective disorder, bipolar type (H)    Fetal alcohol syndrome    Intellectual disability             Chief Complaint / Subjective:    Chief complaint: Intermittent explosive disorder    History of Present Illness:   Patient has been compliant with current medications.  No side effects noted.  Occasional agitation and aggressive episodes at the home but has been redirectable.  He has notably gained over 10 pounds since our last visit.  Staff at his group home report that patient tends to eat foods high in carbs and sugar despite their efforts to have him on a low-fat low-carb low sugar diet.  We did spend some time today discussing importance of observing this diet in order to maintain a healthy weight.  Patient reluctantly agrees to follow a low-carb low sugar and low-fat diet  Patient offers no other concerns today.  Staff at his group home will continue to work with him on behavior modification strategies and also utilize as needed medications to manage aggressive behaviors.  Patient to return to the clinic in approximately 3 months for follow-up appointment and call in between visits any questions or concerns.    Mental Status Examination:   Appearance: Well groomed, good eye contact   Orientation: Patient alert and oriented to person, place, time, and situation  Reliability:  Patient appears to be an adequate historian.    Behavior: cooperative   Speech: Speech is spontaneous and coherent, with a normal rate, rhythm and tone.    Language:There are no difficulties with expressive or receptive language as observed throughout the interview.    Mood: Described as \"ok\".    Affect: congruent   Judgement: Able to make basic decision regarding safety.  Insight: Good awareness of physical and mental health " "conditions and aware of needs around care for these.  Gait and station: unable to assess  Thought process: Logical   Thought content: No evidence of delusions or paranoia.    Hallucinations : No evidence of any hallucination  Thought content: No evidence of delusions or paranoia.   Suicidal /Homical Ideations:  No thoughts of self harm or suicide. No thoughts of harming others.  Associations: Connected  Fund of knowledge: Average  Attention / Concentration: Able to remain focused during the interview with minimal distractibility or need for redirection.  Short Term Memory: Grossly intact as evidence by client recalling themes and ideas discussed.  Long Term Memory: Intact  Motor Status: unable to asse    Drug/treatment history and current pattern of use:   History of cannabis use  History of nicotine use-vaping  Nicotine : Smokes daily     Medication changes: See Above   Medication adherence: compliant  Medication side effects: absent  Information about medications: Side effects, benefits and alternative treatments discussed and patient agrees .    Psychotherapy: Supportive therapy day-to-day living    Education: Diet, exercise, abstinence from drugs and alcohol, patient will not drive if sedated and medications or  under influence of any substance    Lab Results:   Personally reviewed and discussed with the patient    Lab Results   Component Value Date    WBC 12.4 (H) 02/26/2024    HGB 13.1 (L) 02/26/2024    HCT 40.1 02/26/2024     02/26/2024    CHOL 150 12/15/2023    TRIG 75 12/15/2023    HDL 34 (L) 12/15/2023    ALT 29 02/26/2024    AST 23 02/26/2024     02/26/2024    BUN 9.8 02/26/2024    CO2 22 02/26/2024    TSH 3.61 06/30/2022       Vital signs:  /81 (BP Location: Left arm, Patient Position: Sitting, Cuff Size: Adult Regular)   Pulse 87   Ht 1.676 m (5' 6\")   Wt 131.1 kg (289 lb)   SpO2 97%   BMI 46.65 kg/m    Telemedicine visit-no vital signs completed  Allergies: Depakote [valproic " acid], Depakote [valproic acid], and Other environmental allergy         Medications:     Current Outpatient Medications   Medication Sig Dispense Refill    ARIPiprazole (ABILIFY) 5 MG tablet TAKE 1 TABLET BY MOUTH EVERY MORNING 28 tablet 1    atomoxetine (STRATTERA) 100 MG capsule Take 1 capsule (100 mg) by mouth daily 30 capsule 0    benzonatate (TESSALON) 100 MG capsule Take 100 mg by mouth 3 times daily as needed for cough.      cholecalciferol 25 MCG (1000 UT) TABS Take 25 mcg by mouth.      ferrous sulfate (FE TABS) 325 (65 Fe) MG EC tablet Take 325 mg by mouth daily      guanFACINE HCl (INTUNIV) 3 MG TB24 24 hr tablet Take 1 tablet (3 mg) by mouth at bedtime 28 tablet 1    hydrOXYzine HCl (ATARAX) 50 MG tablet TAKE 1 TABLET BY MOUTH THREE TIMES A DAY AS NEEDED FOR ANXIETY 90 tablet 0    lithium 300 MG capsule Take 3 capsules (900 mg) by mouth daily 90 capsule 3    nicotine (NICORETTE) 4 MG lozenge Place 4 mg inside cheek every hour as needed for nicotine withdrawal symptoms.      OLANZapine (ZYPREXA) 10 MG tablet TAKE 1 TABLET BY MOUTH AT BEDTIME 28 tablet 30    paliperidone (INVEGA SUSTENNA) 234 MG/1.5ML JUANA Inject 1.5 mLs (234 mg) into the muscle every 28 days 1.5 mL 2    polyethylene glycol (MIRALAX) 17 g packet Take 1 packet by mouth daily.      traZODone HCl 300 MG TABS TAKE 1 TABLET BY MOUTH AT BEDTIME 28 tablet 1    atomoxetine (STRATTERA) 100 MG capsule Take 1 capsule (100 mg) by mouth daily 30 capsule 3    atropine 1 % ophthalmic solution Place 2 drops under the tongue (Patient not taking: Reported on 1/2/2024)      chlorproMAZINE (THORAZINE) 50 MG tablet Take 1 tablet (50 mg) by mouth 2 times daily as needed for anxiety 60 tablet 2    ferrous sulfate (FEROSUL) 325 (65 Fe) MG tablet Take 325 mg by mouth      guanFACINE HCl (INTUNIV) 3 MG TB24 24 hr tablet TAKE 1 TABLET BY MOUTH AT BEDTIME 28 tablet 1    lithium 300 MG capsule TAKE 3 CAPSULES BY MOUTH EVERY EVENING WITH DINNER 84 capsule 1     loratadine (CLARITIN) 10 MG tablet Take 10 mg by mouth (Patient not taking: Reported on 4/18/2024)      metFORMIN (GLUCOPHAGE-XR) 500 MG 24 hr tablet Take 1,000 mg by mouth (Patient not taking: Reported on 1/2/2024)      nicotine (NICODERM CQ) 14 MG/24HR 24 hr patch Place 1 patch onto the skin every 24 hours (Patient not taking: Reported on 12/14/2023)      ondansetron (ZOFRAN ODT) 4 MG ODT tab Take 1 tablet (4 mg) by mouth every 6 hours as needed for nausea or vomiting 15 tablet 0    paliperidone (INVEGA SUSTENNA) 234 MG/1.5ML JUANA Inject 234 mg into the muscle every 28 days      traZODone HCl 300 MG TABS Take 1 tablet by mouth at bedtime 28 tablet 1    vitamin C (ASCORBIC ACID) 250 MG TABS tablet Take 250 mg by mouth       No current facility-administered medications for this visit.         No current facility-administered medications for this visit.     No current facility-administered medications for this visit.         Medication adherence: Reviewed risk/benefits of medication , Patient able to verbalize understanding of side effects and Patient verbally consents to taking medications      PSYCHOEDUCATION:  Medication side effects and alternatives reviewed. Health promotion activities recommended and reviewed today. All questions addressed. Education and counseling completed regarding risks and benefits of medications and psychotherapy options.  Consent provided by patient/guardian  Call the psychiatric nurse line with medication questions or concerns at [phone].  MyChart may be used to communicate with your provider, but this is not intended to be used for emergencies.  SEROTONIN SYNDROME:  Discussed risks of Serotonin syndrome (ie, serotonin toxicity) which is a potentially life-threatening condition associated with increased serotonergic activity in the central nervous system (CNS). It is seen with therapeutic medication use, inadvertent interactions between drugs, and intentional self-poisoning.  Serotonin syndrome may involve a spectrum of clinical findings, which often include mental status changes, autonomic hyperactivity, and neuromuscular abnormalities.    STIMULANT THERAPY: Side effects discussed including but not limited to cardiac (including HTN, tachycardia, sudden death), motor/tic, appetite/growth, mood lability and sleep disruption. This is a controlled substance with risk for abuse, need to keep in a safe keep place and cannot replace lost scripts  HARM REDUCTION:  Discussions regarding effects of mood altering substances, alcohol and cannabis, on mood and that approach is harm reduction, will continue to prescribe meds as they work to cut back use.    SAFETY:  We all care about your loved one's safety. To reduce the risk of self-harm, remove access to all:  Firearms, Medicines (both prescribed and over-the-counter), Knives and other sharp objects, Ropes and like materials, and Alcohol  SLEEP HYGIENE: establish a sleep routine, limit screen time 1 hour prior to bed, use bed for sleep only, take sleep/medications on time (including sleepy time tea, trazadone or herbal treatments such as melatonin), aroma therapy, limit caffeine/sugar, yoga, guided imagery, stretch, meditation, limit naps to 20 minutes, make a temperature change in the room, white noise, be mindful of slowing down breathing, take a warm bath/shower, frequently wash sheets, and journaling.   Medlineplus.gov is information for patients.  It is run by the National Library of Medicine and it contains information about all disorders, diseases and all medications.              Review of Systems:      ROS:    Subjective Data Only- Tele-Health Visit    10 point ROS was negative except for the items listed in HPI.      Crisis Resources:    Present to the Emergency Department as needed or call after hours crisis line at [phone] or [phone].   [place] Crisis Text Line: Text [place] to 787630.  Suicide LifeLine Chat:  suicidepreventionlifeline.org/chat/.  National Suicide Prevention Lifeline: 917.652.1103 (TTY: 296.895.6223). Call anytime for help.  (www.suicidepreventionlifeline.org)  National [place] on Mental Illness (www.milvia.org): 604.585.1037 or 163-084-2932.  Mental Health Association (www.mentalhealth.org): 781.533.1330 or 153-992-8889.      Coordination of Care:   More than 50% of time spent on coordination of care including: Educating patient about diagnosis, prognosis, side effects and benefits of medications, diet, exercise.  Time also spent providing supportive therapy regarding above issues.          Disclaimer: This note consists of symbols derived from keyboarding, dictation and/or voice recognition software. As a result, there may be errors in the script that have gone undetected. Please consider this when interpreting information found in this chart.    Start Time : 1230  End time : 1302

## 2024-08-29 NOTE — PATIENT INSTRUCTIONS
"[facility]  What to Expect  Here's what to expect in the [facility] Psych[facility] Program.   About the program  You'll be meeting with a psychiatric doctor to check your mental health. A psychiatric doctor helps you deal with troubling thoughts and feelings by giving you medicine. They'll make sure you know the plan for your care. You may see them for a long time. When you're feeling better, they may refer you back to seeing your family doctor.   If you have any questions, we'll be glad to talk to you.  About visits  Be open  At your visits, please talk openly about your problems. It may feel hard, but it's the best way for us to help you.  Cancelling visits  If you can't come to your visit, please call us right away at [phone]. If you don't cancel at least 24 hours (1 full day) before your visit, that's \"late cancellation.\"  Not showing up for your visits  Being very late is the same as not showing up. You'll be a \"no show\" if:  You're more than 15 minutes late for a 30-minute (half hour) visit.  You're more than 30 minutes late for a 60-minute (full hour) visit.  If you cancel late or don't show up 2 times within 6 months, we may end your care.  Getting help between visits  If you need help between visits, you can call us Monday to Friday from 8 a.m. to 4:30 p.m. at [phone].  Emergency care  Call 911 or go to the nearest emergency department if your life or someone else's life is in danger.  Call 988 anytime to reach the national Suicide and Crisis hotline.  Medicine refills  To refill your medicine, call your pharmacy. You can also call [facility]'s Behavioral Access at [phone], Monday to Friday, 8 a.m. to 4:30 p.m. It can take 1 to 3 business days to get a refill.   Forms, letters, and tests  You may have papers to fill out, like FMLA, short-term disability, and workability. We can help you with these forms at your visits, but you must have an appointment. You may need more " than 1 visit for this, to be in an intensive therapy program, or both.  Before we can give you medicine for ADHD, we may refer you to get tested for it or confirm it another way.  We may not be able to give you an emotional support animal letter.  We don't do mental health checks ordered by the court.   We don't do mental health testing, but we can refer you to get tested.   Thank you for choosing us for your care.  For informational purposes only. Not to replace the advice of your health care provider. Copyright   2022 [facility]. All rights reserved. Where's Up 628756 - 12/22      After Visit Summary   Continue medications as prescribed  Have your pharmacy contact us for a refill if you are running low on medications (We may ask you to come into clinic to get a refill from the nurse  No Alcohol or drug use  No driving if sedated  Call the clinic with any questions or concerns   Reach out for help if you feel like hurting yourself or others ([facility] [phone]: [address] [place], [address] or [facility] Suicide Hotline   [phone] , call 911 or go to nearest Emergency room     Crisis Resources:    Present to the Emergency Department as needed or call after hours crisis line at [phone] or [phone].   [place] Crisis Text Line: Text [place] to 369074.  Suicide LifeLine Chat: suicidepreventionlifeline.org/chat/.  National Suicide Prevention Lifeline: 426.581.5514 (TTY: 783.700.1178). Call anytime for help.  (www.suicidepreventionlifeline.org)  National [place] on Mental Illness (www.milvia.org): 693.819.9748 or 112-733-8387.  Mental Health Association (www.mentalhealth.org): 702.256.1502 or 063-281-5696.       Follow up as directed, for your appointments, per your After Visit Summary Form.

## 2024-09-01 ENCOUNTER — HOSPITAL ENCOUNTER (EMERGENCY)
Facility: CLINIC | Age: 24
Discharge: HOME | End: 2024-09-01
Attending: EMERGENCY MEDICINE | Admitting: EMERGENCY MEDICINE
Payer: COMMERCIAL

## 2024-09-01 VITALS
RESPIRATION RATE: 16 BRPM | DIASTOLIC BLOOD PRESSURE: 80 MMHG | BODY MASS INDEX: 40.94 KG/M2 | HEIGHT: 70 IN | HEART RATE: 73 BPM | OXYGEN SATURATION: 98 % | WEIGHT: 286 LBS | SYSTOLIC BLOOD PRESSURE: 126 MMHG | TEMPERATURE: 98.4 F

## 2024-09-01 DIAGNOSIS — R55 SYNCOPE, UNSPECIFIED SYNCOPE TYPE: ICD-10-CM

## 2024-09-01 LAB
ANION GAP SERPL CALCULATED.3IONS-SCNC: 10 MMOL/L (ref 7–15)
BASOPHILS # BLD AUTO: 0 10E3/UL (ref 0–0.2)
BASOPHILS NFR BLD AUTO: 0 %
BUN SERPL-MCNC: 7.2 MG/DL (ref 6–20)
CALCIUM SERPL-MCNC: 9.1 MG/DL (ref 8.8–10.4)
CHLORIDE SERPL-SCNC: 105 MMOL/L (ref 98–107)
CREAT SERPL-MCNC: 0.8 MG/DL (ref 0.67–1.17)
EGFRCR SERPLBLD CKD-EPI 2021: >90 ML/MIN/1.73M2
EOSINOPHIL # BLD AUTO: 0.3 10E3/UL (ref 0–0.7)
EOSINOPHIL NFR BLD AUTO: 3 %
ERYTHROCYTE [DISTWIDTH] IN BLOOD BY AUTOMATED COUNT: 14.4 % (ref 10–15)
GLUCOSE SERPL-MCNC: 91 MG/DL (ref 70–99)
HCO3 SERPL-SCNC: 23 MMOL/L (ref 22–29)
HCT VFR BLD AUTO: 41.2 % (ref 40–53)
HGB BLD-MCNC: 13.5 G/DL (ref 13.3–17.7)
IMM GRANULOCYTES # BLD: 0 10E3/UL
IMM GRANULOCYTES NFR BLD: 0 %
LYMPHOCYTES # BLD AUTO: 1.7 10E3/UL (ref 0.8–5.3)
LYMPHOCYTES NFR BLD AUTO: 16 %
MCH RBC QN AUTO: 29.3 PG (ref 26.5–33)
MCHC RBC AUTO-ENTMCNC: 32.8 G/DL (ref 31.5–36.5)
MCV RBC AUTO: 89 FL (ref 78–100)
MONOCYTES # BLD AUTO: 0.7 10E3/UL (ref 0–1.3)
MONOCYTES NFR BLD AUTO: 6 %
NEUTROPHILS # BLD AUTO: 8 10E3/UL (ref 1.6–8.3)
NEUTROPHILS NFR BLD AUTO: 74 %
NRBC # BLD AUTO: 0 10E3/UL
NRBC BLD AUTO-RTO: 0 /100
PLAT MORPH BLD: NORMAL
PLATELET # BLD AUTO: 147 10E3/UL (ref 150–450)
POTASSIUM SERPL-SCNC: 4.3 MMOL/L (ref 3.4–5.3)
RBC # BLD AUTO: 4.61 10E6/UL (ref 4.4–5.9)
RBC MORPH BLD: NORMAL
SODIUM SERPL-SCNC: 138 MMOL/L (ref 135–145)
TROPONIN T SERPL HS-MCNC: <6 NG/L
WBC # BLD AUTO: 10.9 10E3/UL (ref 4–11)

## 2024-09-01 PROCEDURE — 99284 EMERGENCY DEPT VISIT MOD MDM: CPT

## 2024-09-01 PROCEDURE — 85041 AUTOMATED RBC COUNT: CPT | Performed by: EMERGENCY MEDICINE

## 2024-09-01 PROCEDURE — 93005 ELECTROCARDIOGRAM TRACING: CPT

## 2024-09-01 PROCEDURE — 84484 ASSAY OF TROPONIN QUANT: CPT | Performed by: EMERGENCY MEDICINE

## 2024-09-01 PROCEDURE — 80048 BASIC METABOLIC PNL TOTAL CA: CPT | Performed by: EMERGENCY MEDICINE

## 2024-09-01 PROCEDURE — 36415 COLL VENOUS BLD VENIPUNCTURE: CPT | Performed by: EMERGENCY MEDICINE

## 2024-09-01 ASSESSMENT — ACTIVITIES OF DAILY LIVING (ADL)
ADLS_ACUITY_SCORE: 35
ADLS_ACUITY_SCORE: 35

## 2024-09-01 ASSESSMENT — COLUMBIA-SUICIDE SEVERITY RATING SCALE - C-SSRS
2. HAVE YOU ACTUALLY HAD ANY THOUGHTS OF KILLING YOURSELF IN THE PAST MONTH?: NO
6. HAVE YOU EVER DONE ANYTHING, STARTED TO DO ANYTHING, OR PREPARED TO DO ANYTHING TO END YOUR LIFE?: NO
1. IN THE PAST MONTH, HAVE YOU WISHED YOU WERE DEAD OR WISHED YOU COULD GO TO SLEEP AND NOT WAKE UP?: YES

## 2024-09-01 NOTE — ED TRIAGE NOTES
PT BIBA from group home. Pt was starting to walk up stairs from basement and passed out. Pt feels he is dehydrated. Pt denies pain. Pt PANDA.      Triage Assessment (Adult)       Row Name 09/01/24 1115          Triage Assessment    Airway WDL WDL        Respiratory WDL    Respiratory WDL WDL        Cardiac WDL    Cardiac WDL WDL        Cognitive/Neuro/Behavioral WDL    Cognitive/Neuro/Behavioral WDL WDL

## 2024-09-01 NOTE — ED PROVIDER NOTES
"  Emergency Department Note      History of Present Illness     Chief Complaint   Syncope      HPI   Kaden Easton Jr. is a 24 year old male who presents to the emergency department for evaluation of syncope. The patient reports that earlier today while he was standing by the stairs and was going to get water he began to feel lightheaded and started to hyperventilate. He states that he suddenly lost consciousness and fell, landing on his abdomen. However, he denies any injury to his abdomen in the fall. He denies a history of syncopal episodes. He denies any headache, chest pain, shortness of breath, abdominal pain, or diarrhea.    Independent Historian   None    Review of External Notes   Review of August 29 visit    Past Medical History     Medical History and Problem List   ADD  ADHD  Anxiety  Fetal alcohol syndrome  Hx of transfusion  Obesity  Self-injurious behavior  Thrombocytopenia  Chronic ITP  Schizoaffective disorder  Intermittent explosive disorder  Intellectual disability  Tobacco abuse  Vitamin D insufficiency  Hx of traumatic brain injury  Sialorrhea  Cannabis abuse  Psychosis    Medications   Abilify  Strattera  Tessalon  Thorazine  Ferosul  Intuniv  Atarax  Claritin  Glucophage  Nicoderm  Zyprexa  Zofran  Invega Sustenna  Miralax  Trazodone  Saxenda  Neurontin    Surgical History   Appendectomy    Physical Exam     Patient Vitals for the past 24 hrs:   BP Temp Temp src Pulse Resp SpO2 Height Weight   09/01/24 1326 126/80 -- -- 73 -- -- -- --   09/01/24 1110 123/72 98.4  F (36.9  C) Oral 81 16 98 % 1.778 m (5' 10\") 129.7 kg (286 lb)     Physical Exam  Constitutional: Young black male, supine. No respiratory distress.  HENT: No signs of trauma.   Eyes: EOM are normal. Pupils are equal, round, and reactive to light.   Neck: Normal range of motion. No JVD present. No cervical adenopathy.  Cardiovascular: Regular rhythm.  Exam reveals no gallop and no friction rub.    No murmur heard.  Pulmonary/Chest: " Bilateral breath sounds normal. No wheezes, rhonchi or rales.  Abdominal: Soft. No tenderness. No rebound or guarding.   Musculoskeletal: No edema. No tenderness.   Lymphadenopathy: No lymphadenopathy.   Neurological: Alert and oriented to person, place, and time. Normal strength. Coordination normal. Fluent speech. No facial asymmetry. Normal sensation to light tough. Normal finger-nose-finger.  Skin: Skin is warm and dry. No rash noted. No erythema.     Diagnostics     Lab Results   Labs Ordered and Resulted from Time of ED Arrival to Time of ED Departure   CBC WITH PLATELETS AND DIFFERENTIAL - Abnormal       Result Value    WBC Count 10.9      RBC Count 4.61      Hemoglobin 13.5      Hematocrit 41.2      MCV 89      MCH 29.3      MCHC 32.8      RDW 14.4      Platelet Count 147 (*)     NRBCs per 100 WBC 0      Absolute NRBCs 0.0     BASIC METABOLIC PANEL - Normal    Sodium 138      Potassium 4.3      Chloride 105      Carbon Dioxide (CO2) 23      Anion Gap 10      Urea Nitrogen 7.2      Creatinine 0.80      GFR Estimate >90      Calcium 9.1      Glucose 91     TROPONIN T, HIGH SENSITIVITY - Normal    Troponin T, High Sensitivity <6     RBC AND PLATELET MORPHOLOGY       Imaging   No orders to display       EKG   EKG taken 09/01/24 at 11:17:17  Vent. Rate: 82 BPM  AR interval: 170 ms  QRS Duration: 86 ms  QT/Qtc-Baz: 366/427 ms  P-R-T axes: 41 -13 10    Interpretation:   Normal sinus rhythm  Left axis deviation    Independent Interpretation   None    ED Course      Medications Administered   Medications - No data to display    Procedures   None     Discussion of Management   None    ED Course   ED Course as of 09/01/24 1308   Sun Sep 01, 2024   1130 I initially assessed the patient and obtained the above history and physical exam.     1259 I rechecked with the patient and prepared them for discharge.       Additional Documentation  None    Medical Decision Making / Diagnosis     CMS Diagnoses: None    MIPS        None    [facility] [place]   Kaden Easton Jr. is a 24 year old male who presents to the ED from group home after having passed out.  Patient states he had walked up the stairs wanting to get water he began feeling lightheaded and dizzy and then passed out he denies hurting himself biting his tongue or being incontinent of urine.  He was sent in for evaluation.  Patient was monitored for several hours without arrhythmia and his workup is unremarkable.  He did notice he began breathing fast and is possible he had a either a panic attack or hyperventilation syncope.  Patient is unlikely to have an acute arrhythmia cardiac event or stroke he does not appear septic he will be discharged back to his facility and should follow-up with his primary for further evaluation.    Disposition   The patient was discharged.     Diagnosis     ICD-10-CM    1. Syncope, unspecified syncope type  R55            Discharge Medications   New Prescriptions    No medications on file         Scribe Disclosure:  Dontrell VICTOR, am serving as a scribe at 11:16 AM on 9/1/2024 to document services personally performed by Dylon Veras MD based on my observations and the provider's statements to me.        Dylon Veras MD  09/01/24 5936

## 2024-09-02 LAB
ATRIAL RATE - MUSE: 82 BPM
DIASTOLIC BLOOD PRESSURE - MUSE: NORMAL MMHG
INTERPRETATION ECG - MUSE: NORMAL
P AXIS - MUSE: 41 DEGREES
PR INTERVAL - MUSE: 170 MS
QRS DURATION - MUSE: 86 MS
QT - MUSE: 366 MS
QTC - MUSE: 427 MS
R AXIS - MUSE: -13 DEGREES
SYSTOLIC BLOOD PRESSURE - MUSE: NORMAL MMHG
T AXIS - MUSE: 10 DEGREES
VENTRICULAR RATE- MUSE: 82 BPM

## 2024-09-06 ENCOUNTER — TELEPHONE (OUTPATIENT)
Dept: PSYCHIATRY | Facility: CLINIC | Age: 24
End: 2024-09-06

## 2024-09-06 NOTE — TELEPHONE ENCOUNTER
1) Phoned Assisted Living RN and reviewed Sabrina Atwood's directives. She reports that the patient took PRN Thorazine on 9/4/24 and this was helpful. He refused it yesterday and today and stated that the only thing that helps is the Hydroxyzine.     2) Pt's RN is going to go meet with him in a short while and will see if he is willing to take PRN Thorazine and come up with a plan for the weekend. If he does not want to take Thorazine, she will call the clinic to come up with an alternative plan.       ALBERTO QUINTANILLA RN on 9/6/2024 at 12:56 PM

## 2024-09-06 NOTE — TELEPHONE ENCOUNTER
"1) Reviewed documentation from [facility]  Additional comments: per Rn pt did not sleep last night. He says the shadows are back. Pt called 911 yesterday. Pt will not go to the Er and RN is requesting a PRN asap until they can get an appt with you. The next available appt is 9/30 and she stated that was too long to wait. Pt is saying meds are not working and pt has been hitting walls     Phone number to reach patient:  Other phone number:  [phone]-Jeramy RN    2) Returned a call to the patient's assisted living facility and spoke with the RN.   She reports that for the last 2 days the patient has been decompensating.   - He did not sleep last night.   - He reported to staff that \"the shadows are back and following him around.\"  - He has been hearing things  - He has been hitting walls and throwing things  - yesterday called 911  - Despite behaviors, the staff do not have safety concerns, he tells them he is safe. No SI/HI and staff do not feel his behavior is posing a threat to others at this time.       3) There have been no changes to medications or circumstances. He has been taking PRN Hydroxyzine but it hasn't helped. He has been taking his medication regularly and staff perform \"mouth swabs\" to ensure compliance.     4) Staff are seeking intervention because they feel they cannot wait until next available appointment on 9/30/24. Assisted living RN reports that PRN seroquel has been helpful in the past. He is not experiencing any side effects related to medication at this time per report.     5) Assisted living RN reported awareness of the [facility] unit and stated \"that is our access point.\" She does not believe he needs to go to [facility] at this time.         ALBERTO QUINTANILLA RN on 9/6/2024 at 12:01 PM    "

## 2024-09-06 NOTE — TELEPHONE ENCOUNTER
This is a complicated patient.  On multiple antipsychotics.  Have they been using the thorazine as needed available?  If not, I would trial this, if yes, I will order a small supply of as needed olanzapine 10 mg to get through the weekend until Nkechi gets back and is more familiar with his history.  I also support taking to the emergency department if the agitation continues and concern for safety.    Sabrina Atwood, DNP, APRN, FMHNP-BC,

## 2024-09-06 NOTE — TELEPHONE ENCOUNTER
Reason for call:  Symptom   Symptom or request: seeing shadows, agitated-hitting walls    Duration (how long have symptoms been present): unsure  Have you been treated for this before? Yes    Additional comments: per Rn pt did not sleep last night. He says the shadows are back. Pt called 911 yesterday. Pt will not go to the Er and RN is requesting a PRN asap until they can get an appt with you. The next available appt is 9/30 and she stated that was too long to wait. Pt is saying meds are not working and pt has been hitting walls    Phone number to reach patient:  Other phone number:  [phone]-Suleqa RN    Best Time:  asap    Can we leave a detailed message on this number?  YES    Travel screening: Not Applicable

## 2024-09-07 ENCOUNTER — HOSPITAL ENCOUNTER (EMERGENCY)
Facility: CLINIC | Age: 24
Discharge: HOME | End: 2024-09-07
Attending: EMERGENCY MEDICINE | Admitting: EMERGENCY MEDICINE
Payer: COMMERCIAL

## 2024-09-07 VITALS
HEART RATE: 78 BPM | SYSTOLIC BLOOD PRESSURE: 151 MMHG | TEMPERATURE: 98.7 F | DIASTOLIC BLOOD PRESSURE: 74 MMHG | OXYGEN SATURATION: 97 % | RESPIRATION RATE: 18 BRPM

## 2024-09-07 DIAGNOSIS — M79.605 BILATERAL LEG PAIN: ICD-10-CM

## 2024-09-07 DIAGNOSIS — M79.604 BILATERAL LEG PAIN: ICD-10-CM

## 2024-09-07 DIAGNOSIS — F25.0 SCHIZOAFFECTIVE DISORDER, BIPOLAR TYPE (H): ICD-10-CM

## 2024-09-07 LAB
ANION GAP SERPL CALCULATED.3IONS-SCNC: 12 MMOL/L (ref 7–15)
BASOPHILS # BLD AUTO: 0 10E3/UL (ref 0–0.2)
BASOPHILS NFR BLD AUTO: 0 %
BUN SERPL-MCNC: 7.5 MG/DL (ref 6–20)
CALCIUM SERPL-MCNC: 9 MG/DL (ref 8.8–10.4)
CHLORIDE SERPL-SCNC: 104 MMOL/L (ref 98–107)
CREAT SERPL-MCNC: 0.87 MG/DL (ref 0.67–1.17)
EGFRCR SERPLBLD CKD-EPI 2021: >90 ML/MIN/1.73M2
EOSINOPHIL # BLD AUTO: 0.3 10E3/UL (ref 0–0.7)
EOSINOPHIL NFR BLD AUTO: 2 %
ERYTHROCYTE [DISTWIDTH] IN BLOOD BY AUTOMATED COUNT: 14 % (ref 10–15)
FLUAV RNA SPEC QL NAA+PROBE: NEGATIVE
FLUBV RNA RESP QL NAA+PROBE: NEGATIVE
GIANT PLATELETS BLD QL SMEAR: SLIGHT
GLUCOSE SERPL-MCNC: 91 MG/DL (ref 70–99)
HCO3 SERPL-SCNC: 23 MMOL/L (ref 22–29)
HCT VFR BLD AUTO: 36.2 % (ref 40–53)
HGB BLD-MCNC: 12.2 G/DL (ref 13.3–17.7)
IMM GRANULOCYTES # BLD: 0 10E3/UL
IMM GRANULOCYTES NFR BLD: 0 %
LITHIUM SERPL-SCNC: 0.48 MMOL/L (ref 0.6–1.2)
LYMPHOCYTES # BLD AUTO: 2.3 10E3/UL (ref 0.8–5.3)
LYMPHOCYTES NFR BLD AUTO: 17 %
MCH RBC QN AUTO: 29.9 PG (ref 26.5–33)
MCHC RBC AUTO-ENTMCNC: 33.7 G/DL (ref 31.5–36.5)
MCV RBC AUTO: 89 FL (ref 78–100)
MONOCYTES # BLD AUTO: 1.3 10E3/UL (ref 0–1.3)
MONOCYTES NFR BLD AUTO: 9 %
NEUTROPHILS # BLD AUTO: 9.7 10E3/UL (ref 1.6–8.3)
NEUTROPHILS NFR BLD AUTO: 71 %
NRBC # BLD AUTO: 0 10E3/UL
NRBC BLD AUTO-RTO: 0 /100
PLAT MORPH BLD: ABNORMAL
PLATELET # BLD AUTO: 157 10E3/UL (ref 150–450)
POTASSIUM SERPL-SCNC: 3.7 MMOL/L (ref 3.4–5.3)
RBC # BLD AUTO: 4.08 10E6/UL (ref 4.4–5.9)
RBC MORPH BLD: ABNORMAL
RSV RNA SPEC NAA+PROBE: NEGATIVE
SARS-COV-2 RNA RESP QL NAA+PROBE: NEGATIVE
SODIUM SERPL-SCNC: 139 MMOL/L (ref 135–145)
WBC # BLD AUTO: 13.6 10E3/UL (ref 4–11)

## 2024-09-07 PROCEDURE — 87637 SARSCOV2&INF A&B&RSV AMP PRB: CPT | Performed by: BEHAVIOR TECHNICIAN

## 2024-09-07 PROCEDURE — 80178 ASSAY OF LITHIUM: CPT | Performed by: BEHAVIOR TECHNICIAN

## 2024-09-07 PROCEDURE — 99283 EMERGENCY DEPT VISIT LOW MDM: CPT

## 2024-09-07 PROCEDURE — 36415 COLL VENOUS BLD VENIPUNCTURE: CPT | Performed by: BEHAVIOR TECHNICIAN

## 2024-09-07 PROCEDURE — 85025 COMPLETE CBC W/AUTO DIFF WBC: CPT | Performed by: BEHAVIOR TECHNICIAN

## 2024-09-07 PROCEDURE — 80048 BASIC METABOLIC PNL TOTAL CA: CPT | Performed by: BEHAVIOR TECHNICIAN

## 2024-09-07 RX ORDER — OLANZAPINE 10 MG/1
10 TABLET ORAL AT BEDTIME
Qty: 20 TABLET | Refills: 0 | Status: SHIPPED | OUTPATIENT
Start: 2024-09-07

## 2024-09-07 RX ORDER — OLANZAPINE 5 MG/1
10 TABLET ORAL
Status: DISCONTINUED | OUTPATIENT
Start: 2024-09-07 | End: 2024-09-07 | Stop reason: HOSPADM

## 2024-09-07 RX ORDER — OLANZAPINE 10 MG/1
10 TABLET ORAL AT BEDTIME
Qty: 20 TABLET | Refills: 0 | Status: SHIPPED | OUTPATIENT
Start: 2024-09-07 | End: 2024-09-07

## 2024-09-07 ASSESSMENT — ACTIVITIES OF DAILY LIVING (ADL)
ADLS_ACUITY_SCORE: 35

## 2024-09-07 ASSESSMENT — COLUMBIA-SUICIDE SEVERITY RATING SCALE - C-SSRS
6. HAVE YOU EVER DONE ANYTHING, STARTED TO DO ANYTHING, OR PREPARED TO DO ANYTHING TO END YOUR LIFE?: NO
1. IN THE PAST MONTH, HAVE YOU WISHED YOU WERE DEAD OR WISHED YOU COULD GO TO SLEEP AND NOT WAKE UP?: NO
2. HAVE YOU ACTUALLY HAD ANY THOUGHTS OF KILLING YOURSELF IN THE PAST MONTH?: NO

## 2024-09-07 NOTE — CONSULTS
Diagnostic Evaluation Consultation  Crisis Assessment    Patient Name: Kaden Easton Jr.  Age:  24 year old  Legal Sex: male  Gender Identity: male  Pronouns:   Race: Black or   Ethnicity: Not  or   Language: English      Patient was assessed: In person   Crisis Assessment Start Date: 09/07/24  Crisis Assessment Start Time: 1830  Crisis Assessment Stop Time: 1835  Patient location: [facility] EMERGENCY DEPT                             ED21    Referral Data and Chief Complaint  Kaden Easton Jr. presents to the ED via EMS. Patient is presenting to the ED for the following concerns: Significant behavioral change, Physical aggression, Verbal agitation, Other (see comment) (Seeing Shadows).   Factors that make the mental health crisis life threatening or complex are:  Kaden Easton Jr. is a 24 year old male who presents to the Emergency Department due to foot pain, insomnia and increased agitation at his group home. Per chart review and patient reports, they have a history of an intellectual disability (IQ noted at 55), intermittent explosive disorder, mood disorder, schizoaffective disorder, FAS, and ADHD.   Patient presents flat and is minimally engaged with this writer. Patient mumbles one word answers to the assessment questions. Patient reports he came to the Emergency Department today due to foot pain. He reports living in a group home. He denies any recent changes nor stressors.   Patient is eager to get back home as he does not like being in the hospital.    Patient denies suicidal homicidal ideation, and self injurious behavior. Patient denies auditory visual hallucinations, however Group Home staff reported patient has been responding to internal stimuli and seeing shadows..      Informed Consent and Assessment Methods  Explained the crisis assessment process, including applicable information disclosures and limits to confidentiality, assessed  understanding of the process, and obtained consent to proceed with the assessment.  Assessment methods included conducting a formal interview with patient, review of medical records, collaboration with medical staff, and obtaining relevant collateral information from family and community providers when available.  : done     Patient response to interventions: acceptance expressed, verbalizes understanding  Coping skills were attempted to reduce the crisis:  Prior to arrival patient and staff attempted to utilize PRN medications to reduce the crisis. Patient was communicating his needs to staff and accepting of the supports offered.     History of the Crisis   Patient is a resident a a Group Home, where he has lived for four years due to SPMI. Patient behavior has been increasingly more agitated an aggressive this week. Group Home staff report that they have needed to give patient several PRN doses of medications to decrease the agitation and aggression.   Patient has not harmed anyone nor made threats about self harm nor suicide. Patient is medication compliant and is not believed to be using any illicit substances or alcohol.   Patient has history of aggression and agitation, these symptoms worsen when the patient is not able to get restorative sleep.   Group Home staff believe patient would be best served with medications to promote sleep and additional PRN medication for agitation until the Group Home can get patient in to see his Psychiatrist on Monday.    Brief Psychosocial History  Family:  Single, Children no  Support System:  Other (specify) (Group Home Staff)  Employment Status:  disabled  Source of Income:  disability  Financial Environmental Concerns:  none  Current Hobbies:  games, television/movies/videos  Barriers in Personal Life:  behavioral concerns    Significant Clinical History  Current Anxiety Symptoms:  anxious  Current Depression/Trauma:     Current Somatic Symptoms:  anxious  Current  Psychosis/Thought Disturbance:     Current Eating Symptoms:     Chemical Use History:  Alcohol: None  Benzodiazepines: None  Opiates: None  Cocaine: None  Marijuana: None  Other Use: None   Past diagnosis:  ADHD, Other (intermittent explosive disorder, mood disorder, schizoaffective disorder, FAS)  Family history:  No known history of mental health or chemical health concerns  Past treatment:  Case management, Individual therapy, Psychiatric Medication Management, Primary Care, Residential Treatment  Details of most recent treatment:  Pt lives in a supportive group home due to pt SPMI  Other relevant history:          Collateral Information  Is there collateral information: Yes     Collateral information name, relationship, phone number:  Aissatou [phone]  Group Home  staff member    What happened today: Patient has been not sleeping for two days.   Patient has had increased agitation.   Group Home has been providing PRN medications to better manage agitation.   Patient reported foot pain this afternoon, this was when patient was willing to go to the Emergency Department for assessment.     Patient has been seeing shadows and cannot sleep.   Staff said that patient behavior has not been helped by PRN medications.   Patient paces in the common areas of the group home. He reports distress from seeking the shadows.     Patient has not made any comments about suicide nor harm to others.   Patient will have episodes in his room where it looks like he is fighting the air.     What is different about patient's functioning: Patient lives in a group home with 24/7 staff. Patient has a good relationship with his group home staff.   Patient has been a resident for almost four years. Patient is welcome return to the group home.   Patient has not slept for the past 24 hours. This pattern of not sleeping precipitates more agitation and aggressive behavior.   Staff beleive that the patient needs sleep to avoid increased  agitation and agressive bx. These behaviosr present when he is not able to get restorative sleep.     Concern about alcohol/drug use:  none    What do you think the patient needs:  Restorative sleep and medications to support sleep and decreased agitation.     Has patient made comments about wanting to kill themselves/others: no    If d/c is recommended, can they take part in safety/aftercare planning:  yes    Additional collateral information:        Risk Assessment  Titus Suicide Severity Rating Scale Full Clinical Version:  Suicidal Ideation  Q1 Wish to be Dead (Lifetime): No  Q2 Non-Specific Active Suicidal Thoughts (Lifetime): No  Q6 Suicide Behavior (Lifetime): no     Suicidal Behavior (Lifetime)  Actual Attempt (Lifetime): No  Has subject engaged in non-suicidal self-injurious behavior? (Lifetime): No  Interrupted Attempts (Lifetime): No  Aborted or Self-Interrupted Attempt (Lifetime): No  Preparatory Acts or Behavior (Lifetime): No    Titus Suicide Severity Rating Scale Recent:   Suicidal Ideation (Recent)  Q1 Wished to be Dead (Past Month): no  Q2 Suicidal Thoughts (Past Month): no  Level of Risk per Screen: no risks indicated  Intensity of Ideation (Recent)  Most Severe Ideation Rating (Past 1 Month): 1  Frequency (Past 1 Month): Less than once a week  Duration (Past 1 Month): Fleeting, few seconds or minutes  Controllability (Past 1 Month): Does not attempt to control thoughts  Deterrents (Past 1 Month): Does not apply  Reasons for Ideation (Past 1 Month): Does not apply  Suicidal Behavior (Recent)  Actual Attempt (Past 3 Months): No  Has subject engaged in non-suicidal self-injurious behavior? (Past 3 Months): No  Interrupted Attempts (Past 3 Months): No  Aborted or Self-Interrupted Attempt (Past 3 Months): No  Preparatory Acts or Behavior (Past 3 Months): No    Environmental or Psychosocial Events:    Protective Factors: Protective Factors: strong bond to family unit, community support, or  employment, help seeking    Does the patient have thoughts of harming others? Feels Like Hurting Others: no  Previous Attempt to Hurt Others: no    Is the patient engaging in sexually inappropriate behavior?           Mental Status Exam   Affect: Blunted, Flat  Appearance: Disheveled  Attention Span/Concentration: Attentive  Eye Contact: Avoidant    Fund of Knowledge: Delayed   Language /Speech Content: Fluent  Language /Speech Volume: Other (please comment) (mumbled)  Language /Speech Rate/Productions: Minimally Responsive  Recent Memory: Poor  Remote Memory: Poor  Mood: Normal  Orientation to Person: Yes   Orientation to Place: Yes  Orientation to Time of Day: Yes  Orientation to Date: Yes     Situation (Do they understand why they are here?): Yes  Psychomotor Behavior: Normal  Thought Content: Clear  Thought Form: Intact       Medication  Psychotropic medications:   Medication Orders - Psychiatric (From admission, onward)      Start     Dose/Rate Route Frequency Ordered Stop    09/07/24 1725  OLANZapine (zyPREXA) tablet 10 mg         10 mg Oral ONCE PRN 09/07/24 1726               Current Care Team  Patient Care Team:  No Ref-Primary, Physician as PCP - Frida Mckinnon MD as MD (Pediatrics)  Raisa Ftich MD as MD (Pediatric Rheumatology)  Wilmer Castellano MD as MD (Pediatric Hematology/Oncology)  Nkechi Monreal NP as Assigned Behavioral Health Provider  No Ref-Primary, Physician    Diagnosis  Patient Active Problem List   Diagnosis Code    Thrombocytopenia (H24) D69.6    Chronic ITP (idiopathic thrombocytopenia) (H) D69.3    Attention deficit disorder F98.8    Intermittent explosive disorder F63.81    Anxiety disorder, unspecified F41.9    Schizoaffective disorder, unspecified (H) F25.9    Intermittent explosive disorder F63.81    Schizoaffective disorder, bipolar type (H) F25.0    Fetal alcohol syndrome Q86.0    Intellectual disability F79       Primary Problem This Admission  Active  Hospital Problems    Attention deficit disorder F98.8  Intermittent explosive disorder F63.81  Anxiety disorder, unspecified F41.9  Schizoaffective disorder, bipolar type (H) F25.0  Fetal alcohol syndrome Q86.0  Intellectual disability F79          Clinical Summary and Substantiation of Recommendations   After diagnostic assessment, chart review and collateral information, the recommendation of this writer is for discharge with safety plan to his supportive group home due to no risk symptoms that would indicate that patient is in need of a higher level of cares. Kaden Easton Jr. Is currently experiencing insomnia and increase in agitation however these symptoms are near or at baseline for patient. Group Home staff report that the patient  symptoms of do appear to be close to his baseline, however lack of sleep often exasperates the psychosis and agitation symptoms. Group home staff are comfortable with patient returning to the home tonight with medications for sleep.      Patient coping skills attempted to reduce the crisis:  Prior to arrival patient and staff attempted to utilize PRN medications to reduce the crisis. Patient was communicating his needs to staff and accepting of the supports offered.    Disposition  Recommended disposition: Medication Management, Group Home        Reviewed case and recommendations with attending provider. Attending Name: Emergency Department provider, Severiano Escobar PA-C, was informed about the recommended disposition of discharge to Group Home with prn medications to support sleep and decrease agitation. They are in support of the disposition.       Attending concurs with disposition: yes       Patient and/or validated legal guardian concurs with disposition:   yes       Final disposition:  discharge    Legal status on admission: Voluntary/Patient has signed consent for treatment    Assessment Details   Total duration spent with the patient: 10 min     CPT code(s) utilized:  Non-Billable    PARAS March, Psychotherapist  [facility] - Triage & Transition Services  Callback: [phone]

## 2024-09-07 NOTE — ED NOTES
Writer spoke to Aissatou [phone]  staff member at  who states that patient had a fall last week on the 1st was seen at the ER and everything checked out. Only today patient started to c/o leg pain. Patient has been having an increase in agitation, paranoia, seeing shadows and people following him. Per staff patient has not slept in 48 hours. Patient has been taking PRN hydroxyzine with no change.

## 2024-09-07 NOTE — ED NOTES
Bed: ED21  Expected date:   Expected time:   Means of arrival:   Comments:  542 green pt  24 M [facility] pt with foot pain x a few hours/ho schizophrenia/A&O  1700

## 2024-09-07 NOTE — ED PROVIDER NOTES
ED APC SUPERVISION NOTE:   I evaluated this patient in conjunction with Severiano Escobar PA-C  I have participated in the care of the patient and personally performed key elements of the history, exam, and medical decision making.      HPI:   Kaden Easton Jr. is a 24 year old male who presents to the ED via EMS from his group home with bilateral foot pain and numbness that started while in the shower. He is still able to ambulate. Reports body aches for the past few days. No falls or trauma. His group home staff report he has not slept in about 48 hours and is showing signs of sleep deprivation psychosis.    Independent Historian:   None    Review of External Notes: Psychiatry note from 8/29/2024 was reviewed for agitation.     EXAM:   BP (!) 151/74   Pulse 78   Temp 98.7  F (37.1  C) (Temporal)   Resp 18   SpO2 97%    General: Flat affect  Cardiovascular: Regular rate and rhythm  Lungs: Clear to auscultation  Abdomen: Soft nontender nondistended  Skin: Warm and dry  Musculoskeletal: No reproducible tenderness to the bilateral lower extremities, sensation intact in the bilateral lower extremities, extremities appear well-perfused.  Psych: Flat affect    Labs Ordered and Resulted from Time of ED Arrival to Time of ED Departure   LITHIUM LEVEL - Abnormal       Result Value    Lithium 0.48 (*)    CBC WITH PLATELETS AND DIFFERENTIAL - Abnormal    WBC Count 13.6 (*)     RBC Count 4.08 (*)     Hemoglobin 12.2 (*)     Hematocrit 36.2 (*)     MCV 89      MCH 29.9      MCHC 33.7      RDW 14.0      Platelet Count 157      % Neutrophils 71      % Lymphocytes 17      % Monocytes 9      % Eosinophils 2      % Basophils 0      % Immature Granulocytes 0      NRBCs per 100 WBC 0      Absolute Neutrophils 9.7 (*)     Absolute Lymphocytes 2.3      Absolute Monocytes 1.3      Absolute Eosinophils 0.3      Absolute Basophils 0.0      Absolute Immature Granulocytes 0.0      Absolute NRBCs 0.0     RBC AND PLATELET MORPHOLOGY -  Abnormal    RBC Morphology Confirmed RBC Indices      Platelet Assessment   (*)     Value: Automated Count Confirmed. Giant platelets are present.    Giant Platelets Slight (*)    INFLUENZA A/B, RSV, & SARS-COV2 PCR - Normal    Influenza A PCR Negative      Influenza B PCR Negative      RSV PCR Negative      SARS CoV2 PCR Negative     BASIC METABOLIC PANEL - Normal    Sodium 139      Potassium 3.7      Chloride 104      Carbon Dioxide (CO2) 23      Anion Gap 12      Urea Nitrogen 7.5      Creatinine 0.87      GFR Estimate >90      Calcium 9.0      Glucose 91           Independent Interpretation (X-rays, CTs, rhythm strip):  None    Consultations/Discussion of Management or Tests:  The patient was seen by her mental health professional who obtained collateral information from the group home, the patient is at his baseline per the group home    Social Determinants of Health affecting care:   Stress/Adjustment Disorders and Social Connections/Isolation     MEDICAL DECISION MAKING/ASSESSMENT AND PLAN:   Follow presentation history and physical examination are performed, the above workup was undertaken and returned is unremarkable.  Lithium level is slightly subtherapeutic.  The patient does have diabetes and certainly peripheral neuropathy remains in the differential.  The group home his primary concern is the patient's mental health, according to our mental health  and the group home he is at his baseline and they would like a refill of his olanzapine as needed prescription.  Otherwise head to toe examination is unremarkable, laboratory workup is reassuring and I believe he can safely be discharged home.  There is no signs of an acute fracture, dislocation, DVT.  No signs of an acute infectious etiology.     DIAGNOSIS:     ICD-10-CM    1. Schizoaffective disorder, bipolar type (H)  F25.0 OLANZapine (ZYPREXA) 10 MG tablet      2. Bilateral leg pain  M79.604     M79.605                DISPOSITION:   Discharged  home     Scribe Disclosure:  I, Elaina Frias, am serving as a scribe at 4:50 PM on 9/7/2024 to document services personally performed by Benedict Kirby MD based on my observations and the provider's statements to me.   9/7/2024  [facility] EMERGENCY DEPT       Benedict Kirby MD  09/07/24 1954

## 2024-09-07 NOTE — ED TRIAGE NOTES
Patient BIBA from his  where patient was taking a shower and started to have bilateral feet pain, with numbness to feet. When EMS arrived patient able to ambulate, climb stairs and walk out to the stretcher. EMS elevated patients feet and pain was relieved. Patient comes from  and has hx of schizoaffective disorder and does not like the other  residents.

## 2024-09-08 ENCOUNTER — TELEPHONE (OUTPATIENT)
Dept: BEHAVIORAL HEALTH | Facility: CLINIC | Age: 24
End: 2024-09-08
Payer: COMMERCIAL

## 2024-09-08 NOTE — ED PROVIDER NOTES
Emergency Department Note      History of Present Illness     Chief Complaint   Foot Pain      HPI   Kaden Easton Jr. is a 24 year old male with a history of ADHD, anxiety, obesity, schizophrenia who presents to the ED for evaluation of pain.  Per EMS, patient presents from his group home due to bilateral foot pain and numbness that began yesterday.  Patient had a fall a week ago and was seen in the ED with negative x-rays.  No falls or trauma since.  He is not on blood thinners.  No bladder/bowel incontinence. No back pain. When speaking with patient, he states that he has generalized body aches and numbness to his feet.     Per group home, patient has not slept in the last 48 hours and has had increased agitation, paranoia, and hallucinations.  He has been needing more as needed Zyprexa due to increased agitation.  Group home is requesting a mental health evaluation.    Independent Historian   Patient, EMS, group home facility report history above    Review of External Notes   Reviewed ED note from 9/1/2024.  Patient was seen for syncope.     Reviewed office visit note from 8/29/2024.    Past Medical History     Medical History and Problem List   Past Medical History:   Diagnosis Date    ADD (attention deficit disorder)     ADHD (attention deficit hyperactivity disorder)     ADHD (attention deficit hyperactivity disorder)     Anxiety     Current smoker     Fetal alcohol syndrome     Forehead abrasion     History of transfusion     Obese     Obesity     Self-injurious behavior 8/17/2020       Medications   OLANZapine (ZYPREXA) 10 MG tablet  ARIPiprazole (ABILIFY) 5 MG tablet  atomoxetine (STRATTERA) 100 MG capsule  atomoxetine (STRATTERA) 100 MG capsule  atropine 1 % ophthalmic solution  benzonatate (TESSALON) 100 MG capsule  chlorproMAZINE (THORAZINE) 50 MG tablet  cholecalciferol 25 MCG (1000 UT) TABS  ferrous sulfate (FE TABS) 325 (65 Fe) MG EC tablet  ferrous sulfate (FEROSUL) 325 (65 Fe) MG  tablet  guanFACINE HCl (INTUNIV) 3 MG TB24 24 hr tablet  guanFACINE HCl (INTUNIV) 3 MG TB24 24 hr tablet  hydrOXYzine HCl (ATARAX) 50 MG tablet  lithium 300 MG capsule  lithium 300 MG capsule  loratadine (CLARITIN) 10 MG tablet  metFORMIN (GLUCOPHAGE-XR) 500 MG 24 hr tablet  nicotine (NICODERM CQ) 14 MG/24HR 24 hr patch  nicotine (NICORETTE) 4 MG lozenge  ondansetron (ZOFRAN ODT) 4 MG ODT tab  paliperidone (INVEGA SUSTENNA) 234 MG/1.5ML JUANA  paliperidone (INVEGA SUSTENNA) 234 MG/1.5ML JUANA  polyethylene glycol (MIRALAX) 17 g packet  traZODone HCl 300 MG TABS  traZODone HCl 300 MG TABS  vitamin C (ASCORBIC ACID) 250 MG TABS tablet        Surgical History   No past surgical history on file.    Physical Exam     Patient Vitals for the past 24 hrs:   BP Temp Temp src Pulse Resp SpO2   09/07/24 1621 -- 98.7  F (37.1  C) Temporal -- -- --   09/07/24 1613 (!) 151/74 -- -- 78 18 97 %     Physical Exam  Physical Exam:  General: lying comfortably on hospital bed  Head: normocephalic, atraumatic  Eyes: PERRLA, EOMI  Ears: External ears appear normal.   Nose: no signs of bleeding   Throat: moist mucous membranes  Neck: No JVD  CV: regular rate and rhythm  Pulm: lungs clear to ausculation bilaterally, normal respiratory effort, normal chest expansion with breathing   Abdomen: soft, non-tender, non-distended  MSK: No midline tenderness. Normal ROM of neck.    Ext: normal range of motion of all extremities. No gross deformities. Normal sensation to light touch throughout bilateral lower extremities. No calf tenderness or swelling. No signs of trauma. No bleeding. Normal capillary refill of toes bilaterally.   Skin: warm, dry, no rashes  Neuro: alert and oriented  Psych: Flat affect. Calm.       Diagnostics     Lab Results   Labs Ordered and Resulted from Time of ED Arrival to Time of ED Departure   LITHIUM LEVEL - Abnormal       Result Value    Lithium 0.48 (*)    CBC WITH PLATELETS AND DIFFERENTIAL - Abnormal    WBC Count 13.6  (*)     RBC Count 4.08 (*)     Hemoglobin 12.2 (*)     Hematocrit 36.2 (*)     MCV 89      MCH 29.9      MCHC 33.7      RDW 14.0      Platelet Count 157      % Neutrophils 71      % Lymphocytes 17      % Monocytes 9      % Eosinophils 2      % Basophils 0      % Immature Granulocytes 0      NRBCs per 100 WBC 0      Absolute Neutrophils 9.7 (*)     Absolute Lymphocytes 2.3      Absolute Monocytes 1.3      Absolute Eosinophils 0.3      Absolute Basophils 0.0      Absolute Immature Granulocytes 0.0      Absolute NRBCs 0.0     RBC AND PLATELET MORPHOLOGY - Abnormal    RBC Morphology Confirmed RBC Indices      Platelet Assessment   (*)     Value: Automated Count Confirmed. Giant platelets are present.    Giant Platelets Slight (*)    INFLUENZA A/B, RSV, & SARS-COV2 PCR - Normal    Influenza A PCR Negative      Influenza B PCR Negative      RSV PCR Negative      SARS CoV2 PCR Negative     BASIC METABOLIC PANEL - Normal    Sodium 139      Potassium 3.7      Chloride 104      Carbon Dioxide (CO2) 23      Anion Gap 12      Urea Nitrogen 7.5      Creatinine 0.87      GFR Estimate >90      Calcium 9.0      Glucose 91         Imaging   No orders to display       EKG   None    Independent Interpretation   None    ED Course      Medications Administered   Medications   OLANZapine (zyPREXA) tablet 10 mg (has no administration in time range)       Procedures   Procedures     Discussion of Management   ED [facility].     ED Course   ED Course as of 09/07/24 2139   Sat Sep 07, 2024   1640 I evaluated patient and obtained history.   1645 Spoke with the . He has been complaining of general body aches.    1818 Spoke with DEC . She examined the patient and will talk with the group home regarding disposition.       Additional Documentation  None    Medical Decision Making / Diagnosis     CMS Diagnoses: None    MIPS       None    MDM   Kaden Easton Jr. is a 24 year old male who presents to the ED for evaluation  of bilateral foot pain.  Per EMS, patient has been complaining of bilateral foot pain and numbness since yesterday.  Upon talking with the group home, they report that patient has had generalized bodyaches and bilateral foot pain with numbness over the last 2 days.  He also has not slept in the last 48 hours due to increased agitation, paranoia, and worsening mental state.  He also ran out of his as needed Zyprexa.  See further HPI details above.  The above workup was undertaken.  Broad differential was considered including DVT, cauda equina syndrome, lithium toxicity, rhabdo, trauma, diabetic neuropathy.  On exam, patient is well-appearing.  His vitals are reassuring.  He does not seem intoxicated or in withdrawal. He is calm and cooperative. He has normal sensation to light touch throughout his lower extremities.  He has normal range of motion of bilateral feet and ankles.  He has normal capillary refill of the toes.  No evidence of trauma.  No calf tenderness or swelling.  Low suspicion for DVT given low risk factors and no evidence of calf tenderness or swelling. Low suspicion of cord compression or cauda equina syndrome given no bladder/bowel incontinence, normal sensation and range of motion of bilateral lower extremities.  No evidence of trauma.  Lithium level is low at 0.48.  No evidence of lithium toxicity. Low suspicion for diabetic neuropathy given normal glucose. Normal electrolyte. Normal kidney function. No major electrolyte derangement or severe dehydration. Viral panel is negative for COVID/RSV/Influenza. Medically cleared.     DEC  came to see the patient. She also spoke with the group home. No indications for psychiatric hospitalization. No indications for a hold. No evidence of a deteriorating mental state or acute psychosis. Patient is not a threat to himself or others at this time. Group is requesting a refill on his oral zyprexa because he ran of it and uses it to sleep. He has follow  up with his psychiatrist in two days for reassessment. Group home is okay with the patient returning. Will plan to discharge him home with a prescription for his zyprexa.     Disposition   The patient was discharged.     Diagnosis     ICD-10-CM    1. Schizoaffective disorder, bipolar type (H)  F25.0 OLANZapine (ZYPREXA) 10 MG tablet     DISCONTINUED: OLANZapine (ZYPREXA) 10 MG tablet      2. Bilateral leg pain  M79.604     M79.605            Discharge Medications   Discharge Medication List as of 9/7/2024  8:16 PM            JACE Napier Kausar, PA-C  09/07/24 9880

## 2024-09-08 NOTE — DISCHARGE INSTRUCTIONS
Please follow-up with your primary care provider for reassessment.  Please return to the ER with worsening pain, weakness, numbness, fever, or any other concerns.

## 2024-09-08 NOTE — TELEPHONE ENCOUNTER
Called to offer additional appts/resources and was told now is not a good time and to call back tomorrow (9/9) in the morning.

## 2024-09-09 ENCOUNTER — HOSPITAL ENCOUNTER (EMERGENCY)
Facility: CLINIC | Age: 24
Discharge: NURSING, REHAB OR LONG-TERM CARE | End: 2024-09-09
Attending: EMERGENCY MEDICINE | Admitting: EMERGENCY MEDICINE
Payer: COMMERCIAL

## 2024-09-09 VITALS
HEART RATE: 97 BPM | BODY MASS INDEX: 37.31 KG/M2 | WEIGHT: 260 LBS | TEMPERATURE: 98.3 F | DIASTOLIC BLOOD PRESSURE: 68 MMHG | RESPIRATION RATE: 18 BRPM | SYSTOLIC BLOOD PRESSURE: 142 MMHG | OXYGEN SATURATION: 96 %

## 2024-09-09 DIAGNOSIS — R44.3 HALLUCINATIONS: ICD-10-CM

## 2024-09-09 PROBLEM — F98.8 ATTENTION DEFICIT DISORDER: Status: ACTIVE | Noted: 2023-12-06

## 2024-09-09 PROBLEM — F25.9 SCHIZOAFFECTIVE DISORDER, UNSPECIFIED (H): Status: ACTIVE | Noted: 2023-12-06

## 2024-09-09 PROBLEM — F79 INTELLECTUAL DISABILITY: Status: ACTIVE | Noted: 2023-12-07

## 2024-09-09 PROBLEM — Q86.0 FETAL ALCOHOL SYNDROME: Status: ACTIVE | Noted: 2023-12-07

## 2024-09-09 LAB
ALBUMIN SERPL BCG-MCNC: 4 G/DL (ref 3.5–5.2)
ALP SERPL-CCNC: 57 U/L (ref 40–150)
ALT SERPL W P-5'-P-CCNC: 45 U/L (ref 0–70)
ANION GAP SERPL CALCULATED.3IONS-SCNC: 9 MMOL/L (ref 7–15)
AST SERPL W P-5'-P-CCNC: 33 U/L (ref 0–45)
BASOPHILS # BLD AUTO: 0 10E3/UL (ref 0–0.2)
BASOPHILS NFR BLD AUTO: 0 %
BILIRUB SERPL-MCNC: 0.3 MG/DL
BUN SERPL-MCNC: 7.7 MG/DL (ref 6–20)
CALCIUM SERPL-MCNC: 9 MG/DL (ref 8.8–10.4)
CHLORIDE SERPL-SCNC: 104 MMOL/L (ref 98–107)
CREAT SERPL-MCNC: 0.53 MG/DL (ref 0.67–1.17)
EGFRCR SERPLBLD CKD-EPI 2021: >90 ML/MIN/1.73M2
EOSINOPHIL # BLD AUTO: 0.4 10E3/UL (ref 0–0.7)
EOSINOPHIL NFR BLD AUTO: 3 %
ERYTHROCYTE [DISTWIDTH] IN BLOOD BY AUTOMATED COUNT: 13.9 % (ref 10–15)
GLUCOSE SERPL-MCNC: 105 MG/DL (ref 70–99)
HCO3 SERPL-SCNC: 24 MMOL/L (ref 22–29)
HCT VFR BLD AUTO: 35.1 % (ref 40–53)
HGB BLD-MCNC: 11.8 G/DL (ref 13.3–17.7)
IMM GRANULOCYTES # BLD: 0 10E3/UL
IMM GRANULOCYTES NFR BLD: 0 %
LIPASE SERPL-CCNC: 15 U/L (ref 13–60)
LITHIUM SERPL-SCNC: 0.75 MMOL/L (ref 0.6–1.2)
LYMPHOCYTES # BLD AUTO: 2.2 10E3/UL (ref 0.8–5.3)
LYMPHOCYTES NFR BLD AUTO: 16 %
MCH RBC QN AUTO: 29.8 PG (ref 26.5–33)
MCHC RBC AUTO-ENTMCNC: 33.6 G/DL (ref 31.5–36.5)
MCV RBC AUTO: 89 FL (ref 78–100)
MONOCYTES # BLD AUTO: 1.4 10E3/UL (ref 0–1.3)
MONOCYTES NFR BLD AUTO: 10 %
NEUTROPHILS # BLD AUTO: 9.3 10E3/UL (ref 1.6–8.3)
NEUTROPHILS NFR BLD AUTO: 70 %
NRBC # BLD AUTO: 0 10E3/UL
NRBC BLD AUTO-RTO: 0 /100
PLAT MORPH BLD: NORMAL
PLATELET # BLD AUTO: 163 10E3/UL (ref 150–450)
POTASSIUM SERPL-SCNC: 3.6 MMOL/L (ref 3.4–5.3)
PROT SERPL-MCNC: 6.6 G/DL (ref 6.4–8.3)
RBC # BLD AUTO: 3.96 10E6/UL (ref 4.4–5.9)
RBC MORPH BLD: NORMAL
SODIUM SERPL-SCNC: 137 MMOL/L (ref 135–145)
WBC # BLD AUTO: 13.4 10E3/UL (ref 4–11)

## 2024-09-09 PROCEDURE — 250N000013 HC RX MED GY IP 250 OP 250 PS 637: Performed by: EMERGENCY MEDICINE

## 2024-09-09 PROCEDURE — 80178 ASSAY OF LITHIUM: CPT | Performed by: EMERGENCY MEDICINE

## 2024-09-09 PROCEDURE — 82040 ASSAY OF SERUM ALBUMIN: CPT | Performed by: EMERGENCY MEDICINE

## 2024-09-09 PROCEDURE — 36415 COLL VENOUS BLD VENIPUNCTURE: CPT | Performed by: EMERGENCY MEDICINE

## 2024-09-09 PROCEDURE — 83690 ASSAY OF LIPASE: CPT | Performed by: EMERGENCY MEDICINE

## 2024-09-09 PROCEDURE — 99283 EMERGENCY DEPT VISIT LOW MDM: CPT | Performed by: EMERGENCY MEDICINE

## 2024-09-09 PROCEDURE — 85025 COMPLETE CBC W/AUTO DIFF WBC: CPT | Performed by: EMERGENCY MEDICINE

## 2024-09-09 RX ORDER — MAGNESIUM HYDROXIDE/ALUMINUM HYDROXICE/SIMETHICONE 120; 1200; 1200 MG/30ML; MG/30ML; MG/30ML
15 SUSPENSION ORAL ONCE
Status: DISCONTINUED | OUTPATIENT
Start: 2024-09-09 | End: 2024-09-09 | Stop reason: HOSPADM

## 2024-09-09 RX ORDER — OLANZAPINE 10 MG/1
10 TABLET, ORALLY DISINTEGRATING ORAL ONCE
Status: COMPLETED | OUTPATIENT
Start: 2024-09-09 | End: 2024-09-09

## 2024-09-09 RX ADMIN — OLANZAPINE 10 MG: 10 TABLET, ORALLY DISINTEGRATING ORAL at 10:24

## 2024-09-09 ASSESSMENT — ACTIVITIES OF DAILY LIVING (ADL)
ADLS_ACUITY_SCORE: 35

## 2024-09-09 NOTE — ED PROVIDER NOTES
After Visit Summary   8/29/2018    Cora Aldridge    MRN: [id number]           Patient Information     Date Of Birth          2007        Visit Information        Provider Department      8/29/2018 11:15 AM Mony Valentino MD Peds Dermatology        Today's Diagnoses     Intrinsic atopic dermatitis        Dermatitis, seborrheic        Flexural eczema          Care Instructions    [facility]- Pediatric Dermatology  Dr. Lili Borrero, Dr. Mony Valentino, Dr. Roldan Kebede, Dr. Sobeida Lopes, Dr. Jens Novoa       Pediatric Appointment Scheduling and Call Center [phone]     Non Urgent -Triage Voicemail Line; [phone]- Coral and Verito RN's. Messages are checked periodically throughout the day and are returned as soon as possible.      Clinic Fax number: [phone]    If you need a prescription refill, please contact your pharmacy. They will send us an electronic request. Refills are approved or denied by our Physicians during normal business hours, Monday through Fridays    Per office policy, refills will not be granted if you have not been seen within the past year (or sooner depending on your child's condition)    *Radiology Scheduling- [phone]  *Sedation Unit Scheduling- [phone]  *[place] Scheduling- General [phone]; Pediatric Dermatology [phone]  *Main  Services: [phone]   Czech: [phone]   Puerto Rican: [phone]   Hmong/Mega/Spanish: [phone]    For urgent matters that cannot wait until the next business day, is over a holiday and/or a weekend please call [phone] and ask for the Dermatology Resident On-Call to be paged.          We would like you to do daily bleach baths. We took a culture of the skin today and will call you with the results. When you receive the results we will call and send some oral antibiotics for her.   Please continue the following:   - Continue daily  Took over care of the patient at handoff pending DEC assessment.    DEC  recommended Zyprexa.  They did speak with the patient and think that it would be appropriate for him to go back to his group home.  Patient is given Zyprexa.    He does sleep for some time.  Once he wakes up.  Patient does not have any complaints.  He is agreeable to go back to his group home.  Group home is also accepting the patient back.    I evaluated the patient, he is awake, alert, answering questions.  Patient is discharged.     Marielle Albarran MD  09/09/24 3153     "bathing (can defer bleach baths for now per patient prefernece)  - For moisturization, recommended switch to mineral, coconut or sunflower seed oil,  rather than olive oil, given there is more evidence for their efficacy in atopic dermatitis  - continue mometasone 0.05% ointment twice daily as pulse treatment for more lichenified areas on the arms, then can transition back to triamcinolone 0.1% ointment twice daily as needed  - continue protopic 0.1% ointment twice daily as needed for face (clear today).   - continue ketoconazole 2% shampoo at least once weekly  - continue mometasone 0.1% solution (10-15 drops) evenly over scalp. Recommend perform nightly for 1-week and then transition to 2-3 times weekly as maintenance therapy.     Pediatric Dermatology   [address]  [place] [address]  [phone]    Bleach Bath Instructions  What are dilute bleach baths?  Dilute bleach baths are used to help fight bacteria that is commonly found on the skin; this bacteria may be preventing your skin from healing. If is also used to calm inflammation in skin, even if infection is not present. The dilution ratio we recommend is the same concentration that is in a swimming pool.     Type;  *Regular, plain household bleach used for cleaning clothing. Brand or Generic is okay.   *Make sure this is plain or concentrated bleach. This should NOT be \"splash free, splash less or color safe.\"   *There should not be any added fragrance to the bleach; such a lavender.    How do I make a dilute bleach bath?  *Fill your tub with lukewarm water with at least 4-6 inches of water.  *Pour 1/4 to 1/2 cup of bleach into an adult size bath tub.  *For smaller tubs (infant tubs), add two tablespoons of bleach to the tub water. * Bleach baths work better if your child is able to submerge most of their skin, so consider placing the infant tub in the larger tub.   *Repeat bleach baths as recommended by your " provider.    Other information:  *Do not pour bleach directly onto the skin.  *If is safe to get the bleach mixture on your face and scalp.  *Do not drink the bleach mixture.  *Keep bleach bottle out of reach of children.              Follow-ups after your visit        Follow-up notes from your care team     Return in about 3 months (around 11/29/2018).      Who to contact     Please call your clinic at [phone] to:    Ask questions about your health    Make or cancel appointments    Discuss your medicines    Learn about your test results    Speak to your doctor            Additional Information About Your Visit        MyChart Information     [employer] is an electronic gateway that provides easy, online access to your medical records. With [employer], you can request a clinic appointment, read your test results, renew a prescription or communicate with your care team.     To sign up for [employer], please contact your [facility] or call [phone] for assistance.           Care EveryWhere ID     This is your Care EveryWhere ID. This could be used by other organizations to access your [facility] medical records  [id number]        Your Vitals Were     Last Period                   08/07/2018            Blood Pressure from Last 3 Encounters:   08/10/18 111/67   05/04/18 101/61   08/09/17 104/63    Weight from Last 3 Encounters:   08/10/18 191 lb 4 oz (86.8 kg) (>99 %)*   05/04/18 201 lb (91.2 kg) (>99 %)*   08/09/17 199 lb (90.3 kg) (>99 %)*     * Growth percentiles are based on [facility] 2-20 Years data.              We Performed the Following     Skin Culture Aerobic Bacterial          Today's Medication Changes          These changes are accurate as of 8/29/18 11:49 AM.  If you have any questions, ask your nurse or doctor.               Start taking these medicines.        Dose/Directions    hydrOXYzine 10 MG tablet   Commonly known as:  ATARAX   Used for:  Flexural eczema, Intrinsic atopic  dermatitis        Please use two to five 10 mg tabs as bedtime as needed for itch at bedtime   Quantity:  90 tablet   Refills:  3            Where to get your medicines      These medications were sent to [id number]  [place] - [address] AT [facility] [place]  [address] [place] [address]     Phone:  [phone]     diphenhydrAMINE 25 MG tablet    hydrOXYzine 10 MG tablet    ketoconazole 2 % shampoo    mometasone 0.1 % ointment    mometasone 0.1 % solution (lotion)    tacrolimus 0.1 % ointment                Primary Care Provider Office Phone # Fax #    Lydia Yates PA-C [phone] [address] [facility] [address]        Equal Access to Services     GUS JEAN : Hadii aad ku hadasho Soomaali, waaxda luqadaha, qaybta kaalmada adeegyada, janice avilezin hayaan kacey guevara . So [facility] [phone].    ATENCIÓN: Si habla español, tiene a conner disposición servicios gratuitos de asistencia lingüística. Raymon al [phone].    We comply with applicable federal civil rights laws and [place] laws. We do not discriminate on the basis of race, color, national origin, age, disability, sex, sexual orientation, or gender identity.            Thank you!     Thank you for choosing [facility]  for your care. Our goal is always to provide you with excellent care. Hearing back from our patients is one way we can continue to improve our services. Please take a few minutes to complete the written survey that you may receive in the mail after your visit with us. Thank you!             Your Updated Medication List - Protect others around you: Learn how to safely use, store and throw away your medicines at www.disposemymeds.org.          This list is accurate as of 8/29/18 11:49 AM.  Always use your most recent med list.                   Brand Name Dispense Instructions for use Diagnosis    * albuterol (2.5 MG/3ML)  0.083% neb solution     30 vial    Take 1 vial (2.5 mg) by nebulization every 6 hours as needed for shortness of breath / dyspnea    Mild persistent asthma, uncomplicated       * VENTOLIN  (90 Base) MCG/ACT inhaler   Generic drug:  albuterol     54 g    INHALE 2 PUFFS INTO THE LUNGS EVERY 6 HOURS AS NEEDED FOR SHORTNESS OF BREATH OR DIFFICULT BREATHING    Mild persistent asthma, uncomplicated       diphenhydrAMINE 25 MG tablet    BENADRYL    60 tablet    Take 1-2 tablets (25-50 mg) by mouth every 6 hours as needed for itching or allergies    Flexural eczema       hydrOXYzine 10 MG tablet    ATARAX    90 tablet    Please use two to five 10 mg tabs as bedtime as needed for itch at bedtime    Flexural eczema, Intrinsic atopic dermatitis       ketoconazole 2 % shampoo    NIZORAL    120 mL    Lather onto scalp and let sit for 3-5 minutes before rinsing. Perform once weekly.    Dermatitis, seborrheic       * mometasone 0.1 % solution (lotion)    ELOCON    60 mL    Apply 10 drops evenly over the scalp at nighttime before bed daily for 2 weeks and then three times weekly as needed.    Dermatitis, seborrheic       * mometasone 0.1 % ointment    ELOCON    45 g    Apply twice daily to thicker spots on arms until improved. Do not exceed 2 weeks of application.    Intrinsic atopic dermatitis       tacrolimus 0.1 % ointment    PROTOPIC    60 g    Apply to affected areas of face twice daily    Intrinsic atopic dermatitis       * Notice:  This list has 4 medication(s) that are the same as other medications prescribed for you. Read the directions carefully, and ask your doctor or other care provider to review them with you.

## 2024-09-09 NOTE — DISCHARGE INSTRUCTIONS
Aftercare Plan  If I am feeling unsafe or I am in a crisis, I will:   Contact my established care providers   Call the National Suicide Prevention Lifeline: 988  Go to the nearest emergency room   Call 911     Continue to work with outpatient psychiatry provider on medication management    Please take PRNs as offered by group home staff to see what is helpful and not helpful       Additional Information  Today you were seen by a licensed mental health professional through Triage and Transition services, [facility] (P)  for a crisis assessment in the Emergency Department at [facility].  It is recommended that you follow up with your established providers (psychiatrist, mental health therapist, and/or primary care doctor - as relevant) as soon as possible. Coordinators from [facility] will be calling you in the next 24-48 hours to ensure that you have the resources you need.  You can also contact [facility] coordinators directly at [phone]. You may have been scheduled for or offered an appointment with a mental health provider. [facility] maintains an extensive network of licensed behavioral health providers to connect patients with the services they need.  We do not charge providers a fee to participate in our referral network.  We match patients with providers based on a patient's specific needs, insurance coverage, and location.  Our first effort will be to refer you to a provider within your care system, and will utilize providers outside your care system as needed.

## 2024-09-09 NOTE — ED NOTES
Delivered patient breakfast tray. Patient awake but groggy. Patient asking when he will be able to go home. Advised nurse.

## 2024-09-09 NOTE — ED NOTES
This RN called miguel ángel from pt's group home to give update and let her know pt is ready to be picked up

## 2024-09-09 NOTE — CONSULTS
"Diagnostic Evaluation Consultation  Crisis Assessment    Patient Name: Kaden Easton Jr.  Age:  24 year old  Legal Sex: male  Gender Identity: male  Pronouns: he/him  Race: Black or   Ethnicity: Not  or   Language: English      Patient was assessed: In person   Crisis Assessment Start Date: 09/09/24  Crisis Assessment Start Time: 0915  Crisis Assessment Stop Time: 0945  Patient location: [facility] EMERGENCY DEPT                             BH3    Referral Data and Chief Complaint  Kaden Easton Jr. presents to the ED via EMS. Patient is presenting to the ED for the following concerns: Verbal agitation, Paranoia, Other (see comment) (hallucinations).       Factors that make the mental health crisis life threatening or complex are:  Patient presented to the ED via EMS early this morning from his group home due to paranoia, auditory and visual hallucinations, and insomnia.  He presents with very mumbled speech and still had breakfast in his mouth.  Patient was coached to finish chewing and swallowing food before we could continue with assessment.   The mumbled and soft speech continued throughout assessment.  Patient endorses continued auditory and visual hallucinations this morning yet states he would like to return to group home.  Patient describes mood as \"happy\" yet anxious due to the hallucinations.  He reports he has had difficulty with sleep due to the hallucinations as well.  No changes have been noted to his appetite.  Patient denies SI, HI.  Patient has insight into having a diagnosis of Schizophrenia.  Patient has been behaviorally in control while in the ED overnight.  Patient does make a few comments or statements during assessment that writer asked for clarification on due to the mumbled speech and patient stated \"just messing with you\" or \"that was the voices\"..      Informed Consent and Assessment Methods  Explained the crisis assessment process, " "including applicable information disclosures and limits to confidentiality, assessed understanding of the process, and obtained consent to proceed with the assessment.  Assessment methods included conducting a formal interview with patient, review of medical records, collaboration with medical staff, and obtaining relevant collateral information from family and community providers when available.  : done     Patient response to interventions: eager to participate, acceptance expressed, verbalizes understanding  Coping skills were attempted to reduce the crisis:  listen to music, talk with  staff, take PRNs and go for walks     History of the Crisis   Patient is a resident of a group home and has been with this one for over three years.  He is not able to identify how long he has been in current crisis and group [facility] staff report he has been struggling for a couple of weeks, with some days better than others.  Per group [facility] staff, patient met with outpatient psychiatry end of August and was recommended new PRNs which have been taken inconsistently.  Patient shares having a history of [facility] admissions \"from a long time ago.\"    Brief Psychosocial History  Family:  Single, Children no  Support System:  Parent(s), Facility resident(s)/Staff  Employment Status:  disabled  Source of Income:  disability, social security  Financial Environmental Concerns:  none  Current Hobbies:  games, television/movies/videos  Barriers in Personal Life:  mental health concerns    Significant Clinical History  Current Anxiety Symptoms:  anxious  Current Depression/Trauma:  helplessness, difficulty concentrating  Current Somatic Symptoms:  anxious  Current Psychosis/Thought Disturbance:  auditory hallucinations, visual hallucinations  Current Eating Symptoms:   (no changes)  Chemical Use History:  Alcohol: None  Benzodiazepines: None  Opiates: None  Cocaine: None  Marijuana: None  Other Use: None  Withdrawal Symptoms:  (none)  Addictions: "  (none)   Past diagnosis:  ADHD, Other (intermittent explosive disorder, mood disorder, schizoaffective disorder, FAS)  Family history:  No known history of mental health or chemical health concerns  Past treatment:  Case management, Individual therapy, Psychiatric Medication Management, Primary Care, Residential Treatment  Details of most recent treatment:  Patient is a current resident in a group home with 24/7 support/supervision.  Other relevant history:  [facility] manages outpatient appointments for PCP and psychiatry.       Collateral Information  Is there collateral information: Yes     Collateral information name, relationship, phone number:  Aissatou [phone]  Group Home  staff member    What happened today: continue to not sleep; having hallucinations, yelling and pacing     What is different about patient's functioning: Has been refusing some of the newly recommended PRNs.     Has patient made comments about wanting to kill themselves/others: no    If d/c is recommended, can they take part in safety/aftercare planning:  yes (able to return after getting medication in the ED)    Additional collateral information:        Risk Assessment  [place] Suicide Severity Rating Scale Full Clinical Version:  Suicidal Ideation  Q6 Suicide Behavior (Lifetime): no          [place] Suicide Severity Rating Scale Recent:   Suicidal Ideation (Recent)  Q1 Wished to be Dead (Past Month): no  Q2 Suicidal Thoughts (Past Month): no  Level of Risk per Screen: no risks indicated     Suicidal Behavior (Recent)  Actual Attempt (Past 3 Months): No  Has subject engaged in non-suicidal self-injurious behavior? (Past 3 Months): No  Interrupted Attempts (Past 3 Months): No  Aborted or Self-Interrupted Attempt (Past 3 Months): No  Preparatory Acts or Behavior (Past 3 Months): No    Environmental or Psychosocial Events: helplessness/hopelessness  Protective Factors: Protective Factors: strong bond to family unit, community support, or  employment, help seeking, lives in a responsibly safe and stable environment    Does the patient have thoughts of harming others? Feels Like Hurting Others: no  Previous Attempt to Hurt Others: no  Is the patient engaging in sexually inappropriate behavior?: no    Is the patient engaging in sexually inappropriate behavior?  no        Mental Status Exam   Affect: Appropriate  Appearance: Appropriate (yet was holding food in his mouth after finishing breakfast)  Attention Span/Concentration: Attentive  Eye Contact: Engaged    Fund of Knowledge: Appropriate   Language /Speech Content: Non-Fluent (mumbled)  Language /Speech Volume: Soft, Normal  Language /Speech Rate/Productions: Normal  Recent Memory: Variable  Remote Memory: Variable  Mood: Normal  Orientation to Person: Yes   Orientation to Place: Yes  Orientation to Time of Day: Yes  Orientation to Date: Yes     Situation (Do they understand why they are here?): Yes  Psychomotor Behavior: Normal  Thought Content: Hallucinations  Thought Form: Intact     Medication  Psychotropic medications:   Medication Orders - Psychiatric (From admission, onward)      None             Current Care Team  Patient Care Team:  No Ref-Primary, Physician as PCP - Frida Mckinnon MD as MD (Pediatrics)  Raisa Fitch MD as MD (Pediatric Rheumatology)  Wilmer Castellano MD as MD (Pediatric Hematology/Oncology)  Nkechi Monreal NP as Assigned Behavioral Health Provider  No Ref-Primary, Physician    Diagnosis  Patient Active Problem List   Diagnosis Code    Thrombocytopenia (H24) D69.6    Chronic ITP (idiopathic thrombocytopenia) (H) D69.3    Attention deficit disorder F98.8    Intermittent explosive disorder F63.81    Anxiety disorder, unspecified F41.9    Schizoaffective disorder, unspecified (H) F25.9    Intermittent explosive disorder F63.81    Schizoaffective disorder, bipolar type (H) F25.0    Fetal alcohol syndrome Q86.0    Intellectual disability F79        Primary Problem This Admission  Active Hospital Problems    Intellectual disability      Fetal alcohol syndrome      Schizoaffective disorder, unspecified (H)      Attention deficit disorder      Anxiety disorder, unspecified        Clinical Summary and Substantiation of Recommendations   It is the recommendation of writer that patient discharge back to the group home after receiving more medication in the ED to help reduce the auditory/visual hallucinations and lack of sleep patient presented with.  Patient has a flat affect, mumbled speech, soft volume, thoughts are mostly clear yet delayed due to auditory hallucinations.  He is asking to return to the group home yet knows he continues to struggle with AH/VH.  Patient denies SI, HI.  Patient lives in a group home that is staffed 24/7 and feels supported by the staff there.  Patient describes mood as happy yet has high anxiety, sleep is poor, and appetite remains unchanged. Patient has outpatient psychiatry established and just saw that provider end of August.  He admits to refusing the new PRN medication at times when offered.   Patient can be picked up by group home staff when he awakes from this medication-induced sleep.      Disposition  Recommended disposition: Individual Therapy, Medication Management, Group Home        Reviewed case and recommendations with attending provider. Attending Name: Deion       Attending concurs with disposition: yes       Patient and/or validated legal guardian concurs with disposition: yes       Final disposition:  discharge    Legal status on admission: Voluntary/Patient has signed consent for treatment    Assessment Details   Total duration spent with the patient: 30 min     CPT code(s) utilized: 31821 - Psychotherapy for Crisis - 60 (30-74*) min    PEDRO Swain, [facility] HospitalSW  Licensed Mental Health Professional (LMHP)  [facility], [phone]

## 2024-09-09 NOTE — ED TRIAGE NOTES
"HX: FAS, ADHD, anxiety, obesity, schizophrenia. Pt here yesterday reporting no sleep in 48 hours and pain in his feet and xrays were neg   Tonight pt is having audio and visual hallucinations.  Voices are telling him to shoot himself.  Last night took his lithium and also used marijuana last night.    Pt has been eating Hot Cheetos and says \"my tummy hurts\".       Triage Assessment (Adult)       Row Name 09/09/24 0151          Triage Assessment    Airway WDL WDL        Respiratory WDL    Respiratory WDL WDL        Skin Circulation/Temperature WDL    Skin Circulation/Temperature WDL WDL        Cardiac WDL    Cardiac WDL WDL        Peripheral/Neurovascular WDL    Peripheral Neurovascular WDL WDL        Cognitive/Neuro/Behavioral WDL    Cognitive/Neuro/Behavioral WDL X   FAS                     "

## 2024-09-09 NOTE — ED PROVIDER NOTES
"  Emergency Department Note      History of Present Illness     Chief Complaint   Hallucinations     HPI   Kaden Easton Jr. is a 24 year old male with a history of schizoaffective disorder presenting to the emergency department for a psychiatric evaluation. The patient reports that he has been experiencing both auditory and visual hallucinations for the past week. He reports that his auditory hallucinations include hearing voices, and his visual hallucinations include seeing shadows. He believes that \"there are people that want to shoot him. He endorses nausea as well as soreness in his lower extremities.  He was eating flaming hot Cheetos prior to arrival and has red Cheeto dust over his lips and his hands.  He denies having done anything to hurt himself. He indicates marijuana usage last night, and reports that his last dose of Lithium was last night. The patient lives in a group home.     Independent Historian   None    Review of External Notes   Reviewed psychiatry office visit note on 8/29/2024.  Patient is a 24-year-old male with a history of intellectual disability, fetal alcohol spectrum and mood disorder due to general medical condition.  He resides in a group home.  The plan was to continue the following medications as seen below. He carries a diagnosis of thrombocytopenia, attention deficit disorder, intermittent explosive disorder, anxiety disorder, schizoaffective disorder, fetal alcohol syndrome, intellectual disability.    I reviewed the emergency department visit on 9/7/2024 for evaluation of bilateral foot pain.  Medical workup was negative.  Group home had requested DEC assessment in the DEC  evaluate the patient and there was no indication for psychiatric hospitalization.  Patient was discharged home with a prescription for Zyprexa.    I reviewed emergency department visit note from 9/1/2024.  Patient was seen for evaluation of syncope.  Medical workup was normal and patient was " discharged home.    Plan   Continue with current medications :   Abilify  5 mg in the morning  Quifacine ER   3 mg at bedtime    Invega 234 mg /IM monthly -given by group home   Thorazine 50 mg BID - agitation PRN   Statreta 100 mg in am   Trazodone 300 mg at bedtime   Lithium 900 mg very evening with meals   Olanzapine 10 mg daily -  Hydroxyzine 50 mg TID PRN-    2-High risk medication use-lithium level ordered      Past Medical History     Medical History and Problem List   ADD   ADHD   Anxiety  FAS  Obesity  Chronic idiopathic thrombocytopenia   Schizoaffective disorder   Intellectual disability     Medications   Abilify  Strattera   Tessalon  Thorazine   Ferosul  Intuniv   Atarax   Lithium   Claritin   Metformin   Zyprexa   Ondansetron  Invega sustenna   Trazodone HCl       Surgical History   None     Physical Exam     Patient Vitals for the past 24 hrs:   BP Temp Pulse Resp SpO2 Weight   09/09/24 0127 121/89 98.3  F (36.8  C) 98 18 100 % 117.9 kg (260 lb)     Physical Exam  General: Well-nourished,  appears to be resting comfortably when I enter the room  Eyes: PERRL, conjunctivae pink no scleral icterus or conjunctival injection  ENT:  Moist mucus membranes, posterior oropharynx clear without erythema or exudates  Respiratory:  Lungs clear to auscultation bilaterally, no crackles/rubs/wheezes.  Good air movement  CV: Normal rate and rhythm, no murmurs/rubs/gallops  GI:  Abdomen soft and non-distended.  Normoactive BS.  No tenderness, guarding or rebound  Skin: Warm, dry.  No rashes or petechiae  Musculoskeletal: No peripheral edema or calf tenderness  Neuro: Alert and oriented to person/place/time  Psychiatric: Flat affect.  Calm and cooperative.  Reports having visual hallucinations with shadows.  Denies command hallucinations.  Denies suicidal or homicidal ideation.    Diagnostics     Lab Results   Labs Ordered and Resulted from Time of ED Arrival to Time of ED Departure   COMPREHENSIVE METABOLIC PANEL -  Abnormal       Result Value    Sodium 137      Potassium 3.6      Carbon Dioxide (CO2) 24      Anion Gap 9      Urea Nitrogen 7.7      Creatinine 0.53 (*)     GFR Estimate >90      Calcium 9.0      Chloride 104      Glucose 105 (*)     Alkaline Phosphatase 57      AST 33      ALT 45      Protein Total 6.6      Albumin 4.0      Bilirubin Total 0.3     CBC WITH PLATELETS AND DIFFERENTIAL - Abnormal    WBC Count 13.4 (*)     RBC Count 3.96 (*)     Hemoglobin 11.8 (*)     Hematocrit 35.1 (*)     MCV 89      MCH 29.8      MCHC 33.6      RDW 13.9      Platelet Count 163      % Neutrophils 70      % Lymphocytes 16      % Monocytes 10      % Eosinophils 3      % Basophils 0      % Immature Granulocytes 0      NRBCs per 100 WBC 0      Absolute Neutrophils 9.3 (*)     Absolute Lymphocytes 2.2      Absolute Monocytes 1.4 (*)     Absolute Eosinophils 0.4      Absolute Basophils 0.0      Absolute Immature Granulocytes 0.0      Absolute NRBCs 0.0     LITHIUM LEVEL - Normal    Lithium 0.75     LIPASE - Normal    Lipase 15     RBC AND PLATELET MORPHOLOGY    RBC Morphology Confirmed RBC Indices      Platelet Assessment        Value: Automated Count Confirmed. Platelet morphology is normal.       Imaging   No orders to display       EKG   None.     Independent Interpretation   None    ED Course      Medications Administered   Medications - No data to display    Procedures   Procedures     Discussion of Management   None    ED Course   ED Course as of 09/09/24 0454   Mon Sep 09, 2024   0127 I obtained history and examined the patient as noted above.        Additional Documentation  None    Medical Decision Making / Diagnosis     CMS Diagnoses: None    MIPS       None    [facility]   Kaden Easton JrAgustin is a 24 year old male who has a history of schizoaffective disorder and presents for auditory and visual hallucinations which have been increasing over the past week.  Patient is known to have baseline hallucinations.  He also complained of  some abdominal discomfort.  Abdomen is completely benign.  Laboratory studies including belly labs are normal.  I did check a lithium level and this is therapeutic.  Is not elevated.  He complained of extremity discomfort but has been seen for the same with full workups twice in the past week.  He was tolerating flaming hot Cheetos and my suspicion for any malignant intra-abdominal process is low.  I feel the risks of CT imaging outweigh any benefits.  Patient is medically cleared for evaluation by psychiatry.  DEC consultation is pending at this time.  It is not scheduled to take place until later in the morning after shift change.  Signed out to my partner Dr. Albarran with disposition pending DEC assessment.    Disposition   Care of the patient was transferred to my colleague Dr. Albarran pending DEC assessment.     Diagnosis     ICD-10-CM    1. Hallucinations  R44.3            Discharge Medications   New Prescriptions    No medications on file         Scribe Disclosure:  Dejon VICTOR, am serving as a scribe at 1:36 AM on 9/9/2024 to document services personally performed by Leela Oh MD based on my observations and the provider's statements to me.        Leela Oh MD  09/09/24 4169

## 2024-09-09 NOTE — ED NOTES
Bed: BH3  Expected date:   Expected time:   Means of arrival:   Comments:  [age]M Behavior issues, TBI/psycitzo hx, audio/visual hallucinations, cooperative, ETA 0123

## 2024-09-10 ENCOUNTER — TELEPHONE (OUTPATIENT)
Dept: BEHAVIORAL HEALTH | Facility: CLINIC | Age: 24
End: 2024-09-10
Payer: COMMERCIAL

## 2024-09-10 NOTE — TELEPHONE ENCOUNTER
Triage and Transition Services- Patient Follow Up Call  Service Line Making Phone Call: [facility]    Who did Writer Talk to: Patient    Details of Call: left message to return my call on patient's answering machine    Renetta Martinez 9/10/2024 9:14 AM

## 2024-10-05 ENCOUNTER — HOSPITAL ENCOUNTER (EMERGENCY)
Facility: CLINIC | Age: 24
Discharge: NURSING, REHAB OR LONG-TERM CARE | End: 2024-10-05
Attending: EMERGENCY MEDICINE | Admitting: EMERGENCY MEDICINE
Payer: COMMERCIAL

## 2024-10-05 VITALS
RESPIRATION RATE: 16 BRPM | DIASTOLIC BLOOD PRESSURE: 84 MMHG | HEART RATE: 98 BPM | SYSTOLIC BLOOD PRESSURE: 152 MMHG | TEMPERATURE: 97.5 F | OXYGEN SATURATION: 98 %

## 2024-10-05 DIAGNOSIS — R46.89 AGGRESSIVE BEHAVIOR: ICD-10-CM

## 2024-10-05 PROBLEM — S06.9XAA TBI (TRAUMATIC BRAIN INJURY) (H): Status: ACTIVE | Noted: 2024-10-05

## 2024-10-05 PROBLEM — F63.81 INTERMITTENT EXPLOSIVE DISORDER: Status: ACTIVE | Noted: 2023-12-06

## 2024-10-05 PROBLEM — R44.3 HALLUCINATIONS: Status: ACTIVE | Noted: 2024-10-05

## 2024-10-05 PROCEDURE — 99283 EMERGENCY DEPT VISIT LOW MDM: CPT

## 2024-10-05 RX ORDER — OLANZAPINE 5 MG/1
10 TABLET ORAL ONCE
Status: COMPLETED | OUTPATIENT
Start: 2024-10-05 | End: 2024-10-05

## 2024-10-05 ASSESSMENT — COLUMBIA-SUICIDE SEVERITY RATING SCALE - C-SSRS
2. HAVE YOU ACTUALLY HAD ANY THOUGHTS OF KILLING YOURSELF IN THE PAST MONTH?: NO
1. IN THE PAST MONTH, HAVE YOU WISHED YOU WERE DEAD OR WISHED YOU COULD GO TO SLEEP AND NOT WAKE UP?: NO
6. HAVE YOU EVER DONE ANYTHING, STARTED TO DO ANYTHING, OR PREPARED TO DO ANYTHING TO END YOUR LIFE?: YES

## 2024-10-05 ASSESSMENT — ACTIVITIES OF DAILY LIVING (ADL)
ADLS_ACUITY_SCORE: 35

## 2024-10-05 NOTE — DISCHARGE INSTRUCTIONS
Discharge Instructions  Mental Health Concerns    You were seen today for mental health concerns, such as depression, anxiety, or suicidal thinking. Your provider feels that you do not require hospitalization at this time. However, your symptoms may become worse, and you may need to return to the Emergency Department. Most treatments of depression and suicidal thoughts are a process rather than a single intervention.  Medications and counseling can take several weeks or more to help.    Generally, every Emergency Department visit should have a follow-up clinic visit with either a primary or a specialty clinic/provider. Please follow-up as instructed by your emergency provider today.    By accepting these discharge instructions:  You promise to not harm yourself or others.  You agree that if you feel you are becoming unable to keep that promise, you will do something to help yourself before you do anything to harm yourself or others.   You agree to keep any safety plan arranged on your visit here today.  You agree to take any medication prescribed or recommended by your provider.  If you are getting worse, you can contact a friend or a family member, contact your counselor or family provider, contact a crisis line, or other options discussed with the provider or therapist today.  At any time, you can call 911 and return to the Emergency Department for more help.  You understand that follow-up is essential to your treatment, and you will make and keep appointments recommended on your visit today.    How to improve your mental health and prevent suicide:  Involve others by letting family, friends, counselors know.  Do not isolate yourself.  Avoid alcohol or drugs. Remove weapons, poisons from your home.  Try to stick to routines for eating, sleeping and getting regular exercise.    Try to get into sunlight. Bright natural light not only treats seasonal affective disorder but also depression.  Increase safe activities  that you enjoy.    If you feel worse, contact [phone] ([phone]), or call 911, or your primary provider/counselor for additional assistance.    If you were given a prescription for medicine here today, be sure to read all of the information (including the package insert) that comes with your prescription.  This will include important information about the medicine, its side effects, and any warnings that you need to know about.  The pharmacist who fills the prescription can provide more information and answer questions you may have about the medicine.  If you have questions or concerns that the pharmacist cannot address, please call or return to the Emergency Department.   Remember that you can always come back to the Emergency Department if you are not able to see your regular provider in the amount of time listed above, if you get any new symptoms, or if there is anything that worries you.       [facility] Safety Plan    Creation Date: 12/8/23 Last Update Date: 10/5/24      Step 1: Warning signs:  Warning Signs   Feeling scared   Worried someone is going to hurt me   hearing voices   seeing shadows   not sleeping      Step 2: Internal coping strategies - Things I can do to take my mind off my problems without contacting another person:  Strategies   Take medications as needed   Play video games   Make music   go for a walk with  staff      Step 3: People and social settings that provide distraction:  Name Contact Information   [facility] staff Siva [phone].    ([place] [phone])   Mom       Places   sit outside at the [facility]   take a walk in the neighborhood      Step 4: People whom I can ask for help during a crisis:  Name Contact Information   [facility] staff    [facility] crisis response team [phone]      Step 5: Professionals or agencies I can contact during a crisis:  Clinician/Agency Name Phone Emergency Contact   Outpatient psychiatrist [phone]     ([place]  [phone])    Local Emergency Department Emergency Department Address Emergency Department Phone   [facility] ED     [facility] ED     [facility]        Suicide Prevention Lifeline Phone: Call or Text 913  Crisis Text Line: Text HOME to 353070     Step 6: Making the environment safer (plan for lethal means safety):  Take medications as prescribed  Follow outpatient recommendations  Use coping skills and distractions.      Optional: What is most important to me and worth living for?:     [place] Safety Plan. Yashira Cooper and Luis Keane. Used with permission of the authors.

## 2024-10-05 NOTE — ED TRIAGE NOTES
"Pt BIBA from . Per EMS, pt's  staff called 911 reporting \"out of control behavior\". Per EMS, police were on scene for over an hour trying to calm pt down, in the end  staff wanted him to be brought to ED. Per pt he had a recent med change and has not been able to eat or sleep for past 1.5 days. Of note, reportedly behavior reported to police was that pt was kicking roommates door. Of note, EMS were unable to speak with  staff as they pushed pt out the door and then locked it and did not provide any information to EMS.      Triage Assessment (Adult)       Row Name 10/05/24 0110          Triage Assessment    Airway WDL WDL        Respiratory WDL    Respiratory WDL WDL        Cardiac WDL    Cardiac WDL WDL        Peripheral/Neurovascular WDL    Peripheral Neurovascular WDL WDL        Cognitive/Neuro/Behavioral WDL    Cognitive/Neuro/Behavioral WDL WDL                     "

## 2024-10-05 NOTE — CONSULTS
Diagnostic Evaluation Consultation  Crisis Assessment    Patient Name: Kaden Easton Jr.  Age:  24 year old  Legal Sex: male  Gender Identity: male  Pronouns:   Race: Black or   Ethnicity: Not  or   Language: English      Patient was assessed: Virtual: Full Color Games   Crisis Assessment Start Date: 10/05/24  Crisis Assessment Start Time: 0330  Crisis Assessment Stop Time: 0337  Patient location: [facility] EMERGENCY DEPT                                 Referral Data and Chief Complaint  Kaden Easton Jr. presents to the ED via EMS. Patient is presenting to the ED for the following concerns: Verbal agitation, Physical aggression, Other (see comment) (hallucinations).   Factors that make the mental health crisis life threatening or complex are:  Pt was transported to ED from his group home by EMS. Pt demonstrated verbal and physical aggression this evening, including screaming and kicking/breaking a door. Police were called and report indicates they spent about an hour de-escalating Pt's behavior. When calm, Pt requested to go to ED. In ED, Pt ate a meal and rested quietly in bed. Pt reports he has not been able to sleep and that he hears voices telling him not to eat. Hallucinations seem to be incresing over past few weeks..      Informed Consent and Assessment Methods  Explained the crisis assessment process, including applicable information disclosures and limits to confidentiality, assessed understanding of the process, and obtained consent to proceed with the assessment.  Assessment methods included conducting a formal interview with patient, review of medical records, collaboration with medical staff, and obtaining relevant collateral information from family and community providers when available.  : done     Patient response to interventions: acceptance expressed  Coping skills were attempted to reduce the crisis:  called 911     History of the Crisis   Pt has a  complicated history with mutliple diagnosis: ADD  ADHD  Anxiety  Autism spectrum disorder  Current smoker  Cannabis abuse   Chronic ITP (idiopathic thrombocytopenia)   Epistaxis, recurrent   Fetal alcohol syndrome  Intermittent explosive disorder in adult   Mil intellectual disability   Obesity  Post-splenectomy   Pre-diabetes  Psychosis  Sialorrhea    Self-injurious behavior   Schizoaffective disorder  TBI. He is followed by Nkechi Monreal NP.    Brief Psychosocial History  Family:  Single, Children no  Support System:  Parent(s), Other (specify) ( staff, NP, )  Employment Status:  disabled  Source of Income:  disability  Financial Environmental Concerns:  none  Current Hobbies:  games, music, other (see comments) (going on walks with staff)  Barriers in Personal Life:       Significant Clinical History  Current Anxiety Symptoms:   (NA)  Current Depression/Trauma:   (NA)  Current Somatic Symptoms:   (NA)  Current Psychosis/Thought Disturbance:  impulsive, auditory hallucinations, visual hallucinations, hostile/aggressive  Current Eating Symptoms:   (reports voices telling him not to eat but per GH and ED, he has eaten today)  Chemical Use History:  Alcohol: None  Benzodiazepines: None  Opiates: None  Marijuana: Other (comments) (uses but unable to identify frequency/amount)  Other Use: None   Past diagnosis:  ADHD, Schizophrenia, Substance Use Disorder, Autism (Schizaffective disorder, FAS, Intermittent Explosive Disorder in Adult, TBI, mild intellectual disability)  Family history:  No known history of mental health or chemical health concerns  Past treatment:  Case management, Civil Commitment, Primary Care, Psychiatric Medication Management, Inpatient Hospitalization, Supportive Living Environment (group home, prison house, etc)  Details of most recent treatment:  Pt lives in . He has psychiatry and case management services  Other relevant history:          Collateral Information  Is there  collateral information: Yes     Collateral information name, relationship, phone number:   staff    What happened today: Pt started screaming, kicked and broke a door. Called 911. Police calmed Pt down and Pt wanted to go to hospital     What is different about patient's functioning: Has been saying he hears voices or sees shadows/ghosts for a few weeks. Person providing collateral is relative new staff. She said she has not seem Pt engage in this level of aggression and does not know if this has happened before     Concern about alcohol/drug use:      What do you think the patient needs:      Has patient made comments about wanting to kill themselves/others: no    If d/c is recommended, can they take part in safety/aftercare planning:  yes    Additional collateral information:  Pt emelia return to  but will need transportation     Risk Assessment  [place] Suicide Severity Rating Scale Full Clinical Version:  Suicidal Ideation  Q1 Wish to be Dead (Lifetime): Yes  Q2 Non-Specific Active Suicidal Thoughts (Lifetime): Yes  3. Active Suicidal Ideation with any Methods (Not Plan) Without Intent to Act (Lifetime): No  Q4 Active Suicidal Ideation with Some Intent to Act, Without Specific Plan (Lifetime): No  Q5 Active Suicidal Ideation with Specific Plan and Intent (Lifetime): No  Q6 Suicide Behavior (Lifetime): no     Suicidal Behavior (Lifetime)  Actual Attempt (Lifetime): No  Has subject engaged in non-suicidal self-injurious behavior? (Lifetime): Yes  Interrupted Attempts (Lifetime): No  Aborted or Self-Interrupted Attempt (Lifetime): No  Preparatory Acts or Behavior (Lifetime): No    [place] Suicide Severity Rating Scale Recent:   Suicidal Ideation (Recent)  Q1 Wished to be Dead (Past Month): no  Q2 Suicidal Thoughts (Past Month): no  If yes to Q6, within past 3 months?: no  Level of Risk per Screen: moderate risk          Environmental or Psychosocial Events: history of TBI  Protective Factors: Protective  Factors: lives in a responsibly safe and stable environment, good treatment engagement, supportive ongoing medical and mental health care relationships    Does the patient have thoughts of harming others? Feels Like Hurting Others: no  Previous Attempt to Hurt Others: no  Is the patient engaging in sexually inappropriate behavior?: no    Is the patient engaging in sexually inappropriate behavior?  no        Mental Status Exam   Affect:    Appearance: Disheveled  Attention Span/Concentration: Attentive  Eye Contact: Variable    Fund of Knowledge: Appropriate   Language /Speech Content: Fluent  Language /Speech Volume: Soft  Language /Speech Rate/Productions: Minimally Responsive  Recent Memory: Intact  Remote Memory: Intact  Mood: Normal  Orientation to Person: Yes   Orientation to Place: Yes  Orientation to Time of Day: Yes  Orientation to Date: No     Situation (Do they understand why they are here?):    Psychomotor Behavior: Underactive, Normal (Pt resting comfortably in bed)  Thought Content: Hallucinations (auditory and visual)  Thought Form: Intact     Mini-Cog Assessment  Number of Words Recalled:    Clock-Drawing Test:     Three Item Recall:    Mini-Cog Total Score:       Medication  Psychotropic medications:   Medication Orders - Psychiatric (From admission, onward)      None             Current Care Team  Patient Care Team:  No Ref-Primary, Physician as PCP - General  Frida Caruso MD as MD (Pediatrics)  Raisa Fitch MD as MD (Pediatric Rheumatology)  Wilmer Castellano MD as MD (Pediatric Hematology/Oncology)  Nkechi Monreal NP as Assigned Behavioral Health Provider  No Ref-Primary, Physician    Diagnosis  Patient Active Problem List   Diagnosis Code    Thrombocytopenia (H) D69.6    Chronic ITP (idiopathic thrombocytopenia) (H) D69.3    Attention deficit disorder F98.8    Intermittent explosive disorder F63.81    Anxiety disorder, unspecified F41.9    Schizoaffective disorder, unspecified  (H) F25.9    Intermittent explosive disorder F63.81    Schizoaffective disorder, bipolar type (H) F25.0    Fetal alcohol syndrome Q86.0    Intellectual disability F79    TBI (traumatic brain injury) (H) S06.9XAA    Hallucinations R44.3    Aggressive behavior R46.89       Primary Problem This Admission  Active Hospital Problems    TBI (traumatic brain injury) (H)      Hallucinations      Aggressive behavior      Intellectual disability      Fetal alcohol syndrome      Schizoaffective disorder, unspecified (H)      Intermittent explosive disorder        Clinical Summary and Substantiation of Recommendations   Pt presents in ED after an episode of aggression at . Pt reports having a problem with a housemate that escalated to Pt yelling, hitting walls and kicking a door. Record indicatates 911 was called at request of Pt. Police worked with him for about an hour to de-escalte the situation, at which time, Pt requested to to to ED. This seems consistent with Pt's diagnosis of Intermittent Explosive DIsorder in Adult, rather than an acute crisis or significant behavior change. Pt continues to report auditory hallucinations telling him not to eat. However, Pt ate a meal in ED without difficulty.  staff also reported that Pt has been eating normal meals. Pt reports he has not been sleeping but was slept in ED while waiting for DEC.  staff did not report any significant sleep dsiturbance. Given Pt's complex diagnosis, including FAS, TBI, ASD and intellectual ability, Pt might not be able to provide most accurate history or description of symptoms. Pt's presentation today seem to be consistent with diagnosis, suggesting that Pt may be at or near baseline, rather than in acute crisis. [facility] is not a viable option for Pt in light of his recent aggressive behavior and cognitve disabilities. IP  admission was considered, however both Pt and  staff are amenable to him returning to the facility. Pt is followed by  Nkechi Monreal, NP. She is most familiar with Pt and his treatment, and it is recommended that Pt follow up with her as soon as possible                          Patient coping skills attempted to reduce the crisis:  called 911    Disposition  Recommended disposition: Medication Management, Group Home        Reviewed case and recommendations with attending provider. Attending Name: Dr Urbano       Attending concurs with disposition: yes       Patient and/or validated legal guardian concurs with disposition:   yes       Final disposition:  discharge    Legal status on admission: Voluntary/Patient has signed consent for treatment    Assessment Details   Total duration spent with the patient: 7 min     CPT code(s) utilized: Non-Billable    LOLLY Love, Psychotherapist  DEC - Triage & Transition Services  Callback: [phone]

## 2024-10-05 NOTE — ED PROVIDER NOTES
Emergency Department Note      History of Present Illness     Chief Complaint   Aggressive Behavior    HPI   Kaden Easton Jr. is a 24 year old male with a history of ADD, ADHD, schizoaffective disorder, anxiety, intermittent explosive disorder, and self-injurious behavior who presents via EMS from group home with aggressive behavior. EMS notes the patient was kicking the door and punching and screaming and the police were called. The patient calmed down and then told group home staff he wanted to go to the ED.He adds he has had auditory hallucinations telling him not to eat for the past few days. He ate a meal in the ED. He adds he has not been sleeping due to one of his housemates bothering him. He denies homicidal or suicidal ideation. He adds his medications that he has been on for quite some time have not been working. He sees a psychiatrist. He took his thorazine and Hydroxyzine 6 hours ago. He takes Zyprexa at bedtime.     Independent Historian   EMS as detailed above.      Past Medical History     Medical History and Problem List   ADD  ADHD  Anxiety  Autism spectrum disorder  Current smoker  Cannabis abuse   Chronic ITP (idiopathic thrombocytopenia)   Epistaxis, recurrent   Fetal alcohol syndrome  Intermittent explosive disorder in adult   Mil intellectual disability   Obesity  Post-splenectomy   Pre-diabetes  Psychosis  Sialorrhea    Self-injurious behavior   Schizoaffective disorder  TBI    Medications   Aripirazole   Atomoxetine   Chlorpromazine   Guanfacine HCl   Hydroxyzine HCl   Lithium   Metformin   Olanzapine   Ondansetron   Paliperidone   Polyethylene glycol   Trazodone   Vitamin C     Physical Exam     Patient Vitals for the past 24 hrs:   BP Temp Temp src Pulse Resp SpO2   10/05/24 0114 (!) 152/84 97.5  F (36.4  C) Oral 98 16 98 %     Physical Exam  Eyes:  The pupils are equal and round    Conjunctivae and sclerae are normal  ENT:    The nose is normal    Pinnae are normal  CV:  Regular rate  and rhythm     No edema  Resp:  Lungs are clear    Non-labored    No rales    No wheezing   MS:  Normal muscular tone    No asymmetric leg swelling  Skin:  No rash or acute skin lesions noted  Neuro:   Awake, alert.      Speech is normal and fluent.    Face is symmetric.     Moves all extremities  Psych: Normal affect.  Appropriate interactions. Denies SI/HI. Does not appear to be responding to internal stimuli    Diagnostics     Lab Results   Labs Ordered and Resulted from Time of ED Arrival to Time of ED Departure - No data to display    Imaging   No orders to display       Independent Interpretation   None    ED Course      Medications Administered   Medications   OLANZapine (zyPREXA) tablet 10 mg (has no administration in time range)     Procedures   Procedures     Discussion of Management   ED Mental Health, DEC    ED Course   ED Course as of 10/05/24 0145   Sat Oct 05, 2024   0138 I obtained the patient's history and examined as noted above.      Additional Documentation  None    Medical Decision Making / Diagnosis     CMS Diagnoses: None    MIPS       None    MDM   Kaden Easton Jr. is a 24 year old male who presents to the emergency department with concerns about aggressive behavior.  He was brought in after requesting to be transferred to the emergency department.  Initially police were called to his residence as he was kicking and punching the walls.  He settled down after the police arrived.  He later then asked to go to the emergency department so staff at his group home called the ambulance.  He denies any thoughts of self-harm or harm to others currently.  He does not appear to be responding to internal stimuli.  He notes that he has not been eating and has not been sleeping.  While here he was able to eat a meal and also had to be woken up to be evaluated by mental health.  He was evaluated by our mental health 's here and was felt to be appropriate for continued outpatient management.  I  am in agreement with the plan and plan for the patient to discharge back to his group home.  The group home was willing to accept him back.  He is encouraged return with any new or worrisome symptoms.    Disposition   The patient was discharged.     Diagnosis     ICD-10-CM    1. Aggressive behavior  R46.89            Discharge Medications   Discharge Medication List as of 10/5/2024  4:06 AM        Scribe Disclosure:  I, Elaina Ortiz, am serving as a scribe at 1:26 AM on 10/5/2024 to document services personally performed by Mitch Urbano MD based on my observations and the provider's statements to me.      Mitch Urbano MD [place], Aaron Joseph, MD  10/05/24 0447

## 2024-10-08 DIAGNOSIS — F90.9 ATTENTION DEFICIT HYPERACTIVITY DISORDER (ADHD), UNSPECIFIED ADHD TYPE: ICD-10-CM

## 2024-10-08 RX ORDER — ATOMOXETINE 100 MG/1
100 CAPSULE ORAL DAILY
Qty: 28 CAPSULE | Refills: 0 | Status: SHIPPED | OUTPATIENT
Start: 2024-10-08 | End: 2024-11-06

## 2024-10-08 NOTE — TELEPHONE ENCOUNTER
Date of Last Office Visit: 8/29/2024  Date of Next Office Visit:  10/31/2024  No shows since last visit: No  More than one patient-initiated cancellation (with reschedule) since last seen in clinic? No    [x]Medication refilled per  Medication Refill in Ambulatory Care  policy.  []Medication unable to be refilled by RN due to criteria not met as indicated below:    []Eligibility: has not had a provider visit within last 6 months   []Supervision: no future appointment; < 7 days before next appointment   []Compliance: no shows; cancellations; lapse in therapy   []Verification: order discrepancy; may need modification...   [] > 30-day supply request   []Advanced refill request: > 7 days before refill date   []Controlled medication   []Medication not included in policy   []Review: new med; med adjusted ? 30 days; safety alert; requires lab monitoring...   []Scope of Practice: refill request processed by LPN/MA   []Other:      Medication(s) requested:   -  atomoxetine (STRATTERA) 100 MG capsule   Date last ordered: 7/10/2024  Qty: 30  Refills: 0  Take 1 capsule (100 mg) by mouth daily     Appropriate for refill? Yes      Any Controlled Substance(s)? No      Requested medication(s) verified as identical to current order? Yes    Any lapse in adherence to medication(s) greater than 5 days? No      Additional action taken? signed order(s) per protocol.      Last visit treatment plan:   Plan   Continue with current medications :   Abilify  5 mg in the morning  Quifacine ER   3 mg at bedtime    Invega 234 mg /IM monthly -given by group home   Thorazine 50 mg BID - agitation PRN   Statreta 100 mg in am   Trazodone 300 mg at bedtime   Lithium 900 mg very evening with meals   Olanzapine 10 mg daily -  Hydroxyzine 50 mg TID PRN-    2-High risk medication use-lithium level ordered    3. RTC:  4 week    Any medication(s) require lab monitoring? No      Stefanie Babcock RN on 10/8/2024 at 2:32 PM

## 2024-10-09 DIAGNOSIS — F25.0 SCHIZOAFFECTIVE DISORDER, BIPOLAR TYPE (H): ICD-10-CM

## 2024-10-09 DIAGNOSIS — F43.23 ADJUSTMENT DISORDER WITH MIXED ANXIETY AND DEPRESSED MOOD: ICD-10-CM

## 2024-10-09 DIAGNOSIS — F63.81 INTERMITTENT EXPLOSIVE DISORDER: ICD-10-CM

## 2024-10-09 DIAGNOSIS — F90.9 ATTENTION DEFICIT HYPERACTIVITY DISORDER (ADHD), UNSPECIFIED ADHD TYPE: ICD-10-CM

## 2024-10-09 RX ORDER — TRAZODONE HYDROCHLORIDE 300 MG/1
1 TABLET ORAL AT BEDTIME
Qty: 28 TABLET | Refills: 0 | Status: SHIPPED | OUTPATIENT
Start: 2024-10-09

## 2024-10-09 RX ORDER — LITHIUM CARBONATE 300 MG/1
900 CAPSULE ORAL DAILY
Qty: 84 CAPSULE | Refills: 0 | Status: SHIPPED | OUTPATIENT
Start: 2024-10-09 | End: 2024-11-11

## 2024-10-09 RX ORDER — ARIPIPRAZOLE 5 MG/1
5 TABLET ORAL EVERY MORNING
Qty: 28 TABLET | Refills: 0 | Status: SHIPPED | OUTPATIENT
Start: 2024-10-09 | End: 2024-11-11

## 2024-10-09 RX ORDER — GUANFACINE 3 MG/1
3 TABLET, EXTENDED RELEASE ORAL AT BEDTIME
Qty: 28 TABLET | Refills: 0 | Status: SHIPPED | OUTPATIENT
Start: 2024-10-09

## 2024-10-09 NOTE — TELEPHONE ENCOUNTER
CIERRA Monreal Patient    Date of Last Office Visit: 8/29/2024  Date of Next Office Visit:  10/31/2024  No shows since last visit: No  More than one patient-initiated cancellation (with reschedule) since last seen in clinic? No    []Medication refilled per  Medication Refill in Ambulatory Care  policy.  [x]Medication unable to be refilled by RN due to criteria not met as indicated below:    []Eligibility: has not had a provider visit within last 6 months   []Supervision: no future appointment; < 7 days before next appointment   []Compliance: no shows; cancellations; lapse in therapy   []Verification: order discrepancy; may need modification...   [] > 30-day supply request   []Advanced refill request: > 7 days before refill date   []Controlled medication   []Medication not included in policy   []Review: new med; med adjusted ? 30 days; safety alert; requires lab monitoring...   [x]Scope of Practice: refill request processed by LPN/MA   [x]Other: multiple ED visits since last visit       Medication(s) requested:     -  guanFACINE HCl (INTUNIV) 3 MG TB24 24 hr tablet   Date last ordered: 8/16/24  Qty: 28  Refills: 1      -  ARIPiprazole (ABILIFY) 5 MG tablet   Date last ordered: 8/16/24  Qty: 28  Refills: 1      -  lithium 300 MG capsule   Date last ordered: 8/16/24  Qty: 84  Refills: 1     -  traZODone HCl 300 MG TABS   Date last ordered: 8/16/24  Qty: 28  Refills: 1      Any Controlled Substance(s)? No      Requested medication(s) verified as identical to current order? Yes    Any lapse in adherence to medication(s) greater than 5 days? No      Additional action taken? routed encounter to provider for review.      Last visit treatment plan:   Plan   Continue with current medications :   Abilify  5 mg in the morning  Quifacine ER   3 mg at bedtime    Invega 234 mg /IM monthly -given by group home   Thorazine 50 mg BID - agitation PRN   Statreta 100 mg in am   Trazodone 300 mg at bedtime   Lithium 900 mg very evening with  meals   Olanzapine 10 mg daily -  Hydroxyzine 50 mg TID PRN-    2-High risk medication use-lithium level ordered    3. RTC:  4 week    Any medication(s) require lab monitoring?   Yes, dot phrase is not working for me, please see lithium lab levels attached below

## 2024-10-17 ENCOUNTER — HOSPITAL ENCOUNTER (EMERGENCY)
Facility: CLINIC | Age: 24
Discharge: HOME | End: 2024-10-17
Attending: EMERGENCY MEDICINE | Admitting: EMERGENCY MEDICINE
Payer: COMMERCIAL

## 2024-10-17 ENCOUNTER — APPOINTMENT (OUTPATIENT)
Dept: GENERAL RADIOLOGY | Facility: CLINIC | Age: 24
End: 2024-10-17
Attending: EMERGENCY MEDICINE
Payer: COMMERCIAL

## 2024-10-17 VITALS
RESPIRATION RATE: 18 BRPM | HEART RATE: 86 BPM | TEMPERATURE: 98.7 F | DIASTOLIC BLOOD PRESSURE: 84 MMHG | SYSTOLIC BLOOD PRESSURE: 146 MMHG | OXYGEN SATURATION: 97 %

## 2024-10-17 DIAGNOSIS — S06.9XAA UNSPECIFIED INTRACRANIAL INJURY WITH LOSS OF CONSCIOUSNESS STATUS UNKNOWN, INITIAL ENCOUNTER (H): ICD-10-CM

## 2024-10-17 DIAGNOSIS — Z87.820 HISTORY OF TRAUMATIC BRAIN INJURY: ICD-10-CM

## 2024-10-17 DIAGNOSIS — F25.9 SCHIZOAFFECTIVE DISORDER, UNSPECIFIED TYPE (H): ICD-10-CM

## 2024-10-17 DIAGNOSIS — R44.0 AUDITORY HALLUCINATION: ICD-10-CM

## 2024-10-17 PROCEDURE — 250N000013 HC RX MED GY IP 250 OP 250 PS 637: Performed by: EMERGENCY MEDICINE

## 2024-10-17 PROCEDURE — 73552 X-RAY EXAM OF FEMUR 2/>: CPT | Mod: RT

## 2024-10-17 PROCEDURE — 99283 EMERGENCY DEPT VISIT LOW MDM: CPT

## 2024-10-17 RX ORDER — OLANZAPINE 5 MG/1
5 TABLET, ORALLY DISINTEGRATING ORAL ONCE
Status: COMPLETED | OUTPATIENT
Start: 2024-10-17 | End: 2024-10-17

## 2024-10-17 RX ADMIN — OLANZAPINE 5 MG: 5 TABLET, ORALLY DISINTEGRATING ORAL at 01:43

## 2024-10-17 ASSESSMENT — ACTIVITIES OF DAILY LIVING (ADL)
ADLS_ACUITY_SCORE: 35

## 2024-10-17 ASSESSMENT — COLUMBIA-SUICIDE SEVERITY RATING SCALE - C-SSRS
2. HAVE YOU ACTUALLY HAD ANY THOUGHTS OF KILLING YOURSELF IN THE PAST MONTH?: NO
6. HAVE YOU EVER DONE ANYTHING, STARTED TO DO ANYTHING, OR PREPARED TO DO ANYTHING TO END YOUR LIFE?: NO
1. IN THE PAST MONTH, HAVE YOU WISHED YOU WERE DEAD OR WISHED YOU COULD GO TO SLEEP AND NOT WAKE UP?: NO

## 2024-10-17 NOTE — ED NOTES
Bed: [facility]  Expected date: 10/17/24  Expected time: 12:25 AM  Means of arrival: Ambulance  Comments:  Bhanu Young 24M hearing things

## 2024-10-17 NOTE — ED NOTES
[facility]  ED to EMPATH Checklist:      Goal for EMPATH: Hallucinations    Current Behavior: Flat Affect, Calm, and Cooperative    Safety Concerns: Auditory Hallucinations    Legal Hold Status: Voluntary    Medically Cleared by ED provider: Yes    Patient Therapeutically Searched: Security wanded    Belongings: In room locker    Independent Ambulation at Baseline: Yes/No: Yes    Participates in Care/Conversation: Yes/No: Yes    Patient Informed about EMPATH: Yes/No: Yes    DEC: Ordered and pending    Patient Ready to be Transferred to EMPATH? Yes/No: Yes

## 2024-10-17 NOTE — CONSULTS
Diagnostic Evaluation Consultation  Crisis Assessment    Patient Name: Kaden Easton Jr.  Age:  24 year old  Legal Sex: male  Gender Identity: male  Pronouns: He/him/his  Race: Black or   Ethnicity: Not  or   Language: English      Patient was assessed: Virtual: [place]   Crisis Assessment Start Date: 10/17/24  Crisis Assessment Start Time: 0341  Crisis Assessment Stop Time: 0346  Patient location: [facility] EMERGENCY DEPT                             [facility]    Referral Data and Chief Complaint  Kaden Easton Jr. presents to the ED via EMS. Patient is presenting to the ED for the following concerns:  .   Factors that make the mental health crisis life threatening or complex are:  Patient was sleeping at his group home when awakened by auditory hallucinations. He reports hearing voices daily, but this time it woke him. Patient denies thoughts of harm to self, others, or property. He is very sleepy on interview and having trouble staying awake. Patient reports medication adherence. He lives in a group home with 24/7 staffing. Patient feels safe to return to his group home at this time.      Informed Consent and Assessment Methods  Explained the crisis assessment process, including applicable information disclosures and limits to confidentiality, assessed understanding of the process, and obtained consent to proceed with the assessment.  Assessment methods included conducting a formal interview with patient, review of medical records, collaboration with medical staff, and obtaining relevant collateral information from family and community providers when available.  : done     Patient response to interventions: acceptance expressed  Coping skills were attempted to reduce the crisis:        History of the Crisis   Previous diagnoses include ADHD, Intellectual Disability, Fetal Alcohol Syndrome, Intermittent Explosive Disorder, Schizoaffective Disorder and TBI. Patient has  a history of civil commitments by [facility]; 2016 as Mentally Ill & Chemically Dependent, 2019 as Mentally Ill & Developmentally Disabled, and 2021 as Mentally Ill only. He is not currently under commitment. Patient has a history of cannabis use disorder.    Brief Psychosocial History  Family:  Single, Children yes  Support System:  Facility resident(s)/Staff  Employment Status:  disabled  Source of Income:  disability, social security  Financial Environmental Concerns:  none  Current Hobbies:  music, outdoor activities  Barriers in Personal Life:  mental health concerns    Significant Clinical History  Current Anxiety Symptoms:  Denied   Current Depression/Trauma:   Denied  Current Somatic Symptoms:   Denied  Current Psychosis/Thought Disturbance:  auditory hallucinations  Current Eating Symptoms:   (Patient reports that the voices tell him not to drink water.)    Chemical Use History:    Alcohol: None  Benzodiazepines: None  Opiates: None  Cocaine: None  Marijuana: None  Other Use: None     Past diagnosis:  ADHD, Bipolar Disorder, Schizophrenia, Substance Use Disorder (FAS, TBI, Intellectual Disability)  Family history:  No known history of mental health or chemical health concerns  Past treatment:  Family therapy, Psychiatric Medication Management, Supportive Living Environment (group home, snf house, etc), Case management, Civil Commitment, Primary Care, Inpatient Hospitalization  Details of most recent treatment:  Patient lives in a group home. He has outpatient psychiatry and a .  Other relevant history:          Collateral Information  Is there collateral information: Yes     Collateral information name, relationship, phone number:  Group [place] nurse Rominamichelle, [phone], [facility]    What happened today: He calls 911 from his room and doesn't always tell staff.     What is different about patient's functioning: He takes his medication every day. He does fine during the day,  but at night he has problems. He has a psychiatry appointment on 10/31/2024.     Concern about alcohol/drug use: No     What do you think the patient needs:  He can return to the group home. He will see his psychiatry provider on 10/31/2024.     Has patient made comments about wanting to kill themselves/others: no    If d/c is recommended, can they take part in safety/aftercare planning:  yes    Additional collateral information:        Risk Assessment  [place] Suicide Severity Rating Scale Full Clinical Version:  Suicidal Ideation  Q6 Suicide Behavior (Lifetime): no    [place] Suicide Severity Rating Scale Recent:   Suicidal Ideation (Recent)  Q1 Wished to be Dead (Past Month): no  Q2 Suicidal Thoughts (Past Month): no  Level of Risk per Screen: no risks indicated     Suicidal Behavior (Recent)  Actual Attempt (Past 3 Months): No  Has subject engaged in non-suicidal self-injurious behavior? (Past 3 Months): No  Interrupted Attempts (Past 3 Months): No  Aborted or Self-Interrupted Attempt (Past 3 Months): No  Preparatory Acts or Behavior (Past 3 Months): No    Environmental or Psychosocial Events: other life stressors, history of TBI  Protective Factors: Protective Factors: lives in a responsibly safe and stable environment, supportive ongoing medical and mental health care relationships, good treatment engagement, help seeking    Does the patient have thoughts of harming others? Feels Like Hurting Others: no  Previous Attempt to Hurt Others: no  Is the patient engaging in sexually inappropriate behavior?: no    Is the patient engaging in sexually inappropriate behavior?  no        Mental Status Exam   Affect: Flat  Appearance: Appropriate  Attention Span/Concentration: Inattentive  Eye Contact: Variable    Fund of Knowledge: Appropriate   Language /Speech Content: Fluent  Language /Speech Volume: Soft  Language /Speech Rate/Productions: Minimally Responsive  Recent Memory: Intact  Remote Memory: Intact  Mood:  Normal  Orientation to Person: Yes   Orientation to Place: Yes  Orientation to Time of Day: Yes  Orientation to Date: Yes     Situation (Do they understand why they are here?): Yes  Psychomotor Behavior: Normal  Thought Content: Hallucinations  Thought Form: Intact        Medication  Psychotropic medications:   Medication Orders - Psychiatric (From admission, onward)      None             Current Care Team  Patient Care Team:  No Ref-Primary, Physician as PCP - Frida Mckinnon MD as MD (Pediatrics)  Raisa Fitch MD as MD (Pediatric Rheumatology)  Wilmer Castellano MD as MD (Pediatric Hematology/Oncology)  Nkechi Monreal, NP as Assigned Behavioral Health Provider  No Ref-Primary, Physician    Diagnosis  Patient Active Problem List   Diagnosis Code    Thrombocytopenia (H) D69.6    Chronic ITP (idiopathic thrombocytopenia) (H) D69.3    Attention deficit disorder F98.8    Intermittent explosive disorder F63.81    Anxiety disorder, unspecified F41.9    Schizoaffective disorder, unspecified (H) F25.9    Intermittent explosive disorder F63.81    Schizoaffective disorder, bipolar type (H) F25.0    Fetal alcohol syndrome Q86.0    Intellectual disability F79    TBI (traumatic brain injury) (H) S06.9XAA    Hallucinations R44.3    Aggressive behavior R46.89       Primary Problem This Admission  Schizoaffective Disorder bipolar type  F25.0      Clinical Summary and Substantiation of Recommendations   Patient lives in an assisted living facility with 24/7 staff. His medication management is with Nkechi Monreal, Nurse Practitioner, and his next appointment is 10/31/2024. Patient takes his medication as prescribed. He hears voices at baseline. Over the past month, he has called 911 and gone to the ED several times. His staff plan to discuss possible medication adjustments at that appointment. Patient denies SI/HI. He feels safe to return to his group home/prison.    Patient coping skills attempted to reduce the  crisis:       Disposition  Recommended disposition: Group Home        Reviewed case and recommendations with attending provider. Attending Name: Dr. Das       Attending concurs with disposition: yes       Patient and/or validated legal guardian concurs with disposition:   yes       Final disposition:  discharge    Legal status on admission: Voluntary/Patient has signed consent for treatment    Assessment Details   Total duration spent with the patient: 6 min     CPT code(s) utilized: Non-Billable    Anali Quinn LP, Psychotherapist  [facility] - Triage & Transition Services  Callback: [phone]

## 2024-10-17 NOTE — ED TRIAGE NOTES
BIBA from group home. States hearing voices that woke him from sleep. Voices are telling him to not drink water. Denies SI/HI. Also c/o right thigh pain after recent fall.     Triage Assessment (Adult)       Row Name 10/17/24 0042          Triage Assessment    Airway WDL WDL        Respiratory WDL    Respiratory WDL WDL        Skin Circulation/Temperature WDL    Skin Circulation/Temperature WDL WDL        Cardiac WDL    Cardiac WDL WDL        Peripheral/Neurovascular WDL    Peripheral Neurovascular WDL WDL        Cognitive/Neuro/Behavioral WDL    Cognitive/Neuro/Behavioral WDL WDL

## 2024-10-17 NOTE — ED PROVIDER NOTES
"  Emergency Department Note      History of Present Illness     Chief Complaint   Hallucinations      HPI   Kaden Easton Jr. is a 24 year old male with history of ADHD, TBI, Schizoaffective disorder, self-injurious behavior, and hallucinations who presents to the ED via EMS from his Group Home for evaluation of auditory hallucinations. Per nurse present, patient woke up from sleep to voices telling him \"not to drink water.\" Kaden states this has happened before. He is taking his prescribed medications. Denies suicidal ideation or homicidal ideation. Of note, patient fell yesterday and is endorsing predominantly right thigh pain. He had some mild soreness in his right knee shin and ankle which are not causing much pain at this time.      Independent Historian   Nurse present as detailed above.    Review of External Notes     Past Medical History     Medical History and Problem List   ADD  ADHD  JAZMINE  Tobacco dependence   Fetal alcohol syndrome  History of transfusion  Obesity  Self-injurious behavior  TBI   Chronic idiopathic thrombocytopenia  Intermittent explosive disorder  Psychosis   Schizoaffective disorder, bipolar type  Intellectual disability  Hallucinations  Aggressive behavior  Cannabis abuse  Prediabetes  Mood disorder  Sialorrhea   Vitamin D insufficiency   Thrombocytopenia     Medications   Aripiprazole  Strattera  Benzonatate  Chlorpromazine   Ferrous sulfate  Guanfacine HCl  Hydroxyzine HCl  Lithium  Nicorette   Olanzapine   Ondansetron  Paliperidone  Trazadone HCl    Surgical History   Splenectomy   Appendectomy     Physical Exam     Patient Vitals for the past 24 hrs:   BP Temp Temp src Pulse Resp SpO2   10/17/24 0033 (!) 146/84 98.7  F (37.1  C) Temporal 86 18 97 %     Physical Exam  General: Resting comfortably. Elevated BMI. Speaks slowly. Likely given medication side effects, the patient's speech is mumbled and at times hard to understand.   Head:  The scalp, face, and head appear " normal  Eyes:  The pupils are equal, round, and reactive to light    There is no nystagmus    Extraocular muscles are intact    Conjunctivae and sclerae are normal  ENT:    The nose is normal    Pinnae are normal    The oropharynx is normal  Neck:  Normal range of motion    There is no rigidity noted  CV:  Regular rate  Normal underlying rhythm     Normal S1/S2    No pathological murmur detected  Resp:  Lungs are clear    There is no tachypnea    Non-labored    No rales    No wheezing   GI:  Abdomen is soft, there is no rigidity    No distension/tympani    No rebound tenderness     Non-surgical without peritoneal features at this time  MS:  Normal muscular tone    Symmetric motor strength    No major joint effusions    No asymmetric leg swelling  There is mild tenderness to the right thigh. No signs of bruising. The knee, shin, and ankle are anatomically normal.   Skin:  No rash or acute skin lesions noted  Neuro:  Speech is normal and fluent, there is no aphasia    No motor deficits    Cranial nerves are intact  Psych: Awake. Alert    Normal affect    No suicidal ideation    No homicidal ideation    No visual hallucinations    Has auditory hallucinations    No delusions    No manic features    Diagnostics     Lab Results   Labs Ordered and Resulted from Time of ED Arrival to Time of ED Departure - No data to display    Imaging   XR Femur Right 2 Views    (Results Pending)       Independent Interpretation   I independently reviewed right femur X-Ray and see no evidence of acute femur fracture.    ED Course      Medications Administered   Medications   OLANZapine zydis (zyPREXA) ODT tab 5 mg (5 mg Oral $Given 10/17/24 0143)       Procedures   Procedures     Discussion of Management   None    ED Course   ED Course as of 10/17/24 0412   u Oct 17, 2024   0127 I obtained history and examined the patient as noted above.    0309 Patient was reassessed and is resting comfortably after the Zyprexa.  Still waiting for x-ray  and then we can transfer the patient to the empath unit       Additional Documentation  None    Medical Decision Making / Diagnosis     CMS Diagnoses: None    MIPS       None    MDM   Kaden Easton Jr. is a 24 year old male patient with a history of traumatic brain injury, ADHD, general anxiety disorder, schizoaffective disorder with psychosis, intellectual disability, auditory hallucinations, presents to the emergency department noting that he woke up hearing voices telling him not to drink water.  This has happened before.  He does not have suicidal or homicidal ideation.  He is bothered by the voices.  We sent him to the emergency department for evaluation of these symptoms and to inquire about possible medication adjustment.  The patient is on numerous medications at this time for his underlying conditions.  The patient underwent plain film radiography of the femur which was nonacute.  He is medically clear for empath at this time.    4:48 AM  Update, the patient was seen by and, the mental health therapist.  Please see her assessment for full details.  The as needed Zyprexa that I administered was helpful and she did not see any benefit from transfer to the empath unit and felt that the patient could be successfully transferred back to the group home which seems appropriate.  There is no suicidal or homicidal ideation and this is indeed a chronic intermittent problem for this patient.  He will be returned back to the group home.    Disposition   The patient will be sent back to the group home.    Diagnosis     ICD-10-CM    1. Unspecified intracranial injury with loss of consciousness status unknown, initial encounter (S06.9XAA)  S06.9XAA       2. Auditory hallucination  R44.0       3. Schizoaffective disorder, unspecified type (H)  F25.9       4. History of traumatic brain injury  Z87.820            Discharge Medications   New Prescriptions    No medications on file         Scribe Disclosure:  Latoya VICTOR  Armando, am serving as a scribe at 2:14 AM on 10/17/2024 to document services personally performed by Lazaro Das MD based on my observations and the provider's statements to me.        Lazaro Das MD  10/17/24 0449

## 2024-10-17 NOTE — DISCHARGE INSTRUCTIONS
Return back to your group home, and continue your medications.  If you are continuing to have problems with hearing voices, please follow-up with your psychiatrist, therapist, and your regular doctors so that they can adjust your medications further.

## 2024-10-31 ENCOUNTER — OFFICE VISIT (OUTPATIENT)
Dept: PSYCHIATRY | Facility: CLINIC | Age: 24
End: 2024-10-31
Payer: COMMERCIAL

## 2024-10-31 VITALS
DIASTOLIC BLOOD PRESSURE: 61 MMHG | BODY MASS INDEX: 40.18 KG/M2 | SYSTOLIC BLOOD PRESSURE: 107 MMHG | OXYGEN SATURATION: 99 % | WEIGHT: 280 LBS | HEART RATE: 86 BPM

## 2024-10-31 DIAGNOSIS — F70 MILD INTELLECTUAL DISABILITY: ICD-10-CM

## 2024-10-31 DIAGNOSIS — F63.81 INTERMITTENT EXPLOSIVE DISORDER: ICD-10-CM

## 2024-10-31 DIAGNOSIS — F25.9 SCHIZOAFFECTIVE DISORDER, UNSPECIFIED TYPE (H): ICD-10-CM

## 2024-10-31 DIAGNOSIS — R46.89 AGGRESSIVE BEHAVIOR: ICD-10-CM

## 2024-10-31 DIAGNOSIS — F25.0 SCHIZOAFFECTIVE DISORDER, BIPOLAR TYPE (H): Primary | ICD-10-CM

## 2024-10-31 DIAGNOSIS — Z87.820 HISTORY OF TRAUMATIC BRAIN INJURY: ICD-10-CM

## 2024-10-31 DIAGNOSIS — F90.9 ATTENTION DEFICIT HYPERACTIVITY DISORDER (ADHD), UNSPECIFIED ADHD TYPE: ICD-10-CM

## 2024-10-31 DIAGNOSIS — Q86.0 FETAL ALCOHOL SYNDROME: ICD-10-CM

## 2024-10-31 PROCEDURE — G2211 COMPLEX E/M VISIT ADD ON: HCPCS | Performed by: NURSE PRACTITIONER

## 2024-10-31 PROCEDURE — 99214 OFFICE O/P EST MOD 30 MIN: CPT | Performed by: NURSE PRACTITIONER

## 2024-10-31 ASSESSMENT — PAIN SCALES - GENERAL: PAINLEVEL_OUTOF10: NO PAIN (0)

## 2024-10-31 NOTE — PATIENT INSTRUCTIONS
"The [facility] Program  What to Expect  Here's what to expect in the Panel Psychiatry Program.   About the program  You'll be meeting with a psychiatric doctor to check your mental health. A psychiatric doctor helps you deal with troubling thoughts and feelings by giving you medicine. They'll make sure you know the plan for your care. You may see them for a long time. When you're feeling better, they may refer you back to seeing your family doctor.   If you have any questions, we'll be glad to talk to you.  About visits  Be open  At your visits, please talk openly about your problems. It may feel hard, but it's the best way for us to help you.  Cancelling visits  If you can't come to your visit, please call us right away at [phone]. If you don't cancel at least 24 hours (1 full day) before your visit, that's \"late cancellation.\"  Not showing up for your visits  Being very late is the same as not showing up. You'll be a \"no show\" if:  You're more than 15 minutes late for a 30-minute (half hour) visit.  You're more than 30 minutes late for a 60-minute (full hour) visit.  If you cancel late or don't show up 2 times within 6 months, we may end your care.  Getting help between visits  If you need help between visits, you can call us Monday to Friday from 8 a.m. to 4:30 p.m. at [phone].  Emergency care  Call 911 or go to the nearest emergency department if your life or someone else's life is in danger.  Call 988 anytime to reach the national Suicide and Crisis hotline.  Medicine refills  To refill your medicine, call your pharmacy. You can also call [facility]'s Behavioral Access at [phone], Monday to Friday, 8 a.m. to 4:30 p.m. It can take 1 to 3 business days to get a refill.   Forms, letters, and tests  You may have papers to fill out, like FMLA, short-term disability, and workability. We can help you with these forms at your visits, but you must have an appointment. You may need more " than 1 visit for this, to be in an intensive therapy program, or both.  Before we can give you medicine for ADHD, we may refer you to get tested for it or confirm it another way.  We may not be able to give you an emotional support animal letter.  We don't do mental health checks ordered by the court.   We don't do mental health testing, but we can refer you to get tested.   Thank you for choosing us for your care.  For informational purposes only. Not to replace the advice of your health care provider. Copyright   2022 [facility]. All rights reserved. [employer] [id number] - 12/22      After Visit Summary   Continue medications as prescribed  Have your pharmacy contact us for a refill if you are running low on medications (We may ask you to come into clinic to get a refill from the nurse  No Alcohol or drug use  No driving if sedated  Call the clinic with any questions or concerns   Reach out for help if you feel like hurting yourself or others ([facility] [phone]: [address], [address] or [facility] Suicide Hotline   [phone] , call 911 or go to nearest Emergency room     Crisis Resources:    Present to the Emergency Department as needed or call after hours crisis line at [phone] or [phone].   [place] Crisis Text Line: Text [place] to 610657.  Suicide LifeLine Chat: suicidepreventionlifeline.org/chat/.  National Suicide Prevention Lifeline: 677.203.5421 (TTY: 478.566.8054). Call anytime for help.  (www.suicidepreventionlifeline.org)  National [place] on Mental Illness (www.milvia.org): 631.910.6515 or 538-173-0654.  Mental Health Association (www.mentalhealth.org): 422.228.6134 or 761-443-1651.       Follow up as directed, for your appointments, per your After Visit Summary Form.

## 2024-10-31 NOTE — PROGRESS NOTES
Psychiatric  Out- Patient  Follow Up Progress Note  Date of visit:         Discussion of Care and Treatment Recommendations:   This is a 24 year old male with with  history ADD, ADHD, schizoaffective disorder, anxiety, intermittent explosive disorder, and self-injurious behavior  Intellectual disability,  fetal alcohol  spectrum,and  Pt resides in a group home.   Patient is seen in person  today accompanied by his group home nursing staff.   .      Last visit 0/29/2024.  Recommendation at last visit .  1.Continue with current medications :   Abilify  5 mg in the morning  Quifacine ER   3 mg at bedtime    Invega 234 mg /IM monthly -given by group home   Thorazine 50 mg BID - agitation PRN   Statreta 100 mg in am   Trazodone 300 mg at bedtime   Lithium 900 mg very evening with meals   Olanzapine 10 mg daily -  Hydroxyzine 50 mg TID PRN-    2-High risk medic  Patient and I reviewed diagnosis and treatment plan and patient agrees with following recommendations:  Ongoing education given regarding diagnostic and treatment options with adequate verbalization of understanding.  Plan   Continue current medications  Abilify  5 mg in the morning  Quifacine ER   3 mg at bedtime    Invega 234 mg /IM monthly -given by group home   Thorazine 50 mg BID - agitation PRN   Statreta 100 mg in am   Trazodone 300 mg at bedtime   Lithium 900 mg very evening with meals   Olanzapine 10 mg daily -  Hydroxyzine 50 mg TID PRN-      3. RTC:  4 week  4.  Call 911 or go to the ER if feeling unsafe.  Crisis numbers also provided         DIagnoses:         Patient Active Problem List   Diagnosis    Thrombocytopenia (H)    Chronic ITP (idiopathic thrombocytopenia) (H)    Attention deficit disorder    Intermittent explosive disorder    Anxiety disorder, unspecified    Schizoaffective disorder, unspecified (H)    Intermittent explosive disorder    Schizoaffective disorder, bipolar type (H)    Fetal alcohol syndrome    Intellectual disability     TBI (traumatic brain injury) (H)    Hallucinations    Aggressive behavior             Chief Complaint / Subjective:    Chief complaint: Aggressive behaviors     History of Present Illness:   Since our last visit patient has been to the ER several time.  Behaviors reported by staff include lack of sleep, increased behaviors, increased paranoia, increased hallucinations. Physical aggression including hitting on the doors, punching the walls.  Staff reports  that patient has been compliant with his medications .  Notably patient has bruises on bilateral face from punching walls and doors of the home.    Patient statement-he has been seeing shadows of his brothers who was killed after being involved in a physical fight.  He gets agitated when he sees this shadows.  There is also a new housemates that he occasionally does not get along with which agitates him.      Discussion today  We did discuss with the staff at the group home my recommendations to offer as needed medications once they notice any signs of agitation no physical aggression or psychosis instead of waiting for the situation to escalate.  I discussed with the patient started use to calm himself down when he sees his brother shadows including asking for as needed medication, seeking staff support and remembering to order his brother's death by staying calm instead of being aggressive  We also discussed cast de-escalation techniques with both patient and staff including maintaining a calm environment, soft therapeutic music and walking away from situations that are escalating  Patient is currently on multiple medications and adding a new medication may not necessarily help his state we discussed behavior modification strategies to approach current behaviors.  Patient to continue on current medications and return to the clinic in approximately 4 weeks for follow-up appointment.  Patient is to go to the ER or crisis line if feeling distressed or  "unsafe.    Mental Status Examination:   Appearance: Well groomed, good eye contact   Orientation: Patient alert and oriented to person, place, time, and situation  Reliability:  Patient appears to be an adequate historian.    Behavior: cooperative   Speech: Speech is spontaneous and coherent, with a normal rate, rhythm and tone.    Language:There are no difficulties with expressive or receptive language as observed throughout the interview.    Mood: Described as \"ok\".    Affect: congruent   Judgement: Able to make basic decision regarding safety.  Insight: Good awareness of physical and mental health conditions and aware of needs around care for these.  Gait and station: unable to assess  Thought process: Logical   Thought content: No evidence of delusions or paranoia.    Hallucinations : No evidence of any hallucination  Thought content: No evidence of delusions or paranoia.   Suicidal /Homical Ideations:  No thoughts of self harm or suicide. No thoughts of harming others.  Associations: Connected  Fund of knowledge: Average  Attention / Concentration: Able to remain focused during the interview with minimal distractibility or need for redirection.  Short Term Memory: Grossly intact as evidence by client recalling themes and ideas discussed.  Long Term Memory: Intact  Motor Status: unable to asse    Drug/treatment history and current pattern of use:   History of cannabis use  History of nicotine use-vaping  Nicotine : Smokes daily        Medication changes: See Above   Medication adherence: compliant  Medication side effects: absent  Information about medications: Side effects, benefits and alternative treatments discussed and patient agrees .    Psychotherapy: Supportive therapy day-to-day living    Education: Diet, exercise, abstinence from drugs and alcohol, patient will not drive if sedated and medications or  under influence of any substance    Lab Results:   Personally reviewed and discussed with the " patient    Lab Results   Component Value Date    WBC 13.4 (H) 09/09/2024    HGB 11.8 (L) 09/09/2024    HCT 35.1 (L) 09/09/2024     09/09/2024    CHOL 150 12/15/2023    TRIG 75 12/15/2023    HDL 34 (L) 12/15/2023    ALT 45 09/09/2024    AST 33 09/09/2024     09/09/2024    BUN 7.7 09/09/2024    CO2 24 09/09/2024    TSH 3.61 06/30/2022       Vital signs:  /61 (BP Location: Left arm, Patient Position: Sitting, Cuff Size: Adult Large)   Pulse 86   Wt 127 kg (280 lb)   SpO2 99%   BMI 40.18 kg/m    Telemedicine visit-no vital signs completed  Allergies: Depakote [valproic acid], Depakote [valproic acid], and Other environmental allergy         Medications:     Current Outpatient Medications   Medication Sig Dispense Refill    ARIPiprazole (ABILIFY) 5 MG tablet Take 1 tablet (5 mg) by mouth every morning. 28 tablet 0    atomoxetine (STRATTERA) 100 MG capsule Take 1 capsule (100 mg) by mouth daily 30 capsule 0    benzonatate (TESSALON) 100 MG capsule Take 100 mg by mouth 3 times daily as needed for cough.      chlorproMAZINE (THORAZINE) 50 MG tablet Take 1 tablet (50 mg) by mouth 2 times daily as needed for anxiety 60 tablet 2    guanFACINE HCl (INTUNIV) 3 MG TB24 24 hr tablet TAKE 1 TABLET BY MOUTH AT BEDTIME 28 tablet 1    hydrOXYzine HCl (ATARAX) 50 MG tablet TAKE 1 TABLET BY MOUTH THREE TIMES A DAY AS NEEDED FOR ANXIETY 90 tablet 0    lithium 300 MG capsule TAKE 3 CAPSULES BY MOUTH EVERY EVENING WITH DINNER 84 capsule 1    nicotine (NICORETTE) 4 MG lozenge Place 4 mg inside cheek every hour as needed for nicotine withdrawal symptoms.      OLANZapine (ZYPREXA) 10 MG tablet Take 1 tablet (10 mg) by mouth at bedtime. 20 tablet 0    paliperidone (INVEGA SUSTENNA) 234 MG/1.5ML JUANA Inject 1.5 mLs (234 mg) into the muscle every 28 days 1.5 mL 2    polyethylene glycol (MIRALAX) 17 g packet Take 1 packet by mouth daily.      traZODone HCl 300 MG TABS Take 1 tablet by mouth at bedtime. 28 tablet 0     atomoxetine (STRATTERA) 100 MG capsule TAKE 1 CAPSULE BY MOUTH DAILY 28 capsule 0    atropine 1 % ophthalmic solution Place 2 drops under the tongue (Patient not taking: Reported on 1/2/2024)      cholecalciferol 25 MCG (1000 UT) TABS Take 25 mcg by mouth.      ferrous sulfate (FE TABS) 325 (65 Fe) MG EC tablet Take 325 mg by mouth daily      ferrous sulfate (FEROSUL) 325 (65 Fe) MG tablet Take 325 mg by mouth      guanFACINE HCl (INTUNIV) 3 MG TB24 24 hr tablet Take 1 tablet (3 mg) by mouth at bedtime. 28 tablet 0    lithium 300 MG capsule Take 3 capsules (900 mg) by mouth daily. 84 capsule 0    loratadine (CLARITIN) 10 MG tablet Take 10 mg by mouth (Patient not taking: Reported on 4/18/2024)      metFORMIN (GLUCOPHAGE-XR) 500 MG 24 hr tablet Take 1,000 mg by mouth (Patient not taking: Reported on 1/2/2024)      nicotine (NICODERM CQ) 14 MG/24HR 24 hr patch Place 1 patch onto the skin every 24 hours (Patient not taking: Reported on 10/31/2024)      ondansetron (ZOFRAN ODT) 4 MG ODT tab Take 1 tablet (4 mg) by mouth every 6 hours as needed for nausea or vomiting 15 tablet 0    paliperidone (INVEGA SUSTENNA) 234 MG/1.5ML JUANA Inject 234 mg into the muscle every 28 days      traZODone HCl 300 MG TABS TAKE 1 TABLET BY MOUTH AT BEDTIME 28 tablet 1    vitamin C (ASCORBIC ACID) 250 MG TABS tablet Take 250 mg by mouth       No current facility-administered medications for this visit.       No current facility-administered medications for this visit.     No current facility-administered medications for this visit.         Medication adherence: Reviewed risk/benefits of medication , Patient able to verbalize understanding of side effects and Patient verbally consents to taking medications      PSYCHOEDUCATION:  Medication side effects and alternatives reviewed. Health promotion activities recommended and reviewed today. All questions addressed. Education and counseling completed regarding risks and benefits of medications  and psychotherapy options.  Consent provided by patient/guardian  Call the psychiatric nurse line with medication questions or concerns at [phone].  MyChart may be used to communicate with your provider, but this is not intended to be used for emergencies.  SEROTONIN SYNDROME:  Discussed risks of Serotonin syndrome (ie, serotonin toxicity) which is a potentially life-threatening condition associated with increased serotonergic activity in the central nervous system (CNS). It is seen with therapeutic medication use, inadvertent interactions between drugs, and intentional self-poisoning. Serotonin syndrome may involve a spectrum of clinical findings, which often include mental status changes, autonomic hyperactivity, and neuromuscular abnormalities.    STIMULANT THERAPY: Side effects discussed including but not limited to cardiac (including HTN, tachycardia, sudden death), motor/tic, appetite/growth, mood lability and sleep disruption. This is a controlled substance with risk for abuse, need to keep in a safe keep place and cannot replace lost scripts  HARM REDUCTION:  Discussions regarding effects of mood altering substances, alcohol and cannabis, on mood and that approach is harm reduction, will continue to prescribe meds as they work to cut back use.    SAFETY:  We all care about your loved one's safety. To reduce the risk of self-harm, remove access to all:  Firearms, Medicines (both prescribed and over-the-counter), Knives and other sharp objects, Ropes and like materials, and Alcohol  SLEEP HYGIENE: establish a sleep routine, limit screen time 1 hour prior to bed, use bed for sleep only, take sleep/medications on time (including sleepy time tea, trazadone or herbal treatments such as melatonin), aroma therapy, limit caffeine/sugar, yoga, guided imagery, stretch, meditation, limit naps to 20 minutes, make a temperature change in the room, white noise, be mindful of slowing down breathing, take a warm  bath/shower, frequently wash sheets, and journaling.   Medlineplus.gov is information for patients.  It is run by the National Library of Medicine and it contains information about all disorders, diseases and all medications.              Review of Systems:      ROS:    Subjective Data Only- Tele-Health Visit    10 point ROS was negative except for the items listed in HPI.      Crisis Resources:    Present to the Emergency Department as needed or call after hours crisis line at [phone] or [phone].   [place] Crisis Text Line: Text [place] to 354741.  Suicide LifeLine Chat: [employer]ik [employer].org/chat/.  National Suicide Prevention Lifeline: 143.509.2442 (TTY: 976.638.6698). Call anytime for help.  (www.suicidepreventionlifeline.org)  National Shelley on Mental Illness (www.milvia.org): 944.551.2083 or 895-436-3712.  Mental Health Association (www.mentalhealth.org): 873.932.1614 or 957-757-4093.      Coordination of Care:   More than 50% of time spent on coordination of care including: Educating patient about diagnosis, prognosis, side effects and benefits of medications, diet, exercise.  Time also spent providing supportive therapy regarding above issues.        Disclaimer: This note consists of symbols derived from keyboarding, dictation and/or voice recognition software. As a result, there may be errors in the script that have gone undetected. Please consider this when interpreting information found in this chart.    Start Time : 1400  End time : 3447

## 2024-10-31 NOTE — PROGRESS NOTES
"  Mental Health and Collaborative Care Psychiatry Service Rooming Note      Most pressing mental health concern at this time: Pt is here for med management follow up.  Reports \" shadows are following him \" and he is afraid. \"Every time I see them I am hitting garzon \"  Pt says that shadows are talking to him   He doesn't feel like hurting himself right now but had some thoughts 2 days ago  He c/o of difficulties sleeping because he is afraid that something bad happen to him    Any new physical health conditions or diagnoses affecting you that we should be aware of: multiple ED visits, back pain      Side effects related to medications patient would like to discuss with the provider:  No      Are you taking your medications as prescribed?  yes        Do you need refills of any of the medications?  Will discuss with provider  If      Are you taking any recreational substances? sabrina Holly LPN  October 31, 2024  1:53 PM         "

## 2024-11-05 DIAGNOSIS — F90.9 ATTENTION DEFICIT HYPERACTIVITY DISORDER (ADHD), UNSPECIFIED ADHD TYPE: ICD-10-CM

## 2024-11-06 RX ORDER — ATOMOXETINE 100 MG/1
100 CAPSULE ORAL DAILY
Qty: 28 CAPSULE | Refills: 0 | Status: SHIPPED | OUTPATIENT
Start: 2024-11-06

## 2024-11-06 NOTE — TELEPHONE ENCOUNTER
Date of Last Office Visit: 10/31/24  Date of Next Office Visit:  11/21/24  No shows since last visit: No  More than one patient-initiated cancellation (with reschedule) since last seen in clinic? No    []Medication refilled per  Medication Refill in Ambulatory Care  policy.  []Medication unable to be refilled by RN due to criteria not met as indicated below:    []Eligibility: has not had a provider visit within last 6 months   []Supervision: no future appointment; < 7 days before next appointment   []Compliance: no shows; cancellations; lapse in therapy   []Verification: order discrepancy; may need modification...   [] > 30-day supply request   []Advanced refill request: > 7 days before refill date   []Controlled medication   []Medication not included in policy   []Review: new med; med adjusted <= 30 days; safety alert; requires lab monitoring...   []Scope of Practice: refill request processed by LPN/MA   []Other:      Medication(s) requested:     -  atomoxetine (STRATTERA) 100 MG capsule   Date last ordered: 10/8/24  Qty: 28  Refills: 0  Appropriate for refill? Provider to review.        Any Controlled Substance(s)? No      Requested medication(s) verified as identical to current order? Yes    Any lapse in adherence to medication(s) greater than 5 days? Unknown     Additional action taken? routed encounter to provider for review.      Last visit treatment plan:   Plan   Continue current medications  Abilify  5 mg in the morning  Quifacine ER   3 mg at bedtime    Invega 234 mg /IM monthly -given by group home   Thorazine 50 mg BID - agitation PRN   Statreta 100 mg in am   Trazodone 300 mg at bedtime   Lithium 900 mg very evening with meals   Olanzapine 10 mg daily -  Hydroxyzine 50 mg TID PRN-       3. RTC:  4 week  4.  Call 911 or go to the ER if feeling unsafe.  Crisis numbers also provided    Any medication(s) require lab monitoring? No

## 2024-11-11 ENCOUNTER — MYC REFILL (OUTPATIENT)
Dept: PSYCHIATRY | Facility: CLINIC | Age: 24
End: 2024-11-11
Payer: COMMERCIAL

## 2024-11-11 DIAGNOSIS — F43.23 ADJUSTMENT DISORDER WITH MIXED ANXIETY AND DEPRESSED MOOD: ICD-10-CM

## 2024-11-11 DIAGNOSIS — F25.0 SCHIZOAFFECTIVE DISORDER, BIPOLAR TYPE (H): ICD-10-CM

## 2024-11-11 DIAGNOSIS — F63.81 INTERMITTENT EXPLOSIVE DISORDER: ICD-10-CM

## 2024-11-11 RX ORDER — HYDROXYZINE HYDROCHLORIDE 50 MG/1
50 TABLET, FILM COATED ORAL 3 TIMES DAILY PRN
Qty: 90 TABLET | Refills: 0 | Status: SHIPPED | OUTPATIENT
Start: 2024-11-11

## 2024-11-11 RX ORDER — GUANFACINE 3 MG/1
3 TABLET, EXTENDED RELEASE ORAL AT BEDTIME
Qty: 28 TABLET | Refills: 1 | Status: SHIPPED | OUTPATIENT
Start: 2024-11-11

## 2024-11-11 RX ORDER — TRAZODONE HYDROCHLORIDE 300 MG/1
1 TABLET ORAL AT BEDTIME
Qty: 28 TABLET | Refills: 1 | Status: SHIPPED | OUTPATIENT
Start: 2024-11-11

## 2024-11-11 RX ORDER — ARIPIPRAZOLE 5 MG/1
5 TABLET ORAL EVERY MORNING
Qty: 28 TABLET | Refills: 1 | Status: SHIPPED | OUTPATIENT
Start: 2024-11-11

## 2024-11-11 RX ORDER — LITHIUM CARBONATE 300 MG/1
900 CAPSULE ORAL DAILY
Qty: 84 CAPSULE | Refills: 1 | Status: SHIPPED | OUTPATIENT
Start: 2024-11-11

## 2024-11-11 NOTE — TELEPHONE ENCOUNTER
Date of Last Office Visit: 10/31/2024  Date of Next Office Visit: None; routing for A to assist pt with scheduling.    No shows since last visit: No  More than one patient-initiated cancellation (with reschedule) since last seen in clinic? No    []Medication refilled per  Medication Refill in Ambulatory Care  policy.  []Medication unable to be refilled by RN due to criteria not met as indicated below:    []Eligibility: has not had a provider visit within last 6 months   [x]Supervision: no future appointment; < 7 days before next appointment   []Compliance: no shows; cancellations; lapse in therapy   []Verification: order discrepancy; may need modification...   [] > 30-day supply request   []Advanced refill request: > 7 days before refill date   []Controlled medication   []Medication not included in policy   []Review: new med; med adjusted <= 30 days; safety alert; requires lab monitoring...   [x]Scope of Practice: refill request processed by LPN/MA   []Other:      Medication(s) requested:     -  hydrOXYzine HCl (ATARAX) 50 MG tablet Date last ordered: 08/06/2024  Qty: 90 tablet  Refills: 0  Appropriate for refill? Yes    Any Controlled Substance(s)? No    Requested medication(s) verified as identical to current order? Yes    Any lapse in adherence to medication(s) greater than 5 days? No      Additional action taken? routed encounter to provider for review.      Last visit treatment plan:   Plan   Continue current medications  Abilify  5 mg in the morning  Quifacine ER   3 mg at bedtime    Invega 234 mg /IM monthly -given by group home   Thorazine 50 mg BID - agitation PRN   Statreta 100 mg in am   Trazodone 300 mg at bedtime   Lithium 900 mg very evening with meals     Any medication(s) require lab monitoring? No

## 2024-11-11 NOTE — TELEPHONE ENCOUNTER
Date of Last Office Visit: 10/31/24  Date of Next Office Visit:  11/21/24  No shows since last visit: No  More than one patient-initiated cancellation (with reschedule) since last seen in clinic? No    []Medication refilled per  Medication Refill in Ambulatory Care  policy.  [x]Medication unable to be refilled by RN due to criteria not met as indicated below:    []Eligibility: has not had a provider visit within last 6 months   []Supervision: no future appointment; < 7 days before next appointment   []Compliance: no shows; cancellations; lapse in therapy   []Verification: order discrepancy; may need modification...   [] > 30-day supply request   []Advanced refill request: > 7 days before refill date   []Controlled medication   [x]Medication not included in policy   []Review: new med; med adjusted <= 30 days; safety alert; requires lab monitoring...   []Scope of Practice: refill request processed by LPN/MA   []Other:      Medication(s) requested:     -  ARIPiprazole (ABILIFY) 5 MG tablet   Date last ordered: 10/9/24  Qty: 28  Refills: 0  Appropriate for refill? Yes    -  guanFACINE HCl (INTUNIV) 3 MG TB24 24 hr tablet   Date last ordered: 10/9/24  Qty: 28  Refills: 0  Appropriate for refill? Yes    -  lithium 300 MG capsule   Date last ordered: 10/9/24  Qty: 84  Refills: 0  Appropriate for refill? Yes    -  traZODone HCl 300 MG TABS   Date last ordered: 10/9/24  Qty: 28  Refills: 0  Appropriate for refill? Yes    -  paliperidone (INVEGA SUSTENNA) 234 MG/1.5ML JUANA   Date last ordered: 8/12/24  Qty: 1.5 ml  Refills: 2  Appropriate for refill? Yes      Any Controlled Substance(s)? No      Requested medication(s) verified as identical to current order? Yes    Any lapse in adherence to medication(s) greater than 5 days? No      Additional action taken? cued up medication/order(s).      Last visit treatment plan:   Continue current medications  Abilify  5 mg in the morning  Quifacine ER   3 mg at bedtime    Invega 234 mg  /IM monthly -given by group home   Thorazine 50 mg BID - agitation PRN   Statreta 100 mg in am   Trazodone 300 mg at bedtime   Lithium 900 mg very evening with meals   Olanzapine 10 mg daily -  Hydroxyzine 50 mg TID PRN-       3. RTC:  4 week    Any medication(s) require lab monitoring? Yes   LITHIUM   Last Lithium Level:   Lithium   Date Value Ref Range Status   09/09/2024 0.75 0.60 - 1.20 mmol/L Final     Comment:     Therapeutic: 0.60 - 1.20 mmol/L;   Toxic: >2.00 mmol/L     Last BMP/CMP:   Sodium   Date Value Ref Range Status   09/09/2024 137 135 - 145 mmol/L Final     Potassium   Date Value Ref Range Status   09/09/2024 3.6 3.4 - 5.3 mmol/L Final     Chloride   Date Value Ref Range Status   09/09/2024 104 98 - 107 mmol/L Final     Carbon Dioxide (CO2)   Date Value Ref Range Status   09/09/2024 24 22 - 29 mmol/L Final     Anion Gap   Date Value Ref Range Status   09/09/2024 9 7 - 15 mmol/L Final     Urea Nitrogen   Date Value Ref Range Status   09/09/2024 7.7 6.0 - 20.0 mg/dL Final     Creatinine   Date Value Ref Range Status   09/09/2024 0.53 (L) 0.67 - 1.17 mg/dL Final     GFR Estimate   Date Value Ref Range Status   09/09/2024 >90 >60 mL/min/1.73m2 Final     Comment:     eGFR calculated using 2021 CKD-EPI equation.     Calcium   Date Value Ref Range Status   09/09/2024 9.0 8.8 - 10.4 mg/dL Final     Comment:     Reference intervals for this test were updated on 7/16/2024 to reflect our healthy population more accurately. There may be differences in the flagging of prior results with similar values performed with this method. Those prior results can be interpreted in the context of the updated reference intervals.     Glucose   Date Value Ref Range Status   09/09/2024 105 (H) 70 - 99 mg/dL Final      Last TSH with Free T4 Reflex:   TSH   Date Value Ref Range Status   06/30/2022 3.61 0.30 - 4.20 uIU/mL Final

## 2024-11-27 ENCOUNTER — OFFICE VISIT (OUTPATIENT)
Dept: URGENT CARE | Facility: URGENT CARE | Age: 24
End: 2024-11-27
Payer: COMMERCIAL

## 2024-11-27 VITALS
OXYGEN SATURATION: 99 % | DIASTOLIC BLOOD PRESSURE: 78 MMHG | HEART RATE: 88 BPM | TEMPERATURE: 99.3 F | SYSTOLIC BLOOD PRESSURE: 112 MMHG | RESPIRATION RATE: 16 BRPM

## 2024-11-27 DIAGNOSIS — R42 LIGHTHEADEDNESS: ICD-10-CM

## 2024-11-27 DIAGNOSIS — D72.829 LEUKOCYTOSIS, UNSPECIFIED TYPE: ICD-10-CM

## 2024-11-27 DIAGNOSIS — R42 DIZZINESS: ICD-10-CM

## 2024-11-27 DIAGNOSIS — R30.0 DYSURIA: Primary | ICD-10-CM

## 2024-11-27 LAB
ALBUMIN UR-MCNC: NEGATIVE MG/DL
ANION GAP SERPL CALCULATED.3IONS-SCNC: <1 MMOL/L (ref 3–14)
APPEARANCE UR: CLEAR
BASOPHILS # BLD AUTO: 0 10E3/UL (ref 0–0.2)
BASOPHILS NFR BLD AUTO: 0 %
BILIRUB UR QL STRIP: NEGATIVE
BUN SERPL-MCNC: 12 MG/DL (ref 7–30)
CALCIUM SERPL-MCNC: 9.7 MG/DL (ref 8.5–10.1)
CHLORIDE BLD-SCNC: 109 MMOL/L (ref 94–109)
CO2 SERPL-SCNC: 28 MMOL/L (ref 20–32)
COLOR UR AUTO: YELLOW
CREAT SERPL-MCNC: 0.7 MG/DL (ref 0.66–1.25)
EGFRCR SERPLBLD CKD-EPI 2021: >90 ML/MIN/1.73M2
EOSINOPHIL # BLD AUTO: 0.1 10E3/UL (ref 0–0.7)
EOSINOPHIL NFR BLD AUTO: 1 %
ERYTHROCYTE [DISTWIDTH] IN BLOOD BY AUTOMATED COUNT: 13.5 % (ref 10–15)
GLUCOSE BLD-MCNC: 80 MG/DL (ref 70–99)
GLUCOSE UR STRIP-MCNC: NEGATIVE MG/DL
HCT VFR BLD AUTO: 41.9 % (ref 40–53)
HGB BLD-MCNC: 14 G/DL (ref 13.3–17.7)
HGB UR QL STRIP: NEGATIVE
IMM GRANULOCYTES # BLD: 0 10E3/UL
IMM GRANULOCYTES NFR BLD: 0 %
KETONES UR STRIP-MCNC: NEGATIVE MG/DL
LEUKOCYTE ESTERASE UR QL STRIP: NEGATIVE
LYMPHOCYTES # BLD AUTO: 1.5 10E3/UL (ref 0.8–5.3)
LYMPHOCYTES NFR BLD AUTO: 11 %
MCH RBC QN AUTO: 29 PG (ref 26.5–33)
MCHC RBC AUTO-ENTMCNC: 33.4 G/DL (ref 31.5–36.5)
MCV RBC AUTO: 87 FL (ref 78–100)
MONOCYTES # BLD AUTO: 0.7 10E3/UL (ref 0–1.3)
MONOCYTES NFR BLD AUTO: 6 %
NEUTROPHILS # BLD AUTO: 10.6 10E3/UL (ref 1.6–8.3)
NEUTROPHILS NFR BLD AUTO: 82 %
NITRATE UR QL: NEGATIVE
PH UR STRIP: 7 [PH] (ref 5–7)
PLATELET # BLD AUTO: 224 10E3/UL (ref 150–450)
POTASSIUM BLD-SCNC: 4 MMOL/L (ref 3.4–5.3)
RBC # BLD AUTO: 4.83 10E6/UL (ref 4.4–5.9)
SODIUM SERPL-SCNC: 137 MMOL/L (ref 135–145)
SP GR UR STRIP: 1.01 (ref 1–1.03)
UROBILINOGEN UR STRIP-ACNC: 0.2 E.U./DL
WBC # BLD AUTO: 12.9 10E3/UL (ref 4–11)

## 2024-11-27 PROCEDURE — 85025 COMPLETE CBC W/AUTO DIFF WBC: CPT | Performed by: FAMILY MEDICINE

## 2024-11-27 PROCEDURE — 81003 URINALYSIS AUTO W/O SCOPE: CPT | Performed by: FAMILY MEDICINE

## 2024-11-27 PROCEDURE — 36415 COLL VENOUS BLD VENIPUNCTURE: CPT | Performed by: FAMILY MEDICINE

## 2024-11-27 PROCEDURE — 80048 BASIC METABOLIC PNL TOTAL CA: CPT | Performed by: FAMILY MEDICINE

## 2024-11-27 RX ORDER — LIRAGLUTIDE 6 MG/ML
INJECTION, SOLUTION SUBCUTANEOUS
COMMUNITY
Start: 2024-04-29

## 2024-11-27 RX ORDER — LIRAGLUTIDE 6 MG/ML
INJECTION SUBCUTANEOUS
COMMUNITY
Start: 2024-02-09

## 2024-11-27 RX ORDER — DOXYCYCLINE 100 MG/1
100 TABLET ORAL 2 TIMES DAILY
Qty: 14 TABLET | Refills: 0 | Status: SHIPPED | OUTPATIENT
Start: 2024-11-27 | End: 2024-12-04

## 2024-11-27 NOTE — PROGRESS NOTES
SUBJECTIVE: Kaden Easton Jr. is a 24 year old male who  presents today for a possible UTI.   Symptoms of dysuria and frequency have been going on forday(s).    Hematuria no.  sudden onset and still present with dizziness    Past Medical History:   Diagnosis Date    ADD (attention deficit disorder)     ADHD (attention deficit hyperactivity disorder)     ADHD (attention deficit hyperactivity disorder)     Anxiety     Current smoker     Fetal alcohol syndrome     Forehead abrasion     History of transfusion     Obese     Obesity     Self-injurious behavior 08/17/2020    TBI (traumatic brain injury) (H) 10/05/2024     Allergies   Allergen Reactions    Depakote [Valproic Acid] Unknown     Acute on Chronic Thrombocytopenia.    Depakote [Valproic Acid]     Other Environmental Allergy Other (See Comments)     Mild reaction-- runny nose, itchy/watery eyes, etc.      Social History     Tobacco Use    Smoking status: Every Day     Current packs/day: 1.00     Types: Cigarettes    Smokeless tobacco: Never    Tobacco comments:     smokes daily about a pack 12/14/22  Patient also using lozenges.     Substance Use Topics    Alcohol use: Not Currently     Alcohol/week: 2.0 standard drinks of alcohol     Comment: Alcoholic Drinks/day: Birthday/Beer per staff       ROS: CONSTITUTIONAL:NEGATIVE for fever, chills, change in weight    OBJECTIVE:  /78 (BP Location: Left arm, Patient Position: Sitting, Cuff Size: Adult Large)   Pulse 88   Temp 99.3  F (37.4  C) (Tympanic)   Resp 16   SpO2 99%     No Flank/abd pain      ICD-10-CM    1. Dysuria  R30.0 UA Macroscopic with reflex to Microscopic and Culture - Clinic Collect     Basic metabolic panel     CBC with Platelets & Differential     doxycycline monohydrate (ADOXA) 100 MG tablet      2. Lightheadedness  R42 Basic metabolic panel     CBC with Platelets & Differential      3. Dizziness  R42 Basic metabolic panel     CBC with Platelets & Differential      4. Leukocytosis,  unspecified type  D72.829 doxycycline monohydrate (ADOXA) 100 MG tablet          Drink plenty of fluids.  Prevention and treatment of UTI's discussed.Signs and symptoms of pyelonephritis mentioned.  Follow up with primary care physician if not improving

## 2024-12-03 DIAGNOSIS — F90.9 ATTENTION DEFICIT HYPERACTIVITY DISORDER (ADHD), UNSPECIFIED ADHD TYPE: ICD-10-CM

## 2024-12-04 ENCOUNTER — ANCILLARY PROCEDURE (OUTPATIENT)
Dept: GENERAL RADIOLOGY | Facility: CLINIC | Age: 24
End: 2024-12-04
Attending: NURSE PRACTITIONER
Payer: COMMERCIAL

## 2024-12-04 DIAGNOSIS — R42 DIZZINESS: ICD-10-CM

## 2024-12-04 PROCEDURE — 71046 X-RAY EXAM CHEST 2 VIEWS: CPT | Mod: TC | Performed by: STUDENT IN AN ORGANIZED HEALTH CARE EDUCATION/TRAINING PROGRAM

## 2024-12-04 RX ORDER — ATOMOXETINE 100 MG/1
100 CAPSULE ORAL DAILY
Qty: 28 CAPSULE | Refills: 0 | Status: SHIPPED | OUTPATIENT
Start: 2024-12-04

## 2024-12-04 NOTE — TELEPHONE ENCOUNTER
Date of Last Office Visit: 10/31/24  Date of Next Office Visit:  Tomorrow, but may run out sooner  No shows since last visit: No  More than one patient-initiated cancellation (with reschedule) since last seen in clinic? No    []Medication refilled per  Medication Refill in Ambulatory Care  policy.  [x]Medication unable to be refilled by RN due to criteria not met as indicated below:    []Eligibility: has not had a provider visit within last 6 months   [x]Supervision: no future appointment; < 7 days before next appointment   []Compliance: no shows; cancellations; lapse in therapy   []Verification: order discrepancy; may need modification...   [] > 30-day supply request   []Advanced refill request: > 7 days before refill date   []Controlled medication   []Medication not included in policy   []Review: new med; med adjusted <= 30 days; safety alert; requires lab monitoring...   []Scope of Practice: refill request processed by LPN/MA   []Other:      Medication(s) requested:     -  atomoxetine (STRATTERA) 100 MG capsule   Date last ordered: 11/6/24  Qty: 28  Refills: 0  Appropriate for refill? Provider to review. Appt tomorrow    Any Controlled Substance(s)? No      Requested medication(s) verified as identical to current order? Yes    Any lapse in adherence to medication(s) greater than 5 days? No      Additional action taken? cued up medication/order(s).      Last visit treatment plan:   Continue current medications  Abilify  5 mg in the morning  Quifacine ER   3 mg at bedtime    Invega 234 mg /IM monthly -given by group home   Thorazine 50 mg BID - agitation PRN   Statreta 100 mg in am   Trazodone 300 mg at bedtime   Lithium 900 mg very evening with meals   Olanzapine 10 mg daily -  Hydroxyzine 50 mg TID PRN-       3. RTC:  4 week    Any medication(s) require lab monitoring? No

## 2024-12-05 ENCOUNTER — OFFICE VISIT (OUTPATIENT)
Dept: PSYCHIATRY | Facility: CLINIC | Age: 24
End: 2024-12-05
Payer: COMMERCIAL

## 2024-12-05 VITALS — WEIGHT: 279 LBS | BODY MASS INDEX: 40.03 KG/M2

## 2024-12-05 DIAGNOSIS — F63.81 INTERMITTENT EXPLOSIVE DISORDER: Primary | ICD-10-CM

## 2024-12-05 DIAGNOSIS — F39 SEVERE MOOD DISORDER WITH PSYCHOTIC FEATURES (H): ICD-10-CM

## 2024-12-05 DIAGNOSIS — F25.0 SCHIZOAFFECTIVE DISORDER, BIPOLAR TYPE (H): ICD-10-CM

## 2024-12-05 DIAGNOSIS — Q86.0 FETAL ALCOHOL SYNDROME: ICD-10-CM

## 2024-12-05 DIAGNOSIS — R46.89 AGGRESSIVE BEHAVIOR: ICD-10-CM

## 2024-12-05 DIAGNOSIS — Z87.820 HISTORY OF TRAUMATIC BRAIN INJURY: ICD-10-CM

## 2024-12-05 NOTE — PATIENT INSTRUCTIONS
"[facility]  What to Expect  Here's what to expect in the [facility] Psych[facility] Program.   About the program  You'll be meeting with a psychiatric doctor to check your mental health. A psychiatric doctor helps you deal with troubling thoughts and feelings by giving you medicine. They'll make sure you know the plan for your care. You may see them for a long time. When you're feeling better, they may refer you back to seeing your family doctor.   If you have any questions, we'll be glad to talk to you.  About visits  Be open  At your visits, please talk openly about your problems. It may feel hard, but it's the best way for us to help you.  Cancelling visits  If you can't come to your visit, please call us right away at [phone]. If you don't cancel at least 24 hours (1 full day) before your visit, that's \"late cancellation.\"  Not showing up for your visits  Being very late is the same as not showing up. You'll be a \"no show\" if:  You're more than 15 minutes late for a 30-minute (half hour) visit.  You're more than 30 minutes late for a 60-minute (full hour) visit.  If you cancel late or don't show up 2 times within 6 months, we may end your care.  Getting help between visits  If you need help between visits, you can call us Monday to Friday from 8 a.m. to 4:30 p.m. at [phone].  Emergency care  Call 911 or go to the nearest emergency department if your life or someone else's life is in danger.  Call 988 anytime to reach the national Suicide and Crisis hotline.  Medicine refills  To refill your medicine, call your pharmacy. You can also call [facility]'s [facility] at [phone], Monday to Friday, 8 a.m. to 4:30 p.m. It can take 1 to 3 business days to get a refill.   Forms, letters, and tests  You may have papers to fill out, like FMLA, short-term disability, and workability. We can help you with these forms at your visits, but you must have an appointment. You may need more " than 1 visit for this, to be in an intensive therapy program, or both.  Before we can give you medicine for ADHD, we may refer you to get tested for it or confirm it another way.  We may not be able to give you an emotional support animal letter.  We don't do mental health checks ordered by the court.   We don't do mental health testing, but we can refer you to get tested.   Thank you for choosing us for your care.  For informational purposes only. Not to replace the advice of your health care provider. Copyright   2022 [facility]. All rights reserved. [employer] [id number] - 12/22      After Visit Summary   Continue medications as prescribed  Have your pharmacy contact us for a refill if you are running low on medications (We may ask you to come into clinic to get a refill from the nurse  No Alcohol or drug use  No driving if sedated  Call the clinic with any questions or concerns   Reach out for help if you feel like hurting yourself or others ([facility] [phone]: [address], [address] or [facility] Suicide Hotline   [phone] , call 911 or go to nearest Emergency room     Crisis Resources:    Present to the Emergency Department as needed or call after hours crisis line at [phone] or [phone].   [place] Crisis Text Line: Text [place] to 382911.  Suicide LifeLine Chat: suicidepreventionlifeline.org/chat/.  National Suicide Prevention Lifeline: 426.150.4068 (TTY: 902.645.8125). Call anytime for help.  (www.suicidepreventionlifeline.org)  National [place] on Mental Illness (www.milvia.org): 196.793.8906 or 881-748-7967.  Mental Health Association (www.mentalhealth.org): 384.901.8247 or 239-478-9239.       Follow up as directed, for your appointments, per your After Visit Summary Form.

## 2024-12-05 NOTE — PROGRESS NOTES
Psychiatric  Out- Patient  Follow Up Progress Note  Date of visit:12/5/2024           Discussion of Care and Treatment Recommendations:   This is a 24 year old male with  history ADD, ADHD, schizoaffective disorder, anxiety, intermittent explosive disorder, and self-injurious behavior  Intellectual disability,  fetal alcohol  spectrum,and  Pt resides in a group home.   Patient is seen in person  today accompanied by his group home nursing staff.     Last visit 10/31/2024.  Recommendation at last visit .  Continue current medications  Abilify  5 mg in the morning  Quifacine ER   3 mg at bedtime    Invega 234 mg /IM monthly -given by group home   Thorazine 50 mg BID - agitation PRN   Statreta 100 mg in am   Trazodone 300 mg at bedtime   Lithium 900 mg very evening with meals   Olanzapine 10 mg daily -  Hydroxyzine 50 mg TID PRN-    3. RTC:  4 week  4.  Call 911 or go to the ER if feeling unsafe.  Crisis numbers also provided  Patient and I reviewed diagnosis and treatment plan and patient agrees with following recommendations:  Ongoing education given regarding diagnostic and treatment options with adequate verbalization of understanding.  Plan   Continue current medications  Abilify  5 mg in the morning  Invega 234 mg /IM monthly -given by group home   Thorazine 50 mg BID - agitation PRN    Trazodone 300 mg at bedtime   Lithium 900 mg very evening with meals   Olanzapine 10 mg daily -  Hydroxyzine 50 mg TID PRN-    Stop Statreta 100 mg in am  Stop Quifacine ER   3 mg at bedtime   3. RTC:  4 week  4.  Call 911 or go to the ER if feeling unsafe.  Crisis numbers also provided         DIagnoses:   Schizoaffective disorder, unspecified type (HCC)  Active Problems:  Intermittent explosive disorder  Fetal alcohol spectrum disorder  History of traumatic brain injury  Tobacco use disorder  Mild intellectual disability  ADHD (attention deficit hyperactivity disorder), combined type  Aggressive behavior        Patient  "Active Problem List   Diagnosis    Thrombocytopenia (H)    Chronic ITP (idiopathic thrombocytopenia) (H)    Attention deficit disorder    Intermittent explosive disorder    Anxiety disorder, unspecified    Schizoaffective disorder, unspecified (H)    Intermittent explosive disorder    Schizoaffective disorder, bipolar type (H)    Fetal alcohol syndrome    Intellectual disability    TBI (traumatic brain injury) (H)    Hallucinations    Aggressive behavior             Chief Complaint / Subjective:    Chief complaint:   Aggressive behaviors     History of Present Illness:   Patient is accompanied by group home staff.  Patient continues to have aggressive behaviors including punching walls, yelling at staff, impulsivity.  He has multiple bruises on his face from punching walls.  He gets easily irritated and agitated at the group home with staff and peers.  He is on multiple neuroleptic medications.  In addition he is on guanfacine and Strattera initially prescribed for ADHD.  Given that these 2 medications may occasionally induce impulsivity and aggressive behavior we will try and stop them for a while and observe with the behaviors will improve.  I explained this to patient in detail and the group home staff.  He will continue taking the rest of his medications as prescribed.  Patient to return to the clinic in approximately 2 weeks for follow-up appointment.  Staff will call in between visits with behavior worsens      Mental Status Examination:   Appearance: Well groomed, good eye contact   Orientation: Patient alert and oriented to person, place, time, and situation  Reliability:  Patient appears to be an adequate historian.    Behavior: cooperative   Speech: Speech is spontaneous and coherent, with a normal rate, rhythm and tone.    Language:There are no difficulties with expressive or receptive language as observed throughout the interview.    Mood: Described as \"ok\".    Affect: congruent   Judgement: Able to make " basic decision regarding safety.  Insight: Good awareness of physical and mental health conditions and aware of needs around care for these.  Gait and station: unable to assess  Thought process: Logical   Thought content: No evidence of delusions or paranoia.    Hallucinations : No evidence of any hallucination  Thought content: No evidence of delusions or paranoia.   Suicidal /Homical Ideations:  No thoughts of self harm or suicide. No thoughts of harming others.  Associations: Connected  Fund of knowledge: Average  Attention / Concentration: Able to remain focused during the interview with minimal distractibility or need for redirection.  Short Term Memory: Grossly intact as evidence by client recalling themes and ideas discussed.  Long Term Memory: Intact  Motor Status: unable to asse    Drug/treatment history and current pattern of use:   History of cannabis use  History of nicotine use-vaping  Nicotine : Smokes daily        Medication changes: See Above   Medication adherence: compliant  Medication side effects: absent  Information about medications: Side effects, benefits and alternative treatments discussed and patient agrees .    Psychotherapy: Supportive therapy day-to-day living    Education: Diet, exercise, abstinence from drugs and alcohol, patient will not drive if sedated and medications or  under influence of any substance    Lab Results:   Personally reviewed and discussed with the patient    Lab Results   Component Value Date    WBC 12.9 (H) 11/27/2024    HGB 14.0 11/27/2024    HCT 41.9 11/27/2024     11/27/2024    CHOL 150 12/15/2023    TRIG 75 12/15/2023    HDL 34 (L) 12/15/2023    ALT 45 09/09/2024    AST 33 09/09/2024     11/27/2024    BUN 12 11/27/2024    CO2 28 11/27/2024    TSH 3.61 06/30/2022       Vital signs:  Wt 126.6 kg (279 lb)   BMI 40.03 kg/m    Telemedicine visit-no vital signs completed  Allergies: Depakote [valproic acid], Depakote [valproic acid], and Other  environmental allergy         Medications:     Current Outpatient Medications   Medication Sig Dispense Refill    ARIPiprazole (ABILIFY) 5 MG tablet Take 1 tablet (5 mg) by mouth every morning. 28 tablet 1    atomoxetine (STRATTERA) 100 MG capsule TAKE 1 CAPSULE BY MOUTH DAILY 28 capsule 0    atomoxetine (STRATTERA) 100 MG capsule Take 1 capsule (100 mg) by mouth daily 30 capsule 0    atropine 1 % ophthalmic solution Place 2 drops under the tongue.      benzonatate (TESSALON) 100 MG capsule Take 100 mg by mouth 3 times daily as needed for cough.      chlorproMAZINE (THORAZINE) 50 MG tablet Take 1 tablet (50 mg) by mouth 2 times daily as needed for anxiety 60 tablet 2    cholecalciferol 25 MCG (1000 UT) TABS Take 25 mcg by mouth.      ferrous sulfate (FE TABS) 325 (65 Fe) MG EC tablet Take 325 mg by mouth daily      ferrous sulfate (FEROSUL) 325 (65 Fe) MG tablet Take 325 mg by mouth      guanFACINE HCl (INTUNIV) 3 MG TB24 24 hr tablet Take 1 tablet (3 mg) by mouth at bedtime. 28 tablet 1    guanFACINE HCl (INTUNIV) 3 MG TB24 24 hr tablet Take 1 tablet (3 mg) by mouth at bedtime. 28 tablet 0    hydrOXYzine HCl (ATARAX) 50 MG tablet Take 1 tablet (50 mg) by mouth 3 times daily as needed for anxiety. 90 tablet 0    liraglutide - Weight Management (SAXENDA) 18 MG/3ML pen Inject subcutaneously.      lithium 300 MG capsule Take 3 capsules (900 mg) by mouth daily. 84 capsule 1    lithium 300 MG capsule TAKE 3 CAPSULES BY MOUTH EVERY EVENING WITH DINNER 84 capsule 1    loratadine (CLARITIN) 10 MG tablet Take 10 mg by mouth.      metFORMIN (GLUCOPHAGE-XR) 500 MG 24 hr tablet Take 1,000 mg by mouth.      nicotine (NICODERM CQ) 14 MG/24HR 24 hr patch Place 1 patch onto the skin every 24 hours.      nicotine (NICORETTE) 4 MG lozenge Place 4 mg inside cheek every hour as needed for nicotine withdrawal symptoms.      OLANZapine (ZYPREXA) 10 MG tablet Take 1 tablet (10 mg) by mouth at bedtime. 20 tablet 0    ondansetron (ZOFRAN  ODT) 4 MG ODT tab Take 1 tablet (4 mg) by mouth every 6 hours as needed for nausea or vomiting 15 tablet 0    paliperidone (INVEGA SUSTENNA) 234 MG/1.5ML JUANA Inject 1.5 mLs (234 mg) into the muscle every 28 days. 1.5 mL 2    paliperidone (INVEGA SUSTENNA) 234 MG/1.5ML JUANA Inject 1.5 mLs (234 mg) into the muscle every 28 days 1.5 mL 2    polyethylene glycol (MIRALAX) 17 g packet Take 1 packet by mouth daily.      traZODone HCl 300 MG TABS Take 1 tablet by mouth at bedtime. 28 tablet 1    traZODone HCl 300 MG TABS Take 1 tablet by mouth at bedtime. 28 tablet 0    VICTOZA PEN 18 MG/3ML soln       vitamin C (ASCORBIC ACID) 250 MG TABS tablet Take 250 mg by mouth       No current facility-administered medications for this visit.         No current facility-administered medications for this visit.     No current facility-administered medications for this visit.         Medication adherence: Reviewed risk/benefits of medication , Patient able to verbalize understanding of side effects and Patient verbally consents to taking medications      PSYCHOEDUCATION:  Medication side effects and alternatives reviewed. Health promotion activities recommended and reviewed today. All questions addressed. Education and counseling completed regarding risks and benefits of medications and psychotherapy options.  Consent provided by patient/guardian  Call the psychiatric nurse line with medication questions or concerns at [phone].  MyChart may be used to communicate with your provider, but this is not intended to be used for emergencies.  SEROTONIN SYNDROME:  Discussed risks of Serotonin syndrome (ie, serotonin toxicity) which is a potentially life-threatening condition associated with increased serotonergic activity in the central nervous system (CNS). It is seen with therapeutic medication use, inadvertent interactions between drugs, and intentional self-poisoning. Serotonin syndrome may involve a spectrum of clinical findings,  which often include mental status changes, autonomic hyperactivity, and neuromuscular abnormalities.    STIMULANT THERAPY: Side effects discussed including but not limited to cardiac (including HTN, tachycardia, sudden death), motor/tic, appetite/growth, mood lability and sleep disruption. This is a controlled substance with risk for abuse, need to keep in a safe keep place and cannot replace lost scripts  HARM REDUCTION:  Discussions regarding effects of mood altering substances, alcohol and cannabis, on mood and that approach is harm reduction, will continue to prescribe meds as they work to cut back use.    SAFETY:  We all care about your loved one's safety. To reduce the risk of self-harm, remove access to all:  Firearms, Medicines (both prescribed and over-the-counter), Knives and other sharp objects, Ropes and like materials, and Alcohol  SLEEP HYGIENE: establish a sleep routine, limit screen time 1 hour prior to bed, use bed for sleep only, take sleep/medications on time (including sleepy time tea, trazadone or herbal treatments such as melatonin), aroma therapy, limit caffeine/sugar, yoga, guided imagery, stretch, meditation, limit naps to 20 minutes, make a temperature change in the room, white noise, be mindful of slowing down breathing, take a warm bath/shower, frequently wash sheets, and journaling.   Medlineplus.gov is information for patients.  It is run by the National Library of Medicine and it contains information about all disorders, diseases and all medications.              Review of Systems:      ROS:    Subjective Data Only- Tele-Health Visit    10 point ROS was negative except for the items listed in HPI.      Crisis Resources:    Present to the Emergency Department as needed or call after hours crisis line at [phone] or [phone].   [place] Crisis Text Line: Text [place] to 741190.  Suicide LifeLine Chat: suicidepreventionlifeline.org/chat/.  National Suicide Prevention Lifeline:  752.440.1337 (TTY: 516.851.7694). Call anytime for help.  (www.suicidepreventionlifeline.org)  National [place] on Mental Illness (www.milvia.org): 693.169.7747 or 970-161-9780.  Mental Health Association (www.mentalhealth.org): 746.616.7978 or 084-835-1185.      Coordination of Care:   More than 50% of time spent on coordination of care including: Educating patient about diagnosis, prognosis, side effects and benefits of medications, diet, exercise.  Time also spent providing supportive therapy regarding above issues.          Disclaimer: This note consists of symbols derived from keyboarding, dictation and/or voice recognition software. As a result, there may be errors in the script that have gone undetected. Please consider this when interpreting information found in this chart.    Start Time : 0800  End time : 0830

## 2024-12-05 NOTE — NURSING NOTE
Mental Health and Collaborative Care Psychiatry Service Rooming Note      Most pressing mental health concern at this time:   -has been violent  -not able to sleep/concentrate      Any new physical health conditions or diagnoses affecting you that we should be aware of: denies      Side effects related to medications patient would like to discuss with the provider:  No      Are you taking your medications as prescribed?  Yes, but according to assisted living notes, he refused his Tuesday night medication stating the medications is not working for him anymore        Do you need refills of any of the medications?  No  If so, which ones? None, Strattera was sent in yesterday to their pharmacy      Are you taking any recreational substances? no          Brenda Rivera MA  December 5, 2024  7:46 AM

## 2024-12-06 ENCOUNTER — TELEPHONE (OUTPATIENT)
Dept: PSYCHIATRY | Facility: CLINIC | Age: 24
End: 2024-12-06
Payer: COMMERCIAL

## 2024-12-06 NOTE — TELEPHONE ENCOUNTER
Reason for call:  Other   Patient called regarding (reason for call): prescription  Additional comments: Emerson Wolf from [facility] calling for clarification on Atomoxetine order. Is pt to stop this med or not? The staff and pt heard one thing and the paperwork says another       Phone number to reach patient:  Other phone number:  [phone]    Best Time:  anytime    Can we leave a detailed message on this number?  YES    Travel screening: Not Applicable

## 2024-12-09 ENCOUNTER — APPOINTMENT (OUTPATIENT)
Dept: GENERAL RADIOLOGY | Facility: CLINIC | Age: 24
End: 2024-12-09
Attending: EMERGENCY MEDICINE
Payer: COMMERCIAL

## 2024-12-09 ENCOUNTER — HOSPITAL ENCOUNTER (EMERGENCY)
Facility: CLINIC | Age: 24
Discharge: HOME | End: 2024-12-10
Attending: EMERGENCY MEDICINE | Admitting: EMERGENCY MEDICINE
Payer: COMMERCIAL

## 2024-12-09 DIAGNOSIS — F25.9 SCHIZOAFFECTIVE DISORDER, UNSPECIFIED TYPE (H): ICD-10-CM

## 2024-12-09 DIAGNOSIS — R06.02 SHORTNESS OF BREATH: ICD-10-CM

## 2024-12-09 DIAGNOSIS — R07.9 CHEST PAIN, UNSPECIFIED TYPE: ICD-10-CM

## 2024-12-09 DIAGNOSIS — R46.89 BEHAVIOR CONCERN: ICD-10-CM

## 2024-12-09 DIAGNOSIS — R44.3 HALLUCINATIONS: ICD-10-CM

## 2024-12-09 LAB
ALBUMIN SERPL BCG-MCNC: 3.8 G/DL (ref 3.5–5.2)
ALP SERPL-CCNC: 59 U/L (ref 40–150)
ALT SERPL W P-5'-P-CCNC: 33 U/L (ref 0–70)
ANION GAP SERPL CALCULATED.3IONS-SCNC: 9 MMOL/L (ref 7–15)
AST SERPL W P-5'-P-CCNC: 28 U/L (ref 0–45)
BASOPHILS # BLD AUTO: 0.1 10E3/UL (ref 0–0.2)
BASOPHILS NFR BLD AUTO: 1 %
BILIRUB SERPL-MCNC: 0.2 MG/DL
BUN SERPL-MCNC: 11.2 MG/DL (ref 6–20)
CALCIUM SERPL-MCNC: 8.9 MG/DL (ref 8.8–10.4)
CHLORIDE SERPL-SCNC: 104 MMOL/L (ref 98–107)
CREAT SERPL-MCNC: 0.77 MG/DL (ref 0.67–1.17)
D DIMER PPP FEU-MCNC: 0.3 UG/ML FEU (ref 0–0.5)
EGFRCR SERPLBLD CKD-EPI 2021: >90 ML/MIN/1.73M2
EOSINOPHIL # BLD AUTO: 0.3 10E3/UL (ref 0–0.7)
EOSINOPHIL NFR BLD AUTO: 3 %
ERYTHROCYTE [DISTWIDTH] IN BLOOD BY AUTOMATED COUNT: 13.6 % (ref 10–15)
GLUCOSE SERPL-MCNC: 88 MG/DL (ref 70–99)
HCO3 SERPL-SCNC: 24 MMOL/L (ref 22–29)
HCT VFR BLD AUTO: 37.2 % (ref 40–53)
HGB BLD-MCNC: 12.6 G/DL (ref 13.3–17.7)
IMM GRANULOCYTES # BLD: 0 10E3/UL
IMM GRANULOCYTES NFR BLD: 0 %
LITHIUM SERPL-SCNC: 1.07 MMOL/L (ref 0.6–1.2)
LYMPHOCYTES # BLD AUTO: 2.4 10E3/UL (ref 0.8–5.3)
LYMPHOCYTES NFR BLD AUTO: 22 %
MCH RBC QN AUTO: 29.1 PG (ref 26.5–33)
MCHC RBC AUTO-ENTMCNC: 33.9 G/DL (ref 31.5–36.5)
MCV RBC AUTO: 86 FL (ref 78–100)
MONOCYTES # BLD AUTO: 1 10E3/UL (ref 0–1.3)
MONOCYTES NFR BLD AUTO: 9 %
NEUTROPHILS # BLD AUTO: 7.2 10E3/UL (ref 1.6–8.3)
NEUTROPHILS NFR BLD AUTO: 66 %
NRBC # BLD AUTO: 0 10E3/UL
NRBC BLD AUTO-RTO: 0 /100
PLAT MORPH BLD: ABNORMAL
PLATELET # BLD AUTO: 167 10E3/UL (ref 150–450)
POTASSIUM SERPL-SCNC: 3.7 MMOL/L (ref 3.4–5.3)
PROT SERPL-MCNC: 6.6 G/DL (ref 6.4–8.3)
RBC # BLD AUTO: 4.33 10E6/UL (ref 4.4–5.9)
RBC MORPH BLD: ABNORMAL
SODIUM SERPL-SCNC: 137 MMOL/L (ref 135–145)
TARGETS BLD QL SMEAR: SLIGHT
TROPONIN T SERPL HS-MCNC: 7 NG/L
VARIANT LYMPHS BLD QL SMEAR: PRESENT
WBC # BLD AUTO: 11 10E3/UL (ref 4–11)

## 2024-12-09 PROCEDURE — 85025 COMPLETE CBC W/AUTO DIFF WBC: CPT | Performed by: EMERGENCY MEDICINE

## 2024-12-09 PROCEDURE — 99285 EMERGENCY DEPT VISIT HI MDM: CPT | Mod: 25

## 2024-12-09 PROCEDURE — 80178 ASSAY OF LITHIUM: CPT | Performed by: EMERGENCY MEDICINE

## 2024-12-09 PROCEDURE — 36415 COLL VENOUS BLD VENIPUNCTURE: CPT | Performed by: EMERGENCY MEDICINE

## 2024-12-09 PROCEDURE — 71046 X-RAY EXAM CHEST 2 VIEWS: CPT

## 2024-12-09 PROCEDURE — 85379 FIBRIN DEGRADATION QUANT: CPT | Performed by: EMERGENCY MEDICINE

## 2024-12-09 PROCEDURE — 84484 ASSAY OF TROPONIN QUANT: CPT | Performed by: EMERGENCY MEDICINE

## 2024-12-09 PROCEDURE — 80053 COMPREHEN METABOLIC PANEL: CPT | Performed by: EMERGENCY MEDICINE

## 2024-12-09 PROCEDURE — 93005 ELECTROCARDIOGRAM TRACING: CPT

## 2024-12-09 RX ORDER — TRAZODONE HYDROCHLORIDE 150 MG/1
300 TABLET ORAL AT BEDTIME
Status: DISCONTINUED | OUTPATIENT
Start: 2024-12-09 | End: 2024-12-10 | Stop reason: HOSPADM

## 2024-12-09 RX ORDER — HYDROXYZINE HYDROCHLORIDE 25 MG/1
50 TABLET, FILM COATED ORAL 3 TIMES DAILY PRN
Status: DISCONTINUED | OUTPATIENT
Start: 2024-12-09 | End: 2024-12-10 | Stop reason: HOSPADM

## 2024-12-09 RX ORDER — CHLORPROMAZINE HYDROCHLORIDE 50 MG/1
50 TABLET, FILM COATED ORAL 2 TIMES DAILY PRN
Status: DISCONTINUED | OUTPATIENT
Start: 2024-12-09 | End: 2024-12-10 | Stop reason: HOSPADM

## 2024-12-09 RX ORDER — LITHIUM CARBONATE 300 MG/1
900 CAPSULE ORAL
Status: DISCONTINUED | OUTPATIENT
Start: 2024-12-10 | End: 2024-12-10 | Stop reason: HOSPADM

## 2024-12-09 RX ORDER — OLANZAPINE 5 MG/1
10 TABLET ORAL AT BEDTIME
Status: DISCONTINUED | OUTPATIENT
Start: 2024-12-09 | End: 2024-12-10 | Stop reason: HOSPADM

## 2024-12-09 RX ORDER — ARIPIPRAZOLE 5 MG/1
5 TABLET ORAL EVERY MORNING
Status: DISCONTINUED | OUTPATIENT
Start: 2024-12-10 | End: 2024-12-10 | Stop reason: HOSPADM

## 2024-12-09 ASSESSMENT — ACTIVITIES OF DAILY LIVING (ADL)
ADLS_ACUITY_SCORE: 41

## 2024-12-10 VITALS
HEART RATE: 91 BPM | OXYGEN SATURATION: 94 % | SYSTOLIC BLOOD PRESSURE: 104 MMHG | TEMPERATURE: 99.3 F | RESPIRATION RATE: 16 BRPM | DIASTOLIC BLOOD PRESSURE: 50 MMHG

## 2024-12-10 LAB
ATRIAL RATE - MUSE: 70 BPM
DIASTOLIC BLOOD PRESSURE - MUSE: NORMAL MMHG
INTERPRETATION ECG - MUSE: NORMAL
P AXIS - MUSE: 42 DEGREES
PR INTERVAL - MUSE: 178 MS
QRS DURATION - MUSE: 96 MS
QT - MUSE: 390 MS
QTC - MUSE: 421 MS
R AXIS - MUSE: -8 DEGREES
SYSTOLIC BLOOD PRESSURE - MUSE: NORMAL MMHG
T AXIS - MUSE: 19 DEGREES
VENTRICULAR RATE- MUSE: 70 BPM

## 2024-12-10 PROCEDURE — 250N000013 HC RX MED GY IP 250 OP 250 PS 637: Performed by: EMERGENCY MEDICINE

## 2024-12-10 RX ORDER — OLANZAPINE 10 MG/2ML
10 INJECTION, POWDER, FOR SOLUTION INTRAMUSCULAR 2 TIMES DAILY PRN
Status: DISCONTINUED | OUTPATIENT
Start: 2024-12-10 | End: 2024-12-10 | Stop reason: HOSPADM

## 2024-12-10 RX ORDER — ACETAMINOPHEN 500 MG
1000 TABLET ORAL ONCE
Status: COMPLETED | OUTPATIENT
Start: 2024-12-10 | End: 2024-12-10

## 2024-12-10 RX ADMIN — ACETAMINOPHEN 1000 MG: 500 TABLET, FILM COATED ORAL at 10:44

## 2024-12-10 RX ADMIN — OLANZAPINE 10 MG: 5 TABLET, FILM COATED ORAL at 00:09

## 2024-12-10 RX ADMIN — TRAZODONE HYDROCHLORIDE 300 MG: 150 TABLET ORAL at 00:09

## 2024-12-10 RX ADMIN — ARIPIPRAZOLE 5 MG: 5 TABLET ORAL at 08:46

## 2024-12-10 ASSESSMENT — COLUMBIA-SUICIDE SEVERITY RATING SCALE - C-SSRS
ATTEMPT SINCE LAST CONTACT: NO
TOTAL  NUMBER OF INTERRUPTED ATTEMPTS SINCE LAST CONTACT: NO
1. SINCE LAST CONTACT, HAVE YOU WISHED YOU WERE DEAD OR WISHED YOU COULD GO TO SLEEP AND NOT WAKE UP?: NO
2. HAVE YOU ACTUALLY HAD ANY THOUGHTS OF KILLING YOURSELF?: NO
SUICIDE, SINCE LAST CONTACT: NO
TOTAL  NUMBER OF ABORTED OR SELF INTERRUPTED ATTEMPTS SINCE LAST CONTACT: NO
6. HAVE YOU EVER DONE ANYTHING, STARTED TO DO ANYTHING, OR PREPARED TO DO ANYTHING TO END YOUR LIFE?: NO

## 2024-12-10 ASSESSMENT — ACTIVITIES OF DAILY LIVING (ADL)
ADLS_ACUITY_SCORE: 41

## 2024-12-10 NOTE — ED NOTES
Pt just picked at his breakfast.  Awake and alert   Pt was giving himself the Bydureon injection in his leg.  When he pulled the needle out he had a significant amount of dark red blood which was not normal for him.  The bleeding stopped rather quickly.  Pt just wanted to make sure he doesn't need to do anything different next week?  He will be using the other leg next week.     Pt's fasting blood sugar was 109 this morning.

## 2024-12-10 NOTE — TELEPHONE ENCOUNTER
"    1) RN reviewed initial Telephone message and most recent Telephone message:                 2) Per 12/5/24 Office Visit treatment plan:     \"Stop Statreta 100 mg in am.\"       3) Per Chart Review, atomoxetine (STRATTERA) 100 MG capsule is still an active order, last prescribed on 12/4/24 for quantity of 28.     4) RN spoke with [facility] staff at [phone] who state that [facility] is not on site.     5) RN spoke with [facility], mental health , at [phone]. [facility] states RN should call [facility] regarding medications.     6) RN LVM for [facility] at [phone] requesting they call clinic back at [phone] regarding medication changes.     7) Routing to provider for review and clarification of atomoxetine plan, and to discontinue order in chart if appropriate.     Roxana Del Rio RN on 12/10/2024 at 4:04 PM    "

## 2024-12-10 NOTE — ED PROVIDER NOTES
I received sign out from Dr. Pineda.  Please refer to their H&P for further information.  In brief, patient signed out pending DEC assessment and coordination of care.    906 AM spoke with DEC.  He appears safe for discharge.   staff coming to discharge.      I reassessed the patient.  He denies any acute safety concerns.  Calm and cooperative.  Working to coordinate discharge with [facility] staff.     Ania Pickett MD  12/10/24 6205

## 2024-12-10 NOTE — PROGRESS NOTES
"Triage and Transition Services Extended Care Reassessment     Patient: Kaden goes by \"[place],\" uses he/him pronouns  Date of Service: December 10, 2024  Site of Service: [facility] EMERGENCY DEPT                             ED17  Patient was seen yes  Mode of Assessment: In person     Reason for Reassessment: significant behavior change, physical aggression    History of Patient's Original Emergency Room Encounter: Previous diagnoses include ADHD, Intellectual Disability, Fetal Alcohol Syndrome, Intermittent Explosive Disorder, Schizoaffective Disorder and TBI. Patient has a history of civil commitments by [facility]; 2016 as Mentally Ill & Chemically Dependent, 2019 as Mentally Ill & Developmentally Disabled, and 2021 as Mentally Ill only. He is not currently under commitment. Patient has a history of cannabis use disorder.    Current Patient Presentation: Pt was calm and engaged with Writer when prompting.    Presentation Summary: Writer presented to Pt's room to engage in therapeutic check-in. Writer introduced self and stated purpose of interaction. When prompted, Pt denies any SI, HI, VH. Pt reports the auditory hallucinations are still present, yet not bothering him and identifies them as a male voice. Writer discussed plan of having Pt return to his group home to which Pt was agreeable.    Changes Observed Since Initial Assessment: decrease in presenting symptoms    Therapeutic Interventions Provided: Engaged in safety planning    Current Symptoms:       impulsive      Mental Status Exam   Affect: Flat  Appearance: Appropriate  Attention Span/Concentration: Attentive  Eye Contact: Variable    Fund of Knowledge: Appropriate   Language /Speech Content: Fluent  Language /Speech Volume: Soft  Language /Speech Rate/Productions: Minimally Responsive  Recent Memory: Intact  Remote Memory: Intact  Mood: Normal  Orientation to Person: Yes   Orientation to Place: Yes  Orientation to Time of " "Day: Yes  Orientation to Date: Yes     Situation (Do they understand why they are here?): Yes  Psychomotor Behavior: Normal  Thought Content: Clear, Hallucinations  Thought Form: Intact    Treatment Objective(s) Addressed: rapport building, identifying treatment goals, assessing safety, identifying an appropriate aftercare plan, safety planning    Patient Response to Interventions: acceptance expressed, verbalizes understanding    Progress Towards Goals:  Patient Reports Symptoms Are: improving  Patient Progress Toward Goals: is making progress  Comment: Pt has been receptive to ED interventions.  Next Step to Work Toward Discharge: collaboration with OP team/family/friends  Collaboration Comment: Coordinate with group home staff to have Pt return.    Case Management: Case Management Included: collaborating with patient's support system  Details on Collaborating with Patient's Support System: Spoke with staff at [facility]  Summary of Interaction: Writer spoke with staff at [facility]. Writer identified recommendation of discharge. Staff voiced concern for Pt returning due to recent agitation and being \"out of control\" in the house and making it an unsafe environment since Thanksgiving. They also reported Pt had a recent medication change on 12/5/2024 and wonder if the medication change may be impacting him. Writer validated their concerns, yet reiterated plan for Pt to return home given Pt is not currently presenting with any safety concerns at this time. They identify Pt has been taking medications consistently, except declining a PRN and missing one night dose. Writer discussed how RN will coordinate discharge. They said staff would be able to come and pick Pt up when Pt is discharged.    C-SSRS Since Last Contact:   1. Wish to be Dead (Since Last Contact): No  2. Non-Specific Active Suicidal Thoughts (Since Last Contact): No     Actual Attempt (Since Last Contact): No  Has subject " engaged in non-suicidal self-injurious behavior? (Since Last Contact): No  Interrupted Attempts (Since Last Contact): No  Aborted or Self-Interrupted Attempt (Since Last Contact): No  Preparatory Acts or Behavior (Since Last Contact): No  Suicide (Since Last Contact): No     Calculated C-SSRS Risk Score (Since Last Contact): No Risk Indicated    Plan: Final Disposition / Recommended Care Path: discharge  Plan for Care reviewed with assigned Medical Provider: yes  Plan for Care Team Review: RN, provider  Comments: Dr. Pickett, agrees with recommendation  Patient and/or validated legal guardian concurs: yes  Clinical Substantiation:     Pt currently lives in an assisted living facility with 24/7 staff. Pt has medication management scheduled, most recently saw on 12/5 with follow up in 2 weeks. Pt takes his medication as prescribed. He hears voices at baseline. Pt denies SI/HI. Pt would like to return to group home. Pt presents as calm and cooperative during the reassessment.     At this time IP MH admission is not being recommended due to denial of active SI, HI, VH. Pt's current presentation appears to be chronic in nature and Pt's current sx appear to be at Pt's baseline. Pt does appear to be at higher risk of death by suicide accidental or intentional due to mental health history. At this time IP MH admission does not appear to be the most therapeutically beneficial intervention/ level of care for Pt. Pt appears to be able to use and motivated to engage in supportive mental health/ community resources.     Writer discussed how RN will coordinate discharge. They said staff would be able to come and pick Pt up when Pt is discharged.      Legal Status: Legal Status at Admission: Voluntary/Patient has signed consent for treatment    Session Status: Time session started: 0840  Time session ended: 0845  Session Duration (minutes): 5 minutes  Session Number: 1  Anticipated number of sessions or this episode of care:  1    Session Start Time: 0840  Session Stop Time: 0845  CPT codes: Non-Billable  Time Spent: 5 minutes      CPT code(s) utilized: Non-Billable    Diagnosis:   Patient Active Problem List   Diagnosis Code    Thrombocytopenia (H) D69.6    Chronic ITP (idiopathic thrombocytopenia) (H) D69.3    Attention deficit disorder F98.8    Intermittent explosive disorder F63.81    Anxiety disorder, unspecified F41.9    Schizoaffective disorder, unspecified (H) F25.9    Intermittent explosive disorder F63.81    Schizoaffective disorder, bipolar type (H) F25.0    Fetal alcohol syndrome Q86.0    Intellectual disability F79    TBI (traumatic brain injury) (H) S06.9XAA    Hallucinations R44.3    Aggressive behavior R46.89       Primary Problem This Admission: Active Hospital Problems    TBI (traumatic brain injury) (H)      Intellectual disability      Fetal alcohol syndrome      *Schizoaffective disorder, unspecified (H)        Carlos Krause [facility]   Licensed Mental Health Professional (LMHP), [facility]  [phone]

## 2024-12-10 NOTE — ED NOTES
Pt ambulated to bathroom with escort by writer and security. Pt c/o right leg pain. Ordered tylenol. Spoke with staff and they are ready to pick him up.  Pt given his belongings to change

## 2024-12-10 NOTE — ED NOTES
Bed: ED17  Expected date:   Expected time:   Means of arrival:   Comments:  543  24 M  eval/visual hallucinations/on a hold  1849

## 2024-12-10 NOTE — ED NOTES
Pt L hand w/ redness, bruising, edema. Pt reports 'the voices did it.' Endorses hearing voices and seeing shadows.    Video Observation initiated, patient informed.     Elvira Avalos RN

## 2024-12-10 NOTE — TELEPHONE ENCOUNTER
"TIGRE for [facility]  last month. Current TIGRE on file for patient's , Escobar CARRASCO, [phone].     LVM for  requesting new TIGRE for group home and call back re: medication question.    Last visit: 24  Next visit: 24    Per Nkechi Monreal's last visit note 24, patient is to stop atomoxetine (Strattera) due to aggressive behaviors:    He is on multiple neuroleptic medications.  In addition he is on guanfacine and Strattera initially prescribed for ADHD.  Given that these 2 medications may occasionally induce impulsivity and aggressive behavior we will try and stop them for a while and observe with the behaviors will improve.  I explained this to patient in detail and the group home staff.  He will continue taking the rest of his medications as prescribed. Patient to return to the clinic in approximately 2 weeks for follow-up appointment.  Staff will call in between visits with behavior worsens.    The AVS from the visit states \"continue medications as prescribed\" which may be causing the confusion.    Yesenia Elkins RN on 12/10/2024 at 9:20 AM    "

## 2024-12-10 NOTE — TELEPHONE ENCOUNTER
Allison, staff at [facility] called back asking for an update. There is a current TIGRE on file for [facility] dated April of 2024.  Please return staff's call at [phone] to answer her questions.
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
## 2024-12-10 NOTE — ED PROVIDER NOTES
Emergency Department Note      History of Present Illness     Chief Complaint   Psychiatric Evaluation and Hallucinations      HPI   Kaden Easton Jr. is a 24 year old male who presents to the ER for evaluation of increased agitation, psychosis.  He has history of schizoaffective disorder and fetal alcohol spectrum disorder and intellectual disability and follows with psychiatry.  Patient reports days 2 weeks of increasing auditory and visual hallucinations.  No thoughts of self-harm.  He denies other medical concerns such as fever, cough, vomiting, diarrhea.  Per staff at his group home, patient has had increased behavioral outbursts over the past few weeks and aggression towards staff.  He saw his psychiatrist 4 days ago per below.  Staff will not accept him back to the group [place] tonight due to concern for safety.  Staff tell me he is his own healthcare decision maker.    Independent Historian    staff ([phone])    Review of External Notes   I reviewed the psychiatry office visit from 12/5/2024: This describes history of schizoaffective disorder, intellectual disability, fetal alcohol spectrum, group home residents.  At this visit, plan was to continue current medications including Abilify, Invega IM monthly, Thorazine twice daily, trazodone nightly, lithium at dinnertime, daily olanzapine, hydroxyzine 3 times daily as needed, stop Strattera, stop guanfacine with plan for return to clinic in 4 weeks.    Past Medical History     Medical History and Problem List   Past Medical History:   Diagnosis Date    ADD (attention deficit disorder)     ADHD (attention deficit hyperactivity disorder)     ADHD (attention deficit hyperactivity disorder)     Anxiety     Current smoker     Fetal alcohol syndrome     Forehead abrasion     History of transfusion     Obese     Obesity     Self-injurious behavior 08/17/2020    TBI (traumatic brain injury) (H) 10/05/2024       Medications   ARIPiprazole (ABILIFY) 5 MG  tablet  atomoxetine (STRATTERA) 100 MG capsule  atropine 1 % ophthalmic solution  benzonatate (TESSALON) 100 MG capsule  chlorproMAZINE (THORAZINE) 50 MG tablet  cholecalciferol 25 MCG (1000 UT) TABS  ferrous sulfate (FE TABS) 325 (65 Fe) MG EC tablet  ferrous sulfate (FEROSUL) 325 (65 Fe) MG tablet  hydrOXYzine HCl (ATARAX) 50 MG tablet  liraglutide - Weight Management (SAXENDA) 18 MG/3ML pen  lithium 300 MG capsule  lithium 300 MG capsule  loratadine (CLARITIN) 10 MG tablet  metFORMIN (GLUCOPHAGE-XR) 500 MG 24 hr tablet  nicotine (NICODERM CQ) 14 MG/24HR 24 hr patch  nicotine (NICORETTE) 4 MG lozenge  OLANZapine (ZYPREXA) 10 MG tablet  ondansetron (ZOFRAN ODT) 4 MG ODT tab  paliperidone (INVEGA SUSTENNA) 234 MG/1.5ML JUANA  paliperidone (INVEGA SUSTENNA) 234 MG/1.5ML JUANA  polyethylene glycol (MIRALAX) 17 g packet  traZODone HCl 300 MG TABS  traZODone HCl 300 MG TABS  VICTOZA PEN 18 MG/3ML soln  vitamin C (ASCORBIC ACID) 250 MG TABS tablet        Surgical History   No past surgical history on file.    Physical Exam     Patient Vitals for the past 24 hrs:   BP Temp Temp src Pulse Resp SpO2   12/09/24 2251 -- -- -- 88 20 95 %   12/09/24 2235 -- -- -- 80 16 97 %   12/09/24 2151 -- -- -- 86 26 95 %   12/09/24 2121 -- -- -- 81 26 96 %   12/09/24 2051 -- -- -- 79 19 98 %   12/09/24 2048 -- -- -- 78 15 99 %   12/09/24 2047 (!) 140/87 -- -- 76 -- --   12/09/24 1901 (!) 144/85 99.3  F (37.4  C) Temporal 86 16 98 %     Physical Exam  VS: Reviewed per above  HENT: Mucous membranes moist  EYES: sclera anicteric  CV: Rate as noted,  regular rhythm.   RESP: Effort normal. Breath sounds are normal bilaterally.  GI: no focal tenderness/rebound/guarding, not distended.  PSYCH: +AH/VH, denies SI  NEURO: Alert, moving all extremities  MSK: No deformity of the extremities  SKIN: Warm and dry      Diagnostics     Lab Results   Labs Ordered and Resulted from Time of ED Arrival to Time of ED Departure   CBC WITH PLATELETS AND  DIFFERENTIAL - Abnormal       Result Value    WBC Count 11.0      RBC Count 4.33 (*)     Hemoglobin 12.6 (*)     Hematocrit 37.2 (*)     MCV 86      MCH 29.1      MCHC 33.9      RDW 13.6      Platelet Count 167      % Neutrophils 66      % Lymphocytes 22      % Monocytes 9      % Eosinophils 3      % Basophils 1      % Immature Granulocytes 0      NRBCs per 100 WBC 0      Absolute Neutrophils 7.2      Absolute Lymphocytes 2.4      Absolute Monocytes 1.0      Absolute Eosinophils 0.3      Absolute Basophils 0.1      Absolute Immature Granulocytes 0.0      Absolute NRBCs 0.0     RBC AND PLATELET MORPHOLOGY - Abnormal    RBC Morphology Confirmed RBC Indices      Platelet Assessment        Value: Automated Count Confirmed. Platelet morphology is normal.    Reactive Lymphocytes Present (*)     Target Cells Slight (*)    COMPREHENSIVE METABOLIC PANEL - Normal    Sodium 137      Potassium 3.7      Carbon Dioxide (CO2) 24      Anion Gap 9      Urea Nitrogen 11.2      Creatinine 0.77      GFR Estimate >90      Calcium 8.9      Chloride 104      Glucose 88      Alkaline Phosphatase 59      AST 28      ALT 33      Protein Total 6.6      Albumin 3.8      Bilirubin Total 0.2     LITHIUM LEVEL - Normal    Lithium 1.07     D DIMER QUANTITATIVE - Normal    D-Dimer Quantitative 0.30     TROPONIN T, HIGH SENSITIVITY - Normal    Troponin T, High Sensitivity 7         Imaging   XR Chest 2 Views   Final Result   IMPRESSION: Underpenetrated images. Heart size within normal limits. Lungs are clear accounting for technique.          EKG   ECG results from 12/09/24   EKG 12-lead, tracing only     Value    Systolic Blood Pressure     Diastolic Blood Pressure     Ventricular Rate 70    Atrial Rate 70    CT Interval 178    QRS Duration 96        QTc 421    P Axis 42    R AXIS -8    T Axis 19    Interpretation ECG      Sinus rhythm  Cannot rule out Anterior infarct (cited on or before 01-Sep-2024)  Abnormal ECG  When compared with ECG of  01-Sep-2024 11:17,  No significant change was found           ED Course      Medications Administered   Medications   ARIPiprazole (ABILIFY) tablet 5 mg (has no administration in time range)   chlorproMAZINE (THORAZINE) tablet 50 mg (has no administration in time range)   hydrOXYzine HCl (ATARAX) tablet 50 mg (has no administration in time range)   lithium capsule 900 mg (has no administration in time range)   OLANZapine (zyPREXA) tablet 10 mg (10 mg Oral $Given 12/10/24 0009)   traZODone (DESYREL) tablet 300 mg (300 mg Oral $Given 12/10/24 0009)       Procedures   Procedures       ED Course   ED Course as of 12/10/24 0023   Mon Dec 09, 2024   2021 Pt having some chest pain and shortness of breath and near syncope sensation. Will get ECG labs and then determine imaging   I spoke with DEC team over epic chat. He is cleared by DEC but cannot get a hold of  staff tonight so will reassess in the AM        Medical Decision Making / Diagnosis       MDM   Kaden Easton  is a 24 year old male who presents to the ER for evaluation of increasing agitation and patient concern for auditory and visual hallucinations.  Vital signs reassuring.  On exam patient is awake and alert and calm.  He denies any medical complaints initially.  He does report some disturbing auditory hallucinations and visual hallucinations.  He just saw his psychiatrist a few days ago who made some slight medication changes.  Home meds were ordered.  Staff there tell me that they cannot take him back tonight due to safety concerns.  During my care in the ER, patient was calm and cooperative without any agitated behavior.  Later in ER course while awaiting DEC evaluation, he mentions some chest pain and shortness of breath.  This evaluation was pursued and was negative for acute emergency such as ACS, PE, pneumonia, pneumothorax, aortic dissection.  DEC evaluated patient and no indication for psychiatric inpatient stabilization was discovered.  At  signout to my colleague, plan for reassessment in the morning and hopeful discharge back to group home.    Disposition   Care of the patient was transferred to my colleague pending DEC reassessment in the AM/dispo planning.     Diagnosis     ICD-10-CM    1. Chest pain, unspecified type  R07.9       2. Shortness of breath  R06.02       3. Schizoaffective disorder, unspecified type (H)  F25.9       4. Hallucinations  R44.3       5. Behavior concern  R46.89            Discharge Medications   New Prescriptions    No medications on file        Swapnil Urias MD  12/10/24 0024

## 2024-12-10 NOTE — TELEPHONE ENCOUNTER
Reason for call:  Other   Patient called regarding (reason for call): returning call  Additional comments: Escobar,  is advising he is returning Yesenia Call.     Phone number to reach patient:  Home number on file [phone] (home)    Best Time:  asap    Can we leave a detailed message on this number?  YES    Travel screening: Not Applicable

## 2024-12-10 NOTE — DISCHARGE INSTRUCTIONS
Discharge Instructions  Chest Pain    You have been seen today for chest pain or discomfort.  At this time, your provider has found no signs that your chest pain is due to a serious or life-threatening condition, (or you have declined more testing and/or admission to the hospital). However, sometimes there is a serious problem that does not show up right away. Your evaluation today may not be complete and you may need further testing and evaluation.     Generally, every Emergency Department visit should have a follow-up clinic visit with either a primary or a specialty clinic/provider. Please follow-up as instructed by your emergency provider today.  Return to the Emergency Department if:  Your chest pain changes, gets worse, starts to happen more often, or comes with less activity.  You are newly short of breath.  You get very weak or tired.  You pass out or faint.  You have any new symptoms, like fever, cough, numb legs, or you cough up blood.  You have anything else that worries you.    Until you follow-up with your regular provider, please do the following:  Take one aspirin daily unless you have an allergy or are told not to by your provider.  If a stress test appointment has been made, go to the appointment.  If you have questions, contact your regular provider.  Follow-up with your regular provider/clinic as directed; this is very important.    If you were given a prescription for medicine here today, be sure to read all of the information (including the package insert) that comes with your prescription.  This will include important information about the medicine, its side effects, and any warnings that you need to know about.  The pharmacist who fills the prescription can provide more information and answer questions you may have about the medicine.  If you have questions or concerns that the pharmacist cannot address, please call or return to the Emergency Department.       Remember that you can always come  Pt arrived to the ED via private vehicle from home. Pt c/o abd pain. Pt staets he has a hx of pancreatitis and it feels the same way. Pt c/o feeling shaky that started this morning. Pt states he hasnt had a drink in about a day. Pt states he is used to drinking 5-8 24oz drinks that have 10% alcohol in them.  Pt states he has a hx of having a seizure before when going through withdrawal. Pain 6/10 back to the Emergency Department if you are not able to see your regular provider in the amount of time listed above, if you get any new symptoms, or if there is anything that worries you.  Discharge Instructions  Mental Health Concerns    You were seen today for mental health concerns, such as depression, anxiety, or suicidal thinking. Your provider feels that you do not require hospitalization at this time. However, your symptoms may become worse, and you may need to return to the Emergency Department. Most treatments of depression and suicidal thoughts are a process rather than a single intervention.  Medications and counseling can take several weeks or more to help.    Generally, every Emergency Department visit should have a follow-up clinic visit with either a primary or a specialty clinic/provider. Please follow-up as instructed by your emergency provider today.    By accepting these discharge instructions:  You promise to not harm yourself or others.  You agree that if you feel you are becoming unable to keep that promise, you will do something to help yourself before you do anything to harm yourself or others.   You agree to keep any safety plan arranged on your visit here today.  You agree to take any medication prescribed or recommended by your provider.  If you are getting worse, you can contact a friend or a family member, contact your counselor or family provider, contact a crisis line, or other options discussed with the provider or therapist today.  At any time, you can call 911 and return to the Emergency Department for more help.  You understand that follow-up is essential to your treatment, and you will make and keep appointments recommended on your visit today.    How to improve your mental health and prevent suicide:  Involve others by letting family, friends, counselors know.  Do not isolate yourself.  Avoid alcohol or drugs. Remove weapons, poisons from your home.  Try to stick to routines for  eating, sleeping and getting regular exercise.    Try to get into sunlight. Bright natural light not only treats seasonal affective disorder but also depression.  Increase safe activities that you enjoy.    If you feel worse, contact [phone] ([phone]), or call 911, or your primary provider/counselor for additional assistance.    If you were given a prescription for medicine here today, be sure to read all of the information (including the package insert) that comes with your prescription.  This will include important information about the medicine, its side effects, and any warnings that you need to know about.  The pharmacist who fills the prescription can provide more information and answer questions you may have about the medicine.  If you have questions or concerns that the pharmacist cannot address, please call or return to the Emergency Department.   Remember that you can always come back to the Emergency Department if you are not able to see your regular provider in the amount of time listed above, if you get any new symptoms, or if there is anything that worries you.

## 2024-12-10 NOTE — PROGRESS NOTES
[facility] Note:    Contact information for [facility] to coordinate discharge/return to group home: [phone]

## 2024-12-10 NOTE — PLAN OF CARE
Kaden Easton Jr.  December 10, 2024  Plan of Care Hand-off Note     Patient Care Path: observation    Plan for Care:   Pt currently lives in an assisted living facility with 24/7 staff. Pt has medication management scheduled, most recently saw on 12/5 with follow up in 2 weeks. Pt takes his medication as prescribed. He hears voices at baseline. Pt denies SI/HI. Pt would like to return to group home. Pt presents as calm and cooperative during assessment. Writer was unable to obtain collateral information from pts  staff regarding events leading to ED visit and to discuss pts return. Due to this, pt is recommended to observation overnight with plan to reassess in the AM. If pt continues to remain calm and cooperative, recommendation would be for pt to return to .  staff will need to be contacted to obtain collateral and discuss/collaborate his return.    Identified Goals and Safety Issues: decrease symptoms, engage in safety planning    Overview:   staff, [facility], [phone]            Legal Status: Legal Status at Admission: Voluntary/Patient has signed consent for treatment    Psychiatry Consult: not ordered at this time       Updated  and RN regarding plan of care.        Florecita Montenegro, LPCC, LADC

## 2024-12-11 ENCOUNTER — TELEPHONE (OUTPATIENT)
Dept: BEHAVIORAL HEALTH | Facility: CLINIC | Age: 24
End: 2024-12-11
Payer: COMMERCIAL

## 2024-12-11 NOTE — TELEPHONE ENCOUNTER
1) Provider responded and verified plan to stop atomoxetine (STRATTERA) 100 MG capsule.     2) Provider has now discontinued this order.     3) RN faxed copy of discontinuation order to [facility] RN, at Nurse Fax: [phone].     Roxana Del Rio RN on 12/11/2024 at 4:49 PM

## 2024-12-11 NOTE — TELEPHONE ENCOUNTER
Left message with staff for patient regarding follow up, left [facility] number if they would like additional assistance. No further follow-up is needed.

## 2024-12-13 ENCOUNTER — HOSPITAL ENCOUNTER (EMERGENCY)
Facility: CLINIC | Age: 24
Discharge: HOME | End: 2024-12-14
Attending: EMERGENCY MEDICINE | Admitting: EMERGENCY MEDICINE
Payer: COMMERCIAL

## 2024-12-13 DIAGNOSIS — F20.9 SCHIZOPHRENIA, UNSPECIFIED TYPE (H): ICD-10-CM

## 2024-12-13 PROCEDURE — 99283 EMERGENCY DEPT VISIT LOW MDM: CPT

## 2024-12-13 ASSESSMENT — ACTIVITIES OF DAILY LIVING (ADL)
ADLS_ACUITY_SCORE: 41

## 2024-12-13 ASSESSMENT — COLUMBIA-SUICIDE SEVERITY RATING SCALE - C-SSRS
2. HAVE YOU ACTUALLY HAD ANY THOUGHTS OF KILLING YOURSELF IN THE PAST MONTH?: NO
6. HAVE YOU EVER DONE ANYTHING, STARTED TO DO ANYTHING, OR PREPARED TO DO ANYTHING TO END YOUR LIFE?: NO

## 2024-12-14 ENCOUNTER — APPOINTMENT (OUTPATIENT)
Dept: GENERAL RADIOLOGY | Facility: CLINIC | Age: 24
End: 2024-12-14
Attending: EMERGENCY MEDICINE
Payer: COMMERCIAL

## 2024-12-14 VITALS
WEIGHT: 279 LBS | OXYGEN SATURATION: 97 % | RESPIRATION RATE: 14 BRPM | SYSTOLIC BLOOD PRESSURE: 134 MMHG | DIASTOLIC BLOOD PRESSURE: 82 MMHG | BODY MASS INDEX: 39.94 KG/M2 | TEMPERATURE: 98.7 F | HEIGHT: 70 IN | HEART RATE: 84 BPM

## 2024-12-14 PROBLEM — F32.A DEPRESSION, UNSPECIFIED DEPRESSION TYPE: Status: ACTIVE | Noted: 2024-12-14

## 2024-12-14 PROCEDURE — 73120 X-RAY EXAM OF HAND: CPT | Mod: 50,52

## 2024-12-14 ASSESSMENT — ACTIVITIES OF DAILY LIVING (ADL)
ADLS_ACUITY_SCORE: 41

## 2024-12-14 NOTE — ED PROVIDER NOTES
"  Emergency Department Note      History of Present Illness     Chief Complaint   Psychiatric Evaluation      HPI   Kaden Easton Jr. is a 24 year old male with a history of TBI, self-injurious behavior, aggressive behavior, intellectual disability, intermittent explosive disorder, schizoaffective disorder, and hallucinations who presents to the ED via EMS for a psychiatric evaluation. The patient reports that he is hearing voices at his group home, and it is getting worse. He was punching the walls and punching himself in the face because the voices told him to do so. This resulted in small abrasions to both his hands, pain in both his hands, and epistaxis. He adds that he has had sleep loss due to the voices. Patient states that he is still currently hearing voices, but feels that he can be safe in the ED. He notes that the voices followed him from his previous group home. Patient states that he is taking his medication as prescribed.    Independent Historian   None      Past Medical History     Medical History and Problem List   Cannabis abuse  Recurrent epistaxis  Traumatic brain injury  Mood disorder  Morbid obesity  Self-injurious behavior  Sialorrhea  Tobacco dependence  Vitamin D insufficiency  Aggressive behavior  ADHD  Chronic idiopathic thrombocytopenia  Fetal alcohol spectrum disorder  Intellectual disability   Intermittent explosive disorder  JAZMINE  Schizoaffective disorder  Psychosis  Hallucinations    Medications   Abilify  Thorazine  Atarax  Lithium  Zyprexa  Zofran  Invega sustenna  Trazodone HCl  Tessalon  Ferosul  Saxenda  Claritin  Glucophage  Nicoderm  Nicorette  Miralax  Victoza pen  Ascorbic acid   Cholecalciferol     Surgical History   No past surgical history on file.    Physical Exam     Patient Vitals for the past 24 hrs:   BP Temp Temp src Pulse Resp SpO2 Height Weight   12/13/24 2033 134/86 98.7  F (37.1  C) Temporal 86 14 98 % 1.778 m (5' 10\") 126.6 kg (279 lb)     Physical " Exam  General: Resting on the gurney, appears uncomfortable  Head:  The scalp, face, and head appear normal  Mouth/Throat: Mucus membranes are moist    Dried blood in the right nare  CV:  Regular rate    Normal S1 and S2  No pathological murmur   Resp:  Breath sounds clear and equal bilaterally    Non-labored, no retractions or accessory muscle use    No coarseness    No wheezing   GI:  Abdomen is soft, no rigidity    No tenderness to palpation  MS:  Normal motor assessment of all extremities.    Good capillary refill noted.    Abrasions to the knuckles of both hands  Skin:   No rash or lesions noted.  Neuro:   Speech is normal and fluent. No apparent deficit.  Psych: Awake. Alert.  Flat affect.  Reports hallucinations.  Does report to be responding to internal stimuli.  Command hallucinations instructing self-harm.  No thoughts of harming others.      Diagnostics     Lab Results   Labs Ordered and Resulted from Time of ED Arrival to Time of ED Departure - No data to display    Imaging   XR Hands Bilateral PA View   Final Result   IMPRESSION:       Limited single views of the bilateral hands. No evidence of acute fracture or dislocation. Joint spaces appear preserved. Soft tissues grossly unremarkable.          ED Course      Procedures   Procedures     Discussion of Management   Keri Garcia    ED Course   ED Course as of 12/14/24 0352   Sat Dec 14, 2024   0052 I obtained history and examined the patient as noted above.     0302 I spoke with Keri from [facility] (DEC) regarding the patient's presentation and plan of care.       Additional Documentation  None    Medical Decision Making / Diagnosis     GISSELL Easton JrAgustin is a 24 year old male who presents for psychiatric evaluation.  They have a history of previous psychiatric illness and at this point appear near baseline, but struggling psychiatrically. The patient has had increasing hallucinations and thoughts of self harm. The patient has had thoughts of  self harm and acted on these thoughts by punching the wall and punching himself.  The patient's group home staff tonight is comfortable with the plan for discharge and reports that they will be able to keep him safe. The patient was seen by DEC who agrees with this assessment.     Disposition   The patient was discharged.     Diagnosis     ICD-10-CM    1. Schizophrenia, unspecified type (H)  F20.9            Scribe Disclosure:  I, Kiesha Hopper, am serving as a scribe at 2:00 AM on 12/14/2024 to document services personally performed by Vera Wilson MD based on my observations and the provider's statements to me.        Vera Wilson MD  12/14/24 0702

## 2024-12-14 NOTE — ED NOTES
Patient has been awake and lying in the bed, calm and cooperative, vital signs table. Heading to group home via Ambulance at this time.
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
## 2024-12-14 NOTE — ED TRIAGE NOTES
Pt BIBA from . Pt had multiple outbursts at  today punching the walls and punching himself in the face. Pt endorses SI and voices in his head. Pt is on PD hold. Per EMS VSS and cooperative.

## 2024-12-15 ENCOUNTER — TELEPHONE (OUTPATIENT)
Dept: BEHAVIORAL HEALTH | Facility: CLINIC | Age: 24
End: 2024-12-15
Payer: COMMERCIAL

## 2024-12-23 ENCOUNTER — VIRTUAL VISIT (OUTPATIENT)
Dept: PSYCHIATRY | Facility: CLINIC | Age: 24
End: 2024-12-23
Payer: COMMERCIAL

## 2024-12-23 DIAGNOSIS — R46.89 AGGRESSIVE BEHAVIOR: ICD-10-CM

## 2024-12-23 DIAGNOSIS — F25.0 SCHIZOAFFECTIVE DISORDER, BIPOLAR TYPE (H): ICD-10-CM

## 2024-12-23 DIAGNOSIS — F70 MILD INTELLECTUAL DISABILITY: ICD-10-CM

## 2024-12-23 DIAGNOSIS — Z87.820 HISTORY OF TRAUMATIC BRAIN INJURY: ICD-10-CM

## 2024-12-23 DIAGNOSIS — F39 SEVERE MOOD DISORDER WITH PSYCHOTIC FEATURES (H): ICD-10-CM

## 2024-12-23 DIAGNOSIS — F63.81 INTERMITTENT EXPLOSIVE DISORDER: Primary | ICD-10-CM

## 2024-12-23 PROCEDURE — G2211 COMPLEX E/M VISIT ADD ON: HCPCS | Mod: 95 | Performed by: NURSE PRACTITIONER

## 2024-12-23 PROCEDURE — 99214 OFFICE O/P EST MOD 30 MIN: CPT | Mod: 95 | Performed by: NURSE PRACTITIONER

## 2024-12-23 ASSESSMENT — PAIN SCALES - GENERAL: PAINLEVEL_OUTOF10: EXTREME PAIN (8)

## 2024-12-23 NOTE — PROGRESS NOTES
"  The patient has been notified of following:      \"This virtual  visit will be conducted via a call between you and your physician/provider. We have found that certain health care needs can be provided without the need for a physical exam.  This service lets us provide the care you need virtually/via video   If a prescription is necessary we can send it directly to your pharmacy.  If lab work is needed we can place an order for that and you can then stop by our lab to have the test done at a later time.     Virtual/Video visits are billed at different rates depending on your insurance coverage.Some insurers they may be billed the same as an in-person visit.  Please reach out to your insurance provider with any questions.    Patient has given verbal consent for virtual  visit : Yes      Ho w would you like to obtain your AVS? Mail a copy  If the video visit is dropped, the invitation should be resent by: Send to e-mail at: [email or web address]  Will anyone else be joining your video visit? No            Psychiatric  Out- Patient  Follow Up Progress Note  Date of visit:12/23/2024           Discussion of Care and Treatment Recommendations:   This is a 24 year old male with history of TBI, self-injurious behavior, aggressive behavior,  fetal alcohol  spectrum, intellectual disability, intermittent explosive disorder, schizoaffective disorder, and hallucination .    Patient resides in a group home.  Patient is seen today together with group home staff    Last visit 12/05/2024 for previous ER presentation for aggression so I really do not think.  Recommendation at last visit .  Continue current medications  Abilify  5 mg in the morning  Invega 234 mg /IM monthly -given by group home   Thorazine 50 mg BID - agitation PRN    Trazodone 300 mg at bedtime   Lithium 900 mg very evening with meals   Olanzapine 10 mg daily -  Hydroxyzine 50 mg TID PRN-    Stop Statreta 100 mg in am  Stop Quifacine ER   3 mg at bedtime   3. " RTC:  4 week  4.  Call 911 or go to the ER if feeling unsafe.  Crisis numbers also provided  Patient and I reviewed diagnosis and treatment plan and patient agrees with following recommendations:  Ongoing education given regarding diagnostic and treatment options with adequate verbalization of understanding.  Plan   Continue current medications  Abilify  5 mg in the morning  Invega 234 mg /IM monthly -given by group home   Thorazine 50 mg BID - agitation PRN    Trazodone 300 mg at bedtime   Lithium 900 mg very evening with meals   Olanzapine 10 mg daily -  Hydroxyzine 50 mg TID PRN-     3. RTC: 2- 4 week in person visit  4.  Call 911 or go to the ER if feeling unsafe.  Crisis numbers also provided         DIagnoses:   Intermittent explosive disorder  Fetal alcohol spectrum disorder  History of traumatic brain injury  Tobacco use disorder  Mild intellectual disability  ADHD (attention deficit hyperactivity disorder), combined type  Aggressive behavior           Patient Active Problem List   Diagnosis    Thrombocytopenia (H)    Chronic ITP (idiopathic thrombocytopenia) (H)    Attention deficit disorder    Intermittent explosive disorder    Anxiety disorder, unspecified    Schizoaffective disorder, unspecified (H)    Intermittent explosive disorder    Schizoaffective disorder, bipolar type (H)    Fetal alcohol syndrome    Intellectual disability    TBI (traumatic brain injury) (H)    Hallucinations    Aggressive behavior    Depression, unspecified depression type             Chief Complaint / Subjective:    Chief complaint: Aggressive behaviors    History of Present Illness:   Patient continues to have psychosis with aggressive behaviors including punching walls and having self-inflicted fist lacerations from punching the walls.  He did presents to the ER recently and has had multiple ER visits in the past 2 months.  Patient has a chronic and complex psychiatric history including aggressive behaviors which are  "complicated by history of fetal alcohol syndrome and TBI.  Patient is currently on multiple neuroleptic medications and also on lithium.  Patient is seen today for follow-up appointment.  He only lasted less than 3 minutes and walked away from this appointment.  I did request that they offer him PRNs Thorazine since he was already starting to get anxious.  We discussed with staff the patient is better served with an in person visit.  I also encouraged staff to offer as needed Thorazine if they noticed patient having any symptoms of restlessness or agitation before he starts getting aggressive.  Patient also has as needed hydroxyzine ordered.  It appears as if they have not been utilizing as needed per staff report.    I did recommend that if patient continues to be aggressive and a danger to self and others and not responding to medication that they should send him to the ER.    Mental Status Examination:   Appearance: Well groomed, good eye contact   Orientation: Patient alert and oriented to person, place, time, and situation  Reliability:  Patient appears to be an adequate historian.    Behavior: cooperative   Speech: Speech is spontaneous and coherent, with a normal rate, rhythm and tone.    Language:There are no difficulties with expressive or receptive language as observed throughout the interview.    Mood: Described as \"ok\".    Affect: congruent   Judgement: Able to make basic decision regarding safety.  Insight: Good awareness of physical and mental health conditions and aware of needs around care for these.  Gait and station: unable to assess  Thought process: Logical   Thought content: No evidence of delusions or paranoia.    Hallucinations : No evidence of any hallucination  Thought content: No evidence of delusions or paranoia.   Suicidal /Homical Ideations:  No thoughts of self harm or suicide. No thoughts of harming others.  Associations: Connected  Fund of knowledge: Average  Attention / " Concentration: Able to remain focused during the interview with minimal distractibility or need for redirection.  Short Term Memory: Grossly intact as evidence by client recalling themes and ideas discussed.  Long Term Memory: Intact  Motor Status: unable to asse    Drug/treatment history and current pattern of use:   History of cannabis use    Medication changes: See Above   Medication adherence: compliant  Medication side effects: absent  Information about medications: Side effects, benefits and alternative treatments discussed and patient agrees .    Psychotherapy: Supportive therapy day-to-day living    Education: Diet, exercise, abstinence from drugs and alcohol, patient will not drive if sedated and medications or  under influence of any substance    Lab Results:   Personally reviewed and discussed with the patient    Lab Results   Component Value Date    WBC 11.0 12/09/2024    HGB 12.6 (L) 12/09/2024    HCT 37.2 (L) 12/09/2024     12/09/2024    CHOL 150 12/15/2023    TRIG 75 12/15/2023    HDL 34 (L) 12/15/2023    ALT 33 12/09/2024    AST 28 12/09/2024     12/09/2024    BUN 11.2 12/09/2024    CO2 24 12/09/2024    TSH 3.61 06/30/2022       Vital signs:  There were no vitals taken for this visit.  Telemedicine visit-no vital signs completed  Allergies: Depakote [valproic acid], Depakote [valproic acid], and Other environmental allergy         Medications:       No current facility-administered medications for this visit.     No current facility-administered medications for this visit.         Medication adherence: Reviewed risk/benefits of medication , Patient able to verbalize understanding of side effects and Patient verbally consents to taking medications      PSYCHOEDUCATION:  Medication side effects and alternatives reviewed. Health promotion activities recommended and reviewed today. All questions addressed. Education and counseling completed regarding risks and benefits of medications and  psychotherapy options.  Consent provided by patient/guardian  Call the psychiatric nurse line with medication questions or concerns at [phone].  MyChart may be used to communicate with your provider, but this is not intended to be used for emergencies.  SEROTONIN SYNDROME:  Discussed risks of Serotonin syndrome (ie, serotonin toxicity) which is a potentially life-threatening condition associated with increased serotonergic activity in the central nervous system (CNS). It is seen with therapeutic medication use, inadvertent interactions between drugs, and intentional self-poisoning. Serotonin syndrome may involve a spectrum of clinical findings, which often include mental status changes, autonomic hyperactivity, and neuromuscular abnormalities.    STIMULANT THERAPY: Side effects discussed including but not limited to cardiac (including HTN, tachycardia, sudden death), motor/tic, appetite/growth, mood lability and sleep disruption. This is a controlled substance with risk for abuse, need to keep in a safe keep place and cannot replace lost scripts  HARM REDUCTION:  Discussions regarding effects of mood altering substances, alcohol and cannabis, on mood and that approach is harm reduction, will continue to prescribe meds as they work to cut back use.    SAFETY:  We all care about your loved one's safety. To reduce the risk of self-harm, remove access to all:  Firearms, Medicines (both prescribed and over-the-counter), Knives and other sharp objects, Ropes and like materials, and Alcohol  SLEEP HYGIENE: establish a sleep routine, limit screen time 1 hour prior to bed, use bed for sleep only, take sleep/medications on time (including sleepy time tea, trazadone or herbal treatments such as melatonin), aroma therapy, limit caffeine/sugar, yoga, guided imagery, stretch, meditation, limit naps to 20 minutes, make a temperature change in the room, white noise, be mindful of slowing down breathing, take a warm  bath/shower, frequently wash sheets, and journaling.   Medlineplus.gov is information for patients.  It is run by the National Library of Medicine and it contains information about all disorders, diseases and all medications.              Review of Systems:      ROS:    Subjective Data Only- Tele-Health Visit    10 point ROS was negative except for the items listed in HPI.      Crisis Resources:    Present to the Emergency Department as needed or call after hours crisis line at [phone] or [phone].   [place] Crisis Text Line: Text [place] to 471868.  Suicide LifeLine Chat: suicideSafeBoot.org/chat/.  National Suicide Prevention Lifeline: 327.357.1149 (TTY: 976.531.8760). Call anytime for help.  (www.suicidepreventionFreezing Pointline.org)  National [place] on Mental Illness (www.milvia.org): 976.477.1151 or 931-007-2270.  Mental Health Association (www.mentalhealth.org): 881.202.1043 or 273-928-1996.      Coordination of Care:   More than 50% of time spent on coordination of care including: Educating patient about diagnosis, prognosis, side effects and benefits of medications, diet, exercise.  Time also spent providing supportive therapy regarding above issues.    Video-Visit Details    Type of service:  Video Visit    Originating Location (pt. Location): Home    Distant Location (provider location): Providers Remote Office     Platform used for Video Visit: Katie      Disclaimer: This note consists of symbols derived from keyboarding, dictation and/or voice recognition software. As a result, there may be errors in the script that have gone undetected. Please consider this when interpreting information found in this chart.    Start Time : 1530  End time : 1540

## 2024-12-23 NOTE — PATIENT INSTRUCTIONS
"[facility]  What to Expect  Here's what to expect in the [facility] Program.   About the program  You'll be meeting with a psychiatric doctor to check your mental health. A psychiatric doctor helps you deal with troubling thoughts and feelings by giving you medicine. They'll make sure you know the plan for your care. You may see them for a long time. When you're feeling better, they may refer you back to seeing your family doctor.   If you have any questions, we'll be glad to talk to you.  About visits  Be open  At your visits, please talk openly about your problems. It may feel hard, but it's the best way for us to help you.  Cancelling visits  If you can't come to your visit, please call us right away at [phone]. If you don't cancel at least 24 hours (1 full day) before your visit, that's \"late cancellation.\"  Not showing up for your visits  Being very late is the same as not showing up. You'll be a \"no show\" if:  You're more than 15 minutes late for a 30-minute (half hour) visit.  You're more than 30 minutes late for a 60-minute (full hour) visit.  If you cancel late or don't show up 2 times within 6 months, we may end your care.  Getting help between visits  If you need help between visits, you can call us Monday to Friday from 8 a.m. to 4:30 p.m. at [phone].  Emergency care  Call 911 or go to the nearest emergency department if your life or someone else's life is in danger.  Call 988 anytime to reach the national Suicide and Crisis hotline.  Medicine refills  To refill your medicine, call your pharmacy. You can also call [facility]'s [facility] at [phone], Monday to Friday, 8 a.m. to 4:30 p.m. It can take 1 to 3 business days to get a refill.   Forms, letters, and tests  You may have papers to fill out, like FMLA, short-term disability, and workability. We can help you with these forms at your visits, but you must have an appointment. You may need more " than 1 visit for this, to be in an intensive therapy program, or both.  Before we can give you medicine for ADHD, we may refer you to get tested for it or confirm it another way.  We may not be able to give you an emotional support animal letter.  We don't do mental health checks ordered by the court.   We don't do mental health testing, but we can refer you to get tested.   Thank you for choosing us for your care.  For informational purposes only. Not to replace the advice of your health care provider. Copyright   2022 [facility]. All rights reserved. [employer] [id number] - 12/22      After Visit Summary   Continue medications as prescribed  Have your pharmacy contact us for a refill if you are running low on medications (We may ask you to come into clinic to get a refill from the nurse  No Alcohol or drug use  No driving if sedated  Call the clinic with any questions or concerns   Reach out for help if you feel like hurting yourself or others ([facility] [phone]: [address], [address] or [facility] Suicide Hotline   [phone] , call 911 or go to nearest Emergency room     Crisis Resources:    Present to the Emergency Department as needed or call after hours crisis line at [phone] or [phone].   [place] Crisis Text Line: Text [place] to 482966.  Suicide LifeLine Chat: suicidepreventionlifeline.org/chat/.  National Suicide Prevention Lifeline: 930.333.2294 (TTY: 147.333.6494). Call anytime for help.  (www.suicidepreventionlifeline.org)  National [place] on Mental Illness (www.milvia.org): 106.264.5805 or 765-489-3578.  Mental Health Association (www.mentalhealth.org): 605.427.9033 or 597-480-1212.       Follow up as directed, for your appointments, per your After Visit Summary Form.

## 2024-12-23 NOTE — NURSING NOTE
Current patient location: [address] [place] [id number]    Is the patient currently in the state of [place]? YES    Visit mode:VIDEO    If the visit is dropped, the patient can be reconnected by:VIDEO VISIT: Text to cell phone:   Telephone Information:   Mobile [phone]   Mobile Not on file.       Will anyone else be joining the visit? NO  (If patient encounters technical issues they should call [phone])    Are changes needed to the allergy or medication list? No    Are refills needed on medications prescribed by this physician? Discuss with provider    Rooming Documentation:  Unable to complete questionnaire(s) due to time    Reason for visit: RECHECK    Agustin MONTAGUEF

## 2024-12-29 ENCOUNTER — HOSPITAL ENCOUNTER (EMERGENCY)
Facility: CLINIC | Age: 24
Discharge: NURSING, REHAB OR LONG-TERM CARE | End: 2024-12-29
Attending: EMERGENCY MEDICINE | Admitting: EMERGENCY MEDICINE
Payer: COMMERCIAL

## 2024-12-29 VITALS
TEMPERATURE: 98.5 F | RESPIRATION RATE: 20 BRPM | OXYGEN SATURATION: 99 % | SYSTOLIC BLOOD PRESSURE: 159 MMHG | HEART RATE: 82 BPM | DIASTOLIC BLOOD PRESSURE: 92 MMHG

## 2024-12-29 DIAGNOSIS — R05.1 ACUTE COUGH: ICD-10-CM

## 2024-12-29 DIAGNOSIS — F99 CHRONIC MENTAL ILLNESS: ICD-10-CM

## 2024-12-29 LAB
FLUAV RNA SPEC QL NAA+PROBE: NEGATIVE
FLUBV RNA RESP QL NAA+PROBE: NEGATIVE
RSV RNA SPEC NAA+PROBE: NEGATIVE
SARS-COV-2 RNA RESP QL NAA+PROBE: NEGATIVE

## 2024-12-29 PROCEDURE — 250N000013 HC RX MED GY IP 250 OP 250 PS 637: Performed by: EMERGENCY MEDICINE

## 2024-12-29 PROCEDURE — 87637 SARSCOV2&INF A&B&RSV AMP PRB: CPT | Performed by: EMERGENCY MEDICINE

## 2024-12-29 PROCEDURE — 99283 EMERGENCY DEPT VISIT LOW MDM: CPT

## 2024-12-29 RX ORDER — ARIPIPRAZOLE 5 MG/1
5 TABLET ORAL ONCE
Status: COMPLETED | OUTPATIENT
Start: 2024-12-29 | End: 2024-12-29

## 2024-12-29 RX ORDER — LITHIUM CARBONATE 450 MG
900 TABLET, EXTENDED RELEASE ORAL ONCE
Status: DISCONTINUED | OUTPATIENT
Start: 2024-12-29 | End: 2024-12-29

## 2024-12-29 RX ORDER — LITHIUM CARBONATE 300 MG/1
900 CAPSULE ORAL ONCE
Status: COMPLETED | OUTPATIENT
Start: 2024-12-29 | End: 2024-12-29

## 2024-12-29 RX ORDER — OLANZAPINE 10 MG/1
10 TABLET, ORALLY DISINTEGRATING ORAL ONCE
Status: COMPLETED | OUTPATIENT
Start: 2024-12-29 | End: 2024-12-29

## 2024-12-29 RX ADMIN — LITHIUM CARBONATE 900 MG: 300 CAPSULE, GELATIN COATED ORAL at 18:59

## 2024-12-29 RX ADMIN — ARIPIPRAZOLE 5 MG: 5 TABLET ORAL at 18:50

## 2024-12-29 RX ADMIN — OLANZAPINE 10 MG: 10 TABLET, ORALLY DISINTEGRATING ORAL at 18:50

## 2024-12-29 ASSESSMENT — ACTIVITIES OF DAILY LIVING (ADL)
ADLS_ACUITY_SCORE: 41

## 2024-12-29 NOTE — ED TRIAGE NOTES
Patient presents from group home with staff member, patient is reported to have more anxiety and has not been taken meds lately. Patient denies SI at this time, staff states patient has more aggressive and not cooperative lately. Patient is reported to have fever and cough with CP today as well.

## 2024-12-30 ENCOUNTER — TELEPHONE (OUTPATIENT)
Dept: BEHAVIORAL HEALTH | Facility: CLINIC | Age: 24
End: 2024-12-30
Payer: COMMERCIAL
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
## 2024-12-30 NOTE — ED PROVIDER NOTES
Emergency Department Note      History of Present Illness     Chief Complaint   Fever, Cough, and Anxiety    HPI   Kaden Easton Jr. is a 24 year old male with fetal alcohol spectrum disorder, intermittent explosive disorder, JAZMINE, and schizoaffective disorder who presents with staff from his group home for fever, cough, and anxiety.  Staff noticed mild cough earlier in the day with normal temperature.  They became concerned when his temperature elevated to maximum of 99.9  F this afternoon.  He denies any other symptoms.  He was offered Tylenol at his group home and declined.  He took a walk outside but when he came back he was vaping inside and becoming increasingly anxious and aggressive.  He was hitting himself and has been refusing his medications for the last 3 nights.  This combination of symptoms prompted his emergency department visit this evening.  Upon arrival he tells me he is willing to take his medications now but he has recently been refusing them because of issues with his family who he believes does not like him.    Independent Historian   As detailed as above.     Review of External Notes   Third ED visit this month for mental health and chest pain/shortness of breath. 12/23/24 was seen by psychiatry.     Past Medical History     Medical History and Problem List   Cannabis abuse  Recurrent epistaxis  Traumatic brain injury  Mood disorder  Morbid obesity  Self-injurious behavior  Sialorrhea  Tobacco dependence  Vitamin D insufficiency  Aggressive behavior  ADHD  Chronic idiopathic thrombocytopenia  Fetal alcohol spectrum disorder  Intellectual disability   Intermittent explosive disorder  JAZMINE  Schizoaffective disorder  Psychosis  Hallucinations    Medications   Abilify  Thorazine  Atarax  Lithium  Zyprexa  Zofran  Invega sustenna  Trazodone HCl  Tessalon  Ferosul  Saxenda  Claritin  Glucophage  Nicoderm  Nicorette  Miralax  Victoza pen  Ascorbic acid   Cholecalciferol      Physical Exam      Patient Vitals for the past 24 hrs:   BP Temp Temp src Pulse Resp SpO2   12/29/24 2118 -- -- -- -- -- 99 %   12/29/24 2117 (!) 159/92 -- -- 82 -- --   12/29/24 1747 133/76 98.5  F (36.9  C) Temporal 87 20 98 %     Physical Exam  General: Well-developed and well-nourished. Well appearing young -American man. Cooperative.  Head:  Atraumatic.  Eyes:  Conjunctivae, lids, and sclerae are normal.  ENT:    Normal nose. Moist mucous membranes.  Neck:  Supple. Normal range of motion.  CV:  Regular rate and rhythm. Normal heart sounds with no murmurs, rubs, or gallops detected.  Resp:  No respiratory distress. Clear to auscultation bilaterally without decreased breath sounds, wheezing, rales, or rhonchi.  GI:  Soft. Non-distended. Non-tender.    MS:  Normal ROM. No bilateral lower extremity edema.  Skin:  Warm. Non-diaphoretic. No pallor.  Tiny abrasion to the midline forehead at the hairline.  Neuro:  Awake. A&Ox3. Normal strength.  Psych: Anxious mood and flat affect.  Soft speech, mumbles.  Not responding to internal stimuli.  Vitals reviewed.     Diagnostics     Lab Results   Labs Ordered and Resulted from Time of ED Arrival to Time of ED Departure   INFLUENZA A/B, RSV AND SARS-COV2 PCR - Normal       Result Value    Influenza A PCR Negative      Influenza B PCR Negative      RSV PCR Negative      SARS CoV2 PCR Negative       ED Course      Medications Administered   Medications   ARIPiprazole (ABILIFY) tablet 5 mg (5 mg Oral $Given 12/29/24 1850)   OLANZapine zydis (zyPREXA) ODT tab 10 mg (10 mg Oral $Given 12/29/24 1850)   lithium capsule 900 mg (900 mg Oral $Given 12/29/24 1859)     Discussion of Management   Armando HSI[facility]    ED Course   ED Course as of 12/29/24 2209   Sun Dec 29, 2024   1811 I obtained the history and examined the patient as noted above.      2015 I spoke with with Armando from [facility] regarding the patient's presentation and plan of care.      2055 I rechecked and updated the patient         Additional Documentation  Social Determinants of Health: Social Connections/Isolation: lives in a group home, reports issues with family    Medical Decision Making / Diagnosis   GISSELL Alfonso is a 24 year old man with fetal alcohol spectrum disorder, intermittent explosive disorder, JAZMINE, and schizoaffective disorder whose group home was concerned as he seemed anxious and was refusing his medications.  He was aggressive and hitting himself.  They also are quite concerned because he had a mild cough starting earlier in the day with a Tmax of 99.9  F.  He was well-appearing on exam and afebrile here.  He does not cough during interview.  His lungs are clear.  He is not suicidal.    Influenza/RSV/COVID-19 testing is negative.  There is no role for serum studies or imaging given the mild degree of his symptoms and lack of true fever.  Further, he is calm and cooperative here.  He took his home medications without issue including Abilify, Zyprexa, and lithium.  He was seen by [facility] who feels he is appropriate for discharge back to his care facility. I agree with this reasonable plan. I encouraged him to take all of his medications as prescribed by his doctor and work with his outpatient care team.  He should return with worsening symptoms or new concerns.    Disposition   The patient was discharged.     Diagnosis     ICD-10-CM    1. Acute cough  R05.1       2. Chronic mental illness  F99            Discharge Medications   Discharge Medication List as of 12/29/2024  9:11 PM        Scribe Disclosure:  Renetta VICTOR, am serving as a scribe at 6:32 PM on 12/29/2024 to document services personally performed by Wendy Ohara MD based on my observations and the provider's statements to me.     Scribe Disclosure:  Marielle VICTOR, am serving as the scribe  for Renetta Green, the scribe trainee, at 6:22 PM on 12/29/2024 to document services personally performed by Wendy Ohara MD based on our observations  and the provider's statements to us.         Wendy Ohara MD  01/01/25 1555

## 2024-12-30 NOTE — ED NOTES
Group Home contacted about pt discharge, updated about hospitalization and aware we are coordinating a taxi back to the

## 2024-12-30 NOTE — ED PROVIDER NOTES
"  Emergency Department Note      History of Present Illness     Chief Complaint   Fever, Cough, and Anxiety      HPI   Kaden Easton Jr. is a 24 year old male ***    Independent Historian   {KLAUDIAA Independent Historian:856126::\"None\"}    Review of External Notes   ***    Past Medical History     Medical History and Problem List   Past Medical History:   Diagnosis Date    ADD (attention deficit disorder)     ADHD (attention deficit hyperactivity disorder)     ADHD (attention deficit hyperactivity disorder)     Anxiety     Current smoker     Fetal alcohol syndrome     Forehead abrasion     History of transfusion     Obese     Obesity     Self-injurious behavior 08/17/2020    TBI (traumatic brain injury) (H) 10/05/2024       Medications   ARIPiprazole (ABILIFY) 5 MG tablet  atropine 1 % ophthalmic solution  benzonatate (TESSALON) 100 MG capsule  chlorproMAZINE (THORAZINE) 50 MG tablet  cholecalciferol 25 MCG (1000 UT) TABS  ferrous sulfate (FE TABS) 325 (65 Fe) MG EC tablet  ferrous sulfate (FEROSUL) 325 (65 Fe) MG tablet  hydrOXYzine HCl (ATARAX) 50 MG tablet  liraglutide - Weight Management (SAXENDA) 18 MG/3ML pen  lithium 300 MG capsule  lithium 300 MG capsule  loratadine (CLARITIN) 10 MG tablet  metFORMIN (GLUCOPHAGE-XR) 500 MG 24 hr tablet  nicotine (NICODERM CQ) 14 MG/24HR 24 hr patch  nicotine (NICORETTE) 4 MG lozenge  OLANZapine (ZYPREXA) 10 MG tablet  ondansetron (ZOFRAN ODT) 4 MG ODT tab  paliperidone (INVEGA SUSTENNA) 234 MG/1.5ML JUANA  paliperidone (INVEGA SUSTENNA) 234 MG/1.5ML JUANA  polyethylene glycol (MIRALAX) 17 g packet  traZODone HCl 300 MG TABS  traZODone HCl 300 MG TABS  VICTOZA PEN 18 MG/3ML soln  vitamin C (ASCORBIC ACID) 250 MG TABS tablet        Surgical History   No past surgical history on file.    Physical Exam     Patient Vitals for the past 24 hrs:   BP Temp Temp src Pulse Resp SpO2   12/29/24 1747 133/76 98.5  F (36.9  C) Temporal 87 20 98 %     Physical Exam  ***    Diagnostics     Lab " "Results   Labs Ordered and Resulted from Time of ED Arrival to Time of ED Departure - No data to display    Imaging   No orders to display       EKG   ECG taken at ***, ECG read at ***  ***   *** as compared to prior, dated ***/***/***.  Rate *** bpm. SD interval *** ms. QRS duration *** ms. QT/QTc ***/*** ms. P-R-T axes *** *** ***.    Independent Interpretation   {IndependentReview:597394::\"None\"}    ED Course      Medications Administered   Medications - No data to display    Procedures   Procedures     Discussion of Management   {Consults/Care Discussions:021578::\"None\"}    ED Course        Additional Documentation  {EPPAAdditionalPhrase:560718::\"None\"}    Medical Decision Making / Diagnosis     CMS Diagnoses: {Sepsis/Septic Shock/Stemi/Stroke:118351::\"None\"}    MIPS       {EPPA MIPS:013785::\"None\"}    GISSELL CANALES Rustam Earl is a 24 year old male ***    Disposition   {EPPAFV Dispo:114789}    Diagnosis   No diagnosis found.     Discharge Medications   New Prescriptions    No medications on file         {Provider or scribe signature:646327}    "

## 2024-12-30 NOTE — TELEPHONE ENCOUNTER
LVM for patient regarding follow up, left [facility] number if they would like additional assistance. No further follow-up is needed

## 2024-12-30 NOTE — ED NOTES
Patient has not been taking his medications for a couple of nights, he is refusing Olanzapine and other medications because his family never came to see him during [place], he believes his family does not like him because he is on medications.  He is been hitting him self, he has been more aggressive.  This morning he reported fever and cough, he is  here with staff from [facility].

## 2024-12-30 NOTE — DISCHARGE INSTRUCTIONS
Take all medications as prescribed by your doctor and continue to work with your outpatient care team.  Cover your mouth with cough and wash hands frequently.  Return with worsening symptoms new concerns.      Aftercare Plan    Follow up with established providers and supports as scheduled. Continue taking medications as prescribed. Abstain from drugs and alcohol. Utilize your [facility] mental health crisis team as needed. They are available 24/7. Contact information is listed below.       If I am feeling unsafe or I am in a crisis, I will:   Contact my established care providers   Call the [place] Suicide Prevention Lifeline: [phone]   Go to the nearest emergency room   Call [phone]     Warning signs that I or other people might notice when a crisis is developing for me: changes to sleep, appetite or mood, increased anger, agitation or irritability, feeling depressed or hopeless, spending more time alone or talking less, increased crying, decreased productivity, seeing or hearing things that aren't there, thoughts of not wanting to live anymore or of actually killing myself, thoughts of hurting others    Things I am able to do on my own to cope or help me feel better: watching a favorite tv show or movie, listening to music I enjoy, going outside and breathing fresh air, going for a walk or exercising, taking a shower or bath, a cold or hot beverage, a healthy snack, drawing/coloring/painting, journaling, singing or dancing, deep breathing     I can try practicing square breathing when I begin to feel anxious - inhale through the nose for the count of 4 and the first line on the square. Exhale through the mouth for the count of 4 for the second line of the square. Repeat to complete the square. Repeat the square as many times as needed.    I can also use my five senses to practice mindfulness and grounding. What are five things I can see, four things I can hear, three things I can feel, two things I can smell, and  one thing I can taste.     Things that I am able to do with others to cope or help me feel better: sometimes just talking or spending time with someone else, sharing a meal or having coffee, watching a movie or playing a game, going for a walk or exercising    I can also use community resources including mental health hotlines, [facility] crisis teams, or apps.     Things I can use or do for distraction: movies/tv, music, reading, games, drawing/coloring/painting or other art, essential oils, exercise, cleaning/organizing, puzzles, crossword puzzles, word search, Sudoku       I can also download a meditation or relaxation christina, like Calm, Headspace, or Insight Timer (all three offer a free version)    Changes I can make to support my mental health and wellness: Attend scheduled mental health therapy and psychiatric appointments. Take my medications as prescribed. Maintain a daily schedule/routine. Abstain from all mood altering substances, including drugs, alcohol, or medications not currently prescribed to me. Implement a self-care routine.      People in my life that I can ask for help: friends or family, trusted teachers/staff/colleagues, trusted members of my community or place of Mosque, mental health crisis lines, or 911    Your [facility] has a mental health crisis team you can call 24/7: [facility] Adult, [phone]    Other things that are important when I m in crisis: to remember that the feelings I am having right now are temporary, and it won't feel like this forever, and that it is okay and important to ask for help    Crisis Lines  Crisis Text Line  Text 786885  You will be connected with a trained live crisis counselor to provide support.    Por espanol, texto  CYNTHIA a 014968 o texto a 442-AYUDAME en WhatsApp    [facility] Line  1.800.SUICIDE [0080949]      Community Resources  Fast Tracker  Linking people to mental health and substance use disorder resources  Real Time Translationn.SCHEDit     [place]  "Mental Health Warm Line  Peer to peer support  Monday thru Saturday, 12 pm to 10 pm  484.290.1598 or 3.639.008.9720  Text \"Support\" to 10286    National [employer] on Mental Illness (STEPHANIE)  853.777.1200 or 1.888.STEPHANIE.HELPS      Mental Health Apps  My3  https://Domainex.org/    VirtualHopeBox  https://Think Passenger/apps/virtual-hope-box/      Additional Information  Today you were seen by a licensed mental health professional through Triage and Transition services, [facility] ([facility])  for a crisis assessment in the Emergency Department at [facility].  It is recommended that you follow up with your established providers (psychiatrist, mental health therapist, and/or primary care doctor - as relevant) as soon as possible. Coordinators from [facility] will be calling you in the next 24-48 hours to ensure that you have the resources you need.  You can also contact [facility] coordinators directly at [phone]. You may have been scheduled for or offered an appointment with a mental health provider. [facility] maintains an extensive network of licensed behavioral health providers to connect patients with the services they need.  We do not charge providers a fee to participate in our referral network.  We match patients with providers based on a patient's specific needs, insurance coverage, and location.  Our first effort will be to refer you to a provider within your care system, and will utilize providers outside your care system as needed.        "
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
## 2024-12-30 NOTE — PROGRESS NOTES
Patient declined to be changed into scrubs, he was searched and the rest of his belongings are in the locker.

## 2024-12-31 DIAGNOSIS — F63.81 INTERMITTENT EXPLOSIVE DISORDER: ICD-10-CM

## 2024-12-31 DIAGNOSIS — F25.0 SCHIZOAFFECTIVE DISORDER, BIPOLAR TYPE (H): ICD-10-CM
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
## 2025-01-01 ENCOUNTER — APPOINTMENT (OUTPATIENT)
Dept: GENERAL RADIOLOGY | Facility: CLINIC | Age: 25
End: 2025-01-01
Payer: COMMERCIAL

## 2025-01-01 ENCOUNTER — HOSPITAL ENCOUNTER (EMERGENCY)
Facility: CLINIC | Age: 25
Discharge: HOME | End: 2025-01-02
Attending: EMERGENCY MEDICINE
Payer: COMMERCIAL

## 2025-01-01 ENCOUNTER — MEDICAL CORRESPONDENCE (OUTPATIENT)
Dept: HEALTH INFORMATION MANAGEMENT | Facility: CLINIC | Age: 25
End: 2025-01-01

## 2025-01-01 DIAGNOSIS — R46.89 AGGRESSIVE BEHAVIOR: ICD-10-CM

## 2025-01-01 DIAGNOSIS — M79.641 PAIN OF RIGHT HAND: ICD-10-CM

## 2025-01-01 DIAGNOSIS — M25.532 LEFT WRIST PAIN: ICD-10-CM

## 2025-01-01 PROBLEM — F63.81 INTERMITTENT EXPLOSIVE DISORDER: Status: RESOLVED | Noted: 2023-12-07 | Resolved: 2025-01-01

## 2025-01-01 PROBLEM — F25.9 SCHIZOAFFECTIVE DISORDER, UNSPECIFIED (H): Status: ACTIVE | Noted: 2023-12-06

## 2025-01-01 PROBLEM — F63.81 INTERMITTENT EXPLOSIVE DISORDER: Status: ACTIVE | Noted: 2023-12-06

## 2025-01-01 PROBLEM — Q86.0 FETAL ALCOHOL SYNDROME: Status: ACTIVE | Noted: 2023-12-07

## 2025-01-01 PROBLEM — F79 INTELLECTUAL DISABILITY: Status: ACTIVE | Noted: 2023-12-07

## 2025-01-01 LAB — LITHIUM SERPL-SCNC: 0.7 MMOL/L (ref 0.6–1.2)

## 2025-01-01 PROCEDURE — 73130 X-RAY EXAM OF HAND: CPT | Mod: RT

## 2025-01-01 PROCEDURE — 36415 COLL VENOUS BLD VENIPUNCTURE: CPT

## 2025-01-01 PROCEDURE — 73110 X-RAY EXAM OF WRIST: CPT | Mod: LT

## 2025-01-01 PROCEDURE — 80178 ASSAY OF LITHIUM: CPT

## 2025-01-01 PROCEDURE — 99285 EMERGENCY DEPT VISIT HI MDM: CPT

## 2025-01-01 PROCEDURE — 250N000013 HC RX MED GY IP 250 OP 250 PS 637

## 2025-01-01 RX ORDER — ARIPIPRAZOLE 5 MG/1
5 TABLET ORAL EVERY MORNING
Status: DISCONTINUED | OUTPATIENT
Start: 2025-01-02 | End: 2025-01-02 | Stop reason: HOSPADM

## 2025-01-01 RX ORDER — TRAZODONE HYDROCHLORIDE 150 MG/1
300 TABLET ORAL AT BEDTIME
Status: DISCONTINUED | OUTPATIENT
Start: 2025-01-01 | End: 2025-01-02 | Stop reason: HOSPADM

## 2025-01-01 RX ORDER — OLANZAPINE 10 MG/1
10 TABLET, ORALLY DISINTEGRATING ORAL 2 TIMES DAILY PRN
Status: DISCONTINUED | OUTPATIENT
Start: 2025-01-01 | End: 2025-01-02 | Stop reason: HOSPADM

## 2025-01-01 RX ORDER — CHLORPROMAZINE HYDROCHLORIDE 50 MG/1
50 TABLET, FILM COATED ORAL 2 TIMES DAILY
Status: DISCONTINUED | OUTPATIENT
Start: 2025-01-01 | End: 2025-01-01

## 2025-01-01 RX ADMIN — TRAZODONE HYDROCHLORIDE 300 MG: 150 TABLET ORAL at 23:04

## 2025-01-01 ASSESSMENT — ACTIVITIES OF DAILY LIVING (ADL)
ADLS_ACUITY_SCORE: 41

## 2025-01-02 VITALS
RESPIRATION RATE: 14 BRPM | BODY MASS INDEX: 40.03 KG/M2 | TEMPERATURE: 97.9 F | HEART RATE: 78 BPM | DIASTOLIC BLOOD PRESSURE: 73 MMHG | SYSTOLIC BLOOD PRESSURE: 119 MMHG | OXYGEN SATURATION: 98 % | HEIGHT: 70 IN

## 2025-01-02 PROCEDURE — 250N000013 HC RX MED GY IP 250 OP 250 PS 637

## 2025-01-02 RX ORDER — LITHIUM CARBONATE 300 MG/1
CAPSULE ORAL
Qty: 84 CAPSULE | Refills: 0 | Status: SHIPPED | OUTPATIENT
Start: 2025-01-02

## 2025-01-02 RX ORDER — PALIPERIDONE PALMITATE 234 MG/1.5ML
INJECTION INTRAMUSCULAR
Qty: 1.5 ML | Refills: 0 | Status: SHIPPED | OUTPATIENT
Start: 2025-01-02

## 2025-01-02 RX ORDER — TRAZODONE HYDROCHLORIDE 300 MG/1
1 TABLET ORAL AT BEDTIME
Qty: 28 TABLET | Refills: 0 | Status: SHIPPED | OUTPATIENT
Start: 2025-01-02

## 2025-01-02 RX ORDER — ARIPIPRAZOLE 5 MG/1
5 TABLET ORAL EVERY MORNING
Qty: 28 TABLET | Refills: 0 | Status: SHIPPED | OUTPATIENT
Start: 2025-01-02

## 2025-01-02 RX ADMIN — ARIPIPRAZOLE 5 MG: 5 TABLET ORAL at 10:21

## 2025-01-02 ASSESSMENT — ACTIVITIES OF DAILY LIVING (ADL)
ADLS_ACUITY_SCORE: 41
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
## 2025-01-02 NOTE — ED NOTES
X-ray contacted writer and states they are ready for pt.  Security contacted and writer awaiting their arrival.  X-ray informed writer will contact them when security is present.  Pt is on ROBBY.
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
## 2025-01-02 NOTE — ED NOTES
Provider in with pt.   Tetracycline Counseling: Patient counseled regarding possible photosensitivity and increased risk for sunburn.  Patient instructed to avoid sunlight, if possible.  When exposed to sunlight, patients should wear protective clothing, sunglasses, and sunscreen.  The patient was instructed to call the office immediately if the following severe adverse effects occur:  hearing changes, easy bruising/bleeding, severe headache, or vision changes.  The patient verbalized understanding of the proper use and possible adverse effects of tetracycline.  All of the patient's questions and concerns were addressed. Patient understands to avoid pregnancy while on therapy due to potential birth defects.
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
## 2025-01-02 NOTE — TELEPHONE ENCOUNTER
Date of Last Office Visit: Virtual Visit with Nkechi Monreal NP (12/23/2024)   Date of Next Office Visit:  NONE- RN forwarding encounter to [facility] for scheduling  No shows since last visit: No  More than one patient-initiated cancellation (with reschedule) since last seen in clinic? No    []Medication refilled per  Medication Refill in Ambulatory Care  policy.  [x]Medication unable to be refilled by RN due to criteria not met as indicated below:    []Eligibility: has not had a provider visit within last 6 months   [x]Supervision: no future appointment; < 7 days before next appointment   [x]Compliance: no shows; cancellations; lapse in therapy (possible) - currently inpatient   []Verification: order discrepancy; may need modification...   [] > 30-day supply request   []Advanced refill request: > 7 days before refill date   []Controlled medication   []Medication not included in policy   []Review: new med; med adjusted <= 30 days; safety alert; requires lab monitoring...   []Scope of Practice: refill request processed by LPN/MA   [x]Other:   1. ED with Nick Estevez MD; Elaina Dexter MD (01/01/2025)   2. ED with Wendy Ohara MD (12/29/2024)     Medication(s) requested:     -    traZODone HCl 300 MG TABS  Date last ordered: 11/11/24  Qty: 28  Refills: 1  Appropriate for refill? Provider to review. Currently inpatient    -  paliperidone (INVEGA SUSTENNA) 234 MG/1.5ML JUANA  Date last ordered: 11/11/24  Qty: 1.5ml  Refills: 2  Appropriate for refill? Provider to review. Currently inpatient    -  lithium 300 MG capsule  Date last ordered: 11/11/24  Qty: 84  Refills: 1  Appropriate for refill? Provider to review. Currently inpatient    -  ARIPiprazole (ABILIFY) 5 MG tablet  Date last ordered: 11/11/24  Qty: 28  Refills: 1  Appropriate for refill? Provider to review. Currently inpatient.      Any Controlled Substance(s)? No      Requested medication(s) verified as identical to current order? Yes    Any lapse in  adherence to medication(s) greater than 5 days? No , Unknown, N/A     Additional action taken? none.      Last visit treatment plan:   Psychiatric  Out- Patient  Follow Up Progress Note  Date of visit:12/23/2024             Discussion of Care and Treatment Recommendations:   This is a 24 year old male with history of TBI, self-injurious behavior, aggressive behavior,  fetal alcohol  spectrum, intellectual disability, intermittent explosive disorder, schizoaffective disorder, and hallucination .     Patient resides in a group home.  Patient is seen today together with group home staff     Last visit 12/05/2024 for previous ER presentation for aggression so I really do not think.  Recommendation at last visit .  Continue current medications  Abilify  5 mg in the morning  Invega 234 mg /IM monthly -given by group home   Thorazine 50 mg BID - agitation PRN    Trazodone 300 mg at bedtime   Lithium 900 mg very evening with meals   Olanzapine 10 mg daily -  Hydroxyzine 50 mg TID PRN-    Stop Statreta 100 mg in am  Stop Quifacine ER   3 mg at bedtime   3. RTC:  4 week  4.  Call 911 or go to the ER if feeling unsafe.  Crisis numbers also provided  Patient and I reviewed diagnosis and treatment plan and patient agrees with following recommendations:  Ongoing education given regarding diagnostic and treatment options with adequate verbalization of understanding.  Plan   Continue current medications  Abilify  5 mg in the morning  Invega 234 mg /IM monthly -given by group home   Thorazine 50 mg BID - agitation PRN    Trazodone 300 mg at bedtime   Lithium 900 mg very evening with meals   Olanzapine 10 mg daily -  Hydroxyzine 50 mg TID PRN-     3. RTC: 2- 4 week in person visit  4.  Call 911 or go to the ER if feeling unsafe.  Crisis numbers also provided       Any medication(s) require lab monitoring? Yes   GENERAL ANTIPSYCHOTIC   Last Hemoglobin A1c:   Hemoglobin A1C   Date Value Ref Range Status   12/15/2023 5.6 0.0 - 5.6 %  Final     Comment:     Normal <5.7%   Prediabetes 5.7-6.4%    Diabetes 6.5% or higher     Note: Adopted from ADA consensus guidelines.     Last Lipid Panel (fasting):   Cholesterol   Date Value Ref Range Status   12/15/2023 150 <200 mg/dL Final     Triglycerides   Date Value Ref Range Status   12/15/2023 75 <150 mg/dL Final     Direct Measure HDL   Date Value Ref Range Status   12/15/2023 34 (L) >=40 mg/dL Final     LDL Cholesterol Calculated   Date Value Ref Range Status   12/15/2023 101 (H) <=100 mg/dL Final     Non HDL Cholesterol   Date Value Ref Range Status   12/15/2023 116 <130 mg/dL Final     Patient Fasting > 8hrs?   Date Value Ref Range Status   12/15/2023 Yes  Final     and LITHIUM   Last Lithium Level:   Lithium   Date Value Ref Range Status   01/01/2025 0.70 0.60 - 1.20 mmol/L Final     Comment:     Therapeutic: 0.60 - 1.20 mmol/L;   Toxic: >2.00 mmol/L     Last BMP/CMP:   Sodium   Date Value Ref Range Status   12/09/2024 137 135 - 145 mmol/L Final     Potassium   Date Value Ref Range Status   12/09/2024 3.7 3.4 - 5.3 mmol/L Final     Chloride   Date Value Ref Range Status   12/09/2024 104 98 - 107 mmol/L Final     Carbon Dioxide (CO2)   Date Value Ref Range Status   12/09/2024 24 22 - 29 mmol/L Final     Anion Gap   Date Value Ref Range Status   12/09/2024 9 7 - 15 mmol/L Final     Urea Nitrogen   Date Value Ref Range Status   12/09/2024 11.2 6.0 - 20.0 mg/dL Final     Creatinine   Date Value Ref Range Status   12/09/2024 0.77 0.67 - 1.17 mg/dL Final     GFR Estimate   Date Value Ref Range Status   12/09/2024 >90 >60 mL/min/1.73m2 Final     Comment:     eGFR calculated using 2021 CKD-EPI equation.     Calcium   Date Value Ref Range Status   12/09/2024 8.9 8.8 - 10.4 mg/dL Final     Comment:     Reference intervals for this test were updated on 7/16/2024 to reflect our healthy population more accurately. There may be differences in the flagging of prior results with similar values performed with this  method. Those prior results can be interpreted in the context of the updated reference intervals.     Glucose   Date Value Ref Range Status   12/09/2024 88 70 - 99 mg/dL Final      Last TSH with Free T4 Reflex:   TSH   Date Value Ref Range Status   06/30/2022 3.61 0.30 - 4.20 uIU/mL Final

## 2025-01-02 NOTE — ED PROVIDER NOTES
Sign Out Note     Pt accepted in sign out from: Dr. Elaina Dexter    Briefly pt presented to the ED for: Briefly, 24-year-old male presenting from group [place] with agitation.  Patient was medically cleared during previous shift.  He completed DEC evaluation.  Patient was discussed with [facility] staff, who stated that they can accept the patient in the morning.     Plan at time of sign out: Await transfer back to .     Care of patient during my shift: No issues, patient was cooperative and sleeping.     Plan for patient at this time: Care of patient will be signed out to the oncoming physician, Dr. Estevez.     MD Odalys Gregory Tracy Lynn, MD  01/02/25 0601
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
## 2025-01-02 NOTE — ED NOTES
Writer contacted pt's group home and spoke with Jeramy, nurse at facility.  Jeramy informed writer that facility would not be willing to take pt back.  Writer was provided the name of the , [facility], along with his telephone number, which is [phone].  Writer informed provider of the above information and provider contacted .  Pt will be staying at [facility].  Pt was informed of this by provider.

## 2025-01-02 NOTE — ED NOTES
Writer obtained pt's phone from [facility] and pt gave writer phone number for group home, which is [phone].  Provider given phone number.

## 2025-01-02 NOTE — ED NOTES
Pt HOB 90 degrees, breakfast heated, and encouraged to eat.  Group home called and can come  pt. MD notified

## 2025-01-07 NOTE — TELEPHONE ENCOUNTER
TERRY received faxed refill request for chlorproMAZINE (THORAZINE) tablet 50 mg      This medication has been  Discontinued     Yasmine Lugo CMA January 7, 2025

## 2025-01-09 ENCOUNTER — OFFICE VISIT (OUTPATIENT)
Dept: PSYCHIATRY | Facility: CLINIC | Age: 25
End: 2025-01-09
Payer: COMMERCIAL

## 2025-01-09 VITALS
SYSTOLIC BLOOD PRESSURE: 117 MMHG | HEART RATE: 75 BPM | WEIGHT: 282 LBS | DIASTOLIC BLOOD PRESSURE: 63 MMHG | BODY MASS INDEX: 40.46 KG/M2

## 2025-01-09 DIAGNOSIS — F39: ICD-10-CM

## 2025-01-09 DIAGNOSIS — F63.81 INTERMITTENT EXPLOSIVE DISORDER: ICD-10-CM

## 2025-01-09 DIAGNOSIS — F25.0 SCHIZOAFFECTIVE DISORDER, BIPOLAR TYPE (H): ICD-10-CM

## 2025-01-09 RX ORDER — OLANZAPINE 10 MG/1
10 TABLET ORAL AT BEDTIME
Qty: 90 TABLET | Refills: 0 | Status: SHIPPED | OUTPATIENT
Start: 2025-01-09

## 2025-01-09 RX ORDER — PALIPERIDONE PALMITATE 234 MG/1.5ML
234 INJECTION INTRAMUSCULAR
Qty: 1.5 ML | Refills: 3 | Status: SHIPPED | OUTPATIENT
Start: 2025-01-09

## 2025-01-09 RX ORDER — ARIPIPRAZOLE 5 MG/1
5 TABLET ORAL EVERY MORNING
Qty: 90 TABLET | Refills: 0 | Status: SHIPPED | OUTPATIENT
Start: 2025-01-09

## 2025-01-09 ASSESSMENT — PAIN SCALES - GENERAL: PAINLEVEL_OUTOF10: NO PAIN (0)
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
## 2025-01-09 NOTE — PATIENT INSTRUCTIONS
"The [facility] Psych[facility] Program  What to Expect  Here's what to expect in the Panel Psychiatry Program.   About the program  You'll be meeting with a psychiatric doctor to check your mental health. A psychiatric doctor helps you deal with troubling thoughts and feelings by giving you medicine. They'll make sure you know the plan for your care. You may see them for a long time. When you're feeling better, they may refer you back to seeing your family doctor.   If you have any questions, we'll be glad to talk to you.  About visits  Be open  At your visits, please talk openly about your problems. It may feel hard, but it's the best way for us to help you.  Cancelling visits  If you can't come to your visit, please call us right away at [phone]. If you don't cancel at least 24 hours (1 full day) before your visit, that's \"late cancellation.\"  Not showing up for your visits  Being very late is the same as not showing up. You'll be a \"no show\" if:  You're more than 15 minutes late for a 30-minute (half hour) visit.  You're more than 30 minutes late for a 60-minute (full hour) visit.  If you cancel late or don't show up 2 times within 6 months, we may end your care.  Getting help between visits  If you need help between visits, you can call us Monday to Friday from 8 a.m. to 4:30 p.m. at [phone].  Emergency care  Call 911 or go to the nearest emergency department if your life or someone else's life is in danger.  Call 988 anytime to reach the national Suicide and Crisis hotline.  Medicine refills  To refill your medicine, call your pharmacy. You can also call [facility]'s [facility] at [phone], Monday to Friday, 8 a.m. to 4:30 p.m. It can take 1 to 3 business days to get a refill.   Forms, letters, and tests  You may have papers to fill out, like FMLA, short-term disability, and workability. We can help you with these forms at your visits, but you must have an appointment. You may need more " than 1 visit for this, to be in an intensive therapy program, or both.  Before we can give you medicine for ADHD, we may refer you to get tested for it or confirm it another way.  We may not be able to give you an emotional support animal letter.  We don't do mental health checks ordered by the court.   We don't do mental health testing, but we can refer you to get tested.   Thank you for choosing us for your care.  For informational purposes only. Not to replace the advice of your health care provider. Copyright   2022 [facility]. All rights reserved. [employer] [id number] - 12/22      After Visit Summary   Continue medications as prescribed  Have your pharmacy contact us for a refill if you are running low on medications (We may ask you to come into clinic to get a refill from the nurse  No Alcohol or drug use  No driving if sedated  Call the clinic with any questions or concerns   Reach out for help if you feel like hurting yourself or others ([facility] [phone]: [address], [address] or [facility] Suicide Hotline   [phone] , call 911 or go to nearest Emergency room     Crisis Resources:    Present to the Emergency Department as needed or call after hours crisis line at [phone] or [phone].   [place] Crisis Text Line: Text [place] to 492562.  Suicide LifeLine Chat: suicidepreventionlifeline.org/chat/.  National Suicide Prevention Lifeline: 162.719.4644 (TTY: 674.964.5974). Call anytime for help.  (www.suicidepreventionlifeline.org)  National [place] on Mental Illness (www.milvia.org): 514.495.9909 or 824-900-3628.  Mental Health Association (www.mentalhealth.org): 209.994.7626 or 221-465-0440.       Follow up as directed, for your appointments, per your After Visit Summary Form.
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
## 2025-01-24 ENCOUNTER — LAB REQUISITION (OUTPATIENT)
Dept: LAB | Facility: CLINIC | Age: 25
End: 2025-01-24
Payer: COMMERCIAL

## 2025-01-24 DIAGNOSIS — R30.0 DYSURIA: ICD-10-CM

## 2025-01-24 PROCEDURE — 87591 N.GONORRHOEAE DNA AMP PROB: CPT | Mod: ORL | Performed by: NURSE PRACTITIONER

## 2025-01-24 PROCEDURE — 87491 CHLMYD TRACH DNA AMP PROBE: CPT | Mod: ORL | Performed by: NURSE PRACTITIONER

## 2025-01-25 LAB
C TRACH DNA SPEC QL NAA+PROBE: NEGATIVE
N GONORRHOEA DNA SPEC QL NAA+PROBE: NEGATIVE
SPECIMEN TYPE: NORMAL
SPECIMEN TYPE: NORMAL

## 2025-02-01 ENCOUNTER — HEALTH MAINTENANCE LETTER (OUTPATIENT)
Age: 25
End: 2025-02-01

## 2025-02-03 DIAGNOSIS — F63.81 INTERMITTENT EXPLOSIVE DISORDER: ICD-10-CM

## 2025-02-03 DIAGNOSIS — F43.23 ADJUSTMENT DISORDER WITH MIXED ANXIETY AND DEPRESSED MOOD: ICD-10-CM

## 2025-02-03 DIAGNOSIS — F25.0 SCHIZOAFFECTIVE DISORDER, BIPOLAR TYPE (H): ICD-10-CM

## 2025-02-03 RX ORDER — LITHIUM CARBONATE 300 MG/1
900 CAPSULE ORAL
Qty: 84 CAPSULE | Refills: 1 | Status: SHIPPED | OUTPATIENT
Start: 2025-02-03

## 2025-02-03 RX ORDER — TRAZODONE HYDROCHLORIDE 300 MG/1
1 TABLET ORAL AT BEDTIME
Qty: 28 TABLET | Refills: 0 | Status: SHIPPED | OUTPATIENT
Start: 2025-02-03

## 2025-02-03 NOTE — TELEPHONE ENCOUNTER
Assisted living staff [facility] pharmacy made request for refills of lithium and trazodone and have not received a response.  Please send to pharmacy or call staff if there are any questions at [phone]

## 2025-02-03 NOTE — TELEPHONE ENCOUNTER
Date of Last Office Visit: 1/9/25  Date of Next Office Visit: None; routing for A to assist pt with scheduling.    No shows since last visit: No  More than one patient-initiated cancellation (with reschedule) since last seen in clinic? No    []Medication refilled per  Medication Refill in Ambulatory Care  policy.  [x]Medication unable to be refilled by RN due to criteria not met as indicated below:    []Eligibility: has not had a provider visit within last 6 months   []Supervision: no future appointment; < 7 days before next appointment   []Compliance: no shows; cancellations; lapse in therapy   []Verification: order discrepancy; may need modification...   [] > 30-day supply request   []Advanced refill request: > 7 days before refill date   []Controlled medication   [x]Medication not included in policy   []Review: new med; med adjusted <= 30 days; safety alert; requires lab monitoring...   []Scope of Practice: refill request processed by LPN/MA   []Other:      Medication(s) requested:     -  lithium 300 MG capsule   Date last ordered: 1/2/25  Qty: 84  Refills: 0  Appropriate for refill? Provider to review.      -  traZODone HCl 300 MG TABS   Date last ordered: 1/2/25  Qty: 28  Refills: 0  Appropriate for refill? Provider to review.            Any Controlled Substance(s)? No      Requested medication(s) verified as identical to current order? Yes    Any lapse in adherence to medication(s) greater than 5 days? No      Additional action taken? routed encounter to provider for review.  RN phoned the patients living facility at [phone] and informed the staff that the refill requests had been sent to the provider.        Last visit treatment plan:      Plan   ontinue current medications  Abilify  5 mg in the morning  Invega 234 mg /IM monthly -given by group home   Thorazine 50 mg BID - agitation PRN    Trazodone 300 mg at bedtime   Lithium 900 mg very evening with meals   Olanzapine 10 mg daily -  Hydroxyzine 50  mg TID PRN-     3. RTC: 2- 4 week in person visit  4.  Call 911 or go to the ER if feeling unsafe.  Crisis numbers also provided    Any medication(s) require lab monitoring? No    Anil Casas RN on 2/3/2025 at 2:17 PM

## 2025-02-19 DIAGNOSIS — F43.23 ADJUSTMENT DISORDER WITH MIXED ANXIETY AND DEPRESSED MOOD: ICD-10-CM

## 2025-02-19 DIAGNOSIS — F63.81 INTERMITTENT EXPLOSIVE DISORDER: ICD-10-CM

## 2025-02-19 RX ORDER — TRAZODONE HYDROCHLORIDE 300 MG/1
1 TABLET ORAL AT BEDTIME
Qty: 28 TABLET | Refills: 0 | Status: SHIPPED | OUTPATIENT
Start: 2025-02-19

## 2025-02-19 NOTE — TELEPHONE ENCOUNTER
Date of Last Office Visit: 1/9/25  Date of Next Office Visit: None; routing for A to assist pt with scheduling.    No shows since last visit: No  More than one patient-initiated cancellation (with reschedule) since last seen in clinic? No    []Medication refilled per  Medication Refill in Ambulatory Care  policy.  [x]Medication unable to be refilled by RN due to criteria not met as indicated below:    []Eligibility: has not had a provider visit within last 6 months   [x]Supervision: no future appointment; < 7 days before next appointment   []Compliance: no shows; cancellations; lapse in therapy   []Verification: order discrepancy; may need modification...   [] > 30-day supply request   []Advanced refill request: > 7 days before refill date   []Controlled medication   []Medication not included in policy   []Review: new med; med adjusted <= 30 days; safety alert; requires lab monitoring...   []Scope of Practice: refill request processed by LPN/MA   [x]Other:pharmacy request      Medication(s) requested:     -  traZODone HCl 300 MG TABS   Date last ordered: 2/3/25  Qty: 28  Refills: 0  Appropriate for refill? Provider to review.      Any Controlled Substance(s)? No      Requested medication(s) verified as identical to current order? Yes    Any lapse in adherence to medication(s) greater than 5 days? No      Additional action taken?  Pharmacy requesting refill, [phone],  patient is on a fill cycle for compliance .      Last visit treatment plan:   Plan   ontinue current medications  Abilify  5 mg in the morning  Invega 234 mg /IM monthly -given by group home   Thorazine 50 mg BID - agitation PRN    Trazodone 300 mg at bedtime   Lithium 900 mg very evening with meals   Olanzapine 10 mg daily -  Hydroxyzine 50 mg TID PRN-     3. RTC: 2- 4 week in person visit  4.  Call 911 or go to the ER if feeling unsafe.  Crisis numbers also provided    Any medication(s) require lab monitoring? No    Mikayla Mcgraw RN on  2/19/2025 at 12:39 PM

## 2025-02-19 NOTE — TELEPHONE ENCOUNTER
Date of Last Office Visit: 01/09/2025  Date of Next Office Visit: None; routing for A to assist pt with scheduling.    No shows since last visit: No  More than one patient-initiated cancellation (with reschedule) since last seen in clinic? No    []Medication refilled per  Medication Refill in Ambulatory Care  policy.  []Medication unable to be refilled by RN due to criteria not met as indicated below:    []Eligibility: has not had a provider visit within last 6 months   []Supervision: no future appointment; < 7 days before next appointment   [x]Compliance: no shows; cancellations; lapse in therapy   []Verification: order discrepancy; may need modification...   [] > 30-day supply request   []Advanced refill request: > 7 days before refill date   []Controlled medication   [x]Medication not included in policy   []Review: new med; med adjusted <= 30 days; safety alert; requires lab monitoring...   [x]Scope of Practice: refill request processed by LPN/MA   [x]Other:      Medication(s) requested:     -  chlorpromazine (THORAZINE) 50 MG tablet   Date last ordered: 01/10/25  Qty: 60  Refills: 1  Appropriate for refill? Yes          Any Controlled Substance(s)? No      Requested medication(s) verified as identical to current order? Yes    Any lapse in adherence to medication(s) greater than 5 days? No      Additional action taken? routed encounter to A to assist pt with scheduling an appointment, cued up medication/order(s), and routed encounter to provider for review.      Last visit treatment plan:   None found    Any medication(s) require lab monitoring? No    Yasmine Lugo MA on 2/19/2025 at 2:01 PM

## 2025-02-20 RX ORDER — CHLORPROMAZINE HYDROCHLORIDE 50 MG/1
50 TABLET, FILM COATED ORAL 2 TIMES DAILY PRN
Qty: 60 TABLET | Refills: 2 | Status: SHIPPED | OUTPATIENT
Start: 2025-02-20

## 2025-02-27 ENCOUNTER — VIRTUAL VISIT (OUTPATIENT)
Dept: PSYCHIATRY | Facility: CLINIC | Age: 25
End: 2025-02-27
Payer: COMMERCIAL

## 2025-02-27 DIAGNOSIS — R46.89 AGGRESSIVE BEHAVIOR: ICD-10-CM

## 2025-02-27 DIAGNOSIS — F25.9 SCHIZOAFFECTIVE DISORDER, UNSPECIFIED TYPE (H): ICD-10-CM

## 2025-02-27 DIAGNOSIS — F39: Primary | ICD-10-CM

## 2025-02-27 DIAGNOSIS — Z87.820 HISTORY OF TRAUMATIC BRAIN INJURY: ICD-10-CM

## 2025-02-27 DIAGNOSIS — F63.81 INTERMITTENT EXPLOSIVE DISORDER: ICD-10-CM

## 2025-02-27 DIAGNOSIS — F70 MILD INTELLECTUAL DISABILITY: ICD-10-CM

## 2025-02-27 ASSESSMENT — ANXIETY QUESTIONNAIRES
GAD7 TOTAL SCORE: 1
4. TROUBLE RELAXING: NOT AT ALL
3. WORRYING TOO MUCH ABOUT DIFFERENT THINGS: NOT AT ALL
7. FEELING AFRAID AS IF SOMETHING AWFUL MIGHT HAPPEN: NOT AT ALL
6. BECOMING EASILY ANNOYED OR IRRITABLE: NOT AT ALL
GAD7 TOTAL SCORE: 1
1. FEELING NERVOUS, ANXIOUS, OR ON EDGE: SEVERAL DAYS
IF YOU CHECKED OFF ANY PROBLEMS ON THIS QUESTIONNAIRE, HOW DIFFICULT HAVE THESE PROBLEMS MADE IT FOR YOU TO DO YOUR WORK, TAKE CARE OF THINGS AT HOME, OR GET ALONG WITH OTHER PEOPLE: NOT DIFFICULT AT ALL
2. NOT BEING ABLE TO STOP OR CONTROL WORRYING: NOT AT ALL
5. BEING SO RESTLESS THAT IT IS HARD TO SIT STILL: NOT AT ALL
GAD7 TOTAL SCORE: 1
8. IF YOU CHECKED OFF ANY PROBLEMS, HOW DIFFICULT HAVE THESE MADE IT FOR YOU TO DO YOUR WORK, TAKE CARE OF THINGS AT HOME, OR GET ALONG WITH OTHER PEOPLE?: NOT DIFFICULT AT ALL
7. FEELING AFRAID AS IF SOMETHING AWFUL MIGHT HAPPEN: NOT AT ALL

## 2025-02-27 ASSESSMENT — PAIN SCALES - GENERAL: PAINLEVEL_OUTOF10: NO PAIN (0)

## 2025-02-27 ASSESSMENT — PATIENT HEALTH QUESTIONNAIRE - PHQ9
SUM OF ALL RESPONSES TO PHQ QUESTIONS 1-9: 2
10. IF YOU CHECKED OFF ANY PROBLEMS, HOW DIFFICULT HAVE THESE PROBLEMS MADE IT FOR YOU TO DO YOUR WORK, TAKE CARE OF THINGS AT HOME, OR GET ALONG WITH OTHER PEOPLE: NOT DIFFICULT AT ALL
SUM OF ALL RESPONSES TO PHQ QUESTIONS 1-9: 2

## 2025-02-27 NOTE — PATIENT INSTRUCTIONS
"[facility]  What to Expect  Here's what to expect in the [facility].   About the program  You'll be meeting with a psychiatric doctor to check your mental health. A psychiatric doctor helps you deal with troubling thoughts and feelings by giving you medicine. They'll make sure you know the plan for your care. You may see them for a long time. When you're feeling better, they may refer you back to seeing your family doctor.   If you have any questions, we'll be glad to talk to you.  About visits  Be open  At your visits, please talk openly about your problems. It may feel hard, but it's the best way for us to help you.  Cancelling visits  If you can't come to your visit, please call us right away at [phone]. If you don't cancel at least 24 hours (1 full day) before your visit, that's \"late cancellation.\"  Not showing up for your visits  Being very late is the same as not showing up. You'll be a \"no show\" if:  You're more than 15 minutes late for a 30-minute (half hour) visit.  You're more than 30 minutes late for a 60-minute (full hour) visit.  If you cancel late or don't show up 2 times within 6 months, we may end your care.  Getting help between visits  If you need help between visits, you can call us Monday to Friday from 8 a.m. to 4:30 p.m. at [phone].  Emergency care  Call 911 or go to the nearest emergency department if your life or someone else's life is in danger.  Call 988 anytime to reach the national Suicide and Crisis hotline.  Medicine refills  To refill your medicine, call your pharmacy. You can also call [facility]'s [facility] at [phone], Monday to Friday, 8 a.m. to 4:30 p.m. It can take 1 to 3 business days to get a refill.   Forms, letters, and tests  You may have papers to fill out, like FMLA, short-term disability, and workability. We can help you with these forms at your visits, but you must have an appointment. You may need more " than 1 visit for this, to be in an intensive therapy program, or both.  Before we can give you medicine for ADHD, we may refer you to get tested for it or confirm it another way.  We may not be able to give you an emotional support animal letter.  We don't do mental health checks ordered by the court.   We don't do mental health testing, but we can refer you to get tested.   Thank you for choosing us for your care.  For informational purposes only. Not to replace the advice of your health care provider. Copyright   2022 [facility]. All rights reserved. [employer] [id number] - 12/22      After Visit Summary   Continue medications as prescribed  Have your pharmacy contact us for a refill if you are running low on medications (We may ask you to come into clinic to get a refill from the nurse  No Alcohol or drug use  No driving if sedated  Call the clinic with any questions or concerns   Reach out for help if you feel like hurting yourself or others ([facility] [phone]: [address] [facility], [address] or [facility] Suicide Hotline   [phone] , call 911 or go to nearest Emergency room     Crisis Resources:    Present to the Emergency Department as needed or call after hours crisis line at [phone] or [phone].   [place] Crisis Text Line: Text [place] to 200196.  Suicide LifeLine Chat: suicidepreventionlifeline.org/chat/.  National Suicide Prevention Lifeline: 837.172.2927 (TTY: 828.325.1833). Call anytime for help.  (www.suicidepreventionlifeline.org)  National [place] on Mental Illness (www.milvia.org): 597.901.5866 or 866-042-3467.  Mental Health Association (www.mentalhealth.org): 279.854.2755 or 741-933-9211.       Follow up as directed, for your appointments, per your After Visit Summary Form.

## 2025-02-27 NOTE — PROGRESS NOTES
"  The patient has been notified of following:      \"This virtual  visit will be conducted via a call between you and your physician/provider. We have found that certain health care needs can be provided without the need for a physical exam.  This service lets us provide the care you need virtually/via video   If a prescription is necessary we can send it directly to your pharmacy.  If lab work is needed we can place an order for that and you can then stop by our lab to have the test done at a later time.     Virtual/Video visits are billed at different rates depending on your insurance coverage.Some insurers they may be billed the same as an in-person visit.  Please reach out to your insurance provider with any questions.    Patient has given verbal consent for virtual  visit : Yes      Ho w would you like to obtain your AVS? Mail a copy  If the video visit is dropped, the invitation should be resent by: Send to e-mail at: [email or web address]  Will anyone else be joining your video visit? No            Psychiatric  Out- Patient  Follow Up Progress Note  Date of visit:2/27/2025           Discussion of Care and Treatment Recommendations:   This is a 24 year old male wit a history of TBI, self-injurious behavior, aggressive behavior,  fetal alcohol  spectrum, intellectual disability, intermittent explosive disorder, schizoaffective disorder, and hallucination .     Patient resides in a group home.  Patient is seen today together with group home staff   ..      Last visit 01/09/2025.  Recommendation at last visit .  Continue current medications  Abilify  5 mg in the morning  Invega 234 mg /IM monthly -given by group home   Thorazine 50 mg BID - agitation PRN    Trazodone 300 mg at bedtime   Lithium 900 mg very evening with meals   Olanzapine 10 mg daily -  Hydroxyzine 50 mg TID PRN-     3. RTC: 2- 4 week in person visit  4.  Call 911 or go to the ER if feeling unsafe.  Crisis numbers also provided     Patient and I " reviewed diagnosis and treatment plan and patient agrees with following recommendations:  Ongoing education given regarding diagnostic and treatment options with adequate verbalization of understanding.  Plan            DIagnoses:     Intermittent explosive disorder  Fetal alcohol spectrum disorder  History of traumatic brain injury  Tobacco use disorder  Mild intellectual disability  ADHD (attention deficit hyperactivity disorder), combined type  Aggressive behavior      Patient Active Problem List   Diagnosis    Thrombocytopenia    Chronic ITP (idiopathic thrombocytopenia) (H)    Attention deficit disorder    Intermittent explosive disorder    Anxiety disorder, unspecified    Schizoaffective disorder, unspecified (H)    Schizoaffective disorder, bipolar type (H)    Fetal alcohol syndrome    Intellectual disability    TBI (traumatic brain injury) (H)    Hallucinations    Aggressive behavior    Depression, unspecified depression type             Chief Complaint / Subjective:    Chief complaint: Intermittent explosive disorder       History of Present Illness:   I met with patient and group home staff  Pt has been compliant with current medications.  The group home started a beh modification plan with positive reinforcement. This has greatly improved pt beh He has has only  one incident of agitations since our last visit   Patient agrees with current plan working a lot better  Will continue with current mediations and plan of care     Answers submitted by the patient for this visit:  Patient Health Questionnaire (Submitted on 2/27/2025)  If you checked off any problems, how difficult have these problems made it for you to do your work, take care of things at home, or get along with other people?: Not difficult at all  PHQ9 TOTAL SCORE: 2  Patient Health Questionnaire (G7) (Submitted on 2/27/2025)  JAZMINE 7 TOTAL SCORE: 1    Mental Status Examination:   Appearance: Well groomed, good eye contact   Orientation: Patient  "alert and oriented to person, place, time, and situation  Reliability:  Patient appears to be an adequate historian.    Behavior: cooperative   Speech: Speech is spontaneous and coherent, with a normal rate, rhythm and tone.    Language:There are no difficulties with expressive or receptive language as observed throughout the interview.    Mood: Described as \"ok\".    Affect: congruent   Judgement: Able to make basic decision regarding safety.  Insight: Good awareness of physical and mental health conditions and aware of needs around care for these.  Gait and station: unable to assess  Thought process: Logical   Thought content: No evidence of delusions or paranoia.    Hallucinations : No evidence of any hallucination  Thought content: No evidence of delusions or paranoia.   Suicidal /Homical Ideations:  No thoughts of self harm or suicide. No thoughts of harming others.  Associations: Connected  Fund of knowledge: Average  Attention / Concentration: Able to remain focused during the interview with minimal distractibility or need for redirection.  Short Term Memory: Grossly intact as evidence by client recalling themes and ideas discussed.  Long Term Memory: Intact  Motor Status: unable to asse    Drug/treatment history and current pattern of use:   History of cannabis use. Currently denies use of mood altering substances     Medication changes: See Above   Medication adherence: compliant  Medication side effects: absent  Information about medications: Side effects, benefits and alternative treatments discussed and patient agrees .    Psychotherapy: Supportive therapy day-to-day living    Education: Diet, exercise, abstinence from drugs and alcohol, patient will not drive if sedated and medications or  under influence of any substance    Lab Results:   Personally reviewed and discussed with the patient    Lab Results   Component Value Date    WBC 11.0 12/09/2024    HGB 12.6 (L) 12/09/2024    HCT 37.2 (L) 12/09/2024 "     12/09/2024    CHOL 150 12/15/2023    TRIG 75 12/15/2023    HDL 34 (L) 12/15/2023    ALT 33 12/09/2024    AST 28 12/09/2024     12/09/2024    BUN 11.2 12/09/2024    CO2 24 12/09/2024    TSH 3.61 06/30/2022       Vital signs:  There were no vitals taken for this visit.  Telemedicine visit-no vital signs completed  Allergies: Depakote [valproic acid], Depakote [valproic acid], and Other environmental allergy         Medications:     Current Outpatient Medications   Medication Sig Dispense Refill    ARIPiprazole (ABILIFY) 5 MG tablet Take 1 tablet (5 mg) by mouth every morning. 90 tablet 0    atropine 1 % ophthalmic solution Place 2 drops under the tongue 3 times daily.      benzonatate (TESSALON) 100 MG capsule Take 100 mg by mouth 3 times daily as needed for cough.      chlorproMAZINE (THORAZINE) 50 MG tablet Take 1 tablet (50 mg) by mouth 2 times daily as needed for anxiety. 60 tablet 2    cholecalciferol 25 MCG (1000 UT) TABS Take 25 mcg by mouth.      ferrous sulfate (FE TABS) 325 (65 Fe) MG EC tablet Take 325 mg by mouth daily      ferrous sulfate (FEROSUL) 325 (65 Fe) MG tablet Take 325 mg by mouth      hydrOXYzine HCl (ATARAX) 50 MG tablet Take 1 tablet (50 mg) by mouth 3 times daily as needed for anxiety. 90 tablet 0    liraglutide - Weight Management (SAXENDA) 18 MG/3ML pen Inject subcutaneously.      lithium 300 MG capsule Take 3 capsules (900 mg) by mouth daily (with dinner). 84 capsule 1    lithium 300 MG capsule TAKE 3 CAPSULES BY MOUTH EVERY EVENING WITH DINNER 84 capsule 0    loratadine (CLARITIN) 10 MG tablet Take 10 mg by mouth.      metFORMIN (GLUCOPHAGE-XR) 500 MG 24 hr tablet Take 1,000 mg by mouth.      nicotine (NICODERM CQ) 14 MG/24HR 24 hr patch Place 1 patch onto the skin every 24 hours.      nicotine (NICORETTE) 4 MG lozenge Place 4 mg inside cheek every hour as needed for nicotine withdrawal symptoms.      OLANZapine (ZYPREXA) 10 MG tablet Take 1 tablet (10 mg) by mouth at  bedtime. 90 tablet 0    ondansetron (ZOFRAN ODT) 4 MG ODT tab Take 1 tablet (4 mg) by mouth every 6 hours as needed for nausea or vomiting 15 tablet 0    paliperidone (INVEGA SUSTENNA) 234 MG/1.5ML JUANA Inject 1.5 mLs (234 mg) into the muscle every 28 days. 1.5 mL 3    paliperidone (INVEGA SUSTENNA) 234 MG/1.5ML JUANA INJECT 234MG INTRAMUSCULARLY EVERY 28 DAYS 1.5 mL 0    polyethylene glycol (MIRALAX) 17 g packet Take 1 packet by mouth daily.      traZODone HCl 300 MG TABS TAKE 1 TABLET BY MOUTH AT BEDTIME 28 tablet 0    traZODone HCl 300 MG TABS TAKE 1 TABLET BY MOUTH AT BEDTIME 28 tablet 0    VICTOZA PEN 18 MG/3ML soln       vitamin C (ASCORBIC ACID) 250 MG TABS tablet Take 250 mg by mouth       No current facility-administered medications for this visit.         Medication adherence: Reviewed risk/benefits of medication , Patient able to verbalize understanding of side effects and Patient verbally consents to taking medications      PSYCHOEDUCATION:  Medication side effects and alternatives reviewed. Health promotion activities recommended and reviewed today. All questions addressed. Education and counseling completed regarding risks and benefits of medications and psychotherapy options.  Consent provided by patient/guardian  Call the psychiatric nurse line with medication questions or concerns at [phone].  MyChart may be used to communicate with your provider, but this is not intended to be used for emergencies.  SEROTONIN SYNDROME:  Discussed risks of Serotonin syndrome (ie, serotonin toxicity) which is a potentially life-threatening condition associated with increased serotonergic activity in the central nervous system (CNS). It is seen with therapeutic medication use, inadvertent interactions between drugs, and intentional self-poisoning. Serotonin syndrome may involve a spectrum of clinical findings, which often include mental status changes, autonomic hyperactivity, and neuromuscular abnormalities.     STIMULANT THERAPY: Side effects discussed including but not limited to cardiac (including HTN, tachycardia, sudden death), motor/tic, appetite/growth, mood lability and sleep disruption. This is a controlled substance with risk for abuse, need to keep in a safe keep place and cannot replace lost scripts  HARM REDUCTION:  Discussions regarding effects of mood altering substances, alcohol and cannabis, on mood and that approach is harm reduction, will continue to prescribe meds as they work to cut back use.    SAFETY:  We all care about your loved one's safety. To reduce the risk of self-harm, remove access to all:  Firearms, Medicines (both prescribed and over-the-counter), Knives and other sharp objects, Ropes and like materials, and Alcohol  SLEEP HYGIENE: establish a sleep routine, limit screen time 1 hour prior to bed, use bed for sleep only, take sleep/medications on time (including sleepy time tea, trazadone or herbal treatments such as melatonin), aroma therapy, limit caffeine/sugar, yoga, guided imagery, stretch, meditation, limit naps to 20 minutes, make a temperature change in the room, white noise, be mindful of slowing down breathing, take a warm bath/shower, frequently wash sheets, and journaling.   Medlineplus.gov is information for patients.  It is run by the National Library of Medicine and it contains information about all disorders, diseases and all medications.              Review of Systems:      ROS:    Subjective Data Only- Tele-Health Visit    10 point ROS was negative except for the items listed in HPI.      Crisis Resources:    Present to the Emergency Department as needed or call after hours crisis line at [phone] or [phone].   [place] Crisis Text Line: Text [place] to 839648.  Suicide LifeLine Chat: suicidepreventionlifeline.org/chat/.  National Suicide Prevention Lifeline: 997.337.1342 (TTY: 717.710.1036). Call anytime for help.  (www.suicidepreventionlifeline.org)  National  [place] on Mental Illness (www.milvia.org): 787-952-1891 or 182-913-6729.  Mental Health Association (www.mentalhealth.org): 862.787.7636 or 641-199-5540.      Coordination of Care:   More than 50% of time spent on coordination of care including: Educating patient about diagnosis, prognosis, side effects and benefits of medications, diet, exercise.  Time also spent providing supportive therapy regarding above issues.    Video-Visit Details    Type of service:  Video Visit    Originating Location (pt. Location): Home    Distant Location (provider location): Providers Remote Office     Platform used for Video Visit: [employer]      Disclaimer: This note consists of symbols derived from keyboarding, dictation and/or voice recognition software. As a result, there may be errors in the script that have gone undetected. Please consider this when interpreting information found in this chart.    Start Time : 1530  End time : 1600

## 2025-02-27 NOTE — NURSING NOTE
Is the patient currently in the state of [place]? YES    Current patient location: [address] [place] [id number]    Visit mode:Video    If the visit is dropped, the patient can be reconnected by: VIDEO VISIT: Text to cell phone:   Telephone Information:   Mobile            Will anyone else be joining the visit? No  (If patient encounters technical issues they should call [phone])    Are changes needed to the allergy or medication list? No    Are refills needed on medications prescribed by this physician? Discuss with Provider    Rooming Documentation: Questionnaire(s) completed.    Reason for visit: FLORI Gomez

## 2025-03-25 DIAGNOSIS — F43.23 ADJUSTMENT DISORDER WITH MIXED ANXIETY AND DEPRESSED MOOD: ICD-10-CM

## 2025-03-25 DIAGNOSIS — F39: ICD-10-CM

## 2025-03-25 DIAGNOSIS — F25.0 SCHIZOAFFECTIVE DISORDER, BIPOLAR TYPE (H): ICD-10-CM

## 2025-03-25 DIAGNOSIS — F63.81 INTERMITTENT EXPLOSIVE DISORDER: ICD-10-CM

## 2025-03-25 RX ORDER — PALIPERIDONE PALMITATE 234 MG/1.5ML
INJECTION INTRAMUSCULAR
Qty: 1.5 ML | Refills: 3 | OUTPATIENT
Start: 2025-03-25

## 2025-03-25 NOTE — TELEPHONE ENCOUNTER
Received electronic refill requests for Invega injection (234 mg), Zyprexa 10 mg, Abilify 5 mg, Lithium 30 mg, Trazodone 300 mg. Per chart review, patient should have remaining injections left. RN denied this refill request.     Routing remaining requests    Date of Last Office Visit: 2/27/25  Date of Next Office Visit:  4/7/25  No shows since last visit: No  More than one patient-initiated cancellation (with reschedule) since last seen in clinic? No    [x]Medication unable to be refilled by RN due to criteria not met as indicated below:      [x]Medication not included in policy    Medication(s) requested:   traZODone HCl 300 MG TABS 28 tablet 0 2/19/2025 -- No   Sig - Route: TAKE 1 TABLET BY MOUTH AT BEDTIME - Ora     lithium 300 MG capsule 84 capsule 1 2/3/2025 -- No   Sig - Route: Take 3 capsules (900 mg) by mouth daily (with dinner). - Oral     OLANZapine (ZYPREXA) 10 MG tablet 90 tablet 0 1/9/2025 -- No   Sig - Route: Take 1 tablet (10 mg) by mouth at bedtime. - Oral     ARIPiprazole (ABILIFY) 5 MG tablet 90 tablet 0 1/9/2025 -- No   Sig - Route: Take 1 tablet (5 mg) by mouth every morning. - Oral       Any Controlled Substance(s)? No      Requested medication(s) verified as identical to current order? Yes    Any lapse in adherence to medication(s) greater than 5 days? No      Last visit treatment plan:      Discussion of Care and Treatment Recommendations:   This is a 24 year old male wit a history of TBI, self-injurious behavior, aggressive behavior,  fetal alcohol  spectrum, intellectual disability, intermittent explosive disorder, schizoaffective disorder, and hallucination .     Patient resides in a group home.  Patient is seen today together with group home staff   ..        Last visit 01/09/2025.  Recommendation at last visit .  Continue current medications  Abilify  5 mg in the morning  Invega 234 mg /IM monthly -given by group home   Thorazine 50 mg BID - agitation PRN    Trazodone 300 mg at bedtime    Lithium 900 mg very evening with meals   Olanzapine 10 mg daily -  Hydroxyzine 50 mg TID PRN-     3. RTC: 2- 4 week in person visit  4.  Call 911 or go to the ER if feeling unsafe.  Crisis numbers also provided     Patient and I reviewed diagnosis and treatment plan and patient agrees with following recommendations:  Ongoing education given regarding diagnostic and treatment options with adequate verbalization of understanding.  Plan     Any medication(s) require lab monitoring? Yes   GENERAL ANTIPSYCHOTIC   Last Hemoglobin A1c:   Hemoglobin A1C   Date Value Ref Range Status   12/15/2023 5.6 0.0 - 5.6 % Final     Comment:     Normal <5.7%   Prediabetes 5.7-6.4%    Diabetes 6.5% or higher     Note: Adopted from ADA consensus guidelines.     Last Lipid Panel (fasting):   Cholesterol   Date Value Ref Range Status   12/15/2023 150 <200 mg/dL Final     Triglycerides   Date Value Ref Range Status   12/15/2023 75 <150 mg/dL Final     Direct Measure HDL   Date Value Ref Range Status   12/15/2023 34 (L) >=40 mg/dL Final     LDL Cholesterol Calculated   Date Value Ref Range Status   12/15/2023 101 (H) <=100 mg/dL Final     Non HDL Cholesterol   Date Value Ref Range Status   12/15/2023 116 <130 mg/dL Final     Patient Fasting > 8hrs?   Date Value Ref Range Status   12/15/2023 Yes  Final     and LITHIUM   Last Lithium Level:   Lithium   Date Value Ref Range Status   01/01/2025 0.70 0.60 - 1.20 mmol/L Final     Comment:     Therapeutic: 0.60 - 1.20 mmol/L;   Toxic: >2.00 mmol/L     Last BMP/CMP:   Sodium   Date Value Ref Range Status   12/09/2024 137 135 - 145 mmol/L Final     Potassium   Date Value Ref Range Status   12/09/2024 3.7 3.4 - 5.3 mmol/L Final     Chloride   Date Value Ref Range Status   12/09/2024 104 98 - 107 mmol/L Final     Carbon Dioxide (CO2)   Date Value Ref Range Status   12/09/2024 24 22 - 29 mmol/L Final     Anion Gap   Date Value Ref Range Status   12/09/2024 9 7 - 15 mmol/L Final     Urea Nitrogen    Date Value Ref Range Status   12/09/2024 11.2 6.0 - 20.0 mg/dL Final     Creatinine   Date Value Ref Range Status   12/09/2024 0.77 0.67 - 1.17 mg/dL Final     GFR Estimate   Date Value Ref Range Status   12/09/2024 >90 >60 mL/min/1.73m2 Final     Comment:     eGFR calculated using 2021 CKD-EPI equation.     Calcium   Date Value Ref Range Status   12/09/2024 8.9 8.8 - 10.4 mg/dL Final     Comment:     Reference intervals for this test were updated on 7/16/2024 to reflect our healthy population more accurately. There may be differences in the flagging of prior results with similar values performed with this method. Those prior results can be interpreted in the context of the updated reference intervals.     Glucose   Date Value Ref Range Status   12/09/2024 88 70 - 99 mg/dL Final      Last TSH with Free T4 Reflex:   TSH   Date Value Ref Range Status   06/30/2022 3.61 0.30 - 4.20 uIU/mL Final       Ludmila Gomez RN on 3/25/2025 at 4:05 PM

## 2025-03-26 RX ORDER — ARIPIPRAZOLE 5 MG/1
5 TABLET ORAL EVERY MORNING
Qty: 28 TABLET | Refills: 0 | Status: SHIPPED | OUTPATIENT
Start: 2025-03-26

## 2025-03-26 RX ORDER — LITHIUM CARBONATE 300 MG/1
CAPSULE ORAL
Qty: 84 CAPSULE | Refills: 1 | Status: SHIPPED | OUTPATIENT
Start: 2025-03-26

## 2025-03-26 RX ORDER — TRAZODONE HYDROCHLORIDE 300 MG/1
1 TABLET ORAL AT BEDTIME
Qty: 28 TABLET | Refills: 0 | Status: SHIPPED | OUTPATIENT
Start: 2025-03-26

## 2025-03-26 RX ORDER — OLANZAPINE 10 MG/1
10 TABLET ORAL AT BEDTIME
Qty: 28 TABLET | Refills: 0 | Status: SHIPPED | OUTPATIENT
Start: 2025-03-26

## 2025-04-04 ENCOUNTER — MEDICAL CORRESPONDENCE (OUTPATIENT)
Dept: HEALTH INFORMATION MANAGEMENT | Facility: CLINIC | Age: 25
End: 2025-04-04
Payer: COMMERCIAL

## 2025-04-07 ENCOUNTER — VIRTUAL VISIT (OUTPATIENT)
Dept: PSYCHIATRY | Facility: CLINIC | Age: 25
End: 2025-04-07
Payer: COMMERCIAL

## 2025-04-07 ENCOUNTER — TRANSCRIBE ORDERS (OUTPATIENT)
Dept: OTHER | Age: 25
End: 2025-04-07

## 2025-04-07 DIAGNOSIS — F70 MILD INTELLECTUAL DISABILITY: ICD-10-CM

## 2025-04-07 DIAGNOSIS — Q86.0 FETAL ALCOHOL SYNDROME: ICD-10-CM

## 2025-04-07 DIAGNOSIS — E66.812 CLASS 2 OBESITY DUE TO EXCESS CALORIES WITHOUT SERIOUS COMORBIDITY IN ADULT: Primary | ICD-10-CM

## 2025-04-07 DIAGNOSIS — R46.89 AGGRESSIVE BEHAVIOR: ICD-10-CM

## 2025-04-07 DIAGNOSIS — F25.0 SCHIZOAFFECTIVE DISORDER, BIPOLAR TYPE (H): Primary | ICD-10-CM

## 2025-04-07 DIAGNOSIS — F25.9 SCHIZOAFFECTIVE DISORDER, UNSPECIFIED TYPE (H): ICD-10-CM

## 2025-04-07 DIAGNOSIS — F39: ICD-10-CM

## 2025-04-07 DIAGNOSIS — F90.9 ATTENTION DEFICIT HYPERACTIVITY DISORDER (ADHD), UNSPECIFIED ADHD TYPE: ICD-10-CM

## 2025-04-07 DIAGNOSIS — E66.09 CLASS 2 OBESITY DUE TO EXCESS CALORIES WITHOUT SERIOUS COMORBIDITY IN ADULT: Primary | ICD-10-CM

## 2025-04-07 DIAGNOSIS — Z87.820 HISTORY OF TRAUMATIC BRAIN INJURY: ICD-10-CM

## 2025-04-07 DIAGNOSIS — F63.81 INTERMITTENT EXPLOSIVE DISORDER: ICD-10-CM

## 2025-04-07 PROCEDURE — G2211 COMPLEX E/M VISIT ADD ON: HCPCS | Performed by: NURSE PRACTITIONER

## 2025-04-07 PROCEDURE — 98006 SYNCH AUDIO-VIDEO EST MOD 30: CPT | Performed by: NURSE PRACTITIONER

## 2025-04-07 PROCEDURE — 1126F AMNT PAIN NOTED NONE PRSNT: CPT | Performed by: NURSE PRACTITIONER

## 2025-04-07 ASSESSMENT — PAIN SCALES - GENERAL: PAINLEVEL_OUTOF10: NO PAIN (0)

## 2025-04-07 ASSESSMENT — PATIENT HEALTH QUESTIONNAIRE - PHQ9
SUM OF ALL RESPONSES TO PHQ QUESTIONS 1-9: 1
10. IF YOU CHECKED OFF ANY PROBLEMS, HOW DIFFICULT HAVE THESE PROBLEMS MADE IT FOR YOU TO DO YOUR WORK, TAKE CARE OF THINGS AT HOME, OR GET ALONG WITH OTHER PEOPLE: NOT DIFFICULT AT ALL
SUM OF ALL RESPONSES TO PHQ QUESTIONS 1-9: 1

## 2025-04-07 NOTE — PATIENT INSTRUCTIONS
"[facility]  What to Expect  Here's what to expect in the [facility].   About the program  You'll be meeting with a psychiatric doctor to check your mental health. A psychiatric doctor helps you deal with troubling thoughts and feelings by giving you medicine. They'll make sure you know the plan for your care. You may see them for a long time. When you're feeling better, they may refer you back to seeing your family doctor.   If you have any questions, we'll be glad to talk to you.  About visits  Be open  At your visits, please talk openly about your problems. It may feel hard, but it's the best way for us to help you.  Cancelling visits  If you can't come to your visit, please call us right away at [phone]. If you don't cancel at least 24 hours (1 full day) before your visit, that's \"late cancellation.\"  Not showing up for your visits  Being very late is the same as not showing up. You'll be a \"no show\" if:  You're more than 15 minutes late for a 30-minute (half hour) visit.  You're more than 30 minutes late for a 60-minute (full hour) visit.  If you cancel late or don't show up 2 times within 6 months, we may end your care.  Getting help between visits  If you need help between visits, you can call us Monday to Friday from 8 a.m. to 4:30 p.m. at [phone].  Emergency care  Call 911 or go to the nearest emergency department if your life or someone else's life is in danger.  Call 988 anytime to reach the national Suicide and Crisis hotline.  Medicine refills  To refill your medicine, call your pharmacy. You can also call [facility]'s [facility] at [phone], Monday to Friday, 8 a.m. to 4:30 p.m. It can take 1 to 3 business days to get a refill.   Forms, letters, and tests  You may have papers to fill out, like FMLA, short-term disability, and workability. We can help you with these forms at your visits, but you must have an appointment. You may need more " than 1 visit for this, to be in an intensive therapy program, or both.  Before we can give you medicine for ADHD, we may refer you to get tested for it or confirm it another way.  We may not be able to give you an emotional support animal letter.  We don't do mental health checks ordered by the court.   We don't do mental health testing, but we can refer you to get tested.   Thank you for choosing us for your care.  For informational purposes only. Not to replace the advice of your health care provider. Copyright   2022 [facility]. All rights reserved. [employer] [id number] - 12/22      After Visit Summary   Continue medications as prescribed  Have your pharmacy contact us for a refill if you are running low on medications (We may ask you to come into clinic to get a refill from the nurse  No Alcohol or drug use  No driving if sedated  Call the clinic with any questions or concerns   Reach out for help if you feel like hurting yourself or others ([facility] [phone]: [address] [facility], [address] or [facility] Suicide Hotline   [phone] , call 911 or go to nearest Emergency room     Crisis Resources:    Present to the Emergency Department as needed or call after hours crisis line at [phone] or [phone].   [place] Crisis Text Line: Text [place] to 075168.  Suicide LifeLine Chat: suicidepreventionlifeline.org/chat/.  National Suicide Prevention Lifeline: 842.485.5442 (TTY: 222.128.3846). Call anytime for help.  (www.suicidepreventionlifeline.org)  National [place] on Mental Illness (www.milvia.org): 820.448.6589 or 240-727-6354.  Mental Health Association (www.mentalhealth.org): 708.444.6170 or 090-264-0505.       Follow up as directed, for your appointments, per your After Visit Summary Form.

## 2025-04-07 NOTE — PROGRESS NOTES
"  The patient has been notified of following:      \"This virtual  visit will be conducted via a call between you and your physician/provider. We have found that certain health care needs can be provided without the need for a physical exam.  This service lets us provide the care you need virtually/via video   If a prescription is necessary we can send it directly to your pharmacy.  If lab work is needed we can place an order for that and you can then stop by our lab to have the test done at a later time.     Virtual/Video visits are billed at different rates depending on your insurance coverage.Some insurers they may be billed the same as an in-person visit.  Please reach out to your insurance provider with any questions.    Patient has given verbal consent for virtual  visit : Yes      Ho w would you like to obtain your AVS? Mail a copy  If the video visit is dropped, the invitation should be resent by: Send to e-mail at: [email or web address]  Will anyone else be joining your video visit? No            Psychiatric  Out- Patient  Follow Up Progress Note  Date of visit:4/7/2025           Discussion of Care and Treatment Recommendations:   This is a 24 year old male with a history of TBI, self-injurious behavior, aggressive behavior,  fetal alcohol  spectrum, intellectual disability, intermittent explosive disorder, schizoaffective disorder, and hallucination .     Patient resides in a group home.  Patient is seen today together with group home staff.      Last visit 02/27/2025.  Recommendation at last visit .  Continue current medications  Abilify  5 mg in the morning  Invega 234 mg /IM monthly -given by group home   Thorazine 50 mg BID - agitation PRN    Trazodone 300 mg at bedtime   Lithium 900 mg very evening with meals   Olanzapine 10 mg daily -  Hydroxyzine 50 mg TID PRN-     3. RTC: 2- 4 week in person visit  4.  Call 911 or go to the ER if feeling unsafe.  Crisis numbers also provided  Patient and I " reviewed diagnosis and treatment plan and patient agrees with following recommendations:  Ongoing education given regarding diagnostic and treatment options with adequate verbalization of understanding.  Plan   Continue current medications  Abilify  5 mg in the morning  Invega 234 mg /IM monthly -given by group home   Thorazine 50 mg BID - agitation PRN    Trazodone 300 mg at bedtime   Lithium 900 mg very evening with meals   Olanzapine 10 mg daily -  Hydroxyzine 50 mg TID PRN-     3. RTC: 4 week   4.  Call 911 or go to the ER if feeling unsafe.  Crisis numbers also provided         DIagnoses:     Intermittent explosive disorder  Fetal alcohol spectrum disorder  History of traumatic brain injury  Tobacco use disorder  Mild intellectual disability  ADHD (attention deficit hyperactivity disorder), combined type  Aggressive behavior      Patient Active Problem List   Diagnosis    Thrombocytopenia    Chronic ITP (idiopathic thrombocytopenia) (H)    Attention deficit disorder    Intermittent explosive disorder    Anxiety disorder, unspecified    Schizoaffective disorder, unspecified (H)    Schizoaffective disorder, bipolar type (H)    Fetal alcohol syndrome    Intellectual disability    TBI (traumatic brain injury) (H)    Hallucinations    Aggressive behavior    Depression, unspecified depression type             Chief Complaint / Subjective:    Chief complaint: Psychosis    History of Present Illness:   Patient is seen together with his group home staff who also provided collateral information with patient's consent.  Patient reports compliance with current medications and denies side effects.  Occasional noncommanding auditory hallucinations but states they have decreased and he is able to manage them without any incidents.  Group home staff reports that he has been compliant with current medications.  Has not had no anger incidents or any physical aggression since our last visit.  I applauded him.  He has been getting  "along with peers and staff.  They are happy with his current progress.  Patient appears to be responding positively to current medications and behaviors manageable at this time.  Patient to continue with current medications    Answers submitted by the patient for this visit:  Patient Health Questionnaire (Submitted on 4/7/2025)  If you checked off any problems, how difficult have these problems made it for you to do your work, take care of things at home, or get along with other people?: Not difficult at all  PHQ9 TOTAL SCORE: 1    Mental Status Examination:   Appearance: Well groomed, good eye contact   Orientation: Patient alert and oriented to person, place, time, and situation  Reliability:  Patient appears to be an adequate historian.    Behavior: cooperative   Speech: Speech is spontaneous and coherent, with a normal rate, rhythm and tone.    Language:There are no difficulties with expressive or receptive language as observed throughout the interview.    Mood: Described as \"am fine \".    Affect: congruent   Judgement: Able to make basic decision regarding safety.  Insight: Good awareness of physical and mental health conditions and aware of needs around care for these.  Gait and station: unable to assess  Thought process: Logical   Thought content: No evidence of delusions or paranoia.    Hallucinations : No evidence of any hallucination  Thought content: No evidence of delusions or paranoia.   Suicidal /Homical Ideations:  No thoughts of self harm or suicide. No thoughts of harming others.  Associations: Connected  Fund of knowledge: Average  Attention / Concentration: Able to remain focused during the interview with minimal distractibility or need for redirection.  Short Term Memory: Grossly intact as evidence by client recalling themes and ideas discussed.  Long Term Memory: Intact  Motor Status: unable to asse    Drug/treatment history and current pattern of use:   History of cannabis use. Currently denies " use of mood altering substances     Medication changes: See Above   Medication adherence: compliant  Medication side effects: absent  Information about medications: Side effects, benefits and alternative treatments discussed and patient agrees .    Psychotherapy: Supportive therapy day-to-day living    Education: Diet, exercise, abstinence from drugs and alcohol, patient will not drive if sedated and medications or  under influence of any substance    Lab Results:   Personally reviewed and discussed with the patient    Lab Results   Component Value Date    WBC 11.0 12/09/2024    HGB 12.6 (L) 12/09/2024    HCT 37.2 (L) 12/09/2024     12/09/2024    CHOL 150 12/15/2023    TRIG 75 12/15/2023    HDL 34 (L) 12/15/2023    ALT 33 12/09/2024    AST 28 12/09/2024     12/09/2024    BUN 11.2 12/09/2024    CO2 24 12/09/2024    TSH 3.61 06/30/2022       Vital signs:  There were no vitals taken for this visit.  Telemedicine visit-no vital signs completed  Allergies: Depakote [valproic acid], Depakote [valproic acid], and Other environmental allergy         Medications:     Current Outpatient Medications   Medication Sig Dispense Refill    ARIPiprazole (ABILIFY) 5 MG tablet TAKE 1 TABLET BY MOUTH EVERY MORNING 28 tablet 0    atropine 1 % ophthalmic solution Place 2 drops under the tongue 3 times daily.      benzonatate (TESSALON) 100 MG capsule Take 100 mg by mouth 3 times daily as needed for cough.      chlorproMAZINE (THORAZINE) 50 MG tablet Take 1 tablet (50 mg) by mouth 2 times daily as needed for anxiety. 60 tablet 2    cholecalciferol 25 MCG (1000 UT) TABS Take 25 mcg by mouth.      ferrous sulfate (FE TABS) 325 (65 Fe) MG EC tablet Take 325 mg by mouth daily      ferrous sulfate (FEROSUL) 325 (65 Fe) MG tablet Take 325 mg by mouth      hydrOXYzine HCl (ATARAX) 50 MG tablet Take 1 tablet (50 mg) by mouth 3 times daily as needed for anxiety. 90 tablet 0    liraglutide - Weight Management (SAXENDA) 18 MG/3ML pen  Inject subcutaneously.      lithium 300 MG capsule TAKE 3 CAPSULES BY MOUTH EVERY EVENING WITH DINNER 84 capsule 1    lithium 300 MG capsule TAKE 3 CAPSULES BY MOUTH EVERY EVENING WITH DINNER 84 capsule 0    loratadine (CLARITIN) 10 MG tablet Take 10 mg by mouth.      metFORMIN (GLUCOPHAGE-XR) 500 MG 24 hr tablet Take 1,000 mg by mouth.      nicotine (NICODERM CQ) 14 MG/24HR 24 hr patch Place 1 patch onto the skin every 24 hours.      nicotine (NICORETTE) 4 MG lozenge Place 4 mg inside cheek every hour as needed for nicotine withdrawal symptoms.      OLANZapine (ZYPREXA) 10 MG tablet TAKE 1 TABLET BY MOUTH AT BEDTIME 28 tablet 0    ondansetron (ZOFRAN ODT) 4 MG ODT tab Take 1 tablet (4 mg) by mouth every 6 hours as needed for nausea or vomiting 15 tablet 0    paliperidone (INVEGA SUSTENNA) 234 MG/1.5ML JUANA Inject 1.5 mLs (234 mg) into the muscle every 28 days. 1.5 mL 3    paliperidone (INVEGA SUSTENNA) 234 MG/1.5ML JUANA INJECT 234MG INTRAMUSCULARLY EVERY 28 DAYS 1.5 mL 0    polyethylene glycol (MIRALAX) 17 g packet Take 1 packet by mouth daily.      traZODone HCl 300 MG TABS TAKE 1 TABLET BY MOUTH AT BEDTIME 28 tablet 0    traZODone HCl 300 MG TABS TAKE 1 TABLET BY MOUTH AT BEDTIME 28 tablet 0    VICTOZA PEN 18 MG/3ML soln       vitamin C (ASCORBIC ACID) 250 MG TABS tablet Take 250 mg by mouth       No current facility-administered medications for this visit.               Medication adherence: Reviewed risk/benefits of medication , Patient able to verbalize understanding of side effects and Patient verbally consents to taking medications    Suicide Risk Assessment:   Feels safe in home: Yes   Suicidal ideation: Denies  History of suicide attempts:  No   Hx of impulsivity: No Impetuous and self-damaging behavior is common and can take many forms. Patients abuse substances, binge eat, engage in unsafe sex, spend money irresponsibly, and drive recklessly. In addition, patients can suddenly quit a job that they need  or end a relationship that has the potential to last, thereby sabotaging their own success. Impulsivity can also manifest with immature and regressive behavior and often takes the form of sexually acting out.  Hope for the future: present   Hx of Command hallucinations or current psychosis: No  History of Self-injurious behaviors: No Current:  No  Family member  by suicide:  No  A thorough assessment of risk factors related to suicide and self-harm have been reviewed and are noted above. The patient convincingly denies acute suicidality on several occasions. Local community safety resources reviewed and printed for patient to use if needed. There was no deceit detected, and the patient presented in a manner that was believab          PSYCHOEDUCATION:  Medication side effects and alternatives reviewed. Health promotion activities recommended and reviewed today. All questions addressed. Education and counseling completed regarding risks and benefits of medications and psychotherapy options.  Consent provided by patient/guardian  Call the psychiatric nurse line with medication questions or concerns at [phone].  simplifyMDhart may be used to communicate with your provider, but this is not intended to be used for emergencies.  SEROTONIN SYNDROME:  Discussed risks of Serotonin syndrome (ie, serotonin toxicity) which is a potentially life-threatening condition associated with increased serotonergic activity in the central nervous system (CNS). It is seen with therapeutic medication use, inadvertent interactions between drugs, and intentional self-poisoning. Serotonin syndrome may involve a spectrum of clinical findings, which often include mental status changes, autonomic hyperactivity, and neuromuscular abnormalities.    STIMULANT THERAPY: Side effects discussed including but not limited to cardiac (including HTN, tachycardia, sudden death), motor/tic, appetite/growth, mood lability and sleep disruption. This is a  controlled substance with risk for abuse, need to keep in a safe keep place and cannot replace lost scripts  HARM REDUCTION:  Discussions regarding effects of mood altering substances, alcohol and cannabis, on mood and that approach is harm reduction, will continue to prescribe meds as they work to cut back use.    SAFETY:  We all care about your loved one's safety. To reduce the risk of self-harm, remove access to all:  Firearms, Medicines (both prescribed and over-the-counter), Knives and other sharp objects, Ropes and like materials, and Alcohol  SLEEP HYGIENE: establish a sleep routine, limit screen time 1 hour prior to bed, use bed for sleep only, take sleep/medications on time (including sleepy time tea, trazadone or herbal treatments such as melatonin), aroma therapy, limit caffeine/sugar, yoga, guided imagery, stretch, meditation, limit naps to 20 minutes, make a temperature change in the room, white noise, be mindful of slowing down breathing, take a warm bath/shower, frequently wash sheets, and journaling.   [employer].gov is information for patients.  It is run by the [employer] and it contains information about all disorders, diseases and all medications.              Review of Systems:      ROS:    Subjective Data Only- Tele-Health Visit    10 point ROS was negative except for the items listed in HPI.      Crisis Resources:    Present to the Emergency Department as needed or call after hours crisis line at [phone] or [phone].   [place] Crisis Text Line: Text [place] to 750237.  Suicide LifeLine Chat: suicidepreventionlifeline.org/chat/.  National Suicide Prevention Lifeline: 266.818.3632 (TTY: 895.196.3273). Call anytime for help.  (www.suicidepreventionlifeline.org)  National [place] on Mental Illness (www.milvia.org): 608.595.1242 or 634-759-0834.  Mental Health Association (www.mentalhealth.org): 469.413.5626 or 678-115-1402.      Coordination of Care:   More than 50% of time  spent on coordination of care including: Educating patient about diagnosis, prognosis, side effects and benefits of medications, diet, exercise.  Time also spent providing supportive therapy regarding above issues.    Video-Visit Details    Type of service:  Video Visit    Originating Location (pt. Location): Home    Distant Location (provider location): Providers Remote Office     Platform used for Video Visit: [employer]      Disclaimer: This note consists of symbols derived from keyboarding, dictation and/or voice recognition software. As a result, there may be errors in the script that have gone undetected. Please consider this when interpreting information found in this chart.    Start Time : 1130  End time : 1201

## 2025-04-07 NOTE — NURSING NOTE
Is the patient currently in the state of [place]? YES    Current patient location: [address] [place] [id number]    Visit mode:Video    If the visit is dropped, the patient can be reconnected by: VIDEO VISIT: Text to cell phone:   Telephone Information:   Mobile    Mobile        Will anyone else be joining the visit? No  (If patient encounters technical issues they should call [phone])    Are changes needed to the allergy or medication list? No    Are refills needed on medications prescribed by this physician? No    Rooming Documentation: Questionnaire(s) completed.    Reason for visit: RECHFLORI Bryant

## 2025-04-22 DIAGNOSIS — F63.81 INTERMITTENT EXPLOSIVE DISORDER: ICD-10-CM

## 2025-04-22 DIAGNOSIS — F39: ICD-10-CM

## 2025-04-22 DIAGNOSIS — F25.0 SCHIZOAFFECTIVE DISORDER, BIPOLAR TYPE (H): ICD-10-CM

## 2025-04-22 DIAGNOSIS — F43.23 ADJUSTMENT DISORDER WITH MIXED ANXIETY AND DEPRESSED MOOD: ICD-10-CM

## 2025-04-22 NOTE — TELEPHONE ENCOUNTER
Date of Last Office Visit: 4/7/2025  Date of Next Office Visit: 5/5/2025  No shows since last visit: No  More than one patient-initiated cancellation (with reschedule) since last seen in clinic? No      Medication(s) requested:   OLANZapine (ZYPREXA) 10 MG tablet 28 tablet 0 3/26/2025 -- No   Sig - Route: TAKE 1 TABLET BY MOUTH AT BEDTIME - Oral     ARIPiprazole (ABILIFY) 5 MG tablet 28 tablet 0 3/26/2025 -- No   Sig - Route: TAKE 1 TABLET BY MOUTH EVERY MORNING - Oral      Disp Refills Start End CIELO   traZODone HCl 300 MG TABS 28 tablet 0 3/26/2025 -- No   Sig - Route: TAKE 1 TABLET BY MOUTH AT BEDTIME - Oral       Requested medication(s) verified as identical to current order? Yes    Any lapse in adherence to medication(s) greater than 5 days? No      Last visit treatment plan:        Discussion of Care and Treatment Recommendations:   This is a 24 year old male with a history of TBI, self-injurious behavior, aggressive behavior,  fetal alcohol  spectrum, intellectual disability, intermittent explosive disorder, schizoaffective disorder, and hallucination .     Patient resides in a group home.  Patient is seen today together with group home staff.        Last visit 02/27/2025.  Recommendation at last visit .  Continue current medications  Abilify  5 mg in the morning  Invega 234 mg /IM monthly -given by group home   Thorazine 50 mg BID - agitation PRN    Trazodone 300 mg at bedtime   Lithium 900 mg very evening with meals   Olanzapine 10 mg daily -  Hydroxyzine 50 mg TID PRN-     3. RTC: 2- 4 week in person visit  4.  Call 911 or go to the ER if feeling unsafe.  Crisis numbers also provided  Patient and I reviewed diagnosis and treatment plan and patient agrees with following recommendations:  Ongoing education given regarding diagnostic and treatment options with adequate verbalization of understanding.  Plan   Continue current medications  Abilify  5 mg in the morning  Invega 234 mg /IM monthly -given by group  home   Thorazine 50 mg BID - agitation PRN    Trazodone 300 mg at bedtime   Lithium 900 mg very evening with meals   Olanzapine 10 mg daily -  Hydroxyzine 50 mg TID PRN-     3. RTC: 4 week   4.  Call 911 or go to the ER if feeling unsafe.  Crisis numbers also provided       Any medication(s) require lab monitoring? Yes   GENERAL ANTIPSYCHOTIC   Last Hemoglobin A1c:   Hemoglobin A1C   Date Value Ref Range Status   12/15/2023 5.6 0.0 - 5.6 % Final     Comment:     Normal <5.7%   Prediabetes 5.7-6.4%    Diabetes 6.5% or higher     Note: Adopted from ADA consensus guidelines.     Last Lipid Panel (fasting):     Ludmila Gomez RN on 4/22/2025 at 2:41 PM

## 2025-04-22 NOTE — TELEPHONE ENCOUNTER
Group home administers. Last delivered 4/3/25 and they are getting ready to send all refills.     Please remove note to pharmacy if filling this medication. There are no other prescribers.     Date of Last Office Visit: 4/7/2025  Date of Next Office Visit: 5/5/2025  No shows since last visit: No  More than one patient-initiated cancellation (with reschedule) since last seen in clinic? No    [x]Medication unable to be refilled by RN due to criteria not met as indicated below:      [x]Medication not included in policy    Medication(s) requested:   paliperidone (INVEGA SUSTENNA) 234 MG/1.5ML JUANA 1.5 mL 0 1/2/2025 -- No   Sig: INJECT 234MG INTRAMUSCULARLY EVERY 28 DAYS       Requested medication(s) verified as identical to current order? Yes    Any lapse in adherence to medication(s) greater than 5 days? No      Last visit treatment plan:        Discussion of Care and Treatment Recommendations:   This is a 24 year old male with a history of TBI, self-injurious behavior, aggressive behavior,  fetal alcohol  spectrum, intellectual disability, intermittent explosive disorder, schizoaffective disorder, and hallucination .     Patient resides in a group home.  Patient is seen today together with group home staff.        Last visit 02/27/2025.  Recommendation at last visit .  Continue current medications  Abilify  5 mg in the morning  Invega 234 mg /IM monthly -given by group home   Thorazine 50 mg BID - agitation PRN    Trazodone 300 mg at bedtime   Lithium 900 mg very evening with meals   Olanzapine 10 mg daily -  Hydroxyzine 50 mg TID PRN-     3. RTC: 2- 4 week in person visit  4.  Call 911 or go to the ER if feeling unsafe.  Crisis numbers also provided  Patient and I reviewed diagnosis and treatment plan and patient agrees with following recommendations:  Ongoing education given regarding diagnostic and treatment options with adequate verbalization of understanding.  Plan   Continue current medications  Abilify  5 mg in  the morning  Invega 234 mg /IM monthly -given by group home   Thorazine 50 mg BID - agitation PRN    Trazodone 300 mg at bedtime   Lithium 900 mg very evening with meals   Olanzapine 10 mg daily -  Hydroxyzine 50 mg TID PRN-     3. RTC: 4 week   4.  Call 911 or go to the ER if feeling unsafe.  Crisis numbers also provided       Any medication(s) require lab monitoring? Yes   GENERAL ANTIPSYCHOTIC   Last Hemoglobin A1c:   Hemoglobin A1C   Date Value Ref Range Status   12/15/2023 5.6 0.0 - 5.6 % Final     Comment:     Normal <5.7%   Prediabetes 5.7-6.4%    Diabetes 6.5% or higher     Note: Adopted from ADA consensus guidelines.     Last Lipid Panel (fasting):     Ludmila Gomez RN on 4/22/2025 at 2:41 PM

## 2025-04-23 RX ORDER — TRAZODONE HYDROCHLORIDE 300 MG/1
1 TABLET ORAL AT BEDTIME
Qty: 28 TABLET | Refills: 0 | Status: SHIPPED | OUTPATIENT
Start: 2025-04-23

## 2025-04-23 RX ORDER — OLANZAPINE 10 MG/1
10 TABLET, FILM COATED ORAL AT BEDTIME
Qty: 28 TABLET | Refills: 0 | Status: SHIPPED | OUTPATIENT
Start: 2025-04-23

## 2025-04-23 RX ORDER — PALIPERIDONE PALMITATE 234 MG/1.5ML
INJECTION INTRAMUSCULAR
Qty: 1.5 ML | Refills: 3 | Status: SHIPPED | OUTPATIENT
Start: 2025-04-23

## 2025-04-23 RX ORDER — ARIPIPRAZOLE 5 MG/1
5 TABLET ORAL EVERY MORNING
Qty: 28 TABLET | Refills: 0 | Status: SHIPPED | OUTPATIENT
Start: 2025-04-23

## 2025-05-05 ENCOUNTER — VIRTUAL VISIT (OUTPATIENT)
Dept: PSYCHIATRY | Facility: CLINIC | Age: 25
End: 2025-05-05
Payer: COMMERCIAL

## 2025-05-05 DIAGNOSIS — F63.81 INTERMITTENT EXPLOSIVE DISORDER: ICD-10-CM

## 2025-05-05 DIAGNOSIS — F39: Primary | ICD-10-CM

## 2025-05-05 DIAGNOSIS — F79 INTELLECTUAL DISABILITY: ICD-10-CM

## 2025-05-05 DIAGNOSIS — Z87.820 HISTORY OF TRAUMATIC BRAIN INJURY: ICD-10-CM

## 2025-05-05 DIAGNOSIS — F90.9 ATTENTION DEFICIT HYPERACTIVITY DISORDER (ADHD), UNSPECIFIED ADHD TYPE: ICD-10-CM

## 2025-05-05 DIAGNOSIS — F70 MILD INTELLECTUAL DISABILITY: ICD-10-CM

## 2025-05-05 DIAGNOSIS — F25.9 SCHIZOAFFECTIVE DISORDER, UNSPECIFIED TYPE (H): ICD-10-CM

## 2025-05-05 PROCEDURE — 98006 SYNCH AUDIO-VIDEO EST MOD 30: CPT | Performed by: NURSE PRACTITIONER

## 2025-05-05 PROCEDURE — 1126F AMNT PAIN NOTED NONE PRSNT: CPT | Performed by: NURSE PRACTITIONER

## 2025-05-05 ASSESSMENT — PAIN SCALES - GENERAL: PAINLEVEL_OUTOF10: NO PAIN (0)

## 2025-05-05 NOTE — NURSING NOTE
Current patient location:  group home    Is the patient currently in the state of [place]? YES    Visit mode: VIDEO    If the visit is dropped, the patient can be reconnected by:VIDEO VISIT: Text to cell phone:   Telephone Information:   Mobile [phone]    Mobile Not on file.       Will anyone else be joining the visit? NO  (If patient encounters technical issues they should call [phone])    Are changes needed to the allergy or medication list? No    Are refills needed on medications prescribed by this physician? Discuss with provider    Rooming Documentation:  Questionnaire(s) not pre-assigned Unable to complete questionnaire(s) due to time    Reason for visit: RECHECK    Patsy RUBY

## 2025-05-05 NOTE — PATIENT INSTRUCTIONS
"[facility]  What to Expect  Here's what to expect in the [facility] Program.   About the program  You'll be meeting with a psychiatric doctor to check your mental health. A psychiatric doctor helps you deal with troubling thoughts and feelings by giving you medicine. They'll make sure you know the plan for your care. You may see them for a long time. When you're feeling better, they may refer you back to seeing your family doctor.   If you have any questions, we'll be glad to talk to you.  About visits  Be open  At your visits, please talk openly about your problems. It may feel hard, but it's the best way for us to help you.  Cancelling visits  If you can't come to your visit, please call us right away at [phone]. If you don't cancel at least 24 hours (1 full day) before your visit, that's \"late cancellation.\"  Not showing up for your visits  Being very late is the same as not showing up. You'll be a \"no show\" if:  You're more than 15 minutes late for a 30-minute (half hour) visit.  You're more than 30 minutes late for a 60-minute (full hour) visit.  If you cancel late or don't show up 2 times within 6 months, we may end your care.  Getting help between visits  If you need help between visits, you can call us Monday to Friday from 8 a.m. to 4:30 p.m. at [phone].  Emergency care  Call 911 or go to the nearest emergency department if your life or someone else's life is in danger.  Call 988 anytime to reach the national Suicide and Crisis hotline.  Medicine refills  To refill your medicine, call your pharmacy. You can also call [facility]'s [facility] at [phone], Monday to Friday, 8 a.m. to 4:30 p.m. It can take 1 to 3 business days to get a refill.   Forms, letters, and tests  You may have papers to fill out, like FMLA, short-term disability, and workability. We can help you with these forms at your visits, but you must have an appointment. You may need more " than 1 visit for this, to be in an intensive therapy program, or both.  Before we can give you medicine for ADHD, we may refer you to get tested for it or confirm it another way.  We may not be able to give you an emotional support animal letter.  We don't do mental health checks ordered by the court.   We don't do mental health testing, but we can refer you to get tested.   Thank you for choosing us for your care.  For informational purposes only. Not to replace the advice of your health care provider. Copyright   2022 [facility]. All rights reserved. [employer] 592356 - 12/22      After Visit Summary   Continue medications as prescribed  Have your pharmacy contact us for a refill if you are running low on medications (We may ask you to come into clinic to get a refill from the nurse  No Alcohol or drug use  No driving if sedated  Call the clinic with any questions or concerns   Reach out for help if you feel like hurting yourself or others ([facility] [phone]: [address], [address] or [facility] Suicide Hotline   [phone] , call 911 or go to nearest Emergency room     Crisis Resources:    Present to the Emergency Department as needed or call after hours crisis line at [phone] or [phone].   [place] Crisis Text Line: Text [place] to 099178.  Suicide LifeLine Chat: suicidepreventionlifeline.org/chat/.  National Suicide Prevention Lifeline: 182.883.1134 (TTY: 967.222.4911). Call anytime for help.  (www.suicidepreventionlifeline.org)  National [place] on Mental Illness (www.milvia.org): 639.765.8300 or 845-068-2066.  Mental Health Association (www.mentalhealth.org): 822.193.2808 or 591-414-9394.       Follow up as directed, for your appointments, per your After Visit Summary Form.

## 2025-05-05 NOTE — PROGRESS NOTES
"  The patient has been notified of following:      \"This virtual  visit will be conducted via a call between you and your physician/provider. We have found that certain health care needs can be provided without the need for a physical exam.  This service lets us provide the care you need virtually/via video   If a prescription is necessary we can send it directly to your pharmacy.  If lab work is needed we can place an order for that and you can then stop by our lab to have the test done at a later time.     Virtual/Video visits are billed at different rates depending on your insurance coverage.Some insurers they may be billed the same as an in-person visit.  Please reach out to your insurance provider with any questions.    Patient has given verbal consent for virtual  visit : Yes      Ho w would you like to obtain your AVS? Mail a copy  If the video visit is dropped, the invitation should be resent by: Send to e-mail at: [email or web address]  Will anyone else be joining your video visit? No            Psychiatric  Out- Patient  Follow Up Progress Note  Date of visit:5/5/2025           Discussion of Care and Treatment Recommendations:   This is a 24 year old male with h a history of TBI, self-injurious behavior, aggressive behavior,  fetal alcohol  spectrum, intellectual disability, intermittent explosive disorder, schizoaffective disorder, and hallucination .     Patient resides in a group home.  Patient is seen today together with group home staff.   .      Last visit 04/07/2025.  Recommendation at last visit .  Continue current medications  Abilify  5 mg in the morning  Invega 234 mg /IM monthly -given by group home   Thorazine 50 mg BID - agitation PRN    Trazodone 300 mg at bedtime   Lithium 900 mg very evening with meals   Olanzapine 10 mg daily -  Hydroxyzine 50 mg TID PRN-     3. RTC: 4 week   4.  Call 911 or go to the ER if feeling unsafe.  Crisis numbers also provided     Patient and I reviewed " diagnosis and treatment plan and patient agrees with following recommendations:  Ongoing education given regarding diagnostic and treatment options with adequate verbalization of understanding.  Plan   Continue current medications  Abilify  5 mg in the morning  Invega 234 mg /IM monthly -given by group home   Thorazine 50 mg BID - agitation PRN    Trazodone 300 mg at bedtime   Lithium 900 mg very evening with meals   Olanzapine 10 mg daily -  Hydroxyzine 50 mg TID PRN-     3. RTC: 4 week   4.  Call 911 or go to the ER if feeling unsafe.  Crisis numbers also provided            DIagnoses:     Intermittent explosive disorder  Fetal alcohol spectrum disorder  History of traumatic brain injury  Tobacco use disorder  Mild intellectual disability  ADHD (attention deficit hyperactivity disorder), combined type  Aggressive behavior         Patient Active Problem List   Diagnosis    Thrombocytopenia    Chronic ITP (idiopathic thrombocytopenia) (H)    Attention deficit disorder    Intermittent explosive disorder    Anxiety disorder, unspecified    Schizoaffective disorder, unspecified (H)    Schizoaffective disorder, bipolar type (H)    Fetal alcohol syndrome    Intellectual disability    TBI (traumatic brain injury) (H)    Hallucinations    Aggressive behavior    Depression, unspecified depression type             Chief Complaint / Subjective:    Chief complaint: Psychosis     History of Present Illness:   I spoke with patient and his caregiver.  Patient reports compliance with current medications.  He denies side effects.  He states that his symptoms have been better managed.  He has not had any psychosis or had behavioral issues in the past 2 weeks.  I applauded him.  I did speak with his caregiver who agrees with patient's records.    Both patient and caregiver feel like patient has been responding positively to current medications and behaviors have been better managed.    Patient will continue with current  "medications    Mental Status Examination:   Appearance: Well groomed, good eye contact   Orientation: Patient alert and oriented to person, place, time, and situation  Reliability:  Patient appears to be an adequate historian.    Behavior: cooperative   Speech: Speech is spontaneous and coherent, with a normal rate, rhythm and tone.    Language:There are no difficulties with expressive or receptive language as observed throughout the interview.    Mood: Described as \"good\".    Affect: congruent   Judgement: Able to make basic decision regarding safety.  Insight: Good awareness of physical and mental health conditions and aware of needs around care for these.  Gait and station: unable to assess  Thought process: Logical   Thought content: No evidence of delusions or paranoia.    Hallucinations : No evidence of any hallucination  Thought content: No evidence of delusions or paranoia.   Suicidal /Homical Ideations:  No thoughts of self harm or suicide. No thoughts of harming others.  Associations: Connected  Fund of knowledge: Average  Attention / Concentration: Able to remain focused during the interview with minimal distractibility or need for redirection.  Short Term Memory: Grossly intact as evidence by client recalling themes and ideas discussed.  Long Term Memory: Intact  Motor Status: unable to asse    Drug/treatment history and current pattern of use:   History of cannabis use.  Currently denies use of all mood altering substances    Medication changes: See Above   Medication adherence: compliant  Medication side effects: absent  Information about medications: Side effects, benefits and alternative treatments discussed and patient agrees .    Psychotherapy: Supportive therapy day-to-day living    Education: Diet, exercise, abstinence from drugs and alcohol, patient will not drive if sedated and medications or  under influence of any substance    Lab Results:   Personally reviewed and discussed with the " patient    Lab Results   Component Value Date    WBC 11.0 12/09/2024    HGB 12.6 (L) 12/09/2024    HCT 37.2 (L) 12/09/2024     12/09/2024    CHOL 150 12/15/2023    TRIG 75 12/15/2023    HDL 34 (L) 12/15/2023    ALT 33 12/09/2024    AST 28 12/09/2024     12/09/2024    BUN 11.2 12/09/2024    CO2 24 12/09/2024    TSH 3.61 06/30/2022       Vital signs:  There were no vitals taken for this visit.  Telemedicine visit-no vital signs completed  Allergies: Depakote [valproic acid], Depakote [valproic acid], and Other environmental allergy         Medications:     Current Outpatient Medications   Medication Sig Dispense Refill    ARIPiprazole (ABILIFY) 5 MG tablet TAKE 1 TABLET BY MOUTH EVERY MORNING 28 tablet 0    atropine 1 % ophthalmic solution Place 2 drops under the tongue 3 times daily.      benzonatate (TESSALON) 100 MG capsule Take 100 mg by mouth 3 times daily as needed for cough.      chlorproMAZINE (THORAZINE) 50 MG tablet Take 1 tablet (50 mg) by mouth 2 times daily as needed for anxiety. 60 tablet 2    cholecalciferol 25 MCG (1000 UT) TABS Take 25 mcg by mouth.      ferrous sulfate (FE TABS) 325 (65 Fe) MG EC tablet Take 325 mg by mouth daily      ferrous sulfate (FEROSUL) 325 (65 Fe) MG tablet Take 325 mg by mouth      hydrOXYzine HCl (ATARAX) 50 MG tablet Take 1 tablet (50 mg) by mouth 3 times daily as needed for anxiety. 90 tablet 0    INVEGA SUSTENNA 234 MG/1.5ML JUANA INJECT 234MG INTRAMUSCULARLY EVERY 28 DAYS 1.5 mL 3    liraglutide - Weight Management (SAXENDA) 18 MG/3ML pen Inject subcutaneously.      lithium 300 MG capsule TAKE 3 CAPSULES BY MOUTH EVERY EVENING WITH DINNER 84 capsule 1    lithium 300 MG capsule TAKE 3 CAPSULES BY MOUTH EVERY EVENING WITH DINNER 84 capsule 0    loratadine (CLARITIN) 10 MG tablet Take 10 mg by mouth.      metFORMIN (GLUCOPHAGE-XR) 500 MG 24 hr tablet Take 1,000 mg by mouth.      nicotine (NICODERM CQ) 14 MG/24HR 24 hr patch Place 1 patch onto the skin every 24  hours.      nicotine (NICORETTE) 4 MG lozenge Place 4 mg inside cheek every hour as needed for nicotine withdrawal symptoms.      OLANZapine (ZYPREXA) 10 MG tablet TAKE 1 TABLET BY MOUTH AT BEDTIME 28 tablet 0    ondansetron (ZOFRAN ODT) 4 MG ODT tab Take 1 tablet (4 mg) by mouth every 6 hours as needed for nausea or vomiting 15 tablet 0    paliperidone (INVEGA SUSTENNA) 234 MG/1.5ML JUANA INJECT 234MG INTRAMUSCULARLY EVERY 28 DAYS 1.5 mL 0    polyethylene glycol (MIRALAX) 17 g packet Take 1 packet by mouth daily.      traZODone HCl 300 MG TABS TAKE 1 TABLET BY MOUTH AT BEDTIME 28 tablet 0    traZODone HCl 300 MG TABS TAKE 1 TABLET BY MOUTH AT BEDTIME 28 tablet 0    VICTOZA PEN 18 MG/3ML soln       vitamin C (ASCORBIC ACID) 250 MG TABS tablet Take 250 mg by mouth       No current facility-administered medications for this visit.         No current facility-administered medications for this visit.     No current facility-administered medications for this visit.         Medication adherence: Reviewed risk/benefits of medication , Patient able to verbalize understanding of side effects and Patient verbally consents to taking medications    Suicide Risk Assessment:   Feels safe in home: Yes   Suicidal ideation: Denies  History of suicide attempts:  No   Hx of impulsivity: No Impetuous and self-damaging behavior is common and can take many forms. Patients abuse substances, binge eat, engage in unsafe sex, spend money irresponsibly, and drive recklessly. In addition, patients can suddenly quit a job that they need or end a relationship that has the potential to last, thereby sabotaging their own success. Impulsivity can also manifest with immature and regressive behavior and often takes the form of sexually acting out.  Hope for the future: present   Hx of Command hallucinations or current psychosis: No  History of Self-injurious behaviors: No Current:  No  Family member  by suicide:  No  A thorough assessment of risk  factors related to suicide and self-harm have been reviewed and are noted above. The patient convincingly denies acute suicidality on several occasions. Local community safety resources reviewed and printed for patient to use if needed. There was no deceit detected, and the patient presented in a manner that was believab          PSYCHOEDUCATION:  Medication side effects and alternatives reviewed. Health promotion activities recommended and reviewed today. All questions addressed. Education and counseling completed regarding risks and benefits of medications and psychotherapy options.  Consent provided by patient/guardian  Call the psychiatric nurse line with medication questions or concerns at [phone].  20x200 may be used to communicate with your provider, but this is not intended to be used for emergencies.  SEROTONIN SYNDROME:  Discussed risks of Serotonin syndrome (ie, serotonin toxicity) which is a potentially life-threatening condition associated with increased serotonergic activity in the central nervous system (CNS). It is seen with therapeutic medication use, inadvertent interactions between drugs, and intentional self-poisoning. Serotonin syndrome may involve a spectrum of clinical findings, which often include mental status changes, autonomic hyperactivity, and neuromuscular abnormalities.    STIMULANT THERAPY: Side effects discussed including but not limited to cardiac (including HTN, tachycardia, sudden death), motor/tic, appetite/growth, mood lability and sleep disruption. This is a controlled substance with risk for abuse, need to keep in a safe keep place and cannot replace lost scripts  HARM REDUCTION:  Discussions regarding effects of mood altering substances, alcohol and cannabis, on mood and that approach is harm reduction, will continue to prescribe meds as they work to cut back use.    SAFETY:  We all care about your loved one's safety. To reduce the risk of self-harm, remove access to  all:  Firearms, Medicines (both prescribed and over-the-counter), Knives and other sharp objects, Ropes and like materials, and Alcohol  SLEEP HYGIENE: establish a sleep routine, limit screen time 1 hour prior to bed, use bed for sleep only, take sleep/medications on time (including sleepy time tea, trazadone or herbal treatments such as melatonin), aroma therapy, limit caffeine/sugar, yoga, guided imagery, stretch, meditation, limit naps to 20 minutes, make a temperature change in the room, white noise, be mindful of slowing down breathing, take a warm bath/shower, frequently wash sheets, and journaling.   Medlineplus.gov is information for patients.  It is run by the [employer] and it contains information about all disorders, diseases and all medications.              Review of Systems:      ROS:    Subjective Data Only- Tele-Health Visit    10 point ROS was negative except for the items listed in HPI.      Crisis Resources:    Present to the Emergency Department as needed or call after hours crisis line at [phone] or [phone].   [place] Crisis Text Line: Text [place] to 886301.  Suicide LifeLine Chat: suicidepreFanvibeline.org/chat/.  National Suicide Prevention Lifeline: 749.717.7867 (TTY: 363.211.1105). Call anytime for help.  (www.suicidepreventionlifeline.org)  National [place] on Mental Illness (www.milvia.org): 331.993.7129 or 898-430-4809.  Mental Health Association (www.mentalhealth.org): 315.265.9259 or 077-668-3198.      Coordination of Care:   More than 50% of time spent on coordination of care including: Educating patient about diagnosis, prognosis, side effects and benefits of medications, diet, exercise.  Time also spent providing supportive therapy regarding above issues.    Video-Visit Details    Type of service:  Video Visit    Originating Location (pt. Location): Home    Distant Location (provider location): Providers Remote Office     Platform used for Video Visit:  Katie      Disclaimer: This note consists of symbols derived from keyboarding, dictation and/or voice recognition software. As a result, there may be errors in the script that have gone undetected. Please consider this when interpreting information found in this chart.    Start Time : 1100  End time : 1130

## 2025-05-21 DIAGNOSIS — F43.23 ADJUSTMENT DISORDER WITH MIXED ANXIETY AND DEPRESSED MOOD: ICD-10-CM

## 2025-05-21 DIAGNOSIS — F63.81 INTERMITTENT EXPLOSIVE DISORDER: ICD-10-CM

## 2025-05-21 DIAGNOSIS — F25.0 SCHIZOAFFECTIVE DISORDER, BIPOLAR TYPE (H): ICD-10-CM

## 2025-05-22 RX ORDER — ARIPIPRAZOLE 5 MG/1
5 TABLET ORAL EVERY MORNING
Qty: 28 TABLET | Refills: 0 | Status: SHIPPED | OUTPATIENT
Start: 2025-05-22

## 2025-05-22 RX ORDER — OLANZAPINE 10 MG/1
10 TABLET, FILM COATED ORAL AT BEDTIME
Qty: 28 TABLET | Refills: 0 | Status: SHIPPED | OUTPATIENT
Start: 2025-05-22

## 2025-05-22 RX ORDER — LITHIUM CARBONATE 300 MG/1
CAPSULE ORAL
Qty: 84 CAPSULE | Refills: 1 | Status: SHIPPED | OUTPATIENT
Start: 2025-05-22

## 2025-05-22 RX ORDER — TRAZODONE HYDROCHLORIDE 300 MG/1
1 TABLET ORAL AT BEDTIME
Qty: 28 TABLET | Refills: 0 | Status: SHIPPED | OUTPATIENT
Start: 2025-05-22

## 2025-05-22 NOTE — TELEPHONE ENCOUNTER
Date of Last Office Visit: 05/05/2025  Date of Next Office Visit: 6/12/2025  No shows since last visit: No  More than one patient-initiated cancellation (with reschedule) since last seen in clinic? No    []Medication refilled per  Medication Refill in Ambulatory Care  policy.  [x]Scope of Practice: refill request processed by LPN/MA  []Medication unable to be refilled by RN due to criteria not met as indicated below:    []Eligibility: has not had a provider visit within last 6 months   []Supervision: no future appointment; < 7 days before next appointment   []Compliance: no shows; cancellations; lapse in therapy   []Verification: order discrepancy; may need modification...   [] > 30-day supply request   []Advanced refill request: > 7 days before refill date   []Controlled medication   []Medication not included in policy   []Review: new med; med adjusted <= 30 days; safety alert; requires lab monitoring...   []Other:      Medication(s) requested:     -  OLANZapine (ZYPREXA) 10 MG tablet  Date last ordered: 04/23/2025  Qty: 28  Refills: 0  Appropriate for refill? Yes    -  ARIPiprazole (ABILIFY) 5 MG tablet  Date last ordered: 04/23/2025  Qty: 28  Refills: 0  Appropriate for refill? Yes    -  traZODone HCl 300 MG TABS  Date last ordered: 01/02/2025  Qty: 28  Refills: 0  Appropriate for refill? Yes    Any Controlled Substance(s)? No    Requested medication(s) verified as identical to current order? Yes    Any lapse in adherence to medication(s) greater than 5 days? No      Additional action taken? routed encounter to provider for review.      Last visit treatment plan:   Plan   Continue current medications  Abilify  5 mg in the morning  Invega 234 mg /IM monthly -given by group home   Thorazine 50 mg BID - agitation PRN    Trazodone 300 mg at bedtime   Lithium 900 mg very evening with meals   Olanzapine 10 mg daily -  Hydroxyzine 50 mg TID PRN-     3. RTC: 4 week   4.  Call 911 or go to the ER if feeling unsafe.   Crisis numbers also provided    Any medication(s) require lab monitoring? No    Vonda Cavazos MA on 5/22/2025 at 8:33 AM

## 2025-05-22 NOTE — TELEPHONE ENCOUNTER
Date of Last Office Visit: 05/05/2025  Date of Next Office Visit: 06/12/2025  No shows since last visit: No  More than one patient-initiated cancellation (with reschedule) since last seen in clinic? No    []Medication refilled per  Medication Refill in Ambulatory Care  policy.  [x]Scope of Practice: refill request processed by LPN/MA  []Medication unable to be refilled by RN due to criteria not met as indicated below:    []Eligibility: has not had a provider visit within last 6 months   []Supervision: no future appointment; < 7 days before next appointment   []Compliance: no shows; cancellations; lapse in therapy   []Verification: order discrepancy; may need modification...   [] > 30-day supply request   []Advanced refill request: > 7 days before refill date   []Controlled medication   []Medication not included in policy   []Review: new med; med adjusted <= 30 days; safety alert; requires lab monitoring...   []Other:      Medication(s) requested:     -  lithium 300 MG capsule  Date last ordered: 03/26/2025  Qty: 84  Refills: 1  Appropriate for refill? Yes    Any Controlled Substance(s)? No      Requested medication(s) verified as identical to current order? Yes    Any lapse in adherence to medication(s) greater than 5 days? No      Additional action taken? routed encounter to provider for review.    Last visit treatment plan:   Plan   Continue current medications  Abilify  5 mg in the morning  Invega 234 mg /IM monthly -given by group home   Thorazine 50 mg BID - agitation PRN    Trazodone 300 mg at bedtime   Lithium 900 mg very evening with meals   Olanzapine 10 mg daily -  Hydroxyzine 50 mg TID PRN-     3. RTC: 4 week   4.  Call 911 or go to the ER if feeling unsafe.  Crisis numbers also provided    Any medication(s) require lab monitoring? Yes   LITHIUM   Last Lithium Level:   Lithium   Date Value Ref Range Status   01/01/2025 0.70 0.60 - 1.20 mmol/L Final     Comment:     Therapeutic: 0.60 - 1.20 mmol/L;    Toxic: >2.00 mmol/L     Last BMP/CMP:   Sodium   Date Value Ref Range Status   12/09/2024 137 135 - 145 mmol/L Final     Potassium   Date Value Ref Range Status   12/09/2024 3.7 3.4 - 5.3 mmol/L Final     Chloride   Date Value Ref Range Status   12/09/2024 104 98 - 107 mmol/L Final     Carbon Dioxide (CO2)   Date Value Ref Range Status   12/09/2024 24 22 - 29 mmol/L Final     Anion Gap   Date Value Ref Range Status   12/09/2024 9 7 - 15 mmol/L Final     Urea Nitrogen   Date Value Ref Range Status   12/09/2024 11.2 6.0 - 20.0 mg/dL Final     Creatinine   Date Value Ref Range Status   12/09/2024 0.77 0.67 - 1.17 mg/dL Final     GFR Estimate   Date Value Ref Range Status   12/09/2024 >90 >60 mL/min/1.73m2 Final     Comment:     eGFR calculated using 2021 CKD-EPI equation.     Calcium   Date Value Ref Range Status   12/09/2024 8.9 8.8 - 10.4 mg/dL Final     Comment:     Reference intervals for this test were updated on 7/16/2024 to reflect our healthy population more accurately. There may be differences in the flagging of prior results with similar values performed with this method. Those prior results can be interpreted in the context of the updated reference intervals.     Glucose   Date Value Ref Range Status   12/09/2024 88 70 - 99 mg/dL Final      Last TSH with Free T4 Reflex:   TSH   Date Value Ref Range Status   06/30/2022 3.61 0.30 - 4.20 uIU/mL Final       Vonda Cavazos MA on 5/22/2025 at 8:27 AM

## 2025-06-20 DIAGNOSIS — F25.0 SCHIZOAFFECTIVE DISORDER, BIPOLAR TYPE (H): ICD-10-CM

## 2025-06-20 DIAGNOSIS — F43.23 ADJUSTMENT DISORDER WITH MIXED ANXIETY AND DEPRESSED MOOD: ICD-10-CM

## 2025-06-20 DIAGNOSIS — F63.81 INTERMITTENT EXPLOSIVE DISORDER: ICD-10-CM

## 2025-06-20 NOTE — TELEPHONE ENCOUNTER
Date of Last Office Visit: 5/5/25  Date of Next Office Visit: None; routing for A to assist pt with scheduling.    No shows since last visit: No  More than one patient-initiated cancellation (with reschedule) since last seen in clinic? No June appt cancelled by provider    []Medication refilled per  Medication Refill in Ambulatory Care  policy.  []Scope of Practice: refill request processed by LPN/MA  [x]Medication unable to be refilled by RN due to criteria not met as indicated below:    []Eligibility: has not had a provider visit within last 6 months   [x]Supervision: no future appointment; < 7 days before next appointment   []Compliance: no shows; cancellations; lapse in therapy   []Verification: order discrepancy; may need modification...   [] > 30-day supply request   []Advanced refill request: > 7 days before refill date   []Controlled medication   []Medication not included in policy   []Review: new med; med adjusted <= 30 days; safety alert; requires lab monitoring...   []Other:      Medication(s) requested:     -  ARIPiprazole (ABILIFY) 5 MG tablet   Date last ordered: 5/22/25  Qty: 28  Refills: 0  Appropriate for refill? Provider to review.      -  OLANZapine (ZYPREXA) 10 MG tablet   Date last ordered: 5/22/25  Qty: 28  Refills: 0  Appropriate for refill? Provider to review.      -  traZODone HCl 300 MG TABS   Date last ordered: 5/22/25  Qty: 28  Refills: 0  Appropriate for refill? Provider to review.        Any Controlled Substance(s)? No      Requested medication(s) verified as identical to current order? Yes    Any lapse in adherence to medication(s) greater than 5 days? Unknown     Additional action taken? routed encounter to provider for review.      Last visit treatment plan:   Plan   Continue current medications  Abilify  5 mg in the morning  Invega 234 mg /IM monthly -given by group home   Thorazine 50 mg BID - agitation PRN    Trazodone 300 mg at bedtime   Lithium 900 mg very evening with meals    Olanzapine 10 mg daily -  Hydroxyzine 50 mg TID PRN-     3. RTC: 4 week   4.  Call 911 or go to the ER if feeling unsafe.  Crisis numbers also provided    Any medication(s) require lab monitoring? Yes   GENERAL ANTIPSYCHOTIC   Last Hemoglobin A1c:   Hemoglobin A1C   Date Value Ref Range Status   12/15/2023 5.6 0.0 - 5.6 % Final     Comment:     Normal <5.7%   Prediabetes 5.7-6.4%    Diabetes 6.5% or higher     Note: Adopted from ADA consensus guidelines.     Last Lipid Panel (fasting):   Cholesterol   Date Value Ref Range Status   12/15/2023 150 <200 mg/dL Final     Triglycerides   Date Value Ref Range Status   12/15/2023 75 <150 mg/dL Final     Direct Measure HDL   Date Value Ref Range Status   12/15/2023 34 (L) >=40 mg/dL Final     LDL Cholesterol Calculated   Date Value Ref Range Status   12/15/2023 101 (H) <=100 mg/dL Final     Non HDL Cholesterol   Date Value Ref Range Status   12/15/2023 116 <130 mg/dL Final     Patient Fasting > 8hrs?   Date Value Ref Range Status   12/15/2023 Yes  Final        Sera Carpenter RN on 6/20/2025 at 10:54 AM

## 2025-06-23 RX ORDER — TRAZODONE HYDROCHLORIDE 300 MG/1
1 TABLET ORAL AT BEDTIME
Qty: 28 TABLET | Refills: 0 | Status: SHIPPED | OUTPATIENT
Start: 2025-06-23

## 2025-06-23 RX ORDER — ARIPIPRAZOLE 5 MG/1
5 TABLET ORAL EVERY MORNING
Qty: 28 TABLET | Refills: 0 | Status: SHIPPED | OUTPATIENT
Start: 2025-06-23

## 2025-06-23 RX ORDER — OLANZAPINE 10 MG/1
10 TABLET, FILM COATED ORAL AT BEDTIME
Qty: 28 TABLET | Refills: 0 | Status: SHIPPED | OUTPATIENT
Start: 2025-06-23

## 2025-07-16 DIAGNOSIS — F43.23 ADJUSTMENT DISORDER WITH MIXED ANXIETY AND DEPRESSED MOOD: ICD-10-CM

## 2025-07-16 DIAGNOSIS — F63.81 INTERMITTENT EXPLOSIVE DISORDER: ICD-10-CM

## 2025-07-16 DIAGNOSIS — F25.0 SCHIZOAFFECTIVE DISORDER, BIPOLAR TYPE (H): ICD-10-CM

## 2025-07-16 DIAGNOSIS — F39: ICD-10-CM

## 2025-07-16 RX ORDER — LITHIUM CARBONATE 300 MG/1
CAPSULE ORAL
Qty: 180 CAPSULE | Refills: 0 | Status: SHIPPED | OUTPATIENT
Start: 2025-07-16

## 2025-07-16 RX ORDER — ARIPIPRAZOLE 5 MG/1
5 TABLET ORAL EVERY MORNING
Qty: 90 TABLET | Refills: 0 | Status: SHIPPED | OUTPATIENT
Start: 2025-07-16

## 2025-07-16 RX ORDER — TRAZODONE HYDROCHLORIDE 300 MG/1
1 TABLET ORAL AT BEDTIME
Qty: 90 TABLET | Refills: 0 | Status: SHIPPED | OUTPATIENT
Start: 2025-07-16

## 2025-07-16 RX ORDER — OLANZAPINE 10 MG/1
10 TABLET, FILM COATED ORAL AT BEDTIME
Qty: 90 TABLET | Refills: 0 | Status: SHIPPED | OUTPATIENT
Start: 2025-07-16

## 2025-07-16 RX ORDER — PALIPERIDONE PALMITATE 234 MG/1.5ML
INJECTION INTRAMUSCULAR
Qty: 1.5 ML | Refills: 3 | Status: SHIPPED | OUTPATIENT
Start: 2025-07-16

## 2025-07-16 NOTE — TELEPHONE ENCOUNTER
Date of Last Office Visit: 5/5/25  Date of Next Office Visit: 7/24/25  No shows since last visit: No  More than one patient-initiated cancellation (with reschedule) since last seen in clinic? No    []Medication refilled per  Medication Refill in Ambulatory Care  policy.  []Scope of Practice: refill request processed by LPN/MA  [x]Medication unable to be refilled by RN due to criteria not met as indicated below:    []Eligibility: has not had a provider visit within last 6 months   []Supervision: no future appointment; < 7 days before next appointment   []Compliance: no shows; cancellations; lapse in therapy   []Verification: order discrepancy; may need modification...   [] > 30-day supply request   [x]Advanced refill request: > 7 days before refill date   []Controlled medication   [x]Medication not included in policy   []Review: new med; med adjusted <= 30 days; safety alert; requires lab monitoring...   []Other:      Medication(s) requested:                   Any Controlled Substance(s)? No      Requested medication(s) verified as identical to current order? Yes    Any lapse in adherence to medication(s) greater than 5 days? No      Additional action taken? routed encounter to provider for review.      Last visit treatment plan:     Plan   Continue current medications  Abilify  5 mg in the morning  Invega 234 mg /IM monthly -given by group home   Thorazine 50 mg BID - agitation PRN    Trazodone 300 mg at bedtime   Lithium 900 mg very evening with meals   Olanzapine 10 mg daily -  Hydroxyzine 50 mg TID PRN-     3. RTC: 4 week   4.  Call 911 or go to the ER if feeling unsafe.  Crisis numbers also provided    Is lab monitoring required? Yes LITHIUM   Last Lithium Level:   Lithium   Date Value Ref Range Status   01/01/2025 0.70 0.60 - 1.20 mmol/L Final     Comment:     Therapeutic: 0.60 - 1.20 mmol/L;   Toxic: >2.00 mmol/L     Last BMP/CMP:   Sodium   Date Value Ref Range Status   12/09/2024 137 135 - 145 mmol/L Final      Potassium   Date Value Ref Range Status   12/09/2024 3.7 3.4 - 5.3 mmol/L Final     Chloride   Date Value Ref Range Status   12/09/2024 104 98 - 107 mmol/L Final     Carbon Dioxide (CO2)   Date Value Ref Range Status   12/09/2024 24 22 - 29 mmol/L Final     Anion Gap   Date Value Ref Range Status   12/09/2024 9 7 - 15 mmol/L Final     Urea Nitrogen   Date Value Ref Range Status   12/09/2024 11.2 6.0 - 20.0 mg/dL Final     Creatinine   Date Value Ref Range Status   12/09/2024 0.77 0.67 - 1.17 mg/dL Final     GFR Estimate   Date Value Ref Range Status   12/09/2024 >90 >60 mL/min/1.73m2 Final     Comment:     eGFR calculated using 2021 CKD-EPI equation.     Calcium   Date Value Ref Range Status   12/09/2024 8.9 8.8 - 10.4 mg/dL Final     Comment:     Reference intervals for this test were updated on 7/16/2024 to reflect our healthy population more accurately. There may be differences in the flagging of prior results with similar values performed with this method. Those prior results can be interpreted in the context of the updated reference intervals.     Glucose   Date Value Ref Range Status   12/09/2024 88 70 - 99 mg/dL Final      Last TSH with Free T4 Reflex:   TSH   Date Value Ref Range Status   06/30/2022 3.61 0.30 - 4.20 uIU/mL Final       ALBERTO QUINTANILLA RN on 7/16/2025 at 9:22 AM

## 2025-09-01 ENCOUNTER — HOSPITAL ENCOUNTER (EMERGENCY)
Facility: CLINIC | Age: 25
Discharge: HOME | End: 2025-09-01
Attending: EMERGENCY MEDICINE
Payer: COMMERCIAL

## 2025-09-01 ENCOUNTER — APPOINTMENT (OUTPATIENT)
Dept: GENERAL RADIOLOGY | Facility: CLINIC | Age: 25
End: 2025-09-01
Attending: PHYSICIAN ASSISTANT
Payer: COMMERCIAL

## 2025-09-01 VITALS
OXYGEN SATURATION: 98 % | HEIGHT: 66 IN | WEIGHT: 300 LBS | TEMPERATURE: 98.3 F | RESPIRATION RATE: 18 BRPM | SYSTOLIC BLOOD PRESSURE: 144 MMHG | BODY MASS INDEX: 48.21 KG/M2 | HEART RATE: 93 BPM | DIASTOLIC BLOOD PRESSURE: 93 MMHG

## 2025-09-01 DIAGNOSIS — R07.9 CHEST PAIN: Primary | ICD-10-CM

## 2025-09-01 LAB
ANION GAP SERPL CALCULATED.3IONS-SCNC: 10 MMOL/L (ref 7–15)
ATRIAL RATE - MUSE: 85 BPM
BASOPHILS # BLD AUTO: 0.03 10E3/UL (ref 0–0.2)
BASOPHILS NFR BLD AUTO: 0.3 %
BUN SERPL-MCNC: 7.2 MG/DL (ref 6–20)
BURR CELLS BLD QL SMEAR: SLIGHT
CALCIUM SERPL-MCNC: 8.9 MG/DL (ref 8.8–10.4)
CHLORIDE SERPL-SCNC: 102 MMOL/L (ref 98–107)
CREAT SERPL-MCNC: 0.99 MG/DL (ref 0.67–1.17)
DIASTOLIC BLOOD PRESSURE - MUSE: NORMAL MMHG
EGFRCR SERPLBLD CKD-EPI 2021: >90 ML/MIN/1.73M2
EOSINOPHIL # BLD AUTO: 0.33 10E3/UL (ref 0–0.7)
EOSINOPHIL NFR BLD AUTO: 2.8 %
ERYTHROCYTE [DISTWIDTH] IN BLOOD BY AUTOMATED COUNT: 14.7 % (ref 10–15)
FRAGMENTS BLD QL SMEAR: SLIGHT
GLUCOSE SERPL-MCNC: 91 MG/DL (ref 70–99)
HCO3 SERPL-SCNC: 23 MMOL/L (ref 22–29)
HCT VFR BLD AUTO: 40 % (ref 40–53)
HGB BLD-MCNC: 13.1 G/DL (ref 13.3–17.7)
HOWELL-JOLLY BOD BLD QL SMEAR: PRESENT
IMM GRANULOCYTES # BLD: 0.04 10E3/UL
IMM GRANULOCYTES NFR BLD: 0.3 %
INTERPRETATION ECG - MUSE: NORMAL
LYMPHOCYTES # BLD AUTO: 2.32 10E3/UL (ref 0.8–5.3)
LYMPHOCYTES NFR BLD AUTO: 19.9 %
MCH RBC QN AUTO: 28.6 PG (ref 26.5–33)
MCHC RBC AUTO-ENTMCNC: 32.8 G/DL (ref 31.5–36.5)
MCV RBC AUTO: 87.3 FL (ref 78–100)
MONOCYTES # BLD AUTO: 1.05 10E3/UL (ref 0–1.3)
MONOCYTES NFR BLD AUTO: 9 %
NEUTROPHILS # BLD AUTO: 7.87 10E3/UL (ref 1.6–8.3)
NEUTROPHILS NFR BLD AUTO: 67.7 %
NRBC # BLD AUTO: <0.03 10E3/UL
NRBC BLD AUTO-RTO: 0 /100
P AXIS - MUSE: 40 DEGREES
PLAT MORPH BLD: ABNORMAL
PLATELET # BLD AUTO: 230 10E3/UL (ref 150–450)
POTASSIUM SERPL-SCNC: 4 MMOL/L (ref 3.4–5.3)
PR INTERVAL - MUSE: 158 MS
QRS DURATION - MUSE: 84 MS
QT - MUSE: 362 MS
QTC - MUSE: 430 MS
R AXIS - MUSE: -24 DEGREES
RBC # BLD AUTO: 4.58 10E6/UL (ref 4.4–5.9)
RBC MORPH BLD: ABNORMAL
SODIUM SERPL-SCNC: 135 MMOL/L (ref 135–145)
SYSTOLIC BLOOD PRESSURE - MUSE: NORMAL MMHG
T AXIS - MUSE: 1 DEGREES
TARGETS BLD QL SMEAR: SLIGHT
TROPONIN T SERPL HS-MCNC: <6 NG/L
VENTRICULAR RATE- MUSE: 85 BPM
WBC # BLD AUTO: 11.64 10E3/UL (ref 4–11)

## 2025-09-01 PROCEDURE — 85025 COMPLETE CBC W/AUTO DIFF WBC: CPT | Performed by: PHYSICIAN ASSISTANT

## 2025-09-01 PROCEDURE — 71046 X-RAY EXAM CHEST 2 VIEWS: CPT

## 2025-09-01 PROCEDURE — 80048 BASIC METABOLIC PNL TOTAL CA: CPT | Performed by: PHYSICIAN ASSISTANT

## 2025-09-01 PROCEDURE — 99285 EMERGENCY DEPT VISIT HI MDM: CPT | Mod: 25 | Performed by: EMERGENCY MEDICINE

## 2025-09-01 PROCEDURE — 84484 ASSAY OF TROPONIN QUANT: CPT | Performed by: PHYSICIAN ASSISTANT

## 2025-09-01 PROCEDURE — 36415 COLL VENOUS BLD VENIPUNCTURE: CPT | Performed by: PHYSICIAN ASSISTANT

## 2025-09-01 ASSESSMENT — ACTIVITIES OF DAILY LIVING (ADL)
ADLS_ACUITY_SCORE: 41
ADLS_ACUITY_SCORE: 41